# Patient Record
Sex: MALE | Race: WHITE | Employment: OTHER | URBAN - METROPOLITAN AREA
[De-identification: names, ages, dates, MRNs, and addresses within clinical notes are randomized per-mention and may not be internally consistent; named-entity substitution may affect disease eponyms.]

---

## 2017-02-01 ENCOUNTER — HOSPITAL ENCOUNTER (OUTPATIENT)
Dept: CT IMAGING | Age: 67
Discharge: HOME | End: 2017-02-01
Attending: SURGERY
Payer: MEDICARE

## 2017-02-01 DIAGNOSIS — I71.02 AORTIC DISSECTION, ABDOMINAL (HCC): ICD-10-CM

## 2017-02-01 LAB — CREAT UR-MCNC: 1.3 MG/DL (ref 0.6–1.3)

## 2017-02-01 PROCEDURE — 74174 CTA ABD&PLVS W/CONTRAST: CPT

## 2017-02-01 PROCEDURE — 82565 ASSAY OF CREATININE: CPT

## 2017-02-01 PROCEDURE — 74011636320 HC RX REV CODE- 636/320: Performed by: SURGERY

## 2017-02-01 RX ADMIN — IOPAMIDOL 100 ML: 755 INJECTION, SOLUTION INTRAVENOUS at 08:50

## 2017-02-07 ENCOUNTER — OFFICE VISIT (OUTPATIENT)
Dept: VASCULAR SURGERY | Age: 67
End: 2017-02-07

## 2017-02-07 VITALS
BODY MASS INDEX: 34.46 KG/M2 | WEIGHT: 260 LBS | RESPIRATION RATE: 20 BRPM | HEIGHT: 73 IN | HEART RATE: 88 BPM | DIASTOLIC BLOOD PRESSURE: 84 MMHG | SYSTOLIC BLOOD PRESSURE: 138 MMHG

## 2017-02-07 DIAGNOSIS — Z72.0 TOBACCO ABUSE: ICD-10-CM

## 2017-02-07 DIAGNOSIS — I71.40 ABDOMINAL AORTIC ANEURYSM (AAA) WITHOUT RUPTURE: Primary | ICD-10-CM

## 2017-02-07 NOTE — MR AVS SNAPSHOT
Visit Information Date & Time Provider Department Dept. Phone Encounter #  
 2/7/2017  9:15 AM Amanda Rothman, [address] [facility] [phone] [id number] Upcoming Health Maintenance Date Due DTaP/Tdap/Td series (1 - Tdap) 3/5/1972 FOBT Q 1 YEAR AGE 50-75 3/5/2001 ZOSTER VACCINE AGE 60> 3/5/2011 GLAUCOMA SCREENING Q2Y 3/5/2016 Pneumococcal 65+ Low/Medium Risk (1 of 2 - PCV13) 3/5/2016 MEDICARE YEARLY EXAM 3/5/2016 INFLUENZA AGE 9 TO ADULT 8/1/2016 Allergies as of 2/7/2017  Review Complete On: 2/7/2017 By: Risa Lobato LPN No Known Allergies Current Immunizations  Reviewed on 11/9/2015 Name Date Influenza Vaccine (Quad) PF 11/9/2015 Not reviewed this visit Vitals BP Pulse Resp Height(growth percentile) Weight(growth percentile) BMI  
 138/84 (BP 1 Location: Left arm, BP Patient Position: Sitting) 88 20 6' 1\" (1.854 m) 260 lb (117.9 kg) 34.3 kg/m2 Smoking Status Current Every Day Smoker Vitals History BMI and BSA Data Body Mass Index Body Surface Area  
 34.3 kg/m 2 2.46 m 2 Preferred Pharmacy Pharmacy Name Phone [facility] [address] [place], [address] Your Updated Medication List  
  
   
This list is accurate as of: 2/7/17  9:47 AM.  Always use your most recent med list.  
  
  
  
  
 albuterol 90 mcg/actuation inhaler Commonly known as:  PROVENTIL HFA, VENTOLIN HFA, PROAIR HFA Take 2 Puffs by inhalation every six (6) hours as needed for Wheezing. aspirin 81 mg chewable tablet Take 1 Tab by mouth daily. ATENOLOL PO Take 1 Tab by mouth. atorvastatin 40 mg tablet Commonly known as:  LIPITOR Take 40 mg by mouth two (2) times a day. carvedilol 6.25 mg tablet Commonly known as:  Ezequiel Liter Take  by mouth two (2) times daily (with meals). cyanocobalamin 1,000 mcg tablet Take 1,000 mcg by mouth daily. dexlansoprazole 30 mg capsule Commonly known as:  DEXILANT Take 1 Cap by mouth daily. ergocalciferol 50,000 unit capsule Commonly known as:  ERGOCALCIFEROL Take 1 capsule PO weekly, will be followed by 2000 units PO daily when complete  
  
 fluticasone-salmeterol 100-50 mcg/dose diskus inhaler Commonly known as:  ADVAIR Take 1 Puff by inhalation every twelve (12) hours. Indications: BRONCHOSPASM PREVENTION WITH COPD  
  
 furosemide 40 mg tablet Commonly known as:  LASIX Take  by mouth every seven (7) days. gabapentin 300 mg capsule Commonly known as:  NEURONTIN Take 2 by mouth every pm  
  
 * levothyroxine 137 mcg tablet Commonly known as:  SYNTHROID Take 137 mcg by mouth Daily (before breakfast). * levothyroxine 125 mcg tablet Commonly known as:  SYNTHROID  
  
 lisinopril 10 mg tablet Commonly known as:  Nonah Leidy Take 1 Tab by mouth daily. metoprolol tartrate 25 mg tablet Commonly known as:  LOPRESSOR Take 1 Tab by mouth every twelve (12) hours. nitroglycerin 0.4 mg SL tablet Commonly known as:  NITROSTAT  
1 Tab by SubLINGual route every five (5) minutes as needed for Chest Pain (if chest pain not resolved after taking 3 call 911 ). omega-3 acid ethyl esters 1 gram capsule Commonly known as:  Anthonette Rolling Take 2 Caps by mouth daily (with breakfast). rosuvastatin 20 mg tablet Commonly known as:  CRESTOR Take 1 Tab by mouth nightly. traMADol 50 mg tablet Commonly known as:  ULTRAM  
Take 1 Tab by mouth nightly as needed for Pain. Max Daily Amount: 50 mg. Indications: PAIN  
  
 * Notice: This list has 2 medication(s) that are the same as other medications prescribed for you. Read the directions carefully, and ask your doctor or other care provider to review them with you. Please provide this summary of care documentation to your next provider. Your primary care clinician is listed as Demetria Stroud. If you have any questions after today's visit, please call [phone].

## 2017-02-22 DIAGNOSIS — I71.02 AORTIC DISSECTION, ABDOMINAL (HCC): Primary | ICD-10-CM

## 2017-02-22 NOTE — PROGRESS NOTES
Type of procedure: CTA of abd/pelvis      Weight in appropriate:yes    Name of pre scriber:Dr. Eda Shaver    Date and time order was received: 02/22/17/1006

## 2017-02-23 ENCOUNTER — DOCUMENTATION ONLY (OUTPATIENT)
Dept: VASCULAR SURGERY | Age: 67
End: 2017-02-23

## 2017-02-23 NOTE — PROGRESS NOTES
Per Laureano Hodge at [facility] pt's aneurysm has not met criteria for the surgery per Dr Marcellus Diego. Surgery canceled because [facility] would not give authorization for the surgery.

## 2017-03-01 ENCOUNTER — TELEPHONE (OUTPATIENT)
Dept: VASCULAR SURGERY | Age: 67
End: 2017-03-01

## 2017-03-01 NOTE — TELEPHONE ENCOUNTER
Pt called and wanted to speak with Dr. Bharathi Farrell. He was suppose to have surgery on 02/23/17 but was cancelled by his cardiologist. He has been trying to get in touch with Dr. Tyler Kaur and see what is going on and could not get in touch with him. He knows that Dr. Tyler Kaur and Dr. Bharathi Farrell talked and he would like to know what is going on. Can you please rely this message. Pt will wait for a return call.

## 2017-03-06 ENCOUNTER — TELEPHONE (OUTPATIENT)
Dept: VASCULAR SURGERY | Age: 67
End: 2017-03-06

## 2017-03-06 NOTE — TELEPHONE ENCOUNTER
Discussed with provider , patient needs to get new CTA in  3 mo from the date of the last CTA. Called the patient and let him know this and he is agreeable to this plan. I advised would have someone call him with all his dates for scan and follow up appt.

## 2017-03-06 NOTE — TELEPHONE ENCOUNTER
Pt called, he states that has not heard anything and would like some answers. He said Dr Ron Garcia and Dr Lele Evans where suppose to discuss with Jourdan why he wants to do surgery. His insurance refussed the referral.  I did explain to him that Joe Guo is out on vacation this week.

## 2017-03-15 ENCOUNTER — TELEPHONE (OUTPATIENT)
Dept: VASCULAR SURGERY | Age: 67
End: 2017-03-15

## 2017-04-18 ENCOUNTER — TELEPHONE (OUTPATIENT)
Dept: VASCULAR SURGERY | Age: 67
End: 2017-04-18

## 2017-04-18 NOTE — TELEPHONE ENCOUNTER
Called PT and advised that notes were faxed to Dr. Darinel Mooney and Dr. Sunil Haque and he is aware of it.

## 2017-05-09 RX ORDER — ACETAMINOPHEN AND CODEINE PHOSPHATE 300; 30 MG/1; MG/1
1 TABLET ORAL
COMMUNITY
End: 2017-05-09

## 2017-05-11 NOTE — H&P
History and Physical   55-year-old male following up regarding surveillance and care for aortic aneurysm. He has this history of a thoracic dissection repaired at [facility] with a endovascular device. He has had no new abdominal or back pain he does have chronic back pain for which he had his last spinal injection in October 2016. He is treated for COPD with multiple inhalers, chronic back pain, degenerative aorta with aneurysm. He does continue to smoke. He has no new chest pain chronic shortness of breath and his ambulation is limited he walks with a cane.           Past Medical History   Diagnosis Date    CAD (coronary artery disease), native coronary artery         ALIDA X 2 to OM1    Carotid duplex 08/13/2013       Mild <50% bilateral ICA plaquing.  Complete heart block [facility]) June 2013       Medtronic dual chamber pacemaker    COPD (chronic obstructive pulmonary disease) (HCC)      Gastritis      GERD (gastroesophageal reflux disease)      High cholesterol      History of echocardiogram 06/04/2013       EF 60-65%. No WMA. Gr 1 DDfx. No significant valvular heart disease.  History of myocardial perfusion scan 04/18/2011       No ischemia or prior infarction. No WMA. EF 52%. Neg EKG on pharm stress test.    Hypertension      Hypothyroidism      Lower extremity venous duplex 01/06/2015       No DVT. Superficial insufficiency of right GSV. Deep venous reflux in right CFV & fem vein. Deep venous reflux in left CFV.  Renal duplex 12/24/2014       Mild < 60% RRA stenosis. Low parenchymal & LRA flow. Renal asymmetry. Intrinsic/med disease in right kidney. AAA (3.8 x 4.0 cm) infrarenal.    Right groin hernia         not resolved    S/P cardiac cath 06/02/2013       Diffuse atherosclerosis throughout. pRCA 50%. mLM 30%. mLAD 40%. oD2 (sm) 100%. pLCX 30%. p/mOM1 95/80% (o/lapping 2.25 x 23-mm & 2.25 x 12-mm Xience stents, resid 0%).             Patient Active Problem List   Diagnosis Code    HTN (hypertension) I10    Dyslipidemia E78.5    Aortic dissection, abdominal (HCC) I71.02    Coronary atherosclerosis of native coronary artery I25.10    Complete heart block (HCC) I44.2    Dyspnea R06.00    Lightheadedness R42    Vasovagal response R55    Tobacco abuse Z72.0    AAA (abdominal aortic aneurysm) (HCC) I71.4    Renal artery atherosclerosis, unilateral ([facility]) I70.1    Varicose vein of leg I83.90    Thyroid activity decreased E03.9    Right groin hernia K40.90    CAD (coronary artery disease), native coronary artery I25.10    Gastroesophageal reflux disease without esophagitis K21.9    Hypertriglyceridemia E78.1    Vitamin D deficiency E55.9    Elevated serum creatinine R79.89    HNP (herniated nucleus pulposus), lumbar M51.26    Facet arthritis of lumbar region M46.96    Chronic pain G89.29    Muscle spasm of back M62.830    Tobacco dependency F17.200    Spondylosis of lumbar region without myelopathy or radiculopathy M47.816    Lumbar facet arthropathy (HCC) M12.88    Arthritis of lumbar spine M47.9             Past Surgical History   Procedure Laterality Date    Hx pacemaker   6/2013       Medtronic Pacemaker     Hx coronary stent placement   6/2013    Hx cholecystectomy   2008    Hx hernia repair   1172       Umbilical             Current Outpatient Prescriptions   Medication Sig Dispense Refill    ATENOLOL PO Take 1 Tab by mouth.        levothyroxine (SYNTHROID) 125 mcg tablet          cyanocobalamin 1,000 mcg tablet Take 1,000 mcg by mouth daily.        atorvastatin (LIPITOR) 40 mg tablet Take 40 mg by mouth two (2) times a day.        carvedilol (COREG) 6.25 mg tablet Take by mouth two (2) times daily (with meals).        furosemide (LASIX) 40 mg tablet Take by mouth every seven (7) days.        lisinopril (PRINIVIL, ZESTRIL) 10 mg tablet Take 1 Tab by mouth daily. 30 Tab 3    metoprolol (LOPRESSOR) 25 mg tablet Take 1 Tab by mouth every twelve (12) hours.  60 Tab 3  traMADol (ULTRAM) 50 mg tablet Take 1 Tab by mouth nightly as needed for Pain. Max Daily Amount: 50 mg. Indications: PAIN 30 Tab 0    gabapentin (NEURONTIN) 300 mg capsule Take 2 by mouth every pm 60 Cap 5    fluticasone-salmeterol (ADVAIR) 100-50 mcg/dose diskus inhaler Take 1 Puff by inhalation every twelve (12) hours. Indications: BRONCHOSPASM PREVENTION WITH COPD 1 Inhaler 0    ergocalciferol (ERGOCALCIFEROL) 50,000 unit capsule Take 1 capsule PO weekly, will be followed by 2000 units PO daily when complete 4 Cap 2    omega-3 acid ethyl esters (LOVAZA) 1 gram capsule Take 2 Caps by mouth daily (with breakfast). 180 Cap 3    rosuvastatin (CRESTOR) 20 mg tablet Take 1 Tab by mouth nightly. 90 Tab 0    aspirin 81 mg chewable tablet Take 1 Tab by mouth daily. 30 Tab 11    levothyroxine (SYNTHROID) 137 mcg tablet Take 137 mcg by mouth Daily (before breakfast). 30 Tab 3    albuterol (PROVENTIL HFA, VENTOLIN HFA, PROAIR HFA) 90 mcg/actuation inhaler Take 2 Puffs by inhalation every six (6) hours as needed for Wheezing. 1 Inhaler 0    dexlansoprazole (DEXILANT) 30 mg capsule Take 1 Cap by mouth daily. 90 Cap 3    nitroglycerin (NITROSTAT) 0.4 mg SL tablet 1 Tab by SubLINGual route every five (5) minutes as needed for Chest Pain (if chest pain not resolved after taking 3 call 911 ).  30 Tab 3      No Known Allergies     Review of Systems   A full review of systems was completed times ten organ systems and was deemed negative unless otherwise mentioned in the HPI.     Physical        Visit Vitals    /84 (BP 1 Location: Left arm, BP Patient Position: Sitting)    Pulse 88    Resp 20    Ht 6' 1\" (1.854 m)    Wt 260 lb (117.9 kg)    BMI 34.3 kg/m2         In no acute distress today strong smell of tobacco  No facial symmetry pupils are equal  Neck no JVD I do not hear a carotid bruit  Chest coarse breath sounds throughout limited inspiration  Cardiac regular  Abdomen obese soft nontender unable to palpate any aneurysm  Extremities without edema strength and range of motion seems symmetrical  No signs of acute arterial insufficiency  No skin ulcerations notable  CAT scan shows stable infrarenal aneurysm read a report as 4.1 cm but I measured it to be 4.6 which is similar in size from the last CT  High-resolution images give a good look to the renals and the neck of the aneurysm and can resubmit for possibility of infrarenal stent graft     Impression/Plan:       ICD-10-CM ICD-9-CM     1. Abdominal aortic aneurysm (AAA) without rupture ([address] I71.4 441. 4     2. Tobacco abuse Z72.0 305. 1        EVAR

## 2017-05-17 ENCOUNTER — ANESTHESIA EVENT (OUTPATIENT)
Dept: CARDIOTHORACIC SURGERY | Age: 67
DRG: 269 | End: 2017-05-17
Payer: MEDICARE

## 2017-05-18 ENCOUNTER — HOSPITAL ENCOUNTER (INPATIENT)
Age: 67
LOS: 1 days | Discharge: HOME | DRG: 269 | End: 2017-05-19
Attending: SURGERY | Admitting: SURGERY
Payer: MEDICARE

## 2017-05-18 ENCOUNTER — ANESTHESIA (OUTPATIENT)
Dept: CARDIOTHORACIC SURGERY | Age: 67
DRG: 269 | End: 2017-05-18
Payer: MEDICARE

## 2017-05-18 ENCOUNTER — APPOINTMENT (OUTPATIENT)
Dept: GENERAL RADIOLOGY | Age: 67
DRG: 269 | End: 2017-05-18
Attending: SURGERY
Payer: MEDICARE

## 2017-05-18 PROBLEM — I71.40 AAA (ABDOMINAL AORTIC ANEURYSM) WITHOUT RUPTURE: Status: ACTIVE | Noted: 2017-05-18

## 2017-05-18 LAB
ABO + RH BLD: NORMAL
ACT BLD: 131 SECS (ref 79–138)
ACT BLD: 214 SECS (ref 79–138)
BLOOD GROUP ANTIBODIES SERPL: NORMAL
SPECIMEN EXP DATE BLD: NORMAL

## 2017-05-18 PROCEDURE — 77030019896 HC KT ARTERIAL LN TELE -B: Performed by: SURGERY

## 2017-05-18 PROCEDURE — 74011000258 HC RX REV CODE- 258

## 2017-05-18 PROCEDURE — 77030020061 HC IV BLD WRMR ADMIN SET 3M -B: Performed by: ANESTHESIOLOGY

## 2017-05-18 PROCEDURE — 77030027138 HC INCENT SPIROMETER -A

## 2017-05-18 PROCEDURE — C1768 GRAFT, VASCULAR: HCPCS | Performed by: SURGERY

## 2017-05-18 PROCEDURE — 74011250637 HC RX REV CODE- 250/637: Performed by: SURGERY

## 2017-05-18 PROCEDURE — 77030013058 HC DEV INFL ANGIO BSC -B: Performed by: SURGERY

## 2017-05-18 PROCEDURE — 74011250636 HC RX REV CODE- 250/636: Performed by: SURGERY

## 2017-05-18 PROCEDURE — C1751 CATH, INF, PER/CENT/MIDLINE: HCPCS | Performed by: SURGERY

## 2017-05-18 PROCEDURE — 74011250636 HC RX REV CODE- 250/636

## 2017-05-18 PROCEDURE — 77030010514 HC APPL CLP LIG COVD -B: Performed by: SURGERY

## 2017-05-18 PROCEDURE — 77030011640 HC PAD GRND REM COVD -A: Performed by: SURGERY

## 2017-05-18 PROCEDURE — 77030013079 HC BLNKT BAIR HGGR 3M -A: Performed by: ANESTHESIOLOGY

## 2017-05-18 PROCEDURE — 77030005401 HC CATH RAD ARRO -A: Performed by: ANESTHESIOLOGY

## 2017-05-18 PROCEDURE — 74011250636 HC RX REV CODE- 250/636: Performed by: NURSE ANESTHETIST, CERTIFIED REGISTERED

## 2017-05-18 PROCEDURE — 77030013797 HC KT TRNSDUC PRSSR EDWD -A: Performed by: ANESTHESIOLOGY

## 2017-05-18 PROCEDURE — 77030013797 HC KT TRNSDUC PRSSR EDWD -A: Performed by: SURGERY

## 2017-05-18 PROCEDURE — 74011000250 HC RX REV CODE- 250: Performed by: SURGERY

## 2017-05-18 PROCEDURE — 74011250637 HC RX REV CODE- 250/637: Performed by: NURSE ANESTHETIST, CERTIFIED REGISTERED

## 2017-05-18 PROCEDURE — C1769 GUIDE WIRE: HCPCS | Performed by: SURGERY

## 2017-05-18 PROCEDURE — 77030004530 HC CATH ANGI DX IMGR BSC -A: Performed by: SURGERY

## 2017-05-18 PROCEDURE — B31P1ZZ FLUOROSCOPY OF THORACO-ABDOMINAL AORTA USING LOW OSMOLAR CONTRAST: ICD-10-PCS | Performed by: SURGERY

## 2017-05-18 PROCEDURE — 75953 XR ENDOVASCULAR PLACMNT AAA: CPT

## 2017-05-18 PROCEDURE — 74011250636 HC RX REV CODE- 250/636: Performed by: ANESTHESIOLOGY

## 2017-05-18 PROCEDURE — 77030018673: Performed by: SURGERY

## 2017-05-18 PROCEDURE — C1725 CATH, TRANSLUMIN NON-LASER: HCPCS | Performed by: SURGERY

## 2017-05-18 PROCEDURE — C1894 INTRO/SHEATH, NON-LASER: HCPCS | Performed by: SURGERY

## 2017-05-18 PROCEDURE — 77030004552: Performed by: SURGERY

## 2017-05-18 PROCEDURE — 77030019908 HC STETH ESOPH SIMS -A: Performed by: ANESTHESIOLOGY

## 2017-05-18 PROCEDURE — 65620000000 HC RM CCU GENERAL

## 2017-05-18 PROCEDURE — 77030028837 HC SYR ANGI PWR INJ COEU -A: Performed by: SURGERY

## 2017-05-18 PROCEDURE — 76060000037 HC ANESTHESIA 3 TO 3.5 HR: Performed by: SURGERY

## 2017-05-18 PROCEDURE — 77030020782 HC GWN BAIR PAWS FLX 3M -B: Performed by: SURGERY

## 2017-05-18 PROCEDURE — 85347 COAGULATION TIME ACTIVATED: CPT

## 2017-05-18 PROCEDURE — 77030002986 HC SUT PROL J&J -A: Performed by: SURGERY

## 2017-05-18 PROCEDURE — 74011000250 HC RX REV CODE- 250

## 2017-05-18 PROCEDURE — 77030026438 HC STYL ET INTUB CARD -A: Performed by: ANESTHESIOLOGY

## 2017-05-18 PROCEDURE — 77030018719 HC DRSG PTCH ANTIMIC J&J -A: Performed by: ANESTHESIOLOGY

## 2017-05-18 PROCEDURE — 04V03E6 RESTRICT ABD AORTA, BIFURC, W FENESTR DEV 1 OR 2, PERC: ICD-10-PCS | Performed by: SURGERY

## 2017-05-18 PROCEDURE — 74011636320 HC RX REV CODE- 636/320

## 2017-05-18 PROCEDURE — 77030002933 HC SUT MCRYL J&J -A: Performed by: SURGERY

## 2017-05-18 PROCEDURE — 77030034850: Performed by: SURGERY

## 2017-05-18 PROCEDURE — 77030003390 HC NDL ANGI MRTM -A: Performed by: SURGERY

## 2017-05-18 PROCEDURE — 77030004922 HC CATH DX HI COOK -B: Performed by: SURGERY

## 2017-05-18 PROCEDURE — 74011250636 HC RX REV CODE- 250/636: Performed by: PHYSICIAN ASSISTANT

## 2017-05-18 PROCEDURE — 86900 BLOOD TYPING SEROLOGIC ABO: CPT | Performed by: SURGERY

## 2017-05-18 PROCEDURE — 77010033678 HC OXYGEN DAILY

## 2017-05-18 PROCEDURE — 74011636320 HC RX REV CODE- 636/320: Performed by: SURGERY

## 2017-05-18 PROCEDURE — 77030002996 HC SUT SLK J&J -A: Performed by: SURGERY

## 2017-05-18 PROCEDURE — 76010000202 HC CV SURG 3 TO 3.5 HR INTENSV-TIER 1: Performed by: SURGERY

## 2017-05-18 PROCEDURE — 77030008683 HC TU ET CUF COVD -A: Performed by: ANESTHESIOLOGY

## 2017-05-18 PROCEDURE — C1753 CATH, INTRAVAS ULTRASOUND: HCPCS | Performed by: SURGERY

## 2017-05-18 DEVICE — STENT GRAFT ETLW1613C124E ENDUR II LIMB
Type: IMPLANTABLE DEVICE | Site: AORTA | Status: FUNCTIONAL
Brand: ENDURANT® II

## 2017-05-18 DEVICE — STENT GRAFT ESBF3614C103E ENDUR IIS BIF
Type: IMPLANTABLE DEVICE | Site: AORTA | Status: FUNCTIONAL
Brand: ENDURANT® IIS

## 2017-05-18 RX ORDER — NITROGLYCERIN 0.4 MG/1
0.4 TABLET SUBLINGUAL
Status: DISCONTINUED | OUTPATIENT
Start: 2017-05-18 | End: 2017-05-19 | Stop reason: HOSPADM

## 2017-05-18 RX ORDER — OMEGA-3-ACID ETHYL ESTERS 1 G/1
2 CAPSULE, LIQUID FILLED ORAL
Status: DISCONTINUED | OUTPATIENT
Start: 2017-05-19 | End: 2017-05-19 | Stop reason: HOSPADM

## 2017-05-18 RX ORDER — HYDROMORPHONE HYDROCHLORIDE 1 MG/ML
.5-1 INJECTION, SOLUTION INTRAMUSCULAR; INTRAVENOUS; SUBCUTANEOUS
Status: DISCONTINUED | OUTPATIENT
Start: 2017-05-18 | End: 2017-05-19 | Stop reason: HOSPADM

## 2017-05-18 RX ORDER — SUCCINYLCHOLINE CHLORIDE 20 MG/ML
INJECTION INTRAMUSCULAR; INTRAVENOUS AS NEEDED
Status: DISCONTINUED | OUTPATIENT
Start: 2017-05-18 | End: 2017-05-18 | Stop reason: HOSPADM

## 2017-05-18 RX ORDER — SODIUM CHLORIDE 0.9 % (FLUSH) 0.9 %
5-10 SYRINGE (ML) INJECTION AS NEEDED
Status: DISCONTINUED | OUTPATIENT
Start: 2017-05-18 | End: 2017-05-18 | Stop reason: HOSPADM

## 2017-05-18 RX ORDER — CEFAZOLIN SODIUM 2 G/50ML
2 SOLUTION INTRAVENOUS EVERY 8 HOURS
Status: DISCONTINUED | OUTPATIENT
Start: 2017-05-18 | End: 2017-05-18 | Stop reason: HOSPADM

## 2017-05-18 RX ORDER — OXYCODONE AND ACETAMINOPHEN 5; 325 MG/1; MG/1
1 TABLET ORAL
Status: DISCONTINUED | OUTPATIENT
Start: 2017-05-18 | End: 2017-05-19 | Stop reason: HOSPADM

## 2017-05-18 RX ORDER — GUAIFENESIN 100 MG/5ML
81 LIQUID (ML) ORAL DAILY
Status: DISCONTINUED | OUTPATIENT
Start: 2017-05-19 | End: 2017-05-19 | Stop reason: HOSPADM

## 2017-05-18 RX ORDER — ATORVASTATIN CALCIUM 40 MG/1
40 TABLET, FILM COATED ORAL
Status: DISCONTINUED | OUTPATIENT
Start: 2017-05-18 | End: 2017-05-19 | Stop reason: HOSPADM

## 2017-05-18 RX ORDER — GABAPENTIN 300 MG/1
300 CAPSULE ORAL 3 TIMES DAILY
Status: DISCONTINUED | OUTPATIENT
Start: 2017-05-18 | End: 2017-05-19 | Stop reason: HOSPADM

## 2017-05-18 RX ORDER — LIDOCAINE HYDROCHLORIDE 10 MG/ML
INJECTION, SOLUTION EPIDURAL; INFILTRATION; INTRACAUDAL; PERINEURAL AS NEEDED
Status: DISCONTINUED | OUTPATIENT
Start: 2017-05-18 | End: 2017-05-18 | Stop reason: HOSPADM

## 2017-05-18 RX ORDER — ONDANSETRON 2 MG/ML
INJECTION INTRAMUSCULAR; INTRAVENOUS AS NEEDED
Status: DISCONTINUED | OUTPATIENT
Start: 2017-05-18 | End: 2017-05-18 | Stop reason: HOSPADM

## 2017-05-18 RX ORDER — SODIUM CHLORIDE 9 MG/ML
INJECTION, SOLUTION INTRAVENOUS
Status: DISCONTINUED | OUTPATIENT
Start: 2017-05-18 | End: 2017-05-18 | Stop reason: HOSPADM

## 2017-05-18 RX ORDER — FENTANYL CITRATE 50 UG/ML
50 INJECTION, SOLUTION INTRAMUSCULAR; INTRAVENOUS AS NEEDED
Status: DISCONTINUED | OUTPATIENT
Start: 2017-05-18 | End: 2017-05-19 | Stop reason: HOSPADM

## 2017-05-18 RX ORDER — SODIUM CHLORIDE 0.9 % (FLUSH) 0.9 %
5-10 SYRINGE (ML) INJECTION EVERY 8 HOURS
Status: DISCONTINUED | OUTPATIENT
Start: 2017-05-18 | End: 2017-05-18 | Stop reason: HOSPADM

## 2017-05-18 RX ORDER — PROPOFOL 10 MG/ML
INJECTION, EMULSION INTRAVENOUS AS NEEDED
Status: DISCONTINUED | OUTPATIENT
Start: 2017-05-18 | End: 2017-05-18 | Stop reason: HOSPADM

## 2017-05-18 RX ORDER — BUDESONIDE AND FORMOTEROL FUMARATE DIHYDRATE 80; 4.5 UG/1; UG/1
2 AEROSOL RESPIRATORY (INHALATION)
Status: DISCONTINUED | OUTPATIENT
Start: 2017-05-18 | End: 2017-05-19 | Stop reason: HOSPADM

## 2017-05-18 RX ORDER — SODIUM CHLORIDE 0.9 % (FLUSH) 0.9 %
5-10 SYRINGE (ML) INJECTION AS NEEDED
Status: DISCONTINUED | OUTPATIENT
Start: 2017-05-18 | End: 2017-05-19 | Stop reason: HOSPADM

## 2017-05-18 RX ORDER — ATENOLOL 50 MG/1
25 TABLET ORAL DAILY
Status: DISCONTINUED | OUTPATIENT
Start: 2017-05-19 | End: 2017-05-19 | Stop reason: HOSPADM

## 2017-05-18 RX ORDER — PANTOPRAZOLE SODIUM 40 MG/1
40 TABLET, DELAYED RELEASE ORAL
Status: DISCONTINUED | OUTPATIENT
Start: 2017-05-19 | End: 2017-05-19 | Stop reason: HOSPADM

## 2017-05-18 RX ORDER — HEPARIN SODIUM 200 [USP'U]/100ML
INJECTION, SOLUTION INTRAVENOUS
Status: DISCONTINUED
Start: 2017-05-18 | End: 2017-05-18

## 2017-05-18 RX ORDER — SODIUM CHLORIDE, SODIUM LACTATE, POTASSIUM CHLORIDE, CALCIUM CHLORIDE 600; 310; 30; 20 MG/100ML; MG/100ML; MG/100ML; MG/100ML
75 INJECTION, SOLUTION INTRAVENOUS CONTINUOUS
Status: DISCONTINUED | OUTPATIENT
Start: 2017-05-18 | End: 2017-05-19 | Stop reason: HOSPADM

## 2017-05-18 RX ORDER — ONDANSETRON 2 MG/ML
4 INJECTION INTRAMUSCULAR; INTRAVENOUS
Status: DISCONTINUED | OUTPATIENT
Start: 2017-05-18 | End: 2017-05-19 | Stop reason: HOSPADM

## 2017-05-18 RX ORDER — FAMOTIDINE 20 MG/1
20 TABLET, FILM COATED ORAL ONCE
Status: COMPLETED | OUTPATIENT
Start: 2017-05-18 | End: 2017-05-18

## 2017-05-18 RX ORDER — MAGNESIUM SULFATE 100 %
4 CRYSTALS MISCELLANEOUS AS NEEDED
Status: DISCONTINUED | OUTPATIENT
Start: 2017-05-18 | End: 2017-05-19 | Stop reason: HOSPADM

## 2017-05-18 RX ORDER — IODIXANOL 320 MG/ML
INJECTION, SOLUTION INTRAVASCULAR
Status: DISCONTINUED
Start: 2017-05-18 | End: 2017-05-18

## 2017-05-18 RX ORDER — NALOXONE HYDROCHLORIDE 0.4 MG/ML
0.4 INJECTION, SOLUTION INTRAMUSCULAR; INTRAVENOUS; SUBCUTANEOUS AS NEEDED
Status: DISCONTINUED | OUTPATIENT
Start: 2017-05-18 | End: 2017-05-19 | Stop reason: HOSPADM

## 2017-05-18 RX ORDER — ALBUTEROL SULFATE 90 UG/1
2 AEROSOL, METERED RESPIRATORY (INHALATION)
Status: DISCONTINUED | OUTPATIENT
Start: 2017-05-18 | End: 2017-05-19 | Stop reason: HOSPADM

## 2017-05-18 RX ORDER — SODIUM CHLORIDE 0.9 % (FLUSH) 0.9 %
5-10 SYRINGE (ML) INJECTION EVERY 8 HOURS
Status: DISCONTINUED | OUTPATIENT
Start: 2017-05-18 | End: 2017-05-19 | Stop reason: HOSPADM

## 2017-05-18 RX ORDER — MIDAZOLAM HYDROCHLORIDE 1 MG/ML
INJECTION, SOLUTION INTRAMUSCULAR; INTRAVENOUS AS NEEDED
Status: DISCONTINUED | OUTPATIENT
Start: 2017-05-18 | End: 2017-05-18 | Stop reason: HOSPADM

## 2017-05-18 RX ORDER — HYDROMORPHONE HYDROCHLORIDE 2 MG/ML
0.5 INJECTION, SOLUTION INTRAMUSCULAR; INTRAVENOUS; SUBCUTANEOUS AS NEEDED
Status: DISCONTINUED | OUTPATIENT
Start: 2017-05-18 | End: 2017-05-19 | Stop reason: SDUPTHER

## 2017-05-18 RX ORDER — LIDOCAINE HYDROCHLORIDE 10 MG/ML
INJECTION, SOLUTION EPIDURAL; INFILTRATION; INTRACAUDAL; PERINEURAL
Status: DISCONTINUED
Start: 2017-05-18 | End: 2017-05-18

## 2017-05-18 RX ORDER — IODIXANOL 320 MG/ML
INJECTION, SOLUTION INTRAVASCULAR AS NEEDED
Status: DISCONTINUED | OUTPATIENT
Start: 2017-05-18 | End: 2017-05-18 | Stop reason: HOSPADM

## 2017-05-18 RX ORDER — HEPARIN SODIUM 1000 [USP'U]/ML
INJECTION, SOLUTION INTRAVENOUS; SUBCUTANEOUS AS NEEDED
Status: DISCONTINUED | OUTPATIENT
Start: 2017-05-18 | End: 2017-05-18 | Stop reason: HOSPADM

## 2017-05-18 RX ORDER — CEFAZOLIN SODIUM 2 G/50ML
2 SOLUTION INTRAVENOUS EVERY 8 HOURS
Status: COMPLETED | OUTPATIENT
Start: 2017-05-18 | End: 2017-05-19

## 2017-05-18 RX ORDER — DEXTROSE 50 % IN WATER (D50W) INTRAVENOUS SYRINGE
25-50 AS NEEDED
Status: DISCONTINUED | OUTPATIENT
Start: 2017-05-18 | End: 2017-05-19 | Stop reason: HOSPADM

## 2017-05-18 RX ORDER — FENTANYL CITRATE 50 UG/ML
INJECTION, SOLUTION INTRAMUSCULAR; INTRAVENOUS AS NEEDED
Status: DISCONTINUED | OUTPATIENT
Start: 2017-05-18 | End: 2017-05-18 | Stop reason: HOSPADM

## 2017-05-18 RX ORDER — SODIUM CHLORIDE, SODIUM LACTATE, POTASSIUM CHLORIDE, CALCIUM CHLORIDE 600; 310; 30; 20 MG/100ML; MG/100ML; MG/100ML; MG/100ML
75 INJECTION, SOLUTION INTRAVENOUS CONTINUOUS
Status: DISCONTINUED | OUTPATIENT
Start: 2017-05-18 | End: 2017-05-18 | Stop reason: HOSPADM

## 2017-05-18 RX ORDER — LEVOTHYROXINE SODIUM 125 UG/1
125 TABLET ORAL
Status: DISCONTINUED | OUTPATIENT
Start: 2017-05-19 | End: 2017-05-19 | Stop reason: HOSPADM

## 2017-05-18 RX ORDER — FUROSEMIDE 40 MG/1
40 TABLET ORAL
Status: DISCONTINUED | OUTPATIENT
Start: 2017-05-18 | End: 2017-05-19 | Stop reason: HOSPADM

## 2017-05-18 RX ADMIN — FAMOTIDINE 20 MG: 20 TABLET ORAL at 07:44

## 2017-05-18 RX ADMIN — SODIUM CHLORIDE, SODIUM LACTATE, POTASSIUM CHLORIDE, AND CALCIUM CHLORIDE 75 ML/HR: 600; 310; 30; 20 INJECTION, SOLUTION INTRAVENOUS at 08:05

## 2017-05-18 RX ADMIN — FENTANYL CITRATE 100 MCG: 50 INJECTION, SOLUTION INTRAMUSCULAR; INTRAVENOUS at 08:58

## 2017-05-18 RX ADMIN — ATORVASTATIN CALCIUM 40 MG: 40 TABLET, FILM COATED ORAL at 21:37

## 2017-05-18 RX ADMIN — ONDANSETRON 4 MG: 2 INJECTION INTRAMUSCULAR; INTRAVENOUS at 11:06

## 2017-05-18 RX ADMIN — Medication 10 ML: at 21:36

## 2017-05-18 RX ADMIN — SUCCINYLCHOLINE CHLORIDE 160 MG: 20 INJECTION INTRAMUSCULAR; INTRAVENOUS at 08:58

## 2017-05-18 RX ADMIN — FENTANYL CITRATE 50 MCG: 50 INJECTION, SOLUTION INTRAMUSCULAR; INTRAVENOUS at 11:57

## 2017-05-18 RX ADMIN — Medication 10 ML: at 17:47

## 2017-05-18 RX ADMIN — SODIUM CHLORIDE, SODIUM LACTATE, POTASSIUM CHLORIDE, AND CALCIUM CHLORIDE 75 ML/HR: 600; 310; 30; 20 INJECTION, SOLUTION INTRAVENOUS at 13:52

## 2017-05-18 RX ADMIN — SODIUM CHLORIDE, SODIUM LACTATE, POTASSIUM CHLORIDE, AND CALCIUM CHLORIDE: 600; 310; 30; 20 INJECTION, SOLUTION INTRAVENOUS at 08:47

## 2017-05-18 RX ADMIN — HYDROMORPHONE HYDROCHLORIDE 1 MG: 1 INJECTION, SOLUTION INTRAMUSCULAR; INTRAVENOUS; SUBCUTANEOUS at 16:45

## 2017-05-18 RX ADMIN — HEPARIN SODIUM 2000 UNITS: 1000 INJECTION, SOLUTION INTRAVENOUS; SUBCUTANEOUS at 10:39

## 2017-05-18 RX ADMIN — OXYCODONE HYDROCHLORIDE AND ACETAMINOPHEN 1 TABLET: 5; 325 TABLET ORAL at 20:08

## 2017-05-18 RX ADMIN — HYDROMORPHONE HYDROCHLORIDE 0.5 MG: 2 INJECTION, SOLUTION INTRAMUSCULAR; INTRAVENOUS; SUBCUTANEOUS at 13:25

## 2017-05-18 RX ADMIN — CEFAZOLIN SODIUM 2 G: 2 SOLUTION INTRAVENOUS at 21:37

## 2017-05-18 RX ADMIN — Medication 10 ML: at 08:05

## 2017-05-18 RX ADMIN — ONDANSETRON 4 MG: 2 INJECTION INTRAMUSCULAR; INTRAVENOUS at 17:52

## 2017-05-18 RX ADMIN — GABAPENTIN 300 MG: 300 CAPSULE ORAL at 21:37

## 2017-05-18 RX ADMIN — SODIUM CHLORIDE: 9 INJECTION, SOLUTION INTRAVENOUS at 09:10

## 2017-05-18 RX ADMIN — CEFAZOLIN SODIUM 2 G: 2 SOLUTION INTRAVENOUS at 08:47

## 2017-05-18 RX ADMIN — HEPARIN SODIUM 8000 UNITS: 1000 INJECTION, SOLUTION INTRAVENOUS; SUBCUTANEOUS at 10:06

## 2017-05-18 RX ADMIN — GABAPENTIN 300 MG: 300 CAPSULE ORAL at 17:45

## 2017-05-18 RX ADMIN — IODIXANOL: 320 INJECTION, SOLUTION INTRAVASCULAR at 11:00

## 2017-05-18 RX ADMIN — PROPOFOL 150 MG: 10 INJECTION, EMULSION INTRAVENOUS at 08:58

## 2017-05-18 RX ADMIN — Medication: at 10:00

## 2017-05-18 RX ADMIN — MIDAZOLAM HYDROCHLORIDE 2 MG: 1 INJECTION, SOLUTION INTRAMUSCULAR; INTRAVENOUS at 08:47

## 2017-05-18 RX ADMIN — FUROSEMIDE 40 MG: 40 TABLET ORAL at 17:45

## 2017-05-18 RX ADMIN — CEFAZOLIN SODIUM 2 G: 2 SOLUTION INTRAVENOUS at 13:51

## 2017-05-18 NOTE — ANESTHESIA PROCEDURE NOTES
Arterial Line Placement    Start time: 5/18/2017 9:07 AM  End time: 5/18/2017 9:10 AM  Performed by: Aide Yanez  Authorized by: Aide Yanez     Pre-Procedure  Indications:  Arterial pressure monitoring and blood sampling  Timeout Time: 09:06        Procedure:   Prep:  Chlorhexidine  Orientation:  Left  Location:  Radial artery  Catheter size:  20 G  Number of attempts:  2  Cont Cardiac Output Sensor: No      Assessment:   Post-procedure:  Line secured and sterile dressing applied  Patient Tolerance:  Patient tolerated the procedure well with no immediate complications

## 2017-05-18 NOTE — PROGRESS NOTES
Bedside and Verbal shift change report given to Herrera Sherwood (oncoming nurse) by Silvana Loyd (offgoing nurse). Report included the following information SBAR, Kardex, MAR and Recent Results.        Silvana Loyd

## 2017-05-18 NOTE — IP AVS SNAPSHOT
Current Discharge Medication List  
  
START taking these medications Dose & Instructions Dispensing Information Comments Morning Noon Evening Bedtime  
 oxyCODONE-acetaminophen 5-325 mg per tablet Commonly known as:  PERCOCET Your last dose was: Your next dose is:    
   
   
 Dose:  1-2 Tab Take 1-2 Tabs by mouth every four (4) hours as needed for Pain. Max Daily Amount: 12 Tabs. Quantity:  60 Tab Refills:  0 CONTINUE these medications which have CHANGED Dose & Instructions Dispensing Information Comments Morning Noon Evening Bedtime  
 gabapentin 300 mg capsule Commonly known as:  NEURONTIN What changed:   
- how much to take - when to take this 
- additional instructions Your last dose was: Your next dose is: Take 2 by mouth every pm  
 Quantity:  60 Cap Refills:  5 CONTINUE these medications which have NOT CHANGED Dose & Instructions Dispensing Information Comments Morning Noon Evening Bedtime  
 acetaminophen 500 mg tablet Commonly known as:  TYLENOL Your last dose was: Your next dose is:    
   
   
 Dose:  500 mg Take 500 mg by mouth every six (6) hours as needed for Pain. Refills:  0  
     
   
   
   
  
 albuterol 90 mcg/actuation inhaler Commonly known as:  PROVENTIL HFA, VENTOLIN HFA, PROAIR HFA Your last dose was: Your next dose is:    
   
   
 Dose:  2 Puff Take 2 Puffs by inhalation every six (6) hours as needed for Wheezing. Quantity:  1 Inhaler Refills:  0  
     
   
   
   
  
 aspirin 81 mg chewable tablet Your last dose was: Your next dose is:    
   
   
 Dose:  81 mg Take 1 Tab by mouth daily. Quantity:  30 Tab Refills:  11  
     
   
   
   
  
 atenolol 25 mg tablet Commonly known as:  TENORMIN Your last dose was:     
   
Your next dose is:    
   
   
 Dose:  25 mg  
 Take 25 mg by mouth daily. Refills:  0  
     
   
   
   
  
 atorvastatin 40 mg tablet Commonly known as:  LIPITOR Your last dose was: Your next dose is:    
   
   
 Dose:  40 mg Take 40 mg by mouth nightly. Refills:  0  
     
   
   
   
  
 dexlansoprazole 30 mg capsule Commonly known as:  DEXILANT Your last dose was: Your next dose is:    
   
   
 Dose:  30 mg Take 1 Cap by mouth daily. Quantity:  90 Cap Refills:  3  
     
   
   
   
  
 fluticasone-salmeterol 100-50 mcg/dose diskus inhaler Commonly known as:  ADVAIR Your last dose was: Your next dose is:    
   
   
 Dose:  1 Puff Take 1 Puff by inhalation every twelve (12) hours. Indications: BRONCHOSPASM PREVENTION WITH COPD Quantity:  1 Inhaler Refills:  0  
     
   
   
   
  
 furosemide 40 mg tablet Commonly known as:  LASIX Your last dose was: Your next dose is: Take  by mouth every seven (7) days. Refills:  0  
     
   
   
   
  
 levothyroxine 125 mcg tablet Commonly known as:  SYNTHROID Your last dose was: Your next dose is:    
   
   
  Refills:  0  
     
   
   
   
  
 nitroglycerin 0.4 mg SL tablet Commonly known as:  NITROSTAT Your last dose was: Your next dose is:    
   
   
 Dose:  0.4 mg  
1 Tab by SubLINGual route every five (5) minutes as needed for Chest Pain (if chest pain not resolved after taking 3 call 911 ). Quantity:  30 Tab Refills:  3  
     
   
   
   
  
 omega-3 acid ethyl esters 1 gram capsule Commonly known as:  Sunshine Catena Your last dose was: Your next dose is:    
   
   
 Dose:  2 g Take 2 Caps by mouth daily (with breakfast). Quantity:  180 Cap Refills:  3 Where to Get Your Medications Information on where to get these meds will be given to you by the nurse or doctor. ! Ask your nurse or doctor about these medications oxyCODONE-acetaminophen 5-325 mg per tablet

## 2017-05-18 NOTE — ROUTINE PROCESS
Bedside, Verbal and Written shift change report given to WILIAM Alcala RN (oncoming nurse) by Jhon Strong RN (offgoing nurse). Report included the following information SBAR, Kardex, Intake/Output and Recent Results. Assumed care of pt laying in bed, aox4, following commands, states pain is 6/10, remains on telemetry, room air, louis, bilateral groin incisions, clean dry intact, right groin has small hematoma noted. 2200  Scheduled med's given, pt tolerated well. Pt resting, watching TV.  0000  Reassessment, pt c/o back pain, request pain med's. Repositioned. Swelling right groin unchanged. 0600  Repositioned, weighed pt, scheduled med's given. Pt appears asymptomatic.  0710  Bedside, Verbal and Written shift change report given to Janene Amaya RN (oncoming nurse) by Pj Sanabria. Rita Alcala RN (offgoing nurse). Report included the following information SBAR, Kardex, Intake/Output and Recent Results.

## 2017-05-18 NOTE — IP AVS SNAPSHOT
[address] [address] 
[phone] Patient: Bo Johnston MRN: [id number] [id number] You are allergic to the following No active allergies Recent Documentation Height Weight BMI Smoking Status 1.854 m 127 kg 36.94 kg/m2 Current Every Day Smoker Emergency Contacts Name Discharge Info Relation Home Work Mobile Katelynn Villa DISCHARGE CAREGIVER [3] Sister [23] [phone] About your hospitalization You were admitted on:  May 18, 2017 You last received care in the:  [facility] CV INTNS[facility] CARE You were discharged on:  May 19, 2017 Unit phone number:  [phone] Why you were hospitalized Your primary diagnosis was:  Not on File Your diagnoses also included:  Aaa (Abdominal Aortic Aneurysm) Without Rupture (Hcc) Providers Seen During Your Hospitalizations Provider Role Specialty Primary office phone Randell Sosa MD Attending Provider Vascular Surgery [phone] Your Primary Care Physician (PCP) Primary Care Physician Office Phone Office Fax [address], [address] [facility] [phone] Follow-up Information Follow up With Details Comments Contact Info Abby Mccarty MD On 5/24/2017 appointment time @10:10am [address] [facility] [id number] [phone] Randell Sosa MD On 6/2/2017 Appointment Time @9:00 [address] [facility] [address] [facility] 
[phone] Your Appointments Friday June 02, 2017  9:00 AM EDT HOSPITAL DISCHARGE with Randell Sosa MD  
[facility] [address] [place] [address]) 2300 [facility] 020 706 [facility]  
[phone] Current Discharge Medication List  
  
START taking these medications Dose & Instructions Dispensing Information Comments Morning Noon Evening Bedtime  
 oxyCODONE-acetaminophen 5-325 mg per tablet Commonly known as:  PERCOCET Your last dose was: Your next dose is:    
   
   
 Dose:  1-2 Tab Take 1-2 Tabs by mouth every four (4) hours as needed for Pain. Max Daily Amount: 12 Tabs. Quantity:  60 Tab Refills:  0 CONTINUE these medications which have CHANGED Dose & Instructions Dispensing Information Comments Morning Noon Evening Bedtime  
 gabapentin 300 mg capsule Commonly known as:  NEURONTIN What changed:   
- how much to take - when to take this 
- additional instructions Your last dose was: Your next dose is: Take 2 by mouth every pm  
 Quantity:  60 Cap Refills:  5 CONTINUE these medications which have NOT CHANGED Dose & Instructions Dispensing Information Comments Morning Noon Evening Bedtime  
 acetaminophen 500 mg tablet Commonly known as:  TYLENOL Your last dose was: Your next dose is:    
   
   
 Dose:  500 mg Take 500 mg by mouth every six (6) hours as needed for Pain. Refills:  0  
     
   
   
   
  
 albuterol 90 mcg/actuation inhaler Commonly known as:  PROVENTIL HFA, VENTOLIN HFA, PROAIR HFA Your last dose was: Your next dose is:    
   
   
 Dose:  2 Puff Take 2 Puffs by inhalation every six (6) hours as needed for Wheezing. Quantity:  1 Inhaler Refills:  0  
     
   
   
   
  
 aspirin 81 mg chewable tablet Your last dose was: Your next dose is:    
   
   
 Dose:  81 mg Take 1 Tab by mouth daily. Quantity:  30 Tab Refills:  11  
     
   
   
   
  
 atenolol 25 mg tablet Commonly known as:  TENORMIN Your last dose was: Your next dose is:    
   
   
 Dose:  25 mg Take 25 mg by mouth daily. Refills:  0  
     
   
   
   
  
 atorvastatin 40 mg tablet Commonly known as:  LIPITOR Your last dose was: Your next dose is:    
   
   
 Dose:  40 mg Take 40 mg by mouth nightly. Refills:  0  
     
   
   
   
  
 dexlansoprazole 30 mg capsule Commonly known as:  DEXILANT Your last dose was: Your next dose is:    
   
   
 Dose:  30 mg Take 1 Cap by mouth daily. Quantity:  90 Cap Refills:  3  
     
   
   
   
  
 fluticasone-salmeterol 100-50 mcg/dose diskus inhaler Commonly known as:  ADVAIR Your last dose was: Your next dose is:    
   
   
 Dose:  1 Puff Take 1 Puff by inhalation every twelve (12) hours. Indications: BRONCHOSPASM PREVENTION WITH COPD Quantity:  1 Inhaler Refills:  0  
     
   
   
   
  
 furosemide 40 mg tablet Commonly known as:  LASIX Your last dose was: Your next dose is: Take  by mouth every seven (7) days. Refills:  0  
     
   
   
   
  
 levothyroxine 125 mcg tablet Commonly known as:  SYNTHROID Your last dose was: Your next dose is:    
   
   
  Refills:  0  
     
   
   
   
  
 nitroglycerin 0.4 mg SL tablet Commonly known as:  NITROSTAT Your last dose was: Your next dose is:    
   
   
 Dose:  0.4 mg  
1 Tab by SubLINGual route every five (5) minutes as needed for Chest Pain (if chest pain not resolved after taking 3 call 911 ). Quantity:  30 Tab Refills:  3  
     
   
   
   
  
 omega-3 acid ethyl esters 1 gram capsule Commonly known as:  Kailey Omar Your last dose was: Your next dose is:    
   
   
 Dose:  2 g Take 2 Caps by mouth daily (with breakfast). Quantity:  180 Cap Refills:  3 Where to Get Your Medications Information on where to get these meds will be given to you by the nurse or doctor. ! Ask your nurse or doctor about these medications  
  oxyCODONE-acetaminophen 5-325 mg per tablet Discharge Instructions Call if fever>101.4F, red/pus at incisions, pain> meds. DISCHARGE SUMMARY from Nurse The following personal items are in your possession at time of discharge: 
 
Dental Appliances: None Visual Aid: None Home Medications: None Jewelry: Suzy Cabezascher Clothing: Shirt, Socks, Footwear, Undergarments, Pants Other Valuables: Eyeglasses, [employer], Wynonia Latty PATIENT INSTRUCTIONS: 
 
 
F-face looks uneven A-arms unable to move or move unevenly S-speech slurred or non-existent T-time-call 911 as soon as signs and symptoms begin-DO NOT go Back to bed or wait to see if you get better-TIME IS BRAIN. Warning Signs of HEART ATTACK Call 911 if you have these symptoms: 
? Chest discomfort. Most heart attacks involve discomfort in the center of the chest that lasts more than a few minutes, or that goes away and comes back. It can feel like uncomfortable pressure, squeezing, fullness, or pain. ? Discomfort in other areas of the upper body. Symptoms can include pain or discomfort in one or both arms, the back, neck, jaw, or stomach. ? Shortness of breath with or without chest discomfort. ? Other signs may include breaking out in a cold sweat, nausea, or lightheadedness. Don't wait more than five minutes to call [address]! Fast action can save your life. Calling 911 is almost always the fastest way to get lifesaving treatment. Emergency Medical Services staff can begin treatment when they arrive  up to an hour sooner than if someone gets to the hospital by car. The discharge information has been reviewed with the patient. The patient verbalized understanding.  
 
Discharge medications reviewed with the patient and appropriate educational materials and side effects teaching were provided. Discharge Instructions Attachments/References AORTIC ANEURYSM ABDOMINAL OR THORACIC: ENDOVASCULAR REPAIR: POST-OP (ENGLISH) Discharge Orders None [facility] Announcement We are excited to announce that we are making your provider's discharge notes available to you in "[employer]t. You will see these notes when they are completed and signed by the physician that discharged you from your recent hospital stay. If you have any questions or concerns about any information you see in "[employer], please call the Health Information Department where you were seen or reach out to your Primary Care Provider for more information about your plan of care. General Information Please provide this summary of care documentation to your next provider. Patient Signature:  ____________________________________________________________ Date:  ____________________________________________________________  
  
Rosana Cabrera Provider Signature:  ____________________________________________________________ Date:  ____________________________________________________________ More Information Endovascular Aortic Aneurysm Repair: What to Expect at Home Your Recovery Endovascular aortic aneurysm repair is a procedure to fix a weak and bulging section of the aorta. The aorta is the large blood vessel (artery) that carries blood from the heart through the belly to the rest of the body. The doctor put a man-made tube called a graft inside the aneurysm. Blood will pass through the graft in the aorta without pushing on the aneurysm. You can expect the cuts (incisions) in your groin to be sore for 1 to 2 weeks. If you have stitches or staples in your incisions, the doctor may need to take them out. You may feel more tired than usual for 1 to 2 weeks after surgery.  You may be able to do many of your usual activities after 1 to 2 weeks. But you will probably need up to 4 weeks to fully recover. Strenuous activities will not hurt the graft in your aorta, but they may cause problems with the incisions in your groin. You can be more active when your groin is no longer sore. This care sheet gives you a general idea about how long it will take for you to recover. But each person recovers at a different pace. Follow the steps below to get better as quickly as possible. How can you care for yourself at home? Activity · Rest when you feel tired. Getting enough sleep will help you recover. · Try to walk each day. Start by walking a little more than you did the day before. Bit by bit, increase the amount you walk. Walking boosts blood flow and helps prevent pneumonia and constipation. · Avoid strenuous activities, such as bicycle riding, jogging, weight lifting, or aerobic exercise. Your doctor will tell you when it's okay to do strenuous activity. · Ask your doctor when you can drive again. · You will probably need to take at least 1 to 2 weeks off from work. It depends on the type of work you do and how you feel. · You may shower as usual. Pat the incisions dry. Do not take a bath for the first 2 weeks, or until your doctor tells you it is okay. Diet · You can eat your normal diet. If your stomach is upset, try bland, low-fat foods like plain rice, broiled chicken, toast, and yogurt. · Drink plenty of fluids (unless your doctor tells you not to). · You may notice that your bowel movements are not regular right after your surgery. This is common. Try to avoid constipation and straining with bowel movements. You may want to take a fiber supplement every day. If you have not had a bowel movement after a couple of days, ask your doctor about taking a mild laxative. Medicines · Your doctor will tell you if and when you can restart your medicines.  He or she will also give you instructions about taking any new medicines. · If you take blood thinners, such as warfarin (Coumadin), clopidogrel (Plavix), or aspirin, be sure to talk to your doctor. He or she will tell you if and when to start taking those medicines again. Make sure that you understand exactly what your doctor wants you to do. · Be safe with medicines. Take pain medicines exactly as directed. ¨ If the doctor gave you a prescription medicine for pain, take it as prescribed. ¨ If you are not taking a prescription pain medicine, ask your doctor if you can take an over-the-counter medicine. ¨ Do not take two or more pain medicines at the same time unless the doctor told you to. Many pain medicines have acetaminophen, which is Tylenol. Too much acetaminophen (Tylenol) can be harmful. · If you think your pain medicine is making you sick to your stomach: 
¨ Take your medicine after meals (unless your doctor has told you not to). ¨ Ask your doctor for a different pain medicine. · If your doctor prescribed antibiotics, take them as directed. Do not stop taking them just because you feel better. You need to take the full course of antibiotics. Incision care · If you have strips of tape on the incisions, leave the tape on for a week or until it falls off. · Wash the area daily with water and pat it dry. Other cleaning products, such as hydrogen peroxide, can make the wounds heal more slowly. You may cover the area with a gauze bandage if it weeps or rubs against clothing. Change the bandage every day. · Keep the area clean and dry. Follow-up care is a key part of your treatment and safety. Be sure to make and go to all appointments, and call your doctor if you are having problems. It's also a good idea to know your test results and keep a list of the medicines you take. When should you call for help? Call 911 anytime you think you may need emergency care. For example, call if: · You passed out (lost consciousness). · You have severe trouble breathing. · You have sudden chest pain and shortness of breath, or you cough up blood or foamy, pink mucus. · You have a lump that is getting bigger under your skin where the incisions were made in your groin. · You have severe pain in your belly. · You have chest pain or pressure. This may occur with: ¨ Sweating. ¨ Shortness of breath. ¨ Nausea or vomiting. ¨ Pain that spreads from the chest to the neck, jaw, or one or both shoulders or arms. ¨ Dizziness or lightheadedness. ¨ A fast or uneven pulse. After calling 911, chew 1 adult-strength or 2 to 4 low-dose aspirin. Wait for an ambulance. Do not try to drive yourself. Call your doctor now or seek immediate medical care if: 
· You have new or increased shortness of breath. · You are dizzy or lightheaded, or you feel like you may faint. · You are sick to your stomach or cannot keep fluids down. · You have pain that does not get better after you take pain medicine. · You have a fever over 100°F. 
· You have loose stitches, or one of your incisions comes open. · Bright red blood has soaked through the bandage over your incisions. · You have signs of infection, such as: 
¨ Increased pain, swelling, warmth, or redness. ¨ Red streaks leading from the incisions. ¨ Pus draining from the incisions. ¨ Swollen lymph nodes in your neck, armpits, or groin. ¨ A fever. · You have signs of a blood clot, such as: 
¨ Pain in your calf, back of the knee, thigh, or groin. ¨ Redness and swelling in your leg or groin. Watch closely for any changes in your health, and be sure to contact your doctor if: 
· You have sudden weight gain, such as 3 pounds or more in 2 to 3 days. · You have increased swelling in your legs, ankles, or feet. Where can you learn more? Go to http://seema.info/.  
Enter L455 in the search box to learn more about \"Endovascular Aortic Aneurysm Repair: What to Expect at Home. \" Current as of: July 21, 2016 Content Version: 11.2 © 5547-0648 [employer]. Care instructions adapted under license by [employer] (which disclaims liability or warranty for this information). If you have questions about a medical condition or this instruction, always ask your healthcare professional. [address] [place] [address] any warranty or liability for your use of this information.

## 2017-05-18 NOTE — ANESTHESIA PREPROCEDURE EVALUATION
Anesthetic History   No history of anesthetic complications            Review of Systems / Medical History  Patient summary reviewed and pertinent labs reviewed    Pulmonary    COPD      Undiagnosed apnea and smoker         Neuro/Psych   Within defined limits           Cardiovascular    Hypertension        Dysrhythmias   Pacemaker, past MI, CAD, PAD and hyperlipidemia      Comments: AAA   GI/Hepatic/Renal     GERD           Endo/Other      Hypothyroidism  Morbid obesity and arthritis     Other Findings   Comments:   Risk Factors for Postoperative nausea/vomiting:       History of postoperative nausea/vomiting? NO       Female? NO       Motion sickness? NO       Intended opioid administration for postoperative analgesia? YES      Smoking Abstinence  Current Smoker? YES  Elective Surgery? YES  Seen preoperatively by anesthesiologist or proxy prior to day of surgery? YES  Pt abstained from smoking 24 hours prior to anesthesia?  NO           Physical Exam    Airway  Mallampati: III  TM Distance: 4 - 6 cm  Neck ROM: normal range of motion        Cardiovascular    Rhythm: regular  Rate: normal         Dental    Dentition: Poor dentition and Loose teeth     Pulmonary      Decreased breath sounds: bilateral           Abdominal  GI exam deferred       Other Findings            Anesthetic Plan    ASA: 4  Anesthesia type: general    Monitoring Plan: Arterial line      Induction: Intravenous  Anesthetic plan and risks discussed with: Patient

## 2017-05-18 NOTE — ANESTHESIA POSTPROCEDURE EVALUATION
Post-Anesthesia Evaluation and Assessment    Patient: Adrian Lee MRN: [id number]  SSN: [id number]    YOB: 1950  Age: 78 y.o. Sex: male     VS from flow sheet    Cardiovascular Function/Vital Signs  Visit Vitals    /69    Pulse 66    Temp 36.6 °C (97.8 °F)    Resp 18    Ht 6' 1\" (1.854 m)    Wt 126.1 kg (278 lb)    SpO2 100%    BMI 36.68 kg/m2       Patient is status post general anesthesia for Procedure(s):  AORTIC ABDOMINAL ANEURYSM REPAIR ENDOVASCULAR (EVAR). Nausea/Vomiting: None    Postoperative hydration reviewed and adequate. Pain:  Pain Scale 1: Numeric (0 - 10) (05/18/17 0721)  Pain Intensity 1: 5 (05/18/17 0721)   Managed    Neurological Status:   Neuro (WDL): Within Defined Limits (05/18/17 0727)   At baseline    Mental Status and Level of Consciousness: Arousable    Pulmonary Status:   O2 Device: Oxygen mask (05/18/17 1154)   Adequate oxygenation and airway patent    Complications related to anesthesia: None    Post-anesthesia assessment completed.  No concerns    Signed By: Camila Mart MD     May 18, 2017

## 2017-05-19 VITALS
HEIGHT: 73 IN | DIASTOLIC BLOOD PRESSURE: 52 MMHG | BODY MASS INDEX: 37.11 KG/M2 | RESPIRATION RATE: 10 BRPM | HEART RATE: 60 BPM | TEMPERATURE: 98 F | WEIGHT: 280 LBS | SYSTOLIC BLOOD PRESSURE: 96 MMHG | OXYGEN SATURATION: 93 %

## 2017-05-19 LAB
ANION GAP BLD CALC-SCNC: 6 MMOL/L (ref 3–18)
BUN SERPL-MCNC: 12 MG/DL (ref 7–18)
BUN/CREAT SERPL: 10 (ref 12–20)
CALCIUM SERPL-MCNC: 8.2 MG/DL (ref 8.5–10.1)
CHLORIDE SERPL-SCNC: 100 MMOL/L (ref 100–108)
CO2 SERPL-SCNC: 29 MMOL/L (ref 21–32)
CREAT SERPL-MCNC: 1.16 MG/DL (ref 0.6–1.3)
ERYTHROCYTE [DISTWIDTH] IN BLOOD BY AUTOMATED COUNT: 14.9 % (ref 11.6–14.5)
GLUCOSE SERPL-MCNC: 99 MG/DL (ref 74–99)
HCT VFR BLD AUTO: 36.1 % (ref 36–48)
HGB BLD-MCNC: 11.7 G/DL (ref 13–16)
MCH RBC QN AUTO: 28.5 PG (ref 24–34)
MCHC RBC AUTO-ENTMCNC: 32.4 G/DL (ref 31–37)
MCV RBC AUTO: 87.8 FL (ref 74–97)
PLATELET # BLD AUTO: 149 K/UL (ref 135–420)
PMV BLD AUTO: 10.5 FL (ref 9.2–11.8)
POTASSIUM SERPL-SCNC: 4.2 MMOL/L (ref 3.5–5.5)
RBC # BLD AUTO: 4.11 M/UL (ref 4.7–5.5)
SODIUM SERPL-SCNC: 135 MMOL/L (ref 136–145)
WBC # BLD AUTO: 9.8 K/UL (ref 4.6–13.2)

## 2017-05-19 PROCEDURE — 74011250636 HC RX REV CODE- 250/636: Performed by: ANESTHESIOLOGY

## 2017-05-19 PROCEDURE — 74011250636 HC RX REV CODE- 250/636: Performed by: SURGERY

## 2017-05-19 PROCEDURE — 85027 COMPLETE CBC AUTOMATED: CPT | Performed by: SURGERY

## 2017-05-19 PROCEDURE — 74011250637 HC RX REV CODE- 250/637: Performed by: SURGERY

## 2017-05-19 PROCEDURE — 80048 BASIC METABOLIC PNL TOTAL CA: CPT | Performed by: SURGERY

## 2017-05-19 RX ORDER — OXYCODONE AND ACETAMINOPHEN 5; 325 MG/1; MG/1
1-2 TABLET ORAL
Qty: 60 TAB | Refills: 0 | Status: SHIPPED | OUTPATIENT
Start: 2017-05-19 | End: 2017-05-30

## 2017-05-19 RX ADMIN — Medication 10 ML: at 05:47

## 2017-05-19 RX ADMIN — ASPIRIN 81 MG 81 MG: 81 TABLET ORAL at 08:16

## 2017-05-19 RX ADMIN — ATENOLOL 25 MG: 50 TABLET ORAL at 08:16

## 2017-05-19 RX ADMIN — CEFAZOLIN SODIUM 2 G: 2 SOLUTION INTRAVENOUS at 05:46

## 2017-05-19 RX ADMIN — BUDESONIDE AND FORMOTEROL FUMARATE DIHYDRATE 2 PUFF: 80; 4.5 AEROSOL RESPIRATORY (INHALATION) at 08:17

## 2017-05-19 RX ADMIN — OXYCODONE HYDROCHLORIDE AND ACETAMINOPHEN 1 TABLET: 5; 325 TABLET ORAL at 00:12

## 2017-05-19 RX ADMIN — HYDROMORPHONE HYDROCHLORIDE 1 MG: 1 INJECTION, SOLUTION INTRAMUSCULAR; INTRAVENOUS; SUBCUTANEOUS at 10:20

## 2017-05-19 RX ADMIN — LEVOTHYROXINE SODIUM 125 MCG: 125 TABLET ORAL at 08:17

## 2017-05-19 RX ADMIN — OMEGA-3-ACID ETHYL ESTERS 2000 MG: 1 CAPSULE, LIQUID FILLED ORAL at 08:17

## 2017-05-19 RX ADMIN — PANTOPRAZOLE SODIUM 40 MG: 40 TABLET, DELAYED RELEASE ORAL at 08:16

## 2017-05-19 RX ADMIN — SODIUM CHLORIDE, SODIUM LACTATE, POTASSIUM CHLORIDE, AND CALCIUM CHLORIDE 75 ML/HR: 600; 310; 30; 20 INJECTION, SOLUTION INTRAVENOUS at 00:25

## 2017-05-19 RX ADMIN — GABAPENTIN 300 MG: 300 CAPSULE ORAL at 08:16

## 2017-05-19 RX ADMIN — OXYCODONE HYDROCHLORIDE AND ACETAMINOPHEN 1 TABLET: 5; 325 TABLET ORAL at 08:17

## 2017-05-19 RX ADMIN — ONDANSETRON 4 MG: 2 INJECTION INTRAMUSCULAR; INTRAVENOUS at 08:16

## 2017-05-19 NOTE — DISCHARGE INSTRUCTIONS
Call if fever>101.4F, red/pus at incisions, pain> meds. DISCHARGE SUMMARY from Nurse    The following personal items are in your possession at time of discharge:    Dental Appliances: None  Visual Aid: None     Home Medications: None  Jewelry: Earrings, Necklace  Clothing: Shirt, Socks, Footwear, Undergarments, Pants  Other Valuables: Eyeglasses, Cell Phone, Wallet, Cane             PATIENT INSTRUCTIONS:    After general anesthesia or intravenous sedation, for 24 hours or while taking prescription Narcotics:  · Limit your activities  · Do not drive and operate hazardous machinery  · Do not make important personal or business decisions  · Do  not drink alcoholic beverages  · If you have not urinated within 8 hours after discharge, please contact your surgeon on call. Report the following to your surgeon:  · Excessive pain, swelling, redness or odor of or around the surgical area  · Temperature over 100.5  · Nausea and vomiting lasting longer than 4 hours or if unable to take medications  · Any signs of decreased circulation or nerve impairment to extremity: change in color, persistent  numbness, tingling, coldness or increase pain  · Any questions        What to do at Home:  Recommended activity: Activity as tolerated    If you experience any of the following symptoms Call if fever>101.4F, red/pus at incisions, pain> meds. *  Please give a list of your current medications to your Primary Care Provider. *  Please update this list whenever your medications are discontinued, doses are      changed, or new medications (including over-the-counter products) are added. *  Please carry medication information at all times in case of emergency situations. These are general instructions for a healthy lifestyle:    No smoking/ No tobacco products/ Avoid exposure to second hand smoke    Surgeon General's Warning:  Quitting smoking now greatly reduces serious risk to your health.     Obesity, smoking, and sedentary lifestyle greatly increases your risk for illness    A healthy diet, regular physical exercise & weight monitoring are important for maintaining a healthy lifestyle    You may be retaining fluid if you have a history of heart failure or if you experience any of the following symptoms:  Weight gain of 3 pounds or more overnight or 5 pounds in a week, increased swelling in our hands or feet or shortness of breath while lying flat in bed. Please call your doctor as soon as you notice any of these symptoms; do not wait until your next office visit. Recognize signs and symptoms of STROKE:    F-face looks uneven    A-arms unable to move or move unevenly    S-speech slurred or non-existent    T-time-call 911 as soon as signs and symptoms begin-DO NOT go       Back to bed or wait to see if you get better-TIME IS BRAIN. Warning Signs of HEART ATTACK     Call 911 if you have these symptoms:   Chest discomfort. Most heart attacks involve discomfort in the center of the chest that lasts more than a few minutes, or that goes away and comes back. It can feel like uncomfortable pressure, squeezing, fullness, or pain.  Discomfort in other areas of the upper body. Symptoms can include pain or discomfort in one or both arms, the back, neck, jaw, or stomach.  Shortness of breath with or without chest discomfort.  Other signs may include breaking out in a cold sweat, nausea, or lightheadedness. Don't wait more than five minutes to call 911 - MINUTES MATTER! Fast action can save your life. Calling 911 is almost always the fastest way to get lifesaving treatment. Emergency Medical Services staff can begin treatment when they arrive -- up to an hour sooner than if someone gets to the hospital by car. The discharge information has been reviewed with the patient. The patient verbalized understanding.     Discharge medications reviewed with the patient and appropriate educational materials and side effects teaching were provided.

## 2017-05-19 NOTE — PROGRESS NOTES
met with patient while in Cardiac ICU, completed the initial Spiritual Assessment of the patient, and offered Pastoral Care, see flow sheets for interventions. Patient was standing up in his room and said he is being discharged today. He is [place]. Pastoral support provided. Patient does not have any Evangelical/cultural needs that will affect patients preferences in health care. Chart reviewed. Chaplains will continue to follow and will provide pastoral care on an as needed/as requested basis. Camacho Nj MDiv.   Board Certified [employer] [phone]

## 2017-05-19 NOTE — PROGRESS NOTES
SW contacted regarding this patient by RN. Per RN patient expressed he cannot afford his discharge pain medication. SW informed her the patient is insured and does not qualify for indigent meds. Additionally, policy explained that indigent meds will not provide narcotic pain medications. Per Saul Strange, without going through insurance, prescription will cost $17.  SW suggested provider recommend another, more affordable, alternative.

## 2017-05-19 NOTE — OP NOTES
[address]    Name:  Cassie Randall  MR#:  [id number]  :  1950  Account #:  [de-identified]  Date of Adm:  2017  Date of Surgery:  2017      SURGEON: Kathy Soliman DO    PREOPERATIVE DIAGNOSIS: Complex aortic aneurysm with  expansion. POSTOPERATIVE DIAGNOSIS: Complex aortic aneurysm with  expansion. PROCEDURES PERFORMED: Bilateral open femoral exposure,  [id number]; placement of catheter in aorta,  05633; repair of aortic  aneurysm with a modular bifurcated device, [id number]; radiology s&I  endovascular aortic repair, [id number]; IVUS noncoronary primary  vessel with interpretation, 78585, [id number]. COMPLICATIONS: 0.    ESTIMATED BLOOD LOSS: 150 mL. CONDITION OF THE PATIENT: Stable. ANESTHESIA: General.    SPECIMENS REMOVED: None. DESCRIPTION OF PROCEDURE: The patient is a 81-year-old with an  overall degenerative aorta. He has had an emergency thoracic  procedure for which he has a stenting of his arch and ascending aorta. Remainder of his aorta has a small saccular aneurysm of the posterior  and visceral area that has been stable and then an enlarging infrarenal  portion that is interesting. The infrarenal portion is tight at the  bifurcation causing pressurization of the sac, likely causing enlarging and  also losing landing zones for endovascular repair. He is at really just  past the minimal criteria to still remain safe for this. I had a lengthy  discussion with him and his team and I feel that if he is going to be a  endovasc, should be down now before he loses his opportunity to be an  endovascular. Certainly open he would be fraught with complications  no doubt. We agreed upon this plan. He was brought to the OR, he had a preoperative antibiotic. He was  transferred to the room where he had general anesthesia, an A-line  was placed. He had 2 peripheral IVs, 18 and 20-gauge. Romero catheter  was placed.  He had a shave, prep and draped from the chest to the  knees following [facility] compliant guidelines for aseptic technique. I did  bilateral open femoral exposures. He had prior femoral artery access,  so there was some scar tissue noted, but safe exposure of the bilateral  common femorals done, double vessel was placed on each. First, I  punctured the right common femoral, placed a wire in the aorta,  followed this wire with a directional catheter all the way up into the  thoracic aorta, placed it within the stent of the repaired thoracic  portion. I then heparinized him with 8000 of heparin and placed an 11-  [place] dilator and sheath in exchange for a stiff wire. Advanced the  IVUS catheter over this wire and performed a loop from the visceral to  the external iliac. Of note, we noted the renal arteries and the diameter  measurements here appeared to be a 15 mm zone of neck for healthy  proximal seal and it was sized appropriate for a 36 graft. The  aneurysm was degenerative, lobular as see on CT. The common iliac  was diffusely 13 and smaller, the external was patent and position of  the hypogastric was noted. I accessed the left femoral, placed a wire  up to the same level on the chest, placed an 11-[place] long sheath  into the aorta. With this, I brought the endograft, a 36 x 14 x  103, chose a system to have a short body, so that the 13 limbs will be  at the bifurcation. I brought this on the right side  over the stiff wire,  the left side being exchanged for a flush catheter with marker bands,  brought up into position at the area of the renals. Under magnification  performed an aortogram. The aorta is patent. The aneurysm was noted  and the renal arteries are noted. I did markings and deployed the  proximal cuff landing with the covered portion just below the renals,  really at the ureteral stents, but not covering them. To hold this position  I deployed the top crown and then opened the gate deploying in a  cross limb today.  I cannulated the gate with a Glidewire, placed   catheter, twirled and pulled down in to the endograft to prove that it  was intraluminal. I was satisfied with this, I placed a stiff wire, pulled  back the marker catheter and did a left pelvic arteriogram. The  hypogastric distance to the bifurcation was measured and a 16 x 13 x  124 modular extension was deployed with 4 cm of overlap and landing  just above the hypogastric artery. The remainder of the endograft was  deployed, captured the crown under direct visualization and removed  the delivery mechanism. I placed a sheath and the marker catheter  and did a right pelvic arteriogram. The hypogastric was patent notably,  external and common were notable. This measured for appropriate   as well as a 16 x 13 x 124 modular piece was deployed with  appropriate overlap. I used a Reliant balloon to balloon the entire  endoprosthesis at the bifurcation. I opened 2 Reliant balloons at the  same time, they opened nicely, there was no flow impingement. Exchanged the wire for a catheter, I did an aortogram. The proximal  aorta was patent. The bilateral renal artery stents and renal arteries  are patent, they are not covered by fabric. The stent is patent, there is  no leak. There is good limb separation and the runoff is into the  external and internal iliac arteries without any difficulty. I was pleased  with this. I removed the wires and sheaths, repaired the femorals with  interrupted Prolene sutures, 5-0 and 6-0. Handheld Doppler confirms  flow through the vessels. There was no bleeding. I irrigated and closed  with interrupted 2-0 Monocryl for the fascia, 3-0 Monocryl  subcutaneous, and 4-0 Monocryl and Dermabond glue for the skin. He  was extubated and transferred to recovery in stable condition.         DO TJ Squires MP  D:  05/18/2017   15:50  T:  05/19/2017   00:24  Job #:  [id number]

## 2017-05-19 NOTE — DISCHARGE SUMMARY
Physician Discharge Summary     Patient ID:  Bette Santos  [id number]  86 y.o.  1950    Admit date: 5/18/2017    Discharge date: 5/19/2017      Admitting Physician: Stella Joseph MD     Discharge Physician: Stella Joseph MD     Admission Diagnoses: AAA (abdominal aortic aneurysm) without rupture ([facility] [address] [F31. *    Discharge Diagnoses: There are no discharge diagnoses documented for the most recent discharge. Procedures for this admission: Procedure(s):  AORTIC ABDOMINAL ANEURYSM REPAIR ENDOVASCULAR ([facility])    Discharged Condition: stable    Hospital Course: AAA     Bette Santos  went to the operating room and underwent endovascular repair of his enlarging abdominal aortic aneurysm. He has done well, and his recovery in the cardiovascular intensive care unit has been uneventful. On the morning after surgery, the patient is awake, alert, and oriented. Hemodynamically stable. Lower extremities show no evidence of ischemia. The  abdomen is soft and non tender. Laboratory studies are unremarkable. After ambulating and taking breakfast this morning, he should be ready for hospital discharge. Consults: None    Significant Diagnostic Studies: none    Treatments: IV hydration and analgesia: Morphine    Discharge Exam: GEN: alert, cooperative, no distress, appears stated age  THROAT & NECK: normal and no erythema or exudates noted. LUNG: clear to auscultation bilaterally  HEART: regular rate and rhythm, S1, S2 normal, no murmur, click, rub or gallop  ABDOMEN:  no change  EXTREMITIES:   right lower extremity  extremities normal, atraumatic, no cyanosis or edema and left lower extremity  extremities normal, atraumatic, no cyanosis or edema    Disposition: home    Patient Instructions:   Current Discharge Medication List      START taking these medications    Details   oxyCODONE-acetaminophen (PERCOCET) 5-325 mg per tablet Take 1-2 Tabs by mouth every four (4) hours as needed for Pain.  Max Daily Amount: 12 Tabs. Qty: 60 Tab, Refills: 0         CONTINUE these medications which have NOT CHANGED    Details   atenolol (TENORMIN) 25 mg tablet Take 25 mg by mouth daily. acetaminophen (TYLENOL) 500 mg tablet Take 500 mg by mouth every six (6) hours as needed for Pain.      levothyroxine (SYNTHROID) 125 mcg tablet       atorvastatin (LIPITOR) 40 mg tablet Take 40 mg by mouth nightly.      gabapentin (NEURONTIN) 300 mg capsule Take 2 by mouth every pm  Qty: 60 Cap, Refills: 5    Associated Diagnoses: HNP (herniated nucleus pulposus), lumbar      fluticasone-salmeterol (ADVAIR) 100-50 mcg/dose diskus inhaler Take 1 Puff by inhalation every twelve (12) hours. Indications: BRONCHOSPASM PREVENTION WITH COPD  Qty: 1 Inhaler, Refills: 0    Associated Diagnoses: COPD (chronic obstructive pulmonary disease) ([facility])      omega-3 acid ethyl esters (LOVAZA) 1 gram capsule Take 2 Caps by mouth daily (with breakfast). Qty: 180 Cap, Refills: 3    Associated Diagnoses: Hypertriglyceridemia      aspirin 81 mg chewable tablet Take 1 Tab by mouth daily. Qty: 30 Tab, Refills: 11    Associated Diagnoses: Essential hypertension      albuterol (PROVENTIL HFA, VENTOLIN HFA, PROAIR HFA) 90 mcg/actuation inhaler Take 2 Puffs by inhalation every six (6) hours as needed for Wheezing. Qty: 1 Inhaler, Refills: 0    Associated Diagnoses: Current smoker; Wheezing; Shortness of breath      dexlansoprazole (DEXILANT) 30 mg capsule Take 1 Cap by mouth daily. Qty: 90 Cap, Refills: 3    Associated Diagnoses: Gastroesophageal reflux disease without esophagitis      furosemide (LASIX) 40 mg tablet Take  by mouth every seven (7) days. nitroglycerin (NITROSTAT) 0.4 mg SL tablet 1 Tab by SubLINGual route every five (5) minutes as needed for Chest Pain (if chest pain not resolved after taking 3 call 911 ).   Qty: 30 Tab, Refills: 3             Reference discharge instructions as provided by nursing for diet, labs, medications, activity, wound care and any outpatient referrals. Follow-up with Dr. Kenroy Jacob 2 weeks. Signed:   Cleo Jacob MD  5/19/2017  9:32 AM

## 2017-05-19 NOTE — PROGRESS NOTES
Up in chair, sore at groins, tolerated breakfast  Incisions clean dry and intact  Labs stable, VSS  No SOB, no CP  abd soft  Legs stable and warm    Plan for dc today

## 2017-05-20 NOTE — DISCHARGE SUMMARY
[address] [place] SUMMARY    Name:  Melisa Lynn  MR#:  [id number]  :  1950  Account #:  [de-identified]  Date of Adm:  2017  Date of Discharge:  2017      REASON FOR ADMISSION: Treatment of abdominal aortic aneurysm. HOSPITAL COURSE: The patient was admitted to the hospital,  had standard preoperative antibiotic. General anesthesia. He had an  endovascular repair of an infrarenal aortic aneurysm without any  difficulty. His postop course was without complications. Romero catheter  was removed. His A line was removed. He sat up in a chair and ate  breakfast, was able to walk. Had a lengthy discussion with him, he said  he felt like he wanted to go home. Discharge him today in a stable  condition with no change in his medications and did have a  prescription for Percocet for him. DISPOSITION FOR DISCHARGE: Stable. FOLLOWUP: With Dr. Corie Smith in 1 week.         Shelli Younger DO CM / FLORENTIN  D:  2017   14:51  T:  2017   21:01  Job #:  [id number]

## 2017-05-23 ENCOUNTER — HOSPITAL ENCOUNTER (EMERGENCY)
Age: 67
Discharge: HOME | End: 2017-05-24
Attending: EMERGENCY MEDICINE | Admitting: EMERGENCY MEDICINE
Payer: MEDICARE

## 2017-05-23 ENCOUNTER — APPOINTMENT (OUTPATIENT)
Dept: CT IMAGING | Age: 67
End: 2017-05-23
Attending: EMERGENCY MEDICINE
Payer: MEDICARE

## 2017-05-23 DIAGNOSIS — R10.84 ABDOMINAL PAIN, GENERALIZED: Primary | ICD-10-CM

## 2017-05-23 LAB
ANION GAP BLD CALC-SCNC: 7 MMOL/L (ref 3–18)
BASOPHILS # BLD AUTO: 0 K/UL (ref 0–0.1)
BASOPHILS # BLD: 0 % (ref 0–2)
BUN SERPL-MCNC: 15 MG/DL (ref 7–18)
BUN/CREAT SERPL: 12 (ref 12–20)
CALCIUM SERPL-MCNC: 8.9 MG/DL (ref 8.5–10.1)
CHLORIDE SERPL-SCNC: 100 MMOL/L (ref 100–108)
CK MB CFR SERPL CALC: 1.7 % (ref 0–4)
CK MB SERPL-MCNC: 1.5 NG/ML (ref 5–25)
CK SERPL-CCNC: 89 U/L (ref 39–308)
CO2 SERPL-SCNC: 30 MMOL/L (ref 21–32)
CREAT SERPL-MCNC: 1.23 MG/DL (ref 0.6–1.3)
DIFFERENTIAL METHOD BLD: ABNORMAL
EOSINOPHIL # BLD: 0.2 K/UL (ref 0–0.4)
EOSINOPHIL NFR BLD: 3 % (ref 0–5)
ERYTHROCYTE [DISTWIDTH] IN BLOOD BY AUTOMATED COUNT: 14.7 % (ref 11.6–14.5)
GLUCOSE SERPL-MCNC: 92 MG/DL (ref 74–99)
HCT VFR BLD AUTO: 38.3 % (ref 36–48)
HGB BLD-MCNC: 12.9 G/DL (ref 13–16)
LYMPHOCYTES # BLD AUTO: 18 % (ref 21–52)
LYMPHOCYTES # BLD: 1.5 K/UL (ref 0.9–3.6)
MCH RBC QN AUTO: 29.1 PG (ref 24–34)
MCHC RBC AUTO-ENTMCNC: 33.7 G/DL (ref 31–37)
MCV RBC AUTO: 86.3 FL (ref 74–97)
MONOCYTES # BLD: 0.8 K/UL (ref 0.05–1.2)
MONOCYTES NFR BLD AUTO: 9 % (ref 3–10)
NEUTS SEG # BLD: 5.8 K/UL (ref 1.8–8)
NEUTS SEG NFR BLD AUTO: 70 % (ref 40–73)
PLATELET # BLD AUTO: 195 K/UL (ref 135–420)
PMV BLD AUTO: 11.1 FL (ref 9.2–11.8)
POTASSIUM SERPL-SCNC: 3.6 MMOL/L (ref 3.5–5.5)
RBC # BLD AUTO: 4.44 M/UL (ref 4.7–5.5)
SODIUM SERPL-SCNC: 137 MMOL/L (ref 136–145)
TROPONIN I SERPL-MCNC: <0.02 NG/ML (ref 0–0.04)
WBC # BLD AUTO: 8.4 K/UL (ref 4.6–13.2)

## 2017-05-23 PROCEDURE — 96374 THER/PROPH/DIAG INJ IV PUSH: CPT

## 2017-05-23 PROCEDURE — 74011250636 HC RX REV CODE- 250/636: Performed by: EMERGENCY MEDICINE

## 2017-05-23 PROCEDURE — 82550 ASSAY OF CK (CPK): CPT | Performed by: EMERGENCY MEDICINE

## 2017-05-23 PROCEDURE — 99285 EMERGENCY DEPT VISIT HI MDM: CPT

## 2017-05-23 PROCEDURE — 74011636320 HC RX REV CODE- 636/320: Performed by: EMERGENCY MEDICINE

## 2017-05-23 PROCEDURE — 80048 BASIC METABOLIC PNL TOTAL CA: CPT | Performed by: EMERGENCY MEDICINE

## 2017-05-23 PROCEDURE — 85025 COMPLETE CBC W/AUTO DIFF WBC: CPT | Performed by: EMERGENCY MEDICINE

## 2017-05-23 PROCEDURE — 96375 TX/PRO/DX INJ NEW DRUG ADDON: CPT

## 2017-05-23 RX ORDER — ONDANSETRON 2 MG/ML
4 INJECTION INTRAMUSCULAR; INTRAVENOUS
Status: COMPLETED | OUTPATIENT
Start: 2017-05-23 | End: 2017-05-23

## 2017-05-23 RX ORDER — HYDROMORPHONE HYDROCHLORIDE 1 MG/ML
1 INJECTION, SOLUTION INTRAMUSCULAR; INTRAVENOUS; SUBCUTANEOUS ONCE
Status: COMPLETED | OUTPATIENT
Start: 2017-05-23 | End: 2017-05-23

## 2017-05-23 RX ADMIN — IOPAMIDOL 80 ML: 612 INJECTION, SOLUTION INTRAVENOUS at 23:46

## 2017-05-23 RX ADMIN — HYDROMORPHONE HYDROCHLORIDE 1 MG: 1 INJECTION, SOLUTION INTRAMUSCULAR; INTRAVENOUS; SUBCUTANEOUS at 23:51

## 2017-05-23 RX ADMIN — ONDANSETRON 4 MG: 2 INJECTION INTRAMUSCULAR; INTRAVENOUS at 23:50

## 2017-05-24 ENCOUNTER — APPOINTMENT (OUTPATIENT)
Dept: CT IMAGING | Age: 67
End: 2017-05-24
Attending: EMERGENCY MEDICINE
Payer: MEDICARE

## 2017-05-24 VITALS
HEART RATE: 63 BPM | SYSTOLIC BLOOD PRESSURE: 115 MMHG | BODY MASS INDEX: 37.24 KG/M2 | DIASTOLIC BLOOD PRESSURE: 65 MMHG | OXYGEN SATURATION: 97 % | WEIGHT: 281 LBS | HEIGHT: 73 IN | TEMPERATURE: 98.3 F | RESPIRATION RATE: 14 BRPM

## 2017-05-24 PROCEDURE — 74174 CTA ABD&PLVS W/CONTRAST: CPT

## 2017-05-24 NOTE — ED PROVIDER NOTES
Catina Lizeth  [address] [facility] EMERGENCY DEPT      78 y.o. male with noted past medical history of HTN, Cardiac Cath, GERD, CAD, COPD, and Smoking 1PPD who presents to the emergency department via EMS with c/o lower abd pain onset 6 days ago s/p having Aortic Abd Aneurysm repair 6 days ago. Pt reports constipation, and back pain. Pt denied n/v/d, CP, SOB, or cough. Pt noted to be on Percocet that he is not out of. All other sx denied. No other complaints at this time. No other complaints. Current Facility-Administered Medications   Medication Dose Route Frequency    iopamidol (ISOVUE 300) 61 % contrast injection  mL   mL IntraVENous RAD ONCE     Current Outpatient Prescriptions   Medication Sig    oxyCODONE-acetaminophen (PERCOCET) 5-325 mg per tablet Take 1-2 Tabs by mouth every four (4) hours as needed for Pain. Max Daily Amount: 12 Tabs.  atenolol (TENORMIN) 25 mg tablet Take 25 mg by mouth daily.  acetaminophen (TYLENOL) 500 mg tablet Take 500 mg by mouth every six (6) hours as needed for Pain.  levothyroxine (SYNTHROID) 125 mcg tablet     atorvastatin (LIPITOR) 40 mg tablet Take 40 mg by mouth nightly.  furosemide (LASIX) 40 mg tablet Take  by mouth every seven (7) days.  gabapentin (NEURONTIN) 300 mg capsule Take 2 by mouth every pm (Patient taking differently: 300 mg three (3) times daily. Take 2 by mouth every pm)    fluticasone-salmeterol (ADVAIR) 100-50 mcg/dose diskus inhaler Take 1 Puff by inhalation every twelve (12) hours. Indications: BRONCHOSPASM PREVENTION WITH COPD    omega-3 acid ethyl esters (LOVAZA) 1 gram capsule Take 2 Caps by mouth daily (with breakfast).  aspirin 81 mg chewable tablet Take 1 Tab by mouth daily.  albuterol (PROVENTIL HFA, VENTOLIN HFA, PROAIR HFA) 90 mcg/actuation inhaler Take 2 Puffs by inhalation every six (6) hours as needed for Wheezing.  dexlansoprazole (DEXILANT) 30 mg capsule Take 1 Cap by mouth daily.     nitroglycerin (NITROSTAT) 0.4 mg SL tablet 1 Tab by SubLINGual route every five (5) minutes as needed for Chest Pain (if chest pain not resolved after taking 3 call 911 ). Past Medical History:   Diagnosis Date    Arthritis     CAD (coronary artery disease), native coronary artery     ALIDA X 2 to OM1    Carotid duplex 08/13/2013    Mild <50% bilateral ICA plaquing.  Complete heart block [facility]) June 2013    Medtronic dual chamber pacemaker    COPD (chronic obstructive pulmonary disease) (HCC)     Gastritis     GERD (gastroesophageal reflux disease)     High cholesterol     History of echocardiogram 06/04/2013    EF 60-65%. No WMA. Gr 1 DDfx. No significant valvular heart disease.  History of myocardial perfusion scan 04/18/2011    No ischemia or prior infarction. No WMA. EF 52%. Neg EKG on pharm stress test.    Hypertension     Hypothyroidism     Lower extremity venous duplex 01/06/2015    No DVT. Superficial insufficiency of right GSV. Deep venous reflux in right CFV & fem vein. Deep venous reflux in left CFV.  Renal duplex 12/24/2014    Mild < 60% RRA stenosis. Low parenchymal & LRA flow. Renal asymmetry. Intrinsic/med disease in right kidney. AAA (3.8 x 4.0 cm) infrarenal.    Right groin hernia     not resolved    S/P cardiac cath 06/02/2013    Diffuse atherosclerosis throughout. pRCA 50%. mLM 30%. mLAD 40%. oD2 (sm) 100%. pLCX 30%. p/mOM1 95/80% (o/lapping 2.25 x 23-mm & 2.25 x 12-mm Xience stents, resid 0%).                    Past Surgical History:   Procedure Laterality Date    HX CHOLECYSTECTOMY  2008    HX CORONARY STENT PLACEMENT  6/2013    HX HERNIA REPAIR  9309    Umbilical    HX PACEMAKER  6/2013    Medtronic Pacemaker        Family History   Problem Relation Age of Onset    Cancer Mother      pancreatic    Heart Attack Father     Heart Disease Father     Diabetes Sister     Heart Disease Sister     Diabetes Brother     Stroke Brother        Social History Social History    Marital status:      Spouse name: N/A    Number of children: N/A    Years of education: N/A     Occupational History    Not on file. Social History Main Topics    Smoking status: Current Every Day Smoker     Packs/day: 1.00     Types: Cigarettes    Smokeless tobacco: Never Used    Alcohol use No    Drug use: No    Sexual activity: Not on file     Other Topics Concern     Service No    Blood Transfusions No    Caffeine Concern Yes    Occupational Exposure No    Hobby Hazards No    Sleep Concern No    Stress Concern No    Weight Concern No    Special Diet No    Back Care Yes    Exercise No    Bike Helmet No    Seat Belt Yes     Social History Narrative       No Known Allergies    Patient's primary care provider (as noted in EPIC):  Omar Harper MD    REVIEW OF SYSTEMS:    Constitutional:  Negative for diaphoresis. HENT:  Negative for congestion. Respiratory:  Negative for cough and shortness of breath. Cardiovascular:  Negative for chest pain and palpitations. Gastrointestinal:  Negative for diarrhea. Positive for abd pain, and constipation. Genitourinary:  Negative for flank pain. Musculoskeletal:  Negative for back pain. Skin:  Negative for pallor. Neurological:  Negative for weakness. Visit Vitals    BP (!) 135/110 (BP 1 Location: Right arm, BP Patient Position: At rest)    Pulse 74    Temp 98.1 °F (36.7 °C)    Resp 15    Ht 6' 1\" (1.854 m)    Wt 127.5 kg (281 lb)    SpO2 99%    BMI 37.07 kg/m2       Patient Vitals for the past 12 hrs:   Temp Pulse Resp BP SpO2   05/23/17 2227 98.1 °F (36.7 °C) 74 15 (!) 135/110 99 %       PHYSICAL EXAM:    CONSTITUTIONAL:  Alert, in no apparent distress;  well developed;  well nourished. HEAD:  Normocephalic, atraumatic. EYES:  EOMI. Non-icteric sclera. Normal conjunctiva. ENTM:  Nose:  no rhinorrhea. Throat:  no erythema or exudate, mucous membranes moist.  NECK:  No JVD. Supple  RESPIRATORY:  Chest clear, equal breath sounds, good air movement. CARDIOVASCULAR:  Regular rate and rhythm. No murmurs, rubs, or gallops. GI:  Normal bowel sounds, abdomen soft with bilateral lower abdominal TTP near surgical incision sites. No cellulitis. Wounds are closed. No discharge. No foul odor. No rebound or guarding. Purple-green abdominal wall SQ bruising noted. BACK:  Non-tender. UPPER EXT:  Normal inspection. LOWER EXT:  No edema, no calf tenderness. Distal pulses intact. NEURO:  Moves all four extremities, and grossly normal motor exam.  SKIN:  No rashes;  Normal for age. PSYCH:  Alert and normal affect. DIFFERENTIAL DIAGNOSES/ MEDICAL DECISION MAKING:  Appendicitis, diverticulitis, constipation related pain, obstruction, perforation, abscess, pyelonephritis, urinary tract infection, abdominal wall pain, referred upper abdominal pain, versus combination of the above and/or numerous other processes/ etiologies. Higher on ddx in this patient is post-surgical pain.        Abnormal lab results from this emergency department encounter:  Labs Reviewed   CBC WITH AUTOMATED DIFF - Abnormal; Notable for the following:        Result Value    RBC 4.44 (*)     HGB 12.9 (*)     RDW 14.7 (*)     LYMPHOCYTES 18 (*)     All other components within normal limits   METABOLIC PANEL, BASIC - Abnormal; Notable for the following:     GFR est non-AA 59 (*)     All other components within normal limits   CARDIAC PANEL,(CK, CKMB & TROPONIN)       Lab values for this patient within approximately the last 12 hours:  Recent Results (from the past 12 hour(s))   CBC WITH AUTOMATED DIFF    Collection Time: 05/23/17 10:29 PM   Result Value Ref Range    WBC 8.4 4.6 - 13.2 K/uL    RBC 4.44 (L) 4.70 - 5.50 M/uL    HGB 12.9 (L) 13.0 - 16.0 g/dL    HCT 38.3 36.0 - 48.0 %    MCV 86.3 74.0 - 97.0 FL    MCH 29.1 24.0 - 34.0 PG    MCHC 33.7 31.0 - 37.0 g/dL    RDW 14.7 (H) 11.6 - 14.5 %    PLATELET 511 878 - 200 K/uL    MPV 11.1 9.2 - 11.8 FL    NEUTROPHILS 70 40 - 73 %    LYMPHOCYTES 18 (L) 21 - 52 %    MONOCYTES 9 3 - 10 %    EOSINOPHILS 3 0 - 5 %    BASOPHILS 0 0 - 2 %    ABS. NEUTROPHILS 5.8 1.8 - 8.0 K/UL    ABS. LYMPHOCYTES 1.5 0.9 - 3.6 K/UL    ABS. MONOCYTES 0.8 0.05 - 1.2 K/UL    ABS. EOSINOPHILS 0.2 0.0 - 0.4 K/UL    ABS. BASOPHILS 0.0 0.0 - 0.1 K/UL    DF AUTOMATED     METABOLIC PANEL, BASIC    Collection Time: 05/23/17 10:29 PM   Result Value Ref Range    Sodium 137 136 - 145 mmol/L    Potassium 3.6 3.5 - 5.5 mmol/L    Chloride 100 100 - 108 mmol/L    CO2 30 21 - 32 mmol/L    Anion gap 7 3.0 - 18 mmol/L    Glucose 92 74 - 99 mg/dL    BUN 15 7.0 - 18 MG/DL    Creatinine 1.23 0.6 - 1.3 MG/DL    BUN/Creatinine ratio 12 12 - 20      GFR est AA >60 >60 ml/min/1.73m2    GFR est non-AA 59 (L) >60 ml/min/1.73m2    Calcium 8.9 8.5 - 10.1 MG/DL   CARDIAC PANEL,(CK, CKMB & TROPONIN)    Collection Time: 05/23/17 10:29 PM   Result Value Ref Range    CK 89 39 - 308 U/L    CK - MB 1.5 <3.6 ng/ml    CK-MB Index 1.7 0.0 - 4.0 %    Troponin-I, Qt. <0.02 0.0 - 0.045 NG/ML       Radiologist and cardiologist interpretations if available at time of this note:  No results found. Medication(s) ordered for patient during this emergency visit encounter:  Medications   iopamidol (ISOVUE 300) 61 % contrast injection  mL (not administered)   HYDROmorphone (PF) (DILAUDID) injection 1 mg (1 mg IntraVENous Given 5/23/17 2351)   ondansetron (ZOFRAN) injection 4 mg (4 mg IntraVENous Given 5/23/17 7330)       IMPRESSION AND MEDICAL DECISION MAKING:  Based upon the patient's presentation with noted HPI and PE, along with the work   up done in the emergency department, I believe that the patient is having abdominal pain of uncertain etiology. However, I do believe that the patient is stable and can be discharged home with further outpatient evaluation of the abdominal pain by her primary doctor. DIAGNOSIS:  1.  Abdominal pain.  2. S/p AAA repair within the last week. 3. Constipation, most likely secondary to narcotic use     SPECIFIC PATIENT INSTRUCTIONS FROM THE PHYSICIAN WHO TREATED YOU IN THE ER TODAY:  1. Return if any concerns or worsening of condition(s)  2. Percocet as previously prescribed for pain not controlled with over the counter ibuprofen. 3. Follow up with your primary doctor in the next 2-4 days for reevaluation. 4. Use over the counter fiber additives, such as metamucil, daily as directed on the container to minimize constipation. 5. Dulcolax as prescribed for constipation. Onesimo Laura M.D. Provider Attestation:  If a scribe was utilized in generation of this patient record, I personally performed the services described in the documentation, reviewed the documentation, as recorded by the scribe in my presence, and it accurately records the patient's history of presenting illness, review of systems, patient physical examination, and procedures performed by me as the attending physician. Onesimo Laura M.D. [facility] Board Certified Emergency Physician  5/23/2017.  11:13 PM    Scribe Attestation  Liam Bertrand scribing for and in the presence of Steele Siemens, MD 11:16 PM, 05/24/17. Physician Attestation  I personally performed the services described in the documentation, reviewed the documentation, as recorded by the scribe in my presence, and it accurately and completely records my words and actions.     Steele Siemens, MD 11:16 PM 05/24/17        Signed by : Nikki Kyle, 05/24/17 at 11:16 PM

## 2017-05-24 NOTE — ED NOTES
Discharge instructions reviewed with patient on downtime form. All forms will be scanned into chart. Pt left facility stable and ambulatory. Patient armband removed and given to patient to take home. Patient was informed of the privacy risks if armband lost or stolen.

## 2017-05-24 NOTE — ED TRIAGE NOTES
Pt arrives to ED via EMS. Per EMS, pt is c/o abdominal pain and also bleeding from an incision from a procedure done at this facility last Thursday. Pt is alert and oriented. PT is also c/o constipation x7 days.

## 2017-05-27 ENCOUNTER — HOSPITAL ENCOUNTER (EMERGENCY)
Age: 67
Discharge: HOME | End: 2017-05-27
Attending: EMERGENCY MEDICINE | Admitting: EMERGENCY MEDICINE
Payer: MEDICARE

## 2017-05-27 ENCOUNTER — APPOINTMENT (OUTPATIENT)
Dept: GENERAL RADIOLOGY | Age: 67
End: 2017-05-27
Attending: PHYSICIAN ASSISTANT
Payer: MEDICARE

## 2017-05-27 VITALS
OXYGEN SATURATION: 100 % | HEART RATE: 72 BPM | SYSTOLIC BLOOD PRESSURE: 124 MMHG | RESPIRATION RATE: 16 BRPM | TEMPERATURE: 98.9 F | DIASTOLIC BLOOD PRESSURE: 68 MMHG

## 2017-05-27 DIAGNOSIS — E86.0 DEHYDRATION: ICD-10-CM

## 2017-05-27 DIAGNOSIS — N30.00 ACUTE CYSTITIS WITHOUT HEMATURIA: ICD-10-CM

## 2017-05-27 DIAGNOSIS — K59.03 DRUG-INDUCED CONSTIPATION: Primary | ICD-10-CM

## 2017-05-27 LAB
ALBUMIN SERPL BCP-MCNC: 3 G/DL (ref 3.4–5)
ALBUMIN/GLOB SERPL: 0.6 {RATIO} (ref 0.8–1.7)
ALP SERPL-CCNC: 93 U/L (ref 45–117)
ALT SERPL-CCNC: 30 U/L (ref 16–61)
ANION GAP BLD CALC-SCNC: 10 MMOL/L (ref 3–18)
APPEARANCE UR: CLEAR
APTT PPP: 23 SEC (ref 23–36.4)
AST SERPL W P-5'-P-CCNC: 26 U/L (ref 15–37)
BACTERIA URNS QL MICRO: ABNORMAL /HPF
BASOPHILS # BLD AUTO: 0 K/UL (ref 0–0.1)
BASOPHILS # BLD: 0 % (ref 0–2)
BILIRUB SERPL-MCNC: 0.9 MG/DL (ref 0.2–1)
BILIRUB UR QL: ABNORMAL
BUN SERPL-MCNC: 18 MG/DL (ref 7–18)
BUN/CREAT SERPL: 12 (ref 12–20)
CALCIUM SERPL-MCNC: 8.8 MG/DL (ref 8.5–10.1)
CHLORIDE SERPL-SCNC: 100 MMOL/L (ref 100–108)
CK MB CFR SERPL CALC: 1 % (ref 0–4)
CK MB SERPL-MCNC: 1 NG/ML (ref 5–25)
CK SERPL-CCNC: 104 U/L (ref 39–308)
CO2 SERPL-SCNC: 28 MMOL/L (ref 21–32)
COLOR UR: ABNORMAL
CREAT SERPL-MCNC: 1.52 MG/DL (ref 0.6–1.3)
DIFFERENTIAL METHOD BLD: ABNORMAL
EOSINOPHIL # BLD: 0.1 K/UL (ref 0–0.4)
EOSINOPHIL NFR BLD: 0 % (ref 0–5)
EPITH CASTS URNS QL MICRO: ABNORMAL /LPF (ref 0–5)
ERYTHROCYTE [DISTWIDTH] IN BLOOD BY AUTOMATED COUNT: 14.9 % (ref 11.6–14.5)
GLOBULIN SER CALC-MCNC: 5.1 G/DL (ref 2–4)
GLUCOSE SERPL-MCNC: 120 MG/DL (ref 74–99)
GLUCOSE UR STRIP.AUTO-MCNC: NEGATIVE MG/DL
HCT VFR BLD AUTO: 41 % (ref 36–48)
HGB BLD-MCNC: 13.7 G/DL (ref 13–16)
HGB UR QL STRIP: NEGATIVE
HYALINE CASTS URNS QL MICRO: ABNORMAL /LPF (ref 0–2)
INR PPP: 1.1 (ref 0.8–1.2)
KETONES UR QL STRIP.AUTO: NEGATIVE MG/DL
LEUKOCYTE ESTERASE UR QL STRIP.AUTO: ABNORMAL
LYMPHOCYTES # BLD AUTO: 5 % (ref 21–52)
LYMPHOCYTES # BLD: 0.7 K/UL (ref 0.9–3.6)
MCH RBC QN AUTO: 29.1 PG (ref 24–34)
MCHC RBC AUTO-ENTMCNC: 33.4 G/DL (ref 31–37)
MCV RBC AUTO: 87.2 FL (ref 74–97)
MONOCYTES # BLD: 1.3 K/UL (ref 0.05–1.2)
MONOCYTES NFR BLD AUTO: 8 % (ref 3–10)
MUCOUS THREADS URNS QL MICRO: ABNORMAL /LPF
NEUTS SEG # BLD: 13 K/UL (ref 1.8–8)
NEUTS SEG NFR BLD AUTO: 87 % (ref 40–73)
NITRITE UR QL STRIP.AUTO: POSITIVE
PH UR STRIP: 5.5 [PH] (ref 5–8)
PLATELET # BLD AUTO: 247 K/UL (ref 135–420)
PMV BLD AUTO: 10.4 FL (ref 9.2–11.8)
POTASSIUM SERPL-SCNC: 4.2 MMOL/L (ref 3.5–5.5)
PROT SERPL-MCNC: 8.1 G/DL (ref 6.4–8.2)
PROT UR STRIP-MCNC: ABNORMAL MG/DL
PROTHROMBIN TIME: 13.4 SEC (ref 11.5–15.2)
RBC # BLD AUTO: 4.7 M/UL (ref 4.7–5.5)
RBC #/AREA URNS HPF: ABNORMAL /HPF (ref 0–5)
SODIUM SERPL-SCNC: 138 MMOL/L (ref 136–145)
SP GR UR REFRACTOMETRY: >1.03 (ref 1–1.03)
TROPONIN I SERPL-MCNC: <0.02 NG/ML (ref 0–0.04)
UROBILINOGEN UR QL STRIP.AUTO: 1 EU/DL (ref 0.2–1)
WBC # BLD AUTO: 15 K/UL (ref 4.6–13.2)
WBC URNS QL MICRO: ABNORMAL /HPF (ref 0–4)

## 2017-05-27 PROCEDURE — 74011250636 HC RX REV CODE- 250/636: Performed by: PHYSICIAN ASSISTANT

## 2017-05-27 PROCEDURE — 85025 COMPLETE CBC W/AUTO DIFF WBC: CPT | Performed by: PHYSICIAN ASSISTANT

## 2017-05-27 PROCEDURE — 99285 EMERGENCY DEPT VISIT HI MDM: CPT

## 2017-05-27 PROCEDURE — 96360 HYDRATION IV INFUSION INIT: CPT

## 2017-05-27 PROCEDURE — P9612 CATHETERIZE FOR URINE SPEC: HCPCS

## 2017-05-27 PROCEDURE — 74011000272 HC RX REV CODE- 272: Performed by: PHYSICIAN ASSISTANT

## 2017-05-27 PROCEDURE — 93005 ELECTROCARDIOGRAM TRACING: CPT

## 2017-05-27 PROCEDURE — 82550 ASSAY OF CK (CPK): CPT | Performed by: PHYSICIAN ASSISTANT

## 2017-05-27 PROCEDURE — 85730 THROMBOPLASTIN TIME PARTIAL: CPT | Performed by: PHYSICIAN ASSISTANT

## 2017-05-27 PROCEDURE — 81001 URINALYSIS AUTO W/SCOPE: CPT | Performed by: PHYSICIAN ASSISTANT

## 2017-05-27 PROCEDURE — 80053 COMPREHEN METABOLIC PANEL: CPT | Performed by: PHYSICIAN ASSISTANT

## 2017-05-27 PROCEDURE — 74011250637 HC RX REV CODE- 250/637: Performed by: PHYSICIAN ASSISTANT

## 2017-05-27 PROCEDURE — 85610 PROTHROMBIN TIME: CPT | Performed by: PHYSICIAN ASSISTANT

## 2017-05-27 RX ORDER — POLYETHYLENE GLYCOL 3350 17 G/17G
17 POWDER, FOR SOLUTION ORAL DAILY
Qty: 119 G | Refills: 0 | Status: SHIPPED | OUTPATIENT
Start: 2017-05-27 | End: 2017-09-10 | Stop reason: ALTCHOICE

## 2017-05-27 RX ORDER — BISACODYL 5 MG
10 TABLET, DELAYED RELEASE (ENTERIC COATED) ORAL
Status: COMPLETED | OUTPATIENT
Start: 2017-05-27 | End: 2017-05-27

## 2017-05-27 RX ORDER — BISACODYL 5 MG
5 TABLET, DELAYED RELEASE (ENTERIC COATED) ORAL 2 TIMES DAILY
Qty: 20 TAB | Refills: 0 | Status: SHIPPED | OUTPATIENT
Start: 2017-05-27 | End: 2017-09-10 | Stop reason: ALTCHOICE

## 2017-05-27 RX ORDER — CEPHALEXIN 500 MG/1
500 CAPSULE ORAL 2 TIMES DAILY
Qty: 14 CAP | Refills: 0 | Status: SHIPPED | OUTPATIENT
Start: 2017-05-27 | End: 2017-06-02 | Stop reason: CLARIF

## 2017-05-27 RX ADMIN — SODIUM CHLORIDE 500 ML: 900 INJECTION, SOLUTION INTRAVENOUS at 13:00

## 2017-05-27 RX ADMIN — BISACODYL 10 MG: 5 TABLET, COATED ORAL at 06:37

## 2017-05-27 RX ADMIN — Medication 500 ML: at 10:17

## 2017-05-27 NOTE — ED PROVIDER NOTES
HPI Comments: Bola Khalil is a 78 y.o. male with a pertinent history of recent endovascular AAA repair on 5/18/17 by Dr. Nadine Rdz, CAD s/p ALIDA x 2, HTN, complete heart block now with PPM, HLD, hypothyroidism, GERD, COPD who presents via ambulance to the emergency department for evaluation of lower abdominal pain and constipation x 2-3 days. He states he has not really had a normal BM since being seen for same complaint on 5/23/17. He states he is taking some form of store bran stool softener, but is not sure which. He was treated with enema when he was here before, but states it was unsuccessful. He relates liquid stool leaking out of his anus with frequent rectal spasms. He reports a very brief episode of lower chest pain/upper abdominal pain last night, but none currently. After an episode of straining to have a BM, which he states he knows he is not supposed to be doing, he noticed some lightheadedness and had to sit down. He reports compliance with all other medications. (+) chills. He also reports his urine has been dark since the procedure. No difficulty urinating. He has been on percocet for pain control after the procedure. Pt denies any fevers, headache, ENT issues, SOB, cough, n/v/d, back pain, diaphoresis, melena/hematochezia, dysuria, frequency, focal weakness/numbness/tingling, or rash. Patient has no other complaints at this time. PCP:  Maia Rodriguez MD        Patient is a 78 y.o. male presenting with abdominal pain and constipation. Abdominal Pain    Associated symptoms include constipation and chest pain. Pertinent negatives include no fever, no diarrhea, no nausea, no vomiting, no dysuria, no frequency, no headaches and no back pain. Constipation    Associated symptoms include abdominal pain, chills and constipation. Pertinent negatives include no dysuria, no fever, no nausea, no back pain, no vomiting and no diarrhea.         Past Medical History:   Diagnosis Date    Arthritis     CAD (coronary artery disease), native coronary artery     ALIDA X 2 to OM1    Carotid duplex 08/13/2013    Mild <50% bilateral ICA plaquing.  Complete heart block [facility]) June 2013    Medtronic dual chamber pacemaker    COPD (chronic obstructive pulmonary disease) (HCC)     Gastritis     GERD (gastroesophageal reflux disease)     High cholesterol     History of echocardiogram 06/04/2013    EF 60-65%. No WMA. Gr 1 DDfx. No significant valvular heart disease.  History of myocardial perfusion scan 04/18/2011    No ischemia or prior infarction. No WMA. EF 52%. Neg EKG on pharm stress test.    Hypertension     Hypothyroidism     Lower extremity venous duplex 01/06/2015    No DVT. Superficial insufficiency of right GSV. Deep venous reflux in right CFV & fem vein. Deep venous reflux in left CFV.  Renal duplex 12/24/2014    Mild < 60% RRA stenosis. Low parenchymal & LRA flow. Renal asymmetry. Intrinsic/med disease in right kidney. AAA (3.8 x 4.0 cm) infrarenal.    Right groin hernia     not resolved    S/P cardiac cath 06/02/2013    Diffuse atherosclerosis throughout. pRCA 50%. mLM 30%. mLAD 40%. oD2 (sm) 100%. pLCX 30%. p/mOM1 95/80% (o/lapping 2.25 x 23-mm & 2.25 x 12-mm Xience stents, resid 0%). Past Surgical History:   Procedure Laterality Date    HX CHOLECYSTECTOMY  2008    HX CORONARY STENT PLACEMENT  6/2013    HX HERNIA REPAIR  5793    Umbilical    HX PACEMAKER  6/2013    Medtronic Pacemaker          Family History:   Problem Relation Age of Onset    Cancer Mother      pancreatic    Heart Attack Father     Heart Disease Father     Diabetes Sister     Heart Disease Sister     Diabetes Brother     Stroke Brother        Social History     Social History    Marital status:      Spouse name: N/A    Number of children: N/A    Years of education: N/A     Occupational History    Not on file.      Social History Main Topics    Smoking status: Current Every Day Smoker     Packs/day: 1.00     Types: Cigarettes    Smokeless tobacco: Never Used    Alcohol use No    Drug use: No    Sexual activity: Not on file     Other Topics Concern     Service No    Blood Transfusions No    Caffeine Concern Yes    Occupational Exposure No    Hobby Hazards No    Sleep Concern No    Stress Concern No    Weight Concern No    Special Diet No    Back Care Yes    Exercise No    Bike Helmet No    Seat Belt Yes     Social History Narrative         ALLERGIES: Review of patient's allergies indicates no known allergies. Review of Systems   Constitutional: Positive for chills. Negative for fever. HENT: Negative for congestion, rhinorrhea and sore throat. Respiratory: Negative for cough and shortness of breath. Cardiovascular: Positive for chest pain. Negative for leg swelling. Gastrointestinal: Positive for abdominal pain and constipation. Negative for anal bleeding, blood in stool, diarrhea, nausea and vomiting. Genitourinary: Negative for difficulty urinating, dysuria and frequency. Musculoskeletal: Negative for back pain. Skin: Negative for rash and wound. Neurological: Positive for light-headedness. Negative for dizziness, syncope, weakness and headaches. Vitals:    05/27/17 1230 05/27/17 1245 05/27/17 1300 05/27/17 1315   BP: 105/63 98/54 106/57 94/58   Pulse: 66 67 68 60   Resp:       Temp:       SpO2:                Physical Exam   Constitutional: He is oriented to person, place, and time. He appears well-developed and well-nourished. Appears uncomfortable   HENT:   Head: Normocephalic and atraumatic. Mouth/Throat: Oropharynx is clear and moist.   Eyes: Conjunctivae are normal. Right eye exhibits no discharge. Left eye exhibits no discharge. Neck: Normal range of motion. Neck supple. No thyromegaly present. Cardiovascular: Normal rate, regular rhythm and normal heart sounds.     Pulmonary/Chest: Effort normal and breath sounds normal. No respiratory distress. He exhibits no tenderness. Abdominal: Soft. Bowel sounds are normal. He exhibits no distension and no mass. There is tenderness. There is no rebound and no guarding. Tenderness to deep palpation noted across lower abdominal regions. No rebound, rigidity, guarding, distention, or mass noted. (-) Raman's sign. (-) Rovsing's sign. Normoactive BS all four quadrants. Genitourinary:   Genitourinary Comments: Anorectal exam performed at bedside. Multiple external hemorrhoids. No fissures or other abnormalities noted on external exam of anus or perianal area. Normal anal sphincter tone. Stool bolus noted high in rectal vault. Unable to manually disimpact. No internal hemorrhoids noted. Musculoskeletal: Normal range of motion. He exhibits no edema or deformity. Lymphadenopathy:     He has no cervical adenopathy. Neurological: He is alert and oriented to person, place, and time. Skin: Skin is warm and dry. No rash noted. He is not diaphoretic. Psychiatric: He has a normal mood and affect. Nursing note and vitals reviewed. MDM  Number of Diagnoses or Management Options  Acute cystitis without hematuria: new and requires workup  Dehydration: new and requires workup  Drug-induced constipation: new and requires workup  Diagnosis management comments: Differential Diagnosis: endoleak, rupture of repair, fecal impaction, constipation, mesenteric ischemia, SBO, LBO      Plan:  Pt presents via EMS, well-hydrated, non-toxic in appearance, with normal vitals. Benign exam of abdomen with TTP noted to lower abdomen and no peritoneal signs. Constipation likely from opioid use. Multiple BMs here with dulcolax and Padmini enema. Dark urine, but CK wnl. Gently hydrated with 500cc NS bolus. Troponin wnl. EKG shows ventricular paced rhythm. Leukocytosis noted - pt afebrile. Possibly stress response. UA shows infection - will treat with keflex. Mildly worsened renal function - history of rising and falling GFR. Hydration should help. At this time, patient is stable and appropriate for discharge home. Patient demonstrates understanding of current diagnoses and is in agreement with the treatment plan. They are advised that while the likelihood of serious underlying condition is low at this point given the evaluation performed today, we cannot fully rule it out. They are advised to immediately return with any new symptoms or worsening of current condition. All questions have been answered. Patient is given educational material regarding their diagnoses, including danger symptoms and when to return to the ED.            Amount and/or Complexity of Data Reviewed  Clinical lab tests: ordered and reviewed  Tests in the radiology section of CPT®: ordered and reviewed  Tests in the medicine section of CPT®: ordered and reviewed  Decide to obtain previous medical records or to obtain history from someone other than the patient: yes  Review and summarize past medical records: yes    Risk of Complications, Morbidity, and/or Mortality  Presenting problems: moderate  Diagnostic procedures: moderate  Management options: moderate    Patient Progress  Patient progress: improved    ED Course       Procedures    -------------------------------------------------------------------------------------------------------------------  Orders:  Orders Placed This Encounter    XR ABD ACUTE W 1 V CHEST     Standing Status:   Standing     Number of Occurrences:   1     Order Specific Question:   Transport     Answer:   Wheelchair [7]     Order Specific Question:   Reason for Exam     Answer:   abdominal pain, chest pain, constipation    URINALYSIS W/ RFLX MICROSCOPIC     Standing Status:   Standing     Number of Occurrences:   1    CBC WITH AUTOMATED DIFF     Standing Status:   Standing     Number of Occurrences:   1    PROTHROMBIN TIME + INR     Standing Status:   Standing Number of Occurrences:   1    PTT     Standing Status:   Standing     Number of Occurrences:   1    METABOLIC PANEL, COMPREHENSIVE     Standing Status:   Standing     Number of Occurrences:   1    CARDIAC PANEL,(CK, CKMB & TROPONIN)     Standing Status:   Standing     Number of Occurrences:   1    URINE MICROSCOPIC ONLY     Standing Status:   Standing     Number of Occurrences:   1    CARDIAC MONITORING     Standing Status:   Standing     Number of Occurrences:   1     Order Specific Question:   Type: Answer:   Bedside    STRAIGHT CATHETER (NURSING) ONE TIME STAT     Standing Status:   Standing     Number of Occurrences:   1    EKG, 12 LEAD, INITIAL     Standing Status:   Standing     Number of Occurrences:   1     Order Specific Question:   Reason for Exam:     Answer:   chest pain    SALINE LOCK IV ONE TIME STAT     Standing Status:   Standing     Number of Occurrences:   1    bisacodyl (DULCOLAX) tablet 10 mg    rush enema    sodium chloride 0.9 % bolus infusion 500 mL    cephALEXin (KEFLEX) 500 mg capsule     Sig: Take 1 Cap by mouth two (2) times a day for 7 days. Dispense:  14 Cap     Refill:  0    bisacodyl (DULCOLAX, BISACODYL,) 5 mg EC tablet     Sig: Take 1 Tab by mouth two (2) times a day. Take 1 tablet by mouth twice a day as needed for constipation. Dispense:  20 Tab     Refill:  0    polyethylene glycol (MIRALAX) 17 gram/dose powder     Sig: Take 17 g by mouth daily.  1 capful with 8 oz of water daily     Dispense:  119 g     Refill:  0        Lab Results:   Recent Results (from the past 12 hour(s))   CBC WITH AUTOMATED DIFF    Collection Time: 05/27/17  7:00 AM   Result Value Ref Range    WBC 15.0 (H) 4.6 - 13.2 K/uL    RBC 4.70 4.70 - 5.50 M/uL    HGB 13.7 13.0 - 16.0 g/dL    HCT 41.0 36.0 - 48.0 %    MCV 87.2 74.0 - 97.0 FL    MCH 29.1 24.0 - 34.0 PG    MCHC 33.4 31.0 - 37.0 g/dL    RDW 14.9 (H) 11.6 - 14.5 %    PLATELET 402 015 - 696 K/uL    MPV 10.4 9.2 - 11.8 FL NEUTROPHILS 87 (H) 40 - 73 %    LYMPHOCYTES 5 (L) 21 - 52 %    MONOCYTES 8 3 - 10 %    EOSINOPHILS 0 0 - 5 %    BASOPHILS 0 0 - 2 %    ABS. NEUTROPHILS 13.0 (H) 1.8 - 8.0 K/UL    ABS. LYMPHOCYTES 0.7 (L) 0.9 - 3.6 K/UL    ABS. MONOCYTES 1.3 (H) 0.05 - 1.2 K/UL    ABS. EOSINOPHILS 0.1 0.0 - 0.4 K/UL    ABS. BASOPHILS 0.0 0.0 - 0.1 K/UL    DF AUTOMATED     PROTHROMBIN TIME + INR    Collection Time: 05/27/17  7:00 AM   Result Value Ref Range    Prothrombin time 13.4 11.5 - 15.2 sec    INR 1.1 0.8 - 1.2     PTT    Collection Time: 05/27/17  7:00 AM   Result Value Ref Range    aPTT 23.0 23.0 - 73.4 SEC   METABOLIC PANEL, COMPREHENSIVE    Collection Time: 05/27/17  7:00 AM   Result Value Ref Range    Sodium 138 136 - 145 mmol/L    Potassium 4.2 3.5 - 5.5 mmol/L    Chloride 100 100 - 108 mmol/L    CO2 28 21 - 32 mmol/L    Anion gap 10 3.0 - 18 mmol/L    Glucose 120 (H) 74 - 99 mg/dL    BUN 18 7.0 - 18 MG/DL    Creatinine 1.52 (H) 0.6 - 1.3 MG/DL    BUN/Creatinine ratio 12 12 - 20      GFR est AA 56 (L) >60 ml/min/1.73m2    GFR est non-AA 46 (L) >60 ml/min/1.73m2    Calcium 8.8 8.5 - 10.1 MG/DL    Bilirubin, total 0.9 0.2 - 1.0 MG/DL    ALT (SGPT) 30 16 - 61 U/L    AST (SGOT) 26 15 - 37 U/L    Alk.  phosphatase 93 45 - 117 U/L    Protein, total 8.1 6.4 - 8.2 g/dL    Albumin 3.0 (L) 3.4 - 5.0 g/dL    Globulin 5.1 (H) 2.0 - 4.0 g/dL    A-G Ratio 0.6 (L) 0.8 - 1.7     CARDIAC PANEL,(CK, CKMB & TROPONIN)    Collection Time: 05/27/17  7:00 AM   Result Value Ref Range     39 - 308 U/L    CK - MB 1.0 <3.6 ng/ml    CK-MB Index 1.0 0.0 - 4.0 %    Troponin-I, Qt. <0.02 0.0 - 0.045 NG/ML   EKG, 12 LEAD, INITIAL    Collection Time: 05/27/17  7:23 AM   Result Value Ref Range    Ventricular Rate 66 BPM    Atrial Rate 66 BPM    P-R Interval 332 ms    QRS Duration 192 ms    Q-T Interval 490 ms    QTC Calculation (Bezet) 513 ms    Calculated P Axis 34 degrees    Calculated R Axis -81 degrees    Calculated T Axis 97 degrees    Diagnosis Electronic ventricular pacemaker  When compared with ECG of 06-OCT-2015 22:58,  Vent. rate has decreased BY  21 BPM     URINALYSIS W/ RFLX MICROSCOPIC    Collection Time: 05/27/17 10:14 AM   Result Value Ref Range    Color ORANGE      Appearance CLEAR      Specific gravity >1.030 (H) 1.005 - 1.030    pH (UA) 5.5 5.0 - 8.0      Protein TRACE (A) NEG mg/dL    Glucose NEGATIVE  NEG mg/dL    Ketone NEGATIVE  NEG mg/dL    Bilirubin MODERATE (A) NEG      Blood NEGATIVE  NEG      Urobilinogen 1.0 0.2 - 1.0 EU/dL    Nitrites POSITIVE (A) NEG      Leukocyte Esterase MODERATE (A) NEG     URINE MICROSCOPIC ONLY    Collection Time: 05/27/17 10:14 AM   Result Value Ref Range    WBC 1 to 4 0 - 4 /hpf    RBC 0 to 3 0 - 5 /hpf    Epithelial cells FEW 0 - 5 /lpf    Bacteria FEW (A) NEG /hpf    Mucus 1+ (A) NEG /lpf    Hyaline cast 0 to 3 0 - 2 /lpf       Radiology Results:  XR ABD ACUTE W 1 V CHEST    (Results Pending)       Progress Notes:  6:38 AM:  Kinsey Oglesby PA-C was at the pt's bedside, assessed the pt and answered the pt's questions regarding treatment.    -------------------------------------------------------------------------------------------------------------------    Disposition:  Diagnosis:   1. Drug-induced constipation    2. Acute cystitis without hematuria    3. Dehydration        Disposition: [place] Home    Follow-up Information     Follow up With Details Comments Contact Info    Donovan Arrieta MD Call in 2 days For follow-up [address]      [facility] EMERGENCY DEPT Go to As needed, If symptoms worsen [address]  [phone]          Patient's Medications   Start Taking    BISACODYL (DULCOLAX, BISACODYL,) 5 MG EC TABLET    Take 1 Tab by mouth two (2) times a day. Take 1 tablet by mouth twice a day as needed for constipation. CEPHALEXIN (KEFLEX) 500 MG CAPSULE    Take 1 Cap by mouth two (2) times a day for 7 days. POLYETHYLENE GLYCOL (MIRALAX) 17 GRAM/DOSE POWDER    Take 17 g by mouth daily. 1 capful with 8 oz of water daily   Continue Taking    ACETAMINOPHEN (TYLENOL) 500 MG TABLET    Take 500 mg by mouth every six (6) hours as needed for Pain. ALBUTEROL (PROVENTIL HFA, VENTOLIN HFA, PROAIR HFA) 90 MCG/ACTUATION INHALER    Take 2 Puffs by inhalation every six (6) hours as needed for Wheezing. ASPIRIN 81 MG CHEWABLE TABLET    Take 1 Tab by mouth daily. ATENOLOL (TENORMIN) 25 MG TABLET    Take 25 mg by mouth daily. ATORVASTATIN (LIPITOR) 40 MG TABLET    Take 40 mg by mouth nightly. DEXLANSOPRAZOLE (DEXILANT) 30 MG CAPSULE    Take 1 Cap by mouth daily. FLUTICASONE-SALMETEROL (ADVAIR) 100-50 MCG/DOSE DISKUS INHALER    Take 1 Puff by inhalation every twelve (12) hours. Indications: BRONCHOSPASM PREVENTION WITH COPD    FUROSEMIDE (LASIX) 40 MG TABLET    Take  by mouth every seven (7) days. GABAPENTIN (NEURONTIN) 300 MG CAPSULE    Take 2 by mouth every pm    LEVOTHYROXINE (SYNTHROID) 125 MCG TABLET        NITROGLYCERIN (NITROSTAT) 0.4 MG SL TABLET    1 Tab by SubLINGual route every five (5) minutes as needed for Chest Pain (if chest pain not resolved after taking 3 call 911 ). OMEGA-3 ACID ETHYL ESTERS (LOVAZA) 1 GRAM CAPSULE    Take 2 Caps by mouth daily (with breakfast). OXYCODONE-ACETAMINOPHEN (PERCOCET) 5-325 MG PER TABLET    Take 1-2 Tabs by mouth every four (4) hours as needed for Pain. Max Daily Amount: 12 Tabs.    These Medications have changed    No medications on file   Stop Taking    No medications on file

## 2017-05-27 NOTE — DISCHARGE INSTRUCTIONS
Urinary Tract Infections in Men: Care Instructions  Your Care Instructions    A urinary tract infection, or UTI, is a general term for an infection anywhere between the kidneys and the tip of the penis. UTIs can also be a result of a prostate problem. Most cause pain or burning when you urinate. Most UTIs are caused by bacteria and can be cured with antibiotics. It is important to complete your treatment so that the infection does not get worse. Follow-up care is a key part of your treatment and safety. Be sure to make and go to all appointments, and call your doctor if you are having problems. It's also a good idea to know your test results and keep a list of the medicines you take. How can you care for yourself at home? · Take your antibiotics as prescribed. Do not stop taking them just because you feel better. You need to take the full course of antibiotics. · Take your medicines exactly as prescribed. Your doctor may have prescribed a medicine, such as phenazopyridine (Pyridium), to help relieve pain when you urinate. This turns your urine orange. You may stop taking it when your symptoms get better. But be sure to take all of your antibiotics, which treat the infection. · Drink extra water for the next day or two. This will help make the urine less concentrated and help wash out the bacteria causing the infection. (If you have kidney, heart, or liver disease and have to limit your fluids, talk with your doctor before you increase your fluid intake.)  · Avoid drinks that are carbonated or have caffeine. They can irritate the bladder. · Urinate often. Try to empty your bladder each time. · To relieve pain, take a hot bath or lay a heating pad (set on low) over your lower belly or genital area. Never go to sleep with a heating pad in place. To help prevent UTIs  · Drink plenty of fluids, enough so that your urine is light yellow or clear like water.  If you have kidney, heart, or liver disease and have to limit fluids, talk with your doctor before you increase the amount of fluids you drink. · Urinate when you have the urge. Do not hold your urine for a long time. Urinate before you go to sleep. · Keep your penis clean. Catheter care  If you have a drainage tube (catheter) in place, the following steps will help you care for it. · Always wash your hands before and after touching your catheter. · Check the area around the urethra for inflammation or signs of infection. Signs of infection include irritated, swollen, red, or tender skin, or pus around the catheter. · Clean the area around the catheter with soap and water two times a day. Dry with a clean towel afterward. · Do not apply powder or lotion to the skin around the catheter. To empty the urine collection bag  · Wash your hands with soap and water. · Without touching the drain spout, remove the spout from its sleeve at the bottom of the collection bag. Open the valve on the spout. · Let the urine flow out of the bag and into the toilet or a container. Do not let the tubing or drain spout touch anything. · After you empty the bag, clean the end of the drain spout with tissue and water. Close the valve and put the drain spout back into its sleeve at the bottom of the collection bag. · Wash your hands with soap and water. When should you call for help? Call your doctor now or seek immediate medical care if:  · Symptoms such as a fever, chills, nausea, or vomiting get worse or happen for the first time. · You have new pain in your back just below your rib cage. This is called flank pain. · There is new blood or pus in your urine. · You are not able to take or keep down your antibiotics. Watch closely for changes in your health, and be sure to contact your doctor if:  · You are not getting better after taking an antibiotic for 2 days. · Your symptoms go away but then come back. Where can you learn more?   Go to http://daily-lance.info/. Enter X273 in the search box to learn more about \"Urinary Tract Infections in Men: Care Instructions. \"  Current as of: November 28, 2016  Content Version: 11.2  © 6743-9618 [employer]. Care instructions adapted under license by [employer] (which disclaims liability or warranty for this information). If you have questions about a medical condition or this instruction, always ask your healthcare professional. [address] any warranty or liability for your use of this information. Oral Rehydration: Care Instructions  Your Care Instructions  Dehydration occurs when your body loses too much water. This can happen if you do not drink enough fluids or lose a lot of fluid due to diarrhea, vomiting, or sweating. Being dehydrated can cause health problems and can even be life-threatening. To replace lost fluids, you need to drink liquid that contains special chemicals called electrolytes. Electrolytes keep your body working well. Plain water does not have electrolytes. You also need to rest to prevent more fluid loss. Replacing water and electrolytes (oral rehydration) completely takes about 36 hours. But you should feel better within a few hours. Follow-up care is a key part of your treatment and safety. Be sure to make and go to all appointments, and call your doctor if you are having problems. It's also a good idea to know your test results and keep a list of the medicines you take. How can you care for yourself at home? · Take frequent sips of a drink such as Gatorade, Powerade, or other rehydration drinks that your doctor suggests. These replace both fluid and important chemicals (electrolytes) you need for balance in your blood. · Drink 2 quarts of cool liquid over 2 to 4 hours. You should have at least 10 glasses of liquid a day to replace lost fluid.  If you have kidney, heart, or liver disease and have to limit fluids, talk with your doctor before you increase the amount of fluids you drink. · Make your own drink. Measure everything carefully. The drink may not work well or may even be harmful if the amounts are off. ¨ 1 quart water  ¨ ½ teaspoon salt  ¨ 6 teaspoons sugar  · Do not drink liquid with caffeine, such as coffee and mike. · Do not drink any alcohol. It can make you dehydrated. · Drink plenty of fluids, enough so that your urine is light yellow or clear like water. If you have kidney, heart, or liver disease and have to limit fluids, talk with your doctor before you increase the amount of fluids you drink. When should you call for help? Call 911 anytime you think you may need emergency care. For example, call if:  · You have signs of severe dehydration, such as:  ¨ You are confused or unable to stay awake. ¨ You passed out (lost consciousness). Call your doctor now or seek immediate medical care if:  · You still have signs of dehydration. You have sunken eyes and a dry mouth, and you pass only a little dark urine. · You are dizzy or lightheaded, or you feel like you may faint. · You are not able to keep down fluids. Watch closely for changes in your health, and be sure to contact your doctor if:  · You do not get better as expected. Where can you learn more? Go to http://daily-lance.info/. Enter I040 in the search box to learn more about \"Oral Rehydration: Care Instructions. \"  Current as of: May 27, 2016  Content Version: 11.2  © 5865-3017 [employer]. Care instructions adapted under license by [employer] (which disclaims liability or warranty for this information). If you have questions about a medical condition or this instruction, always ask your healthcare professional. [address] any warranty or liability for your use of this information.

## 2017-05-27 NOTE — ED NOTES
Pt arrives from home by medic with c/o abdominal pain and constipation. Pt had AAA repair on 5/18/17 and reports constipation since then. Was seen here on 5/23/17 and was tx with an enema.

## 2017-05-27 NOTE — ED NOTES
Patient had large bowel movement in pants. Patient was assisted to bedside commode. Patient states he is unable to clean himself. Assisted patient in cleaning himself.

## 2017-05-27 NOTE — ED NOTES
Pt alert and oriented c/o abdominal discomfort. Reports he was seen here 3 days ago for same, was given an enema and reports he felt relief after. Pt reports that he has had few small bowel movements since. Reports abd discomfort and constipation. Pt states he takes percocet as he had a stent placed for AAA last week. No other complaints.

## 2017-05-27 NOTE — ED NOTES
Report received from Yadi Shaffer. Patient resting comfortably asleepon stretcher at this time. Observed for rise and fall of chest. Call light within reach.

## 2017-05-29 ENCOUNTER — APPOINTMENT (OUTPATIENT)
Dept: GENERAL RADIOLOGY | Age: 67
End: 2017-05-29
Attending: EMERGENCY MEDICINE
Payer: MEDICARE

## 2017-05-29 ENCOUNTER — APPOINTMENT (OUTPATIENT)
Dept: CT IMAGING | Age: 67
End: 2017-05-29
Attending: EMERGENCY MEDICINE
Payer: MEDICARE

## 2017-05-29 ENCOUNTER — HOSPITAL ENCOUNTER (EMERGENCY)
Age: 67
Discharge: HOME | End: 2017-05-30
Attending: EMERGENCY MEDICINE | Admitting: EMERGENCY MEDICINE
Payer: MEDICARE

## 2017-05-29 DIAGNOSIS — M54.9 ACUTE MIDLINE BACK PAIN, UNSPECIFIED BACK LOCATION: Primary | ICD-10-CM

## 2017-05-29 DIAGNOSIS — R19.7 DIARRHEA, UNSPECIFIED TYPE: ICD-10-CM

## 2017-05-29 LAB
ABO + RH BLD: NORMAL
ALBUMIN SERPL BCP-MCNC: 2.9 G/DL (ref 3.4–5)
ALBUMIN/GLOB SERPL: 0.5 {RATIO} (ref 0.8–1.7)
ALP SERPL-CCNC: 88 U/L (ref 45–117)
ALT SERPL-CCNC: 30 U/L (ref 16–61)
ANION GAP BLD CALC-SCNC: 9 MMOL/L (ref 3–18)
AST SERPL W P-5'-P-CCNC: 41 U/L (ref 15–37)
ATRIAL RATE: 66 BPM
BASOPHILS # BLD AUTO: 0 K/UL (ref 0–0.1)
BASOPHILS # BLD: 0 % (ref 0–2)
BILIRUB DIRECT SERPL-MCNC: 0.3 MG/DL (ref 0–0.2)
BILIRUB SERPL-MCNC: 0.7 MG/DL (ref 0.2–1)
BLOOD GROUP ANTIBODIES SERPL: NORMAL
BUN SERPL-MCNC: 16 MG/DL (ref 7–18)
BUN/CREAT SERPL: 12 (ref 12–20)
CALCIUM SERPL-MCNC: 8.8 MG/DL (ref 8.5–10.1)
CALCULATED P AXIS, ECG09: 34 DEGREES
CALCULATED R AXIS, ECG10: -81 DEGREES
CALCULATED T AXIS, ECG11: 97 DEGREES
CHLORIDE SERPL-SCNC: 103 MMOL/L (ref 100–108)
CK MB CFR SERPL CALC: 0.8 % (ref 0–4)
CK MB SERPL-MCNC: 1.2 NG/ML (ref 5–25)
CK SERPL-CCNC: 148 U/L (ref 39–308)
CO2 SERPL-SCNC: 25 MMOL/L (ref 21–32)
CREAT SERPL-MCNC: 1.34 MG/DL (ref 0.6–1.3)
DIAGNOSIS, 93000: NORMAL
DIFFERENTIAL METHOD BLD: ABNORMAL
EOSINOPHIL # BLD: 0.2 K/UL (ref 0–0.4)
EOSINOPHIL NFR BLD: 2 % (ref 0–5)
ERYTHROCYTE [DISTWIDTH] IN BLOOD BY AUTOMATED COUNT: 14.9 % (ref 11.6–14.5)
GLOBULIN SER CALC-MCNC: 5.3 G/DL (ref 2–4)
GLUCOSE SERPL-MCNC: 97 MG/DL (ref 74–99)
HCT VFR BLD AUTO: 38.6 % (ref 36–48)
HGB BLD-MCNC: 13 G/DL (ref 13–16)
INR PPP: 1 (ref 0.8–1.2)
LACTATE BLD-SCNC: 2.4 MMOL/L (ref 0.4–2)
LIPASE SERPL-CCNC: 194 U/L (ref 73–393)
LYMPHOCYTES # BLD AUTO: 12 % (ref 21–52)
LYMPHOCYTES # BLD: 1.5 K/UL (ref 0.9–3.6)
MCH RBC QN AUTO: 28.8 PG (ref 24–34)
MCHC RBC AUTO-ENTMCNC: 33.7 G/DL (ref 31–37)
MCV RBC AUTO: 85.6 FL (ref 74–97)
MONOCYTES # BLD: 0.5 K/UL (ref 0.05–1.2)
MONOCYTES NFR BLD AUTO: 4 % (ref 3–10)
NEUTS SEG # BLD: 9.8 K/UL (ref 1.8–8)
NEUTS SEG NFR BLD AUTO: 82 % (ref 40–73)
P-R INTERVAL, ECG05: 332 MS
PLATELET # BLD AUTO: 276 K/UL (ref 135–420)
PMV BLD AUTO: 10.7 FL (ref 9.2–11.8)
POTASSIUM SERPL-SCNC: 3.7 MMOL/L (ref 3.5–5.5)
PROT SERPL-MCNC: 8.2 G/DL (ref 6.4–8.2)
PROTHROMBIN TIME: 12.4 SEC (ref 11.5–15.2)
Q-T INTERVAL, ECG07: 490 MS
QRS DURATION, ECG06: 192 MS
QTC CALCULATION (BEZET), ECG08: 513 MS
RBC # BLD AUTO: 4.51 M/UL (ref 4.7–5.5)
SODIUM SERPL-SCNC: 137 MMOL/L (ref 136–145)
SPECIMEN EXP DATE BLD: NORMAL
TROPONIN I SERPL-MCNC: <0.02 NG/ML (ref 0–0.04)
VENTRICULAR RATE, ECG03: 66 BPM
WBC # BLD AUTO: 12 K/UL (ref 4.6–13.2)

## 2017-05-29 PROCEDURE — 86900 BLOOD TYPING SEROLOGIC ABO: CPT | Performed by: EMERGENCY MEDICINE

## 2017-05-29 PROCEDURE — 80076 HEPATIC FUNCTION PANEL: CPT | Performed by: EMERGENCY MEDICINE

## 2017-05-29 PROCEDURE — 80048 BASIC METABOLIC PNL TOTAL CA: CPT | Performed by: EMERGENCY MEDICINE

## 2017-05-29 PROCEDURE — 85610 PROTHROMBIN TIME: CPT | Performed by: EMERGENCY MEDICINE

## 2017-05-29 PROCEDURE — 83605 ASSAY OF LACTIC ACID: CPT

## 2017-05-29 PROCEDURE — 99285 EMERGENCY DEPT VISIT HI MDM: CPT

## 2017-05-29 PROCEDURE — 93005 ELECTROCARDIOGRAM TRACING: CPT

## 2017-05-29 PROCEDURE — 74011636320 HC RX REV CODE- 636/320: Performed by: EMERGENCY MEDICINE

## 2017-05-29 PROCEDURE — 96375 TX/PRO/DX INJ NEW DRUG ADDON: CPT

## 2017-05-29 PROCEDURE — 96374 THER/PROPH/DIAG INJ IV PUSH: CPT

## 2017-05-29 PROCEDURE — 83690 ASSAY OF LIPASE: CPT | Performed by: EMERGENCY MEDICINE

## 2017-05-29 PROCEDURE — 82550 ASSAY OF CK (CPK): CPT | Performed by: EMERGENCY MEDICINE

## 2017-05-29 PROCEDURE — 85025 COMPLETE CBC W/AUTO DIFF WBC: CPT | Performed by: EMERGENCY MEDICINE

## 2017-05-29 PROCEDURE — 71010 XR CHEST PORT: CPT

## 2017-05-29 PROCEDURE — 74011250636 HC RX REV CODE- 250/636: Performed by: EMERGENCY MEDICINE

## 2017-05-29 PROCEDURE — 96361 HYDRATE IV INFUSION ADD-ON: CPT

## 2017-05-29 PROCEDURE — 74174 CTA ABD&PLVS W/CONTRAST: CPT

## 2017-05-29 RX ORDER — HYDROMORPHONE HYDROCHLORIDE 1 MG/ML
1 INJECTION, SOLUTION INTRAMUSCULAR; INTRAVENOUS; SUBCUTANEOUS
Status: COMPLETED | OUTPATIENT
Start: 2017-05-29 | End: 2017-05-29

## 2017-05-29 RX ORDER — SODIUM CHLORIDE 9 MG/ML
150 INJECTION, SOLUTION INTRAVENOUS CONTINUOUS
Status: DISCONTINUED | OUTPATIENT
Start: 2017-05-29 | End: 2017-05-30 | Stop reason: HOSPADM

## 2017-05-29 RX ORDER — ONDANSETRON 2 MG/ML
4 INJECTION INTRAMUSCULAR; INTRAVENOUS ONCE
Status: COMPLETED | OUTPATIENT
Start: 2017-05-29 | End: 2017-05-29

## 2017-05-29 RX ADMIN — HYDROMORPHONE HYDROCHLORIDE 1 MG: 1 INJECTION, SOLUTION INTRAMUSCULAR; INTRAVENOUS; SUBCUTANEOUS at 21:32

## 2017-05-29 RX ADMIN — SODIUM CHLORIDE 150 ML/HR: 900 INJECTION, SOLUTION INTRAVENOUS at 22:26

## 2017-05-29 RX ADMIN — IOPAMIDOL 95 ML: 755 INJECTION, SOLUTION INTRAVENOUS at 22:54

## 2017-05-29 RX ADMIN — ONDANSETRON 4 MG: 2 INJECTION INTRAMUSCULAR; INTRAVENOUS at 21:31

## 2017-05-29 RX ADMIN — SODIUM CHLORIDE 3744 ML: 900 INJECTION, SOLUTION INTRAVENOUS at 23:08

## 2017-05-30 VITALS
WEIGHT: 275.19 LBS | BODY MASS INDEX: 36.31 KG/M2 | HEART RATE: 72 BPM | TEMPERATURE: 98.3 F | OXYGEN SATURATION: 99 % | DIASTOLIC BLOOD PRESSURE: 61 MMHG | RESPIRATION RATE: 16 BRPM | SYSTOLIC BLOOD PRESSURE: 118 MMHG

## 2017-05-30 DIAGNOSIS — I71.40 AAA (ABDOMINAL AORTIC ANEURYSM) WITHOUT RUPTURE: Primary | ICD-10-CM

## 2017-05-30 LAB
APPEARANCE UR: CLEAR
ATRIAL RATE: 71 BPM
BILIRUB UR QL: NEGATIVE
CALCULATED P AXIS, ECG09: 49 DEGREES
CALCULATED R AXIS, ECG10: -81 DEGREES
CALCULATED T AXIS, ECG11: 92 DEGREES
COLOR UR: ABNORMAL
DIAGNOSIS, 93000: NORMAL
EPITH CASTS URNS QL MICRO: NORMAL /LPF (ref 0–5)
GLUCOSE UR STRIP.AUTO-MCNC: NEGATIVE MG/DL
HGB UR QL STRIP: NEGATIVE
HYALINE CASTS URNS QL MICRO: NORMAL /LPF (ref 0–2)
KETONES UR QL STRIP.AUTO: ABNORMAL MG/DL
LACTATE BLD-SCNC: 0.7 MMOL/L (ref 0.4–2)
LEUKOCYTE ESTERASE UR QL STRIP.AUTO: NEGATIVE
NITRITE UR QL STRIP.AUTO: NEGATIVE
P-R INTERVAL, ECG05: 330 MS
PH UR STRIP: 6 [PH] (ref 5–8)
PROT UR STRIP-MCNC: ABNORMAL MG/DL
Q-T INTERVAL, ECG07: 492 MS
QRS DURATION, ECG06: 198 MS
QTC CALCULATION (BEZET), ECG08: 534 MS
RBC #/AREA URNS HPF: NORMAL /HPF (ref 0–5)
SP GR UR REFRACTOMETRY: >1.03 (ref 1–1.03)
UROBILINOGEN UR QL STRIP.AUTO: 2 EU/DL (ref 0.2–1)
VENTRICULAR RATE, ECG03: 71 BPM
WBC URNS QL MICRO: NORMAL /HPF (ref 0–4)

## 2017-05-30 PROCEDURE — 81001 URINALYSIS AUTO W/SCOPE: CPT | Performed by: EMERGENCY MEDICINE

## 2017-05-30 PROCEDURE — 83605 ASSAY OF LACTIC ACID: CPT

## 2017-05-30 PROCEDURE — 74011250637 HC RX REV CODE- 250/637: Performed by: EMERGENCY MEDICINE

## 2017-05-30 RX ORDER — LIDOCAINE 50 MG/G
1 PATCH TOPICAL
Status: DISCONTINUED | OUTPATIENT
Start: 2017-05-30 | End: 2017-05-30 | Stop reason: HOSPADM

## 2017-05-30 RX ORDER — METRONIDAZOLE 500 MG/1
500 TABLET ORAL 2 TIMES DAILY
Qty: 14 TAB | Refills: 0 | Status: SHIPPED | OUTPATIENT
Start: 2017-05-30 | End: 2017-06-09

## 2017-05-30 RX ORDER — LIDOCAINE 50 MG/G
PATCH TOPICAL
Qty: 1 EACH | Refills: 0 | Status: SHIPPED | OUTPATIENT
Start: 2017-05-30 | End: 2017-09-10 | Stop reason: ALTCHOICE

## 2017-05-30 RX ORDER — OXYCODONE AND ACETAMINOPHEN 5; 325 MG/1; MG/1
1 TABLET ORAL
Qty: 8 TAB | Refills: 0 | Status: SHIPPED | OUTPATIENT
Start: 2017-05-30 | End: 2017-06-09

## 2017-05-30 NOTE — DISCHARGE INSTRUCTIONS
Back Pain: Care Instructions  Your Care Instructions    Back pain has many possible causes. It is often related to problems with muscles and ligaments of the back. It may also be related to problems with the nerves, discs, or bones of the back. Moving, lifting, standing, sitting, or sleeping in an awkward way can strain the back. Sometimes you don't notice the injury until later. Arthritis is another common cause of back pain. Although it may hurt a lot, back pain usually improves on its own within several weeks. Most people recover in 12 weeks or less. Using good home treatment and being careful not to stress your back can help you feel better sooner. Follow-up care is a key part of your treatment and safety. Be sure to make and go to all appointments, and call your doctor if you are having problems. Its also a good idea to know your test results and keep a list of the medicines you take. How can you care for yourself at home? · Sit or lie in positions that are most comfortable and reduce your pain. Try one of these positions when you lie down:  ¨ Lie on your back with your knees bent and supported by large pillows. ¨ Lie on the floor with your legs on the seat of a sofa or chair. [place] on your side with your knees and hips bent and a pillow between your legs. ¨ Lie on your stomach if it does not make pain worse. · Do not sit up in bed, and avoid soft couches and twisted positions. Bed rest can help relieve pain at first, but it delays healing. Avoid bed rest after the first day of back pain. · Change positions every 30 minutes. If you must sit for long periods of time, take breaks from sitting. Get up and walk around, or lie in a comfortable position. · Try using a heating pad on a low or medium setting for 15 to 20 minutes every 2 or 3 hours. Try a warm shower in place of one session with the heating pad. · You can also try an ice pack for 10 to 15 minutes every 2 to 3 hours.  Put a thin cloth between the ice pack and your skin. · Take pain medicines exactly as directed. ¨ If the doctor gave you a prescription medicine for pain, take it as prescribed. ¨ If you are not taking a prescription pain medicine, ask your doctor if you can take an over-the-counter medicine. · Take short walks several times a day. You can start with 5 to 10 minutes, 3 or 4 times a day, and work up to longer walks. Walk on level surfaces and avoid hills and stairs until your back is better. · Return to work and other activities as soon as you can. Continued rest without activity is usually not good for your back. · To prevent future back pain, do exercises to stretch and strengthen your back and stomach. Learn how to use good posture, safe lifting techniques, and proper body mechanics. When should you call for help? Call your doctor now or seek immediate medical care if:  · You have new or worsening numbness in your legs. · You have new or worsening weakness in your legs. (This could make it hard to stand up.)  · You lose control of your bladder or bowels. Watch closely for changes in your health, and be sure to contact your doctor if:  · Your pain gets worse. · You are not getting better after 2 weeks. Where can you learn more? Go to http://daily-lance.info/. Enter W500 in the search box to learn more about \"Back Pain: Care Instructions. \"  Current as of: May 23, 2016  Content Version: 11.2  © 1189-3522 [employer]. Care instructions adapted under license by [employer] (which disclaims liability or warranty for this information). If you have questions about a medical condition or this instruction, always ask your healthcare professional. [address] any warranty or liability for your use of this information. Diarrhea: Care Instructions  Your Care Instructions    Diarrhea is loose, watery stools (bowel movements).  The exact cause is often hard to find. Sometimes diarrhea is your body's way of getting rid of what caused an upset stomach. Viruses, food poisoning, and many medicines can cause diarrhea. Some people get diarrhea in response to emotional stress, anxiety, or certain foods. Almost everyone has diarrhea now and then. It usually isn't serious, and your stools will return to normal soon. The important thing to do is replace the fluids you have lost, so you can prevent dehydration. The doctor has checked you carefully, but problems can develop later. If you notice any problems or new symptoms, get medical treatment right away. Follow-up care is a key part of your treatment and safety. Be sure to make and go to all appointments, and call your doctor if you are having problems. It's also a good idea to know your test results and keep a list of the medicines you take. How can you care for yourself at home? · Watch for signs of dehydration, which means your body has lost too much water. Dehydration is a serious condition and should be treated right away. Signs of dehydration are:  ¨ Increasing thirst and dry eyes and mouth. ¨ Feeling faint or lightheaded. ¨ Darker urine, and a smaller amount of urine than normal.  · To prevent dehydration, drink plenty of fluids, enough so that your urine is light yellow or clear like water. Choose water and other caffeine-free clear liquids until you feel better. If you have kidney, heart, or liver disease and have to limit fluids, talk with your doctor before you increase the amount of fluids you drink. · Begin eating small amounts of mild foods the next day, if you feel like it. ¨ Try yogurt that has live cultures of Lactobacillus. (Check the label.)  ¨ Avoid spicy foods, fruits, alcohol, and caffeine until 48 hours after all symptoms are gone. ¨ Avoid chewing gum that contains sorbitol.   ¨ Avoid dairy products (except for yogurt with Lactobacillus) while you have diarrhea and for 3 days after symptoms are gone.  · The doctor may recommend that you take over-the-counter medicine, such as loperamide (Imodium), if you still have diarrhea after 6 hours. Read and follow all instructions on the label. Do not use this medicine if you have bloody diarrhea, a high fever, or other signs of serious illness. Call your doctor if you think you are having a problem with your medicine. When should you call for help? Call 911 anytime you think you may need emergency care. For example, call if:  · You passed out (lost consciousness). · Your stools are maroon or very bloody. Call your doctor now or seek immediate medical care if:  · You are dizzy or lightheaded, or you feel like you may faint. · Your stools are black and look like tar, or they have streaks of blood. · You have new or worse belly pain. · You have symptoms of dehydration, such as:  ¨ Dry eyes and a dry mouth. ¨ Passing only a little dark urine. ¨ Feeling thirstier than usual.  · You have a new or higher fever. Watch closely for changes in your health, and be sure to contact your doctor if:  · Your diarrhea is getting worse. · You see pus in the diarrhea. · You are not getting better after 2 days (48 hours). Where can you learn more? Go to http://daily-lance.info/. Enter X684 in the search box to learn more about \"Diarrhea: Care Instructions. \"  Current as of: May 27, 2016  Content Version: 11.2  © 0288-4570 [employer]. Care instructions adapted under license by [employer] (which disclaims liability or warranty for this information). If you have questions about a medical condition or this instruction, always ask your healthcare professional. [place] [address] any warranty or liability for your use of this information.

## 2017-05-30 NOTE — ED TRIAGE NOTES
Patient arrived to ED by EMS with complaints of severe abdominal pain, diarrhea, and mid back pain.  Patient also c/o of sob and chest pain, denies any n/v.

## 2017-05-30 NOTE — PROGRESS NOTES
Type of procedure: duplex of lower leg arterial / looking for pseudoaneurysm      Weight in appropriate: yes    Name of pre scriber: Dr. Bonner [facility]    Date and time order was received:   05/30/17

## 2017-05-30 NOTE — ED PROVIDER NOTES
HPI Comments: 8:57 PM Rissa Raygoza is a 78 y.o. male with a h/o HTN, Gastritis, CAD, COPD, AAA, and Smoking 1PPPD  presents to the ED via EMS with c/o lower abd pain onset 10 days ago s/p having an AAA surgery. Per EMS reported pt was rx abx and has not gotten them filled because the pt claimed to not have any transportation. Pt stated that he came to the ED 1 week ago when he received a CT, and Dilaudid, and that he came to the ED 4 days ago when he received 2 Dulcolax pills. Pt reported lower back pain, SOB, and diarrhea. Pt denied n/v/, CP, or cough. Pt noted that he sees Dr. Amanda Mae, and Dr Sabine Baldwin (nephrology). All other sx denied. No other complaints at this time. Rhiannon Comer MD      The history is provided by the patient. Past Medical History:   Diagnosis Date    Arthritis     CAD (coronary artery disease), native coronary artery     ALIDA X 2 to OM1    Carotid duplex 08/13/2013    Mild <50% bilateral ICA plaquing.  Complete heart block [facility]) June 2013    Medtronic dual chamber pacemaker    COPD (chronic obstructive pulmonary disease) (HCC)     Gastritis     GERD (gastroesophageal reflux disease)     High cholesterol     History of echocardiogram 06/04/2013    EF 60-65%. No WMA. Gr 1 DDfx. No significant valvular heart disease.  History of myocardial perfusion scan 04/18/2011    No ischemia or prior infarction. No WMA. EF 52%. Neg EKG on pharm stress test.    Hypertension     Hypothyroidism     Lower extremity venous duplex 01/06/2015    No DVT. Superficial insufficiency of right GSV. Deep venous reflux in right CFV & fem vein. Deep venous reflux in left CFV.  Renal duplex 12/24/2014    Mild < 60% RRA stenosis. Low parenchymal & LRA flow. Renal asymmetry. Intrinsic/med disease in right kidney. AAA (3.8 x 4.0 cm) infrarenal.    Right groin hernia     not resolved    S/P cardiac cath 06/02/2013    Diffuse atherosclerosis throughout. pRCA 50%.   mLM 30%.  mLAD 40%. oD2 (sm) 100%. pLCX 30%. p/mOM1 95/80% (o/lapping 2.25 x 23-mm & 2.25 x 12-mm Xience stents, resid 0%). Past Surgical History:   Procedure Laterality Date    HX CHOLECYSTECTOMY  2008    HX CORONARY STENT PLACEMENT  6/2013    HX HERNIA REPAIR  0186    Umbilical    HX PACEMAKER  6/2013    Medtronic Pacemaker          Family History:   Problem Relation Age of Onset    Cancer Mother      pancreatic    Heart Attack Father     Heart Disease Father     Diabetes Sister     Heart Disease Sister     Diabetes Brother     Stroke Brother        Social History     Social History    Marital status:      Spouse name: N/A    Number of children: N/A    Years of education: N/A     Occupational History    Not on file. Social History Main Topics    Smoking status: Current Every Day Smoker     Packs/day: 1.00     Types: Cigarettes    Smokeless tobacco: Never Used    Alcohol use No    Drug use: No    Sexual activity: Not on file     Other Topics Concern     Service No    Blood Transfusions No    Caffeine Concern Yes    Occupational Exposure No    Hobby Hazards No    Sleep Concern No    Stress Concern No    Weight Concern No    Special Diet No    Back Care Yes    Exercise No    Bike Helmet No    Seat Belt Yes     Social History Narrative         ALLERGIES: Review of patient's allergies indicates no known allergies. Review of Systems   Constitutional: Negative for activity change, appetite change, diaphoresis and fever. HENT: Negative for congestion, dental problem, ear pain, hearing loss, nosebleeds, postnasal drip, sinus pressure, sneezing and tinnitus. Eyes: Negative for photophobia, discharge, redness and visual disturbance. Respiratory: Positive for shortness of breath. Negative for cough, choking, wheezing and stridor. Cardiovascular: Negative for chest pain, palpitations and leg swelling.    Gastrointestinal: Positive for abdominal pain (lower) and diarrhea. Negative for abdominal distention, anal bleeding, blood in stool, nausea and vomiting. Genitourinary: Negative for decreased urine volume, difficulty urinating, discharge, dysuria, frequency, hematuria, penile swelling, scrotal swelling, testicular pain and urgency. Musculoskeletal: Positive for back pain. Negative for arthralgias, gait problem, joint swelling, myalgias and neck pain. Skin: Negative for color change and pallor. Neurological: Negative for dizziness, tremors, seizures, syncope and headaches. Hematological: Negative for adenopathy. Does not bruise/bleed easily. Psychiatric/Behavioral: Negative for agitation, behavioral problems, confusion and hallucinations. The patient is not nervous/anxious. Vitals:    05/29/17 2101 05/29/17 2114 05/29/17 2200 05/29/17 2224   BP: 106/69  134/66    Pulse: 72  69    Resp: 14  13    Temp: 97.6 °F (36.4 °C)      SpO2: 98% 98% 97%    Weight:    124.8 kg (275 lb 3 oz)            Physical Exam   Constitutional: He is oriented to person, place, and time. He appears well-developed and well-nourished. HENT:   Head: Normocephalic and atraumatic. Right Ear: External ear normal.   Left Ear: External ear normal.   Nose: Nose normal.   Mouth/Throat: Oropharynx is clear and moist.   Eyes: Conjunctivae and EOM are normal. Pupils are equal, round, and reactive to light. Right eye exhibits no discharge. No scleral icterus. Neck: Normal range of motion. Neck supple. No JVD present. No thyromegaly present. Cardiovascular: Normal rate, regular rhythm and intact distal pulses. Exam reveals no gallop and no friction rub. No murmur heard. Pulmonary/Chest: No respiratory distress. He has no wheezes. He has no rales. He exhibits no tenderness. Abdominal: Soft. He exhibits no distension and no mass. There is no tenderness. There is no rebound and no guarding. No bowel sounds. Musculoskeletal: Normal range of motion.  He exhibits no edema. Lymphadenopathy:     He has no cervical adenopathy. Neurological: He is alert and oriented to person, place, and time. No cranial nerve deficit. Coordination normal.   Skin: Skin is warm and dry. No rash noted. No erythema. Psychiatric: He has a normal mood and affect. His behavior is normal. Judgment normal.   Nursing note and vitals reviewed. MDM  Number of Diagnoses or Management Options  Diagnosis management comments: 78year old male presents with abdominal pain radiating to the back and diarrhea       Amount and/or Complexity of Data Reviewed  Clinical lab tests: ordered  Tests in the radiology section of CPT®: ordered    Risk of Complications, Morbidity, and/or Mortality  Presenting problems: high      ED Course       Procedures                       Vitals:  Patient Vitals for the past 12 hrs:   Temp Pulse Resp BP SpO2   05/29/17 2200 - 69 13 134/66 97 %   05/29/17 2114 - - - - 98 %   05/29/17 2101 97.6 °F (36.4 °C) 72 14 106/69 98 %         Medications ordered:   Medications   0.9% sodium chloride infusion (150 mL/hr IntraVENous New Bag 5/29/17 2226)   HYDROmorphone (DILAUDID) syringe 1 mg (1 mg IntraVENous Given 5/29/17 2132)   ondansetron (ZOFRAN) injection 4 mg (4 mg IntraVENous Given 5/29/17 2131)   sodium chloride 0.9 % bolus infusion 3,744 mL (3,744 mL IntraVENous New Bag 5/29/17 2308)   iopamidol (ISOVUE-370) 76 % injection  mL (95 mL IntraVENous Given 5/29/17 2254)         Lab findings:  Recent Results (from the past 12 hour(s))   EKG, 12 LEAD, INITIAL    Collection Time: 05/29/17  9:10 PM   Result Value Ref Range    Ventricular Rate 71 BPM    Atrial Rate 71 BPM    P-R Interval 330 ms    QRS Duration 198 ms    Q-T Interval 492 ms    QTC Calculation (Bezet) 534 ms    Calculated P Axis 49 degrees    Calculated R Axis -81 degrees    Calculated T Axis 92 degrees    Diagnosis       Electronic ventricular pacemaker  When compared with ECG of 27-MAY-2017 07:23,  Vent.  rate has increased BY   5 BPM     CBC WITH AUTOMATED DIFF    Collection Time: 05/29/17  9:31 PM   Result Value Ref Range    WBC 12.0 4.6 - 13.2 K/uL    RBC 4.51 (L) 4.70 - 5.50 M/uL    HGB 13.0 13.0 - 16.0 g/dL    HCT 38.6 36.0 - 48.0 %    MCV 85.6 74.0 - 97.0 FL    MCH 28.8 24.0 - 34.0 PG    MCHC 33.7 31.0 - 37.0 g/dL    RDW 14.9 (H) 11.6 - 14.5 %    PLATELET 073 985 - 689 K/uL    MPV 10.7 9.2 - 11.8 FL    NEUTROPHILS 82 (H) 40 - 73 %    LYMPHOCYTES 12 (L) 21 - 52 %    MONOCYTES 4 3 - 10 %    EOSINOPHILS 2 0 - 5 %    BASOPHILS 0 0 - 2 %    ABS. NEUTROPHILS 9.8 (H) 1.8 - 8.0 K/UL    ABS. LYMPHOCYTES 1.5 0.9 - 3.6 K/UL    ABS. MONOCYTES 0.5 0.05 - 1.2 K/UL    ABS. EOSINOPHILS 0.2 0.0 - 0.4 K/UL    ABS. BASOPHILS 0.0 0.0 - 0.1 K/UL    DF AUTOMATED     METABOLIC PANEL, BASIC    Collection Time: 05/29/17  9:31 PM   Result Value Ref Range    Sodium 137 136 - 145 mmol/L    Potassium 3.7 3.5 - 5.5 mmol/L    Chloride 103 100 - 108 mmol/L    CO2 25 21 - 32 mmol/L    Anion gap 9 3.0 - 18 mmol/L    Glucose 97 74 - 99 mg/dL    BUN 16 7.0 - 18 MG/DL    Creatinine 1.34 (H) 0.6 - 1.3 MG/DL    BUN/Creatinine ratio 12 12 - 20      GFR est AA >60 >60 ml/min/1.73m2    GFR est non-AA 53 (L) >60 ml/min/1.73m2    Calcium 8.8 8.5 - 10.1 MG/DL   CARDIAC PANEL,(CK, CKMB & TROPONIN)    Collection Time: 05/29/17  9:31 PM   Result Value Ref Range     39 - 308 U/L    CK - MB 1.2 <3.6 ng/ml    CK-MB Index 0.8 0.0 - 4.0 %    Troponin-I, Qt. <0.02 0.0 - 0.045 NG/ML   LIPASE    Collection Time: 05/29/17  9:31 PM   Result Value Ref Range    Lipase 194 73 - 393 U/L   HEPATIC FUNCTION PANEL    Collection Time: 05/29/17  9:31 PM   Result Value Ref Range    Protein, total 8.2 6.4 - 8.2 g/dL    Albumin 2.9 (L) 3.4 - 5.0 g/dL    Globulin 5.3 (H) 2.0 - 4.0 g/dL    A-G Ratio 0.5 (L) 0.8 - 1.7      Bilirubin, total 0.7 0.2 - 1.0 MG/DL    Bilirubin, direct 0.3 (H) 0.0 - 0.2 MG/DL    Alk.  phosphatase 88 45 - 117 U/L    AST (SGOT) 41 (H) 15 - 37 U/L    ALT (SGPT) 30 16 - 61 U/L   PROTHROMBIN TIME + INR    Collection Time: 05/29/17  9:31 PM   Result Value Ref Range    Prothrombin time 12.4 11.5 - 15.2 sec    INR 1.0 0.8 - 1.2     TYPE & SCREEN    Collection Time: 05/29/17  9:31 PM   Result Value Ref Range    Crossmatch Expiration 06/01/2017     ABO/Rh(D) Aileen Palm NEGATIVE     Antibody screen NEG    POC LACTIC ACID    Collection Time: 05/29/17  9:40 PM   Result Value Ref Range    Lactic Acid (POC) 2.4 (HH) 0.4 - 2.0 mmol/L   POC LACTIC ACID    Collection Time: 05/30/17 12:55 AM   Result Value Ref Range    Lactic Acid (POC) 0.7 0.4 - 2.0 mmol/L   URINALYSIS W/ RFLX MICROSCOPIC    Collection Time: 05/30/17  1:15 AM   Result Value Ref Range    Color DARK YELLOW      Appearance CLEAR      Specific gravity >1.030 (H) 1.005 - 1.030    pH (UA) 6.0 5.0 - 8.0      Protein TRACE (A) NEG mg/dL    Glucose NEGATIVE  NEG mg/dL    Ketone TRACE (A) NEG mg/dL    Bilirubin NEGATIVE  NEG      Blood NEGATIVE  NEG      Urobilinogen 2.0 (H) 0.2 - 1.0 EU/dL    Nitrites NEGATIVE  NEG      Leukocyte Esterase NEGATIVE  NEG     URINE MICROSCOPIC ONLY    Collection Time: 05/30/17  1:15 AM   Result Value Ref Range    WBC 0 to 5 0 - 4 /hpf    RBC 0 to 3 0 - 5 /hpf    Epithelial cells FEW 0 - 5 /lpf    Hyaline cast 0 to 2 0 - 2 /lpf       EKG interpretation by ED Physician:      X-Ray, CT or other radiology findings or impressions:  XR CHEST PORT   Final Result   IMPRESSION:     Stable intra-arterial aortic arch and descending thoracic aortic graft. Stable  ectasia of the aorta   CTA ABD PELV W WO CONT    (Results Pending)     2:12 AM  CT showed evidence of inflammation distal colon secondary to colitis or enema. Will start pt on Flagyl and ask to f/u with [facility] PCP. Pt is unable to give stool sample at this time. I suspect pain is from lumbar disc disease. Will place on Lidoderm patch and have pt f/u with PCP. Progress notes, Consult notes or additional Procedure notes:   Chart reviewed;    He had standard preoperative antibiotic. endovascular repair of an infrarenal aortic aneurysm was perfoerformed without any  difficulty. His postop course was without complications. Reevaluation of patient:       Disposition:  Diagnosis:   1. Acute midline back pain, unspecified back location    2. Diarrhea, unspecified type        Disposition: discharge    Follow-up Information     Follow up With Details Comments Contact Info    Anastacio Pratt MD Go in 2 days For follow up [address] [facility]  [phone]              Patient's Medications   Start Taking    No medications on file   Continue Taking    ACETAMINOPHEN (TYLENOL) 500 MG TABLET    Take 500 mg by mouth every six (6) hours as needed for Pain. ALBUTEROL (PROVENTIL HFA, VENTOLIN HFA, PROAIR HFA) 90 MCG/ACTUATION INHALER    Take 2 Puffs by inhalation every six (6) hours as needed for Wheezing. ASPIRIN 81 MG CHEWABLE TABLET    Take 1 Tab by mouth daily. ATENOLOL (TENORMIN) 25 MG TABLET    Take 25 mg by mouth daily. ATORVASTATIN (LIPITOR) 40 MG TABLET    Take 40 mg by mouth nightly. BISACODYL (DULCOLAX, BISACODYL,) 5 MG EC TABLET    Take 1 Tab by mouth two (2) times a day. Take 1 tablet by mouth twice a day as needed for constipation. CEPHALEXIN (KEFLEX) 500 MG CAPSULE    Take 1 Cap by mouth two (2) times a day for 7 days. DEXLANSOPRAZOLE (DEXILANT) 30 MG CAPSULE    Take 1 Cap by mouth daily. FLUTICASONE-SALMETEROL (ADVAIR) 100-50 MCG/DOSE DISKUS INHALER    Take 1 Puff by inhalation every twelve (12) hours. Indications: BRONCHOSPASM PREVENTION WITH COPD    FUROSEMIDE (LASIX) 40 MG TABLET    Take  by mouth every seven (7) days. GABAPENTIN (NEURONTIN) 300 MG CAPSULE    Take 2 by mouth every pm    LEVOTHYROXINE (SYNTHROID) 125 MCG TABLET        NITROGLYCERIN (NITROSTAT) 0.4 MG SL TABLET    1 Tab by SubLINGual route every five (5) minutes as needed for Chest Pain (if chest pain not resolved after taking 3 call 911 ). OMEGA-3 ACID ETHYL ESTERS (LOVAZA) 1 GRAM CAPSULE    Take 2 Caps by mouth daily (with breakfast). OXYCODONE-ACETAMINOPHEN (PERCOCET) 5-325 MG PER TABLET    Take 1-2 Tabs by mouth every four (4) hours as needed for Pain. Max Daily Amount: 12 Tabs. POLYETHYLENE GLYCOL (MIRALAX) 17 GRAM/DOSE POWDER    Take 17 g by mouth daily. 1 capful with 8 oz of water daily   These Medications have changed    No medications on file   Stop Taking    No medications on file     Nikki [address] for and in the presence of Nils Pagan MD 9:04 PM, 05/30/17. Physician Attestation  I personally performed the services described in the documentation, reviewed the documentation, as recorded by the scribe in my presence, and it accurately and completely records my words and actions.     Nils Pagan MD 9:04 PM 05/30/17        Signed by : Nikki Aleman, 05/30/17 at 9:04 PM

## 2017-06-02 ENCOUNTER — APPOINTMENT (OUTPATIENT)
Dept: GENERAL RADIOLOGY | Age: 67
DRG: 392 | End: 2017-06-02
Attending: EMERGENCY MEDICINE
Payer: MEDICARE

## 2017-06-02 ENCOUNTER — OFFICE VISIT (OUTPATIENT)
Dept: VASCULAR SURGERY | Age: 67
End: 2017-06-02

## 2017-06-02 ENCOUNTER — HOSPITAL ENCOUNTER (INPATIENT)
Age: 67
LOS: 6 days | Discharge: HOME HEALTH | DRG: 392 | End: 2017-06-09
Attending: EMERGENCY MEDICINE | Admitting: HOSPITALIST
Payer: MEDICARE

## 2017-06-02 ENCOUNTER — APPOINTMENT (OUTPATIENT)
Dept: CT IMAGING | Age: 67
DRG: 392 | End: 2017-06-02
Attending: EMERGENCY MEDICINE
Payer: MEDICARE

## 2017-06-02 VITALS
HEIGHT: 73 IN | SYSTOLIC BLOOD PRESSURE: 94 MMHG | DIASTOLIC BLOOD PRESSURE: 56 MMHG | WEIGHT: 275 LBS | RESPIRATION RATE: 13 BRPM | HEART RATE: 79 BPM | BODY MASS INDEX: 36.45 KG/M2

## 2017-06-02 DIAGNOSIS — I71.40 AAA (ABDOMINAL AORTIC ANEURYSM) WITHOUT RUPTURE: Primary | ICD-10-CM

## 2017-06-02 DIAGNOSIS — R79.89 ELEVATED D-DIMER: ICD-10-CM

## 2017-06-02 DIAGNOSIS — K62.5 RECTAL BLEEDING: ICD-10-CM

## 2017-06-02 DIAGNOSIS — R06.02 SHORTNESS OF BREATH: ICD-10-CM

## 2017-06-02 DIAGNOSIS — E86.0 DEHYDRATION: ICD-10-CM

## 2017-06-02 DIAGNOSIS — K52.9 COLITIS: Primary | ICD-10-CM

## 2017-06-02 DIAGNOSIS — J44.9 COPD (CHRONIC OBSTRUCTIVE PULMONARY DISEASE) (HCC): ICD-10-CM

## 2017-06-02 DIAGNOSIS — R06.02 SOB (SHORTNESS OF BREATH): ICD-10-CM

## 2017-06-02 DIAGNOSIS — F17.200 TOBACCO DEPENDENCY: ICD-10-CM

## 2017-06-02 LAB
ANION GAP BLD CALC-SCNC: 11 MMOL/L (ref 3–18)
ANION GAP BLD CALC-SCNC: 7 MMOL/L (ref 3–18)
ATRIAL RATE: 63 BPM
BASOPHILS # BLD AUTO: 0.1 K/UL (ref 0–0.06)
BASOPHILS # BLD: 1 % (ref 0–2)
BUN SERPL-MCNC: 10 MG/DL (ref 7–18)
BUN SERPL-MCNC: 9 MG/DL (ref 7–18)
BUN/CREAT SERPL: 7 (ref 12–20)
BUN/CREAT SERPL: 8 (ref 12–20)
CALCIUM SERPL-MCNC: 8.2 MG/DL (ref 8.5–10.1)
CALCIUM SERPL-MCNC: 8.9 MG/DL (ref 8.5–10.1)
CALCULATED P AXIS, ECG09: 29 DEGREES
CALCULATED R AXIS, ECG10: -88 DEGREES
CALCULATED T AXIS, ECG11: 55 DEGREES
CHLORIDE SERPL-SCNC: 100 MMOL/L (ref 100–108)
CHLORIDE SERPL-SCNC: 104 MMOL/L (ref 100–108)
CO2 SERPL-SCNC: 26 MMOL/L (ref 21–32)
CO2 SERPL-SCNC: 26 MMOL/L (ref 21–32)
CREAT SERPL-MCNC: 1.18 MG/DL (ref 0.6–1.3)
CREAT SERPL-MCNC: 1.45 MG/DL (ref 0.6–1.3)
D DIMER PPP FEU-MCNC: 8.72 UG/ML(FEU)
DIAGNOSIS, 93000: NORMAL
DIFFERENTIAL METHOD BLD: ABNORMAL
EOSINOPHIL # BLD: 0.2 K/UL (ref 0–0.4)
EOSINOPHIL NFR BLD: 2 % (ref 0–5)
ERYTHROCYTE [DISTWIDTH] IN BLOOD BY AUTOMATED COUNT: 15 % (ref 11.6–14.5)
ERYTHROCYTE [DISTWIDTH] IN BLOOD BY AUTOMATED COUNT: 15 % (ref 11.6–14.5)
GLUCOSE SERPL-MCNC: 110 MG/DL (ref 74–99)
GLUCOSE SERPL-MCNC: 135 MG/DL (ref 74–99)
HCT VFR BLD AUTO: 36.1 % (ref 36–48)
HCT VFR BLD AUTO: 40.5 % (ref 36–48)
HGB BLD-MCNC: 11.8 G/DL (ref 13–16)
HGB BLD-MCNC: 13.3 G/DL (ref 13–16)
LACTATE SERPL-SCNC: 1.1 MMOL/L (ref 0.4–2)
LACTATE SERPL-SCNC: 3.2 MMOL/L (ref 0.4–2)
LYMPHOCYTES # BLD AUTO: 16 % (ref 21–52)
LYMPHOCYTES # BLD: 2 K/UL (ref 0.9–3.6)
MCH RBC QN AUTO: 28.1 PG (ref 24–34)
MCH RBC QN AUTO: 28.5 PG (ref 24–34)
MCHC RBC AUTO-ENTMCNC: 32.7 G/DL (ref 31–37)
MCHC RBC AUTO-ENTMCNC: 32.8 G/DL (ref 31–37)
MCV RBC AUTO: 86 FL (ref 74–97)
MCV RBC AUTO: 86.9 FL (ref 74–97)
MONOCYTES # BLD: 1.1 K/UL (ref 0.05–1.2)
MONOCYTES NFR BLD AUTO: 9 % (ref 3–10)
NEUTS SEG # BLD: 8.7 K/UL (ref 1.8–8)
NEUTS SEG NFR BLD AUTO: 72 % (ref 40–73)
P-R INTERVAL, ECG05: 316 MS
PLATELET # BLD AUTO: 268 K/UL (ref 135–420)
PLATELET # BLD AUTO: 353 K/UL (ref 135–420)
PMV BLD AUTO: 10.5 FL (ref 9.2–11.8)
PMV BLD AUTO: 10.7 FL (ref 9.2–11.8)
POTASSIUM SERPL-SCNC: 3.3 MMOL/L (ref 3.5–5.5)
POTASSIUM SERPL-SCNC: 4.5 MMOL/L (ref 3.5–5.5)
Q-T INTERVAL, ECG07: 558 MS
QRS DURATION, ECG06: 184 MS
QTC CALCULATION (BEZET), ECG08: 571 MS
RBC # BLD AUTO: 4.2 M/UL (ref 4.7–5.5)
RBC # BLD AUTO: 4.66 M/UL (ref 4.7–5.5)
SODIUM SERPL-SCNC: 137 MMOL/L (ref 136–145)
SODIUM SERPL-SCNC: 137 MMOL/L (ref 136–145)
TROPONIN I SERPL-MCNC: <0.02 NG/ML (ref 0–0.06)
VENTRICULAR RATE, ECG03: 63 BPM
WBC # BLD AUTO: 12.1 K/UL (ref 4.6–13.2)
WBC # BLD AUTO: 8.8 K/UL (ref 4.6–13.2)

## 2017-06-02 PROCEDURE — 74011250636 HC RX REV CODE- 250/636: Performed by: EMERGENCY MEDICINE

## 2017-06-02 PROCEDURE — 80048 BASIC METABOLIC PNL TOTAL CA: CPT | Performed by: FAMILY MEDICINE

## 2017-06-02 PROCEDURE — 96374 THER/PROPH/DIAG INJ IV PUSH: CPT

## 2017-06-02 PROCEDURE — 94640 AIRWAY INHALATION TREATMENT: CPT

## 2017-06-02 PROCEDURE — 65660000000 HC RM CCU STEPDOWN

## 2017-06-02 PROCEDURE — 85027 COMPLETE CBC AUTOMATED: CPT | Performed by: FAMILY MEDICINE

## 2017-06-02 PROCEDURE — 96361 HYDRATE IV INFUSION ADD-ON: CPT

## 2017-06-02 PROCEDURE — 74011250637 HC RX REV CODE- 250/637: Performed by: FAMILY MEDICINE

## 2017-06-02 PROCEDURE — 99285 EMERGENCY DEPT VISIT HI MDM: CPT

## 2017-06-02 PROCEDURE — 84484 ASSAY OF TROPONIN QUANT: CPT | Performed by: EMERGENCY MEDICINE

## 2017-06-02 PROCEDURE — 96372 THER/PROPH/DIAG INJ SC/IM: CPT

## 2017-06-02 PROCEDURE — 71020 XR CHEST PA LAT: CPT

## 2017-06-02 PROCEDURE — 85025 COMPLETE CBC W/AUTO DIFF WBC: CPT | Performed by: EMERGENCY MEDICINE

## 2017-06-02 PROCEDURE — 80048 BASIC METABOLIC PNL TOTAL CA: CPT | Performed by: EMERGENCY MEDICINE

## 2017-06-02 PROCEDURE — 83605 ASSAY OF LACTIC ACID: CPT | Performed by: EMERGENCY MEDICINE

## 2017-06-02 PROCEDURE — 74011000250 HC RX REV CODE- 250: Performed by: EMERGENCY MEDICINE

## 2017-06-02 PROCEDURE — 85379 FIBRIN DEGRADATION QUANT: CPT | Performed by: EMERGENCY MEDICINE

## 2017-06-02 PROCEDURE — 36415 COLL VENOUS BLD VENIPUNCTURE: CPT | Performed by: FAMILY MEDICINE

## 2017-06-02 PROCEDURE — 93005 ELECTROCARDIOGRAM TRACING: CPT

## 2017-06-02 PROCEDURE — 71275 CT ANGIOGRAPHY CHEST: CPT

## 2017-06-02 RX ORDER — CIPROFLOXACIN 2 MG/ML
400 INJECTION, SOLUTION INTRAVENOUS
Status: COMPLETED | OUTPATIENT
Start: 2017-06-02 | End: 2017-06-02

## 2017-06-02 RX ORDER — CIPROFLOXACIN 500 MG/1
TABLET ORAL 2 TIMES DAILY
COMMUNITY
End: 2017-06-09

## 2017-06-02 RX ORDER — ACETAMINOPHEN 325 MG/1
650 TABLET ORAL
Status: DISCONTINUED | OUTPATIENT
Start: 2017-06-02 | End: 2017-06-10 | Stop reason: HOSPADM

## 2017-06-02 RX ORDER — METRONIDAZOLE 500 MG/100ML
500 INJECTION, SOLUTION INTRAVENOUS
Status: COMPLETED | OUTPATIENT
Start: 2017-06-02 | End: 2017-06-02

## 2017-06-02 RX ORDER — ENOXAPARIN SODIUM 150 MG/ML
120 INJECTION SUBCUTANEOUS EVERY 12 HOURS
Status: DISCONTINUED | OUTPATIENT
Start: 2017-06-03 | End: 2017-06-03

## 2017-06-02 RX ORDER — BISACODYL 5 MG
5 TABLET, DELAYED RELEASE (ENTERIC COATED) ORAL 2 TIMES DAILY
Status: DISCONTINUED | OUTPATIENT
Start: 2017-06-02 | End: 2017-06-03

## 2017-06-02 RX ORDER — DIPHENHYDRAMINE HCL 25 MG
25 CAPSULE ORAL
Status: DISCONTINUED | OUTPATIENT
Start: 2017-06-02 | End: 2017-06-10 | Stop reason: HOSPADM

## 2017-06-02 RX ORDER — ENOXAPARIN SODIUM 100 MG/ML
100 INJECTION SUBCUTANEOUS
Status: COMPLETED | OUTPATIENT
Start: 2017-06-02 | End: 2017-06-02

## 2017-06-02 RX ORDER — OMEGA-3-ACID ETHYL ESTERS 1 G/1
2 CAPSULE, LIQUID FILLED ORAL
Status: DISCONTINUED | OUTPATIENT
Start: 2017-06-03 | End: 2017-06-10 | Stop reason: HOSPADM

## 2017-06-02 RX ORDER — BUDESONIDE AND FORMOTEROL FUMARATE DIHYDRATE 160; 4.5 UG/1; UG/1
2 AEROSOL RESPIRATORY (INHALATION)
Status: DISCONTINUED | OUTPATIENT
Start: 2017-06-02 | End: 2017-06-10 | Stop reason: HOSPADM

## 2017-06-02 RX ORDER — ATENOLOL 25 MG/1
25 TABLET ORAL DAILY
Status: DISCONTINUED | OUTPATIENT
Start: 2017-06-03 | End: 2017-06-04

## 2017-06-02 RX ORDER — SODIUM CHLORIDE 9 MG/ML
1000 INJECTION, SOLUTION INTRAVENOUS ONCE
Status: COMPLETED | OUTPATIENT
Start: 2017-06-02 | End: 2017-06-02

## 2017-06-02 RX ORDER — OXYCODONE AND ACETAMINOPHEN 5; 325 MG/1; MG/1
1 TABLET ORAL
Status: DISCONTINUED | OUTPATIENT
Start: 2017-06-02 | End: 2017-06-10 | Stop reason: HOSPADM

## 2017-06-02 RX ORDER — ATORVASTATIN CALCIUM 40 MG/1
40 TABLET, FILM COATED ORAL
Status: DISCONTINUED | OUTPATIENT
Start: 2017-06-02 | End: 2017-06-10 | Stop reason: HOSPADM

## 2017-06-02 RX ORDER — GABAPENTIN 300 MG/1
300 CAPSULE ORAL 3 TIMES DAILY
Status: DISCONTINUED | OUTPATIENT
Start: 2017-06-02 | End: 2017-06-10 | Stop reason: HOSPADM

## 2017-06-02 RX ORDER — LEVOTHYROXINE SODIUM 125 UG/1
125 TABLET ORAL
Status: DISCONTINUED | OUTPATIENT
Start: 2017-06-03 | End: 2017-06-10 | Stop reason: HOSPADM

## 2017-06-02 RX ADMIN — CIPROFLOXACIN 400 MG: 2 INJECTION, SOLUTION INTRAVENOUS at 15:21

## 2017-06-02 RX ADMIN — ENOXAPARIN SODIUM 100 MG: 100 INJECTION SUBCUTANEOUS at 15:21

## 2017-06-02 RX ADMIN — GABAPENTIN 300 MG: 300 CAPSULE ORAL at 23:30

## 2017-06-02 RX ADMIN — BUDESONIDE AND FORMOTEROL FUMARATE DIHYDRATE 2 PUFF: 160; 4.5 AEROSOL RESPIRATORY (INHALATION) at 21:04

## 2017-06-02 RX ADMIN — ATORVASTATIN CALCIUM 40 MG: 40 TABLET, FILM COATED ORAL at 23:30

## 2017-06-02 RX ADMIN — METRONIDAZOLE 500 MG: 500 INJECTION, SOLUTION INTRAVENOUS at 15:21

## 2017-06-02 RX ADMIN — GABAPENTIN 300 MG: 300 CAPSULE ORAL at 18:27

## 2017-06-02 RX ADMIN — SODIUM CHLORIDE 1000 ML: 900 INJECTION, SOLUTION INTRAVENOUS at 11:04

## 2017-06-02 RX ADMIN — OXYCODONE HYDROCHLORIDE AND ACETAMINOPHEN 1 TABLET: 5; 325 TABLET ORAL at 18:48

## 2017-06-02 RX ADMIN — OXYCODONE HYDROCHLORIDE AND ACETAMINOPHEN 1 TABLET: 5; 325 TABLET ORAL at 23:37

## 2017-06-02 NOTE — ED PROVIDER NOTES
HPI Comments: 10:01 AM Joni Benites is a 78 y.o. male with a history of hypertension, hypercholesterolemia, hypothyroidism, gastritis, COPD, GERD, CAD, and arthritis who presents to ED to be evaluated for SOB onset this morning. Pt was being seen at [facility] office who sent him to the ED for SOB. Pt also c/o ongoing abd pain, rectal bleeding, blood in stool, diarrhea, fatigue, and intermittent lightheatedness. Pt explains he had a S/P AAA repair May 18, 2017. Reports following the procedure he has been to the emergency department 3 times and his PCP once. Admits to smoking cigarettes daily. No other concerns at this time. The history is provided by the patient. Past Medical History:   Diagnosis Date    Arthritis     CAD (coronary artery disease), native coronary artery     ALIDA X 2 to OM1    Carotid duplex 08/13/2013    Mild <50% bilateral ICA plaquing.  Complete heart block [facility]) June 2013    Medtronic dual chamber pacemaker    COPD (chronic obstructive pulmonary disease) (HCC)     Gastritis     GERD (gastroesophageal reflux disease)     High cholesterol     History of echocardiogram 06/04/2013    EF 60-65%. No WMA. Gr 1 DDfx. No significant valvular heart disease.  History of myocardial perfusion scan 04/18/2011    No ischemia or prior infarction. No WMA. EF 52%. Neg EKG on pharm stress test.    Hypertension     Hypothyroidism     Lower extremity venous duplex 01/06/2015    No DVT. Superficial insufficiency of right GSV. Deep venous reflux in right CFV & fem vein. Deep venous reflux in left CFV.  Renal duplex 12/24/2014    Mild < 60% RRA stenosis. Low parenchymal & LRA flow. Renal asymmetry. Intrinsic/med disease in right kidney. AAA (3.8 x 4.0 cm) infrarenal.    Right groin hernia     not resolved    S/P AAA repair 05/18/2017    S/P cardiac cath 06/02/2013    Diffuse atherosclerosis throughout. pRCA 50%. mLM 30%. mLAD 40%. oD2 (sm) 100%.   Batool Aguirre 30%.  p/mOM1 95/80% (o/lapping 2.25 x 23-mm & 2.25 x 12-mm Xience stents, resid 0%). Past Surgical History:   Procedure Laterality Date    HX CHOLECYSTECTOMY  2008    HX CORONARY STENT PLACEMENT  6/2013    HX HERNIA REPAIR  2073    Umbilical    HX PACEMAKER  6/2013    Medtronic Pacemaker          Family History:   Problem Relation Age of Onset    Cancer Mother      pancreatic    Heart Attack Father     Heart Disease Father     Diabetes Sister     Heart Disease Sister     Diabetes Brother     Stroke Brother        Social History     Social History    Marital status:      Spouse name: N/A    Number of children: N/A    Years of education: N/A     Occupational History    Not on file. Social History Main Topics    Smoking status: Current Every Day Smoker     Packs/day: 1.00     Types: Cigarettes    Smokeless tobacco: Never Used    Alcohol use No    Drug use: No    Sexual activity: Not on file     Other Topics Concern     Service No    Blood Transfusions No    Caffeine Concern Yes    Occupational Exposure No    Hobby Hazards No    Sleep Concern No    Stress Concern No    Weight Concern No    Special Diet No    Back Care Yes    Exercise No    Bike Helmet No    Seat Belt Yes     Social History Narrative         ALLERGIES: Review of patient's allergies indicates no known allergies. Review of Systems   Constitutional: Positive for fatigue. Respiratory: Positive for shortness of breath. Gastrointestinal: Positive for anal bleeding, blood in stool and diarrhea. Neurological: Positive for light-headedness. Vitals:    06/02/17 1130 06/02/17 1337 06/02/17 1338 06/02/17 1400   BP: 112/59 126/62  126/65   Pulse: 64  68 67   Resp: 10  14 9   Temp:       SpO2: 100%  100% 100%   Weight:       Height:                Physical Exam   Constitutional: He is oriented to person, place, and time. He appears well-developed and well-nourished. No distress. HENT:   Head: Normocephalic and atraumatic. Nose: Nose normal.   Eyes: Right eye exhibits no discharge. Left eye exhibits no discharge. No scleral icterus. Neck: Neck supple. No JVD present. Cardiovascular: Normal rate, regular rhythm and normal heart sounds. Exam reveals no friction rub. No murmur heard. Pulmonary/Chest: Effort normal and breath sounds normal. No respiratory distress. He has no wheezes. He has no rales. Abdominal: Soft. There is no tenderness. There is no rebound and no guarding. Genitourinary:   Genitourinary Comments: External hemorrhoids w/o active bleeding. Stool brown, guaiac +. Musculoskeletal: He exhibits no edema or tenderness. Neurological: He is alert and oriented to person, place, and time. He exhibits normal muscle tone. Coordination normal.   Skin: Skin is warm and dry. No rash noted. He is not diaphoretic. Psychiatric: He has a normal mood and affect. Nursing note and vitals reviewed. MDM  Number of Diagnoses or Management Options  Colitis:   Dehydration:   Elevated d-dimer:   Rectal bleeding:   Shortness of breath:   Diagnosis management comments: Imp: Weakness, dehydration, shortness of breath, diarrhea w/ heme + stool. Mutliple trips to ER and MD. Multiple complaints and co-morbidities. Markedly elevated D-dimer. Sent for CTA but IV infiltrated x 2 and additional attempts halted. Case d/w Dr Gonzales Land to clear for Lovenox administration pending VQ scan. Case d/w Dr German Gordon at [address] [place] [address] to admit. Flagyl and Cipro administered based on ongoing GI bleeding combined w/ prior CT noting area of focal colitis and elevated lactate today. Concern for worsening bleeding w/ Lovenox but risk of untreated PE of greater immediate concern.      Critical Care  Total time providing critical care: 30-74 minutes    ED Course       Procedures    Vitals:  Patient Vitals for the past 12 hrs:   Temp Pulse Resp BP SpO2   06/02/17 1400 - 67 9 126/65 100 %   06/02/17 1338 - 68 14 - 100 %   06/02/17 1337 - - - 126/62 -   06/02/17 1130 - 64 10 112/59 100 %   06/02/17 1105 - 62 16 - 100 %   06/02/17 1104 - - - 110/72 -   06/02/17 1000 - 60 16 104/62 100 %   06/02/17 0947 97.6 °F (36.4 °C) 64 20 96/51 100 %   06/02/17 0945 - 62 17 98/56 99 %   06/02/17 0937 - - - 95/56 100 %   06/02/17 0935 - - - 105/54 100 %         Medications ordered:   Medications   metroNIDAZOLE (FLAGYL) IVPB premix 500 mg (500 mg IntraVENous New Bag 6/2/17 1521)   ciprofloxacin (CIPRO) 400 mg IVPB (premix) (400 mg IntraVENous New Bag 6/2/17 1521)   0.9% sodium chloride infusion 1,000 mL (0 mL IntraVENous IV Completed 6/2/17 1300)   enoxaparin (LOVENOX) injection 100 mg (100 mg SubCUTAneous Given 6/2/17 1521)         Lab findings:  Recent Results (from the past 12 hour(s))   EKG, 12 LEAD, INITIAL    Collection Time: 06/02/17  9:46 AM   Result Value Ref Range    Ventricular Rate 63 BPM    Atrial Rate 63 BPM    P-R Interval 316 ms    QRS Duration 184 ms    Q-T Interval 558 ms    QTC Calculation (Bezet) 571 ms    Calculated P Axis 29 degrees    Calculated R Axis -88 degrees    Calculated T Axis 55 degrees    Diagnosis       AV dual-paced rhythm  Left axis deviation  Abnormal ECG  When compared with ECG of 29-MAY-2017 21:10,  No significant change was found    Confirmed by Tracee Schmidt MD ([id number]) on 6/2/2017 11:31:06 AM     CBC WITH AUTOMATED DIFF    Collection Time: 06/02/17  9:50 AM   Result Value Ref Range    WBC 12.1 4.6 - 13.2 K/uL    RBC 4.66 (L) 4.70 - 5.50 M/uL    HGB 13.3 13.0 - 16.0 g/dL    HCT 40.5 36.0 - 48.0 %    MCV 86.9 74.0 - 97.0 FL    MCH 28.5 24.0 - 34.0 PG    MCHC 32.8 31.0 - 37.0 g/dL    RDW 15.0 (H) 11.6 - 14.5 %    PLATELET 715 411 - 109 K/uL    MPV 10.7 9.2 - 11.8 FL    NEUTROPHILS 72 40 - 73 %    LYMPHOCYTES 16 (L) 21 - 52 %    MONOCYTES 9 3 - 10 %    EOSINOPHILS 2 0 - 5 %    BASOPHILS 1 0 - 2 %    ABS. NEUTROPHILS 8.7 (H) 1.8 - 8.0 K/UL    ABS. LYMPHOCYTES 2.0 0.9 - 3.6 K/UL    ABS.  MONOCYTES 1.1 0.05 - 1.2 K/UL    ABS. EOSINOPHILS 0.2 0.0 - 0.4 K/UL    ABS. BASOPHILS 0.1 (H) 0.0 - 0.06 K/UL    DF AUTOMATED     METABOLIC PANEL, BASIC    Collection Time: 06/02/17  9:50 AM   Result Value Ref Range    Sodium 137 136 - 145 mmol/L    Potassium 4.5 3.5 - 5.5 mmol/L    Chloride 100 100 - 108 mmol/L    CO2 26 21 - 32 mmol/L    Anion gap 11 3.0 - 18 mmol/L    Glucose 135 (H) 74 - 99 mg/dL    BUN 10 7.0 - 18 MG/DL    Creatinine 1.45 (H) 0.6 - 1.3 MG/DL    BUN/Creatinine ratio 7 (L) 12 - 20      GFR est AA 59 (L) >60 ml/min/1.73m2    GFR est non-AA 49 (L) >60 ml/min/1.73m2    Calcium 8.9 8.5 - 10.1 MG/DL   LACTIC ACID, PLASMA    Collection Time: 06/02/17  9:50 AM   Result Value Ref Range    Lactic acid 3.2 (HH) 0.4 - 2.0 MMOL/L   TROPONIN I    Collection Time: 06/02/17  9:50 AM   Result Value Ref Range    Troponin-I, Qt. <0.02 0.00 - 0.06 NG/ML   D DIMER    Collection Time: 06/02/17  9:50 AM   Result Value Ref Range    D DIMER 8.72 (H) <0.46 ug/ml(FEU)   LACTIC ACID, PLASMA    Collection Time: 06/02/17  2:32 PM   Result Value Ref Range    Lactic acid 1.1 0.4 - 2.0 MMOL/L       EKG interpretation by ED Physician:  paced rhythm w/ capture, rate 63    X-Ray, CT or other radiology findings or impressions:  XR CHEST PA LAT   Final Result      CTA CHEST W OR W WO CONT    (Results Pending)         Reevaluation of patient:   Better after IVF. Results discussed and need for admission reviewed    Disposition:  Diagnosis:   1. Colitis    2. Elevated d-dimer    3. Shortness of breath    4. Dehydration    5. Rectal bleeding        Disposition: admit    Follow-up Information     None           Patient's Medications   Start Taking    No medications on file   Continue Taking    ACETAMINOPHEN (TYLENOL) 500 MG TABLET    Take 500 mg by mouth every six (6) hours as needed for Pain. ALBUTEROL (PROVENTIL HFA, VENTOLIN HFA, PROAIR HFA) 90 MCG/ACTUATION INHALER    Take 2 Puffs by inhalation every six (6) hours as needed for Wheezing.     ASPIRIN 81 MG CHEWABLE TABLET    Take 1 Tab by mouth daily. ATENOLOL (TENORMIN) 25 MG TABLET    Take 25 mg by mouth daily. ATORVASTATIN (LIPITOR) 40 MG TABLET    Take 40 mg by mouth nightly. BISACODYL (DULCOLAX, BISACODYL,) 5 MG EC TABLET    Take 1 Tab by mouth two (2) times a day. Take 1 tablet by mouth twice a day as needed for constipation. CIPROFLOXACIN HCL (CIPRO) 500 MG TABLET    Take  by mouth two (2) times a day. FLUTICASONE-SALMETEROL (ADVAIR) 100-50 MCG/DOSE DISKUS INHALER    Take 1 Puff by inhalation every twelve (12) hours. Indications: BRONCHOSPASM PREVENTION WITH COPD    GABAPENTIN (NEURONTIN) 300 MG CAPSULE    Take 2 by mouth every pm    LEVOTHYROXINE (SYNTHROID) 125 MCG TABLET        LIDOCAINE (LIDODERM) 5 %    Apply patch to the affected area for 12 hours a day and remove for 12 hours a day. METRONIDAZOLE (FLAGYL) 500 MG TABLET    Take 1 Tab by mouth two (2) times a day for 7 days. NITROGLYCERIN (NITROSTAT) 0.4 MG SL TABLET    1 Tab by SubLINGual route every five (5) minutes as needed for Chest Pain (if chest pain not resolved after taking 3 call 911 ). OMEGA-3 ACID ETHYL ESTERS (LOVAZA) 1 GRAM CAPSULE    Take 2 Caps by mouth daily (with breakfast). OXYCODONE-ACETAMINOPHEN (PERCOCET) 5-325 MG PER TABLET    Take 1 Tab by mouth every four (4) hours as needed for Pain. Max Daily Amount: 6 Tabs. POLYETHYLENE GLYCOL (MIRALAX) 17 GRAM/DOSE POWDER    Take 17 g by mouth daily. 1 capful with 8 oz of water daily   These Medications have changed    No medications on file   Stop Taking    No medications on file     Scribe Attestation:   I, Kay Schulte, scribing for and in the presence of Colton De Souza MD June 02, 2017 at 10:10 AM     Physician Attestation:   I personally performed the services described in this documentation, reviewed and edited the documentation which was dictated to the scribe in my presence, and it accurately records my words and actions.  Colton De Souza MD  June 02, 2017 at 10:10 AM    Signed by: Nikki Temple, June 02, 2017 at 10:10 AM

## 2017-06-02 NOTE — ROUTINE PROCESS
TRANSFER - OUT REPORT:    Verbal report given to [employer] (name) on Bette Santos  being transferred to  (unit) for routine progression of care       Report consisted of patients Situation, Background, Assessment and   Recommendations(SBAR). Information from the following report(s) SBAR, Kardex and ED Summary was reviewed with the receiving nurse. Lines:   Peripheral IV 06/02/17 Right Wrist (Active)   Dressing Status Clean, dry, & intact 6/2/2017  9:58 AM   Dressing Type Transparent 6/2/2017  9:58 AM   Hub Color/Line Status Flushed 6/2/2017  9:58 AM        Opportunity for questions and clarification was provided.       Patient transported with:   Monitor  O2 @ 2 liters

## 2017-06-02 NOTE — IP AVS SNAPSHOT
Current Discharge Medication List  
  
START taking these medications Dose & Instructions Dispensing Information Comments Morning Noon Evening Bedtime LORazepam 1 mg tablet Commonly known as:  ATIVAN Your last dose was: Your next dose is:    
   
   
 Dose:  1 mg Take 1 Tab by mouth every six (6) hours as needed. Max Daily Amount: 4 mg. Indications: anxiety Quantity:  15 Tab Refills:  0  
     
   
   
   
  
 nicotine 14 mg/24 hr patch Commonly known as:  Bhumi Harris Your last dose was: Your next dose is:    
   
   
 Dose:  1 Patch 1 Patch by TransDERmal route daily for 30 days. Quantity:  30 Patch Refills:  0  
     
   
   
   
  
 nystatin topical cream  
Commonly known as:  MYCOSTATIN Your last dose was: Your next dose is:    
   
   
 Dose:  1 Units Apply 1 g to affected area two (2) times daily as needed for Skin Irritation. Apply to athlete's feet BID Quantity:  15 g Refills:  0 phenyleph-shark pacheco oil-mo-pet rectal ointment Commonly known as:  PREPARATION H Your last dose was: Your next dose is:    
   
   
 by PeriANAL route four (4) times daily as needed for Pain. Quantity:  30 g Refills:  0  
     
   
   
   
  
 senna 8.6 mg tablet Commonly known as:  Robert Gore Your last dose was: Your next dose is:    
   
   
 Dose:  2 Tab Take 2 Tabs by mouth two (2) times a day. Indications: Constipation Quantity:  100 Tab Refills:  0 CONTINUE these medications which have CHANGED Dose & Instructions Dispensing Information Comments Morning Noon Evening Bedtime  
 gabapentin 300 mg capsule Commonly known as:  NEURONTIN What changed:   
- how much to take - when to take this 
- additional instructions Your last dose was: Your next dose is: Take 2 by mouth every pm  
 Quantity:  60 Cap Refills:  5  
     
   
   
   
  
 oxyCODONE-acetaminophen 7.5-325 mg per tablet Commonly known as:  PERCOCET 7.5 What changed:   
- when to take this - Another medication with the same name was removed. Continue taking this medication, and follow the directions you see here. Your last dose was: Your next dose is:    
   
   
 Dose:  1 Tab Take 1 Tab by mouth every four (4) hours as needed. Max Daily Amount: 6 Tabs. Indications: Pain Quantity:  30 Tab Refills:  0 CONTINUE these medications which have NOT CHANGED Dose & Instructions Dispensing Information Comments Morning Noon Evening Bedtime  
 albuterol 90 mcg/actuation inhaler Commonly known as:  PROVENTIL HFA, VENTOLIN HFA, PROAIR HFA Your last dose was: Your next dose is:    
   
   
 Dose:  2 Puff Take 2 Puffs by inhalation every six (6) hours as needed for Wheezing. Quantity:  1 Inhaler Refills:  0  
     
   
   
   
  
 aspirin 81 mg chewable tablet Your last dose was: Your next dose is:    
   
   
 Dose:  81 mg Take 1 Tab by mouth daily. Quantity:  30 Tab Refills:  11  
     
   
   
   
  
 atenolol 25 mg tablet Commonly known as:  TENORMIN Your last dose was: Your next dose is:    
   
   
 Dose:  25 mg Take 25 mg by mouth daily. Refills:  0  
     
   
   
   
  
 atorvastatin 40 mg tablet Commonly known as:  LIPITOR Your last dose was: Your next dose is:    
   
   
 Dose:  40 mg Take 40 mg by mouth nightly. Refills:  0  
     
   
   
   
  
 bisacodyl 5 mg EC tablet Commonly known as:  DULCOLAX (BISACODYL) Your last dose was: Your next dose is:    
   
   
 Dose:  5 mg Take 1 Tab by mouth two (2) times a day. Take 1 tablet by mouth twice a day as needed for constipation. Quantity:  20 Tab Refills:  0  
     
   
   
   
  
 fluticasone-salmeterol 100-50 mcg/dose diskus inhaler Commonly known as:  ADVAIR Your last dose was: Your next dose is:    
   
   
 Dose:  1 Puff Take 1 Puff by inhalation every twelve (12) hours. Indications: BRONCHOSPASM PREVENTION WITH COPD Quantity:  1 Inhaler Refills:  0  
     
   
   
   
  
 levothyroxine 125 mcg tablet Commonly known as:  SYNTHROID Your last dose was: Your next dose is:    
   
   
  Refills:  0  
     
   
   
   
  
 lidocaine 5 % Commonly known as:  Jim Hitch Your last dose was: Your next dose is:    
   
   
 Apply patch to the affected area for 12 hours a day and remove for 12 hours a day. Quantity:  1 Each Refills:  0  
     
   
   
   
  
 nitroglycerin 0.4 mg SL tablet Commonly known as:  NITROSTAT Your last dose was: Your next dose is:    
   
   
 Dose:  0.4 mg  
1 Tab by SubLINGual route every five (5) minutes as needed for Chest Pain (if chest pain not resolved after taking 3 call 911 ). Quantity:  30 Tab Refills:  3  
     
   
   
   
  
 omega-3 acid ethyl esters 1 gram capsule Commonly known as:  Isa Shahid Your last dose was: Your next dose is:    
   
   
 Dose:  2 g Take 2 Caps by mouth daily (with breakfast). Quantity:  180 Cap Refills:  3  
     
   
   
   
  
 polyethylene glycol 17 gram/dose powder Commonly known as:  Lashonda Hampton Your last dose was: Your next dose is:    
   
   
 Dose:  17 g Take 17 g by mouth daily. 1 capful with 8 oz of water daily Quantity:  119 g Refills:  0 PROTONIX 40 mg tablet Generic drug:  pantoprazole Your last dose was: Your next dose is:    
   
   
 Dose:  40 mg Take 40 mg by mouth daily. Refills:  0 STOP taking these medications   
 acetaminophen 500 mg tablet Commonly known as:  TYLENOL  
   
  
 CIPRO 500 mg tablet Generic drug:  ciprofloxacin HCl  
   
  
 metroNIDAZOLE 500 mg tablet Commonly known as:  FLAGYL Where to Get Your Medications Information on where to get these meds will be given to you by the nurse or doctor. ! Ask your nurse or doctor about these medications  
  fluticasone-salmeterol 100-50 mcg/dose diskus inhaler LORazepam 1 mg tablet  
 nicotine 14 mg/24 hr patch  
 nystatin topical cream  
 oxyCODONE-acetaminophen 7.5-325 mg per tablet  
 phenyleph-shark pacheco oil-mo-pet rectal ointment  
 senna 8.6 mg tablet

## 2017-06-02 NOTE — H&P
History & Physical    Patient: Shalini Levine MRN: [id number]  CSN: [id number]    YOB: 1950  Age: 78 y.o. Sex: male      DOA: 6/2/2017    Chief Complaint:   Chief Complaint   Patient presents with    Shortness of Breath          HPI:     Shalini Levine is a 78 y.o.  male who presented to the ED earlier today with complaints of general malaise, SOB, dizziness/lightheadedness. Reports having had AAA repair approximately 2 weeks ago and has been feeling poorly since. Has had several episodes of abdominal pain and loose stools that have been intermixed with blood. He has been seen in the ED on at least 2 occasions over the course of the past several days but states \"they just sent me home. \"  Was seen by his vascular surgeon this AM and was sent to ED from his office. H/H on arrival to ED today was in normal range and there was hemorrhoid noted on exam.  D dimer was elevated to 8-range, however, and CTA was subsequently attempted to evaluate for possible PE. Study was unsuccessful, however, due to inability to obtain adequate IV access. Case was discussed with vascular surgery and recommendation was made to go ahead and anticoagulate patient. He has now been referred for hospital admission for further evaluation/treatment. Past Medical History:   Diagnosis Date    Arthritis     CAD (coronary artery disease), native coronary artery     ALIDA X 2 to OM1    Carotid duplex 08/13/2013    Mild <50% bilateral ICA plaquing.  Complete heart block [facility]) June 2013    Medtronic dual chamber pacemaker    COPD (chronic obstructive pulmonary disease) (HCC)     Gastritis     GERD (gastroesophageal reflux disease)     High cholesterol     History of echocardiogram 06/04/2013    EF 60-65%. No WMA. Gr 1 DDfx. No significant valvular heart disease.  History of myocardial perfusion scan 04/18/2011    No ischemia or prior infarction. No WMA. EF 52%.   Neg EKG on pharm stress test.    Hypertension     Hypothyroidism     Lower extremity venous duplex 01/06/2015    No DVT. Superficial insufficiency of right GSV. Deep venous reflux in right CFV & fem vein. Deep venous reflux in left CFV.  Renal duplex 12/24/2014    Mild < 60% RRA stenosis. Low parenchymal & LRA flow. Renal asymmetry. Intrinsic/med disease in right kidney. AAA (3.8 x 4.0 cm) infrarenal.    Right groin hernia     not resolved    S/P AAA repair 05/18/2017    S/P cardiac cath 06/02/2013    Diffuse atherosclerosis throughout. pRCA 50%. mLM 30%. mLAD 40%. oD2 (sm) 100%. pLCX 30%. p/mOM1 95/80% (o/lapping 2.25 x 23-mm & 2.25 x 12-mm Xience stents, resid 0%). Past Surgical History:   Procedure Laterality Date    HX CHOLECYSTECTOMY  2008    HX CORONARY STENT PLACEMENT  6/2013    HX HERNIA REPAIR  8495    Umbilical    HX PACEMAKER  6/2013    Medtronic Pacemaker        Family History   Problem Relation Age of Onset    Cancer Mother      pancreatic    Heart Attack Father     Heart Disease Father     Diabetes Sister     Heart Disease Sister     Diabetes Brother     Stroke Brother        Social History     Social History    Marital status:      Spouse name: N/A    Number of children: N/A    Years of education: N/A     Social History Main Topics    Smoking status: Current Every Day Smoker     Packs/day: 1.00     Types: Cigarettes    Smokeless tobacco: Never Used    Alcohol use No    Drug use: No    Sexual activity: Not Asked     Other Topics Concern     Service No    Blood Transfusions No    Caffeine Concern Yes    Occupational Exposure No    Hobby Hazards No    Sleep Concern No    Stress Concern No    Weight Concern No    Special Diet No    Back Care Yes    Exercise No    Bike Helmet No    Seat Belt Yes     Social History Narrative       Prior to Admission medications    Medication Sig Start Date End Date Taking?  Authorizing Provider   ciprofloxacin HCl (CIPRO) 500 mg tablet Take  by mouth two (2) times a day. Yes Carly López MD   metroNIDAZOLE (FLAGYL) 500 mg tablet Take 1 Tab by mouth two (2) times a day for 7 days. 5/30/17 6/6/17 Yes James Rogers MD   atenolol (TENORMIN) 25 mg tablet Take 25 mg by mouth daily. Yes Historical Provider   acetaminophen (TYLENOL) 500 mg tablet Take 500 mg by mouth every six (6) hours as needed for Pain. Yes Historical Provider   levothyroxine (SYNTHROID) 125 mcg tablet  6/28/16  Yes Historical Provider   atorvastatin (LIPITOR) 40 mg tablet Take 40 mg by mouth nightly. Yes Historical Provider   gabapentin (NEURONTIN) 300 mg capsule Take 2 by mouth every pm  Patient taking differently: 300 mg three (3) times daily. Take 2 by mouth every pm 12/10/15  Yes Yadi Hood MD   aspirin 81 mg chewable tablet Take 1 Tab by mouth daily. 11/9/15  Yes Radha Ceron NP   albuterol (PROVENTIL HFA, VENTOLIN HFA, PROAIR HFA) 90 mcg/actuation inhaler Take 2 Puffs by inhalation every six (6) hours as needed for Wheezing. 11/9/15  Yes Radha Ceron NP   nitroglycerin (NITROSTAT) 0.4 mg SL tablet 1 Tab by SubLINGual route every five (5) minutes as needed for Chest Pain (if chest pain not resolved after taking 3 call 911 ). 6/7/13  Yes Shilo Christian MD   oxyCODONE-acetaminophen (PERCOCET) 5-325 mg per tablet Take 1 Tab by mouth every four (4) hours as needed for Pain. Max Daily Amount: 6 Tabs. 5/30/17   James Rogers MD   lidocaine (LIDODERM) 5 % Apply patch to the affected area for 12 hours a day and remove for 12 hours a day. 5/30/17   James Rogers MD   bisacodyl (DULCOLAX, BISACODYL,) 5 mg EC tablet Take 1 Tab by mouth two (2) times a day. Take 1 tablet by mouth twice a day as needed for constipation. 5/27/17   Merary Ash PA-C   polyethylene glycol [facility]) 17 gram/dose powder Take 17 g by mouth daily.  1 capful with 8 oz of water daily 5/27/17   Merary Ash PA-C   fluticasone-salmeterol (ADVAIR) 100-50 mcg/dose diskus inhaler Take 1 Puff by inhalation every twelve (12) hours. Indications: BRONCHOSPASM PREVENTION WITH COPD 12/1/15   Devyn Barnes NP   omega-3 acid ethyl esters (LOVAZA) 1 gram capsule Take 2 Caps by mouth daily (with breakfast). 11/16/15   Devyn Barnes NP       No Known Allergies      Review of Systems  GENERAL: no F/C, + malaise/fatigue. HEENT: No change in vision, no earache, tinnitus, sore throat or sinus congestion. NECK: No pain or stiffness. PULMONARY: + shortness of breath, no cough or wheeze. Cardiovascular: no pnd or orthopnea, no CP  GASTROINTESTINAL:  No nausea, vomiting.  + abdominal pain and iarrhea, with occasional blood. GENITOURINARY: No urinary frequency, urgency, hesitancy or dysuria. MUSCULOSKELETAL: No joint or muscle pain, no back pain, no recent trauma. DERMATOLOGIC: No rash, no itching, no lesions. ENDOCRINE: No polyuria, polydipsia, no heat or cold intolerance. No recent change in weight. HEMATOLOGICAL: No easy bruising or bleeding. NEUROLOGIC: No headache, seizures, numbness, tingling or focal weakness. Physical Exam:     Physical Exam:  Visit Vitals    /75 (BP 1 Location: Right arm, BP Patient Position: At rest)    Pulse 60    Temp 97.8 °F (36.6 °C)    Resp 18    Ht 6' 1\" (1.854 m)    Wt 124.1 kg (273 lb 8 oz)    SpO2 99%    BMI 36.08 kg/m2    O2 Flow Rate (L/min): 2 l/min O2 Device: Nasal cannula    Temp (24hrs), Av.9 °F (36.6 °C), Min:97.6 °F (36.4 °C), Max:98.2 °F (36.8 °C)         07 -  1900  In: 720 [P.O.:720]  Out: -     General:  In NAD. Head: NCAT. Rachelle Snooks Eyes:  Conjunctivae/corneas clear, sclerae anicteric. Nose: Nares normal, no drainage. Throat: OP clear. Neck: Supple, symmetrical, trachea midline. Back:   Deferred. Lungs:   Clear, no wheezes. Effort nonlabored. Chest wall:  No tenderness or deformity. Heart:  RRR. Rachelle Snooks Abdomen: Soft, NTTP. Rachelle Snooks Extremities: No edema. Pulses: Warm, no ischemia. Skin:  No rashes or lesions   Neurologic: Awake and alert, motor grossly nonfocal.       Labs Reviewed:  BMP:   Lab Results   Component Value Date/Time     06/02/2017 09:50 AM    K 4.5 06/02/2017 09:50 AM     06/02/2017 09:50 AM    CO2 26 06/02/2017 09:50 AM    AGAP 11 06/02/2017 09:50 AM     (H) 06/02/2017 09:50 AM    BUN 10 06/02/2017 09:50 AM    CREA 1.45 (H) 06/02/2017 09:50 AM    GFRAA 59 (L) 06/02/2017 09:50 AM    GFRNA 49 (L) 06/02/2017 09:50 AM          CBC WITH AUTOMATED DIFF    Collection Time: 06/02/17  9:50 AM   Result Value Ref Range    WBC 12.1 4.6 - 13.2 K/uL    RBC 4.66 (L) 4.70 - 5.50 M/uL    HGB 13.3 13.0 - 16.0 g/dL    HCT 40.5 36.0 - 48.0 %    MCV 86.9 74.0 - 97.0 FL    MCH 28.5 24.0 - 34.0 PG    MCHC 32.8 31.0 - 37.0 g/dL    RDW 15.0 (H) 11.6 - 14.5 %    PLATELET 167 267 - 472 K/uL    MPV 10.7 9.2 - 11.8 FL    NEUTROPHILS 72 40 - 73 %    LYMPHOCYTES 16 (L) 21 - 52 %    MONOCYTES 9 3 - 10 %    EOSINOPHILS 2 0 - 5 %    BASOPHILS 1 0 - 2 %    ABS. NEUTROPHILS 8.7 (H) 1.8 - 8.0 K/UL    ABS. LYMPHOCYTES 2.0 0.9 - 3.6 K/UL    ABS. MONOCYTES 1.1 0.05 - 1.2 K/UL    ABS. EOSINOPHILS 0.2 0.0 - 0.4 K/UL    ABS. BASOPHILS 0.1 (H) 0.0 - 0.06 K/UL    DF AUTOMATED           Assessment:   Suspected colitis - infectious, ischemic? Elevated d dimer, concern for DVT/PE  PAD s/p recent AAA repair  Hypothyroidism   Hyponatremia, mild  CKD 2-3  Ongoing tobacco abuse    Plan:  Admit to telemetry bed. H/H and platelets normal - monitor. Will continue with Lovenox for now. Mesenteric and venous PVL studies ordered. Cipro and flagyl to continue. Monitor lytes/renal indices. F/U labs in AM.  Resume home meds as prescribed. Needs to stop smoking. DVT/GI Prophylaxis: Lovenox   Discussed with patient family, questions answered.       Anamaria Sorto MD  6/2/2017 7:23 PM

## 2017-06-02 NOTE — IP AVS SNAPSHOT
[address] [place] Patient: Jose Alfredo Dominguez MRN: [id number] YNA:[birth year] You are allergic to the following No active allergies Recent Documentation Height Weight BMI Smoking Status 1.854 m 120.4 kg 35.02 kg/m2 Current Every Day Smoker Emergency Contacts Name Discharge Info Relation Home Work Mobile Katelynn Villa DISCHARGE CAREGIVER [3] Sister [23] [phone] Katelynn Villa  Other Relative [6] [phone] About your hospitalization You were admitted on:  June 2, 2017 You last received care in the:  [address] [place] [address] You were discharged on:  June 9, 2017 Why you were hospitalized Your primary diagnosis was: Aaa (Abdominal Aortic Aneurysm) (Hcc) Your diagnoses also included:  Colitis, Cad (Coronary Artery Disease), Native Coronary Artery, Constipation Due To Pain Medication Therapy, Orthostatic Hypotension, Chronic Pain Providers Seen During Your Hospitalizations Provider Role Specialty Primary office phone Ella Stubbs MD Attending Provider Emergency Medicine [phone] Yoel David MD Attending Provider [facility] [phone] Lottie Bowles DO Attending Provider Internal Medicine [phone] Your Primary Care Physician (PCP) Primary Care Physician Office Phone Office Fax [address], [address] [facility] [address] [phone] Follow-up Information Follow up With Details Comments Contact Info Nicola Thomson MD   [address] [facility] [address] 
[phone] Nicola Thomson MD On 6/12/2017 0920 AM [facility] [address] 
[phone] Your Appointments Friday June 16, 2017  9:45 AM EDT Follow Up with [facility] ([address] [facility]) [address] [place] [address] [phone] [facility]  
[phone] Current Discharge Medication List  
  
START taking these medications Dose & Instructions Dispensing Information Comments Morning Noon Evening Bedtime LORazepam 1 mg tablet Commonly known as:  ATIVAN Your last dose was: Your next dose is:    
   
   
 Dose:  1 mg Take 1 Tab by mouth every six (6) hours as needed. Max Daily Amount: 4 mg. Indications: anxiety Quantity:  15 Tab Refills:  0  
     
   
   
   
  
 nicotine 14 mg/24 hr patch Commonly known as:  Yolie Mage Your last dose was: Your next dose is:    
   
   
 Dose:  1 Patch 1 Patch by TransDERmal route daily for 30 days. Quantity:  30 Patch Refills:  0  
     
   
   
   
  
 nystatin topical cream  
Commonly known as:  MYCOSTATIN Your last dose was: Your next dose is:    
   
   
 Dose:  1 Units Apply 1 g to affected area two (2) times daily as needed for Skin Irritation. Apply to athlete's feet BID Quantity:  15 g Refills:  0 phenyleph-shark pacheco oil-mo-pet rectal ointment Commonly known as:  PREPARATION H Your last dose was: Your next dose is:    
   
   
 by PeriANAL route four (4) times daily as needed for Pain. Quantity:  30 g Refills:  0  
     
   
   
   
  
 senna 8.6 mg tablet Commonly known as:  Robert Gore Your last dose was: Your next dose is:    
   
   
 Dose:  2 Tab Take 2 Tabs by mouth two (2) times a day. Indications: Constipation Quantity:  100 Tab Refills:  0 CONTINUE these medications which have CHANGED Dose & Instructions Dispensing Information Comments Morning Noon Evening Bedtime  
 gabapentin 300 mg capsule Commonly known as:  NEURONTIN What changed:   
- how much to take - when to take this 
- additional instructions Your last dose was: Your next dose is: Take 2 by mouth every pm  
 Quantity:  60 Cap Refills:  5  
     
   
   
   
  
 oxyCODONE-acetaminophen 7.5-325 mg per tablet Commonly known as:  PERCOCET 7.5 What changed:   
- when to take this - Another medication with the same name was removed. Continue taking this medication, and follow the directions you see here. Your last dose was: Your next dose is:    
   
   
 Dose:  1 Tab Take 1 Tab by mouth every four (4) hours as needed. Max Daily Amount: 6 Tabs. Indications: Pain Quantity:  30 Tab Refills:  0 CONTINUE these medications which have NOT CHANGED Dose & Instructions Dispensing Information Comments Morning Noon Evening Bedtime  
 albuterol 90 mcg/actuation inhaler Commonly known as:  PROVENTIL HFA, VENTOLIN HFA, PROAIR HFA Your last dose was: Your next dose is:    
   
   
 Dose:  2 Puff Take 2 Puffs by inhalation every six (6) hours as needed for Wheezing. Quantity:  1 Inhaler Refills:  0  
     
   
   
   
  
 aspirin 81 mg chewable tablet Your last dose was: Your next dose is:    
   
   
 Dose:  81 mg Take 1 Tab by mouth daily. Quantity:  30 Tab Refills:  11  
     
   
   
   
  
 atenolol 25 mg tablet Commonly known as:  TENORMIN Your last dose was: Your next dose is:    
   
   
 Dose:  25 mg Take 25 mg by mouth daily. Refills:  0  
     
   
   
   
  
 atorvastatin 40 mg tablet Commonly known as:  LIPITOR Your last dose was: Your next dose is:    
   
   
 Dose:  40 mg Take 40 mg by mouth nightly. Refills:  0  
     
   
   
   
  
 bisacodyl 5 mg EC tablet Commonly known as:  DULCOLAX (BISACODYL) Your last dose was: Your next dose is:    
   
   
 Dose:  5 mg Take 1 Tab by mouth two (2) times a day. Take 1 tablet by mouth twice a day as needed for constipation. Quantity:  20 Tab Refills:  0 fluticasone-salmeterol 100-50 mcg/dose diskus inhaler Commonly known as:  ADVAIR Your last dose was: Your next dose is:    
   
   
 Dose:  1 Puff Take 1 Puff by inhalation every twelve (12) hours. Indications: BRONCHOSPASM PREVENTION WITH COPD Quantity:  1 Inhaler Refills:  0  
     
   
   
   
  
 levothyroxine 125 mcg tablet Commonly known as:  SYNTHROID Your last dose was: Your next dose is:    
   
   
  Refills:  0  
     
   
   
   
  
 lidocaine 5 % Commonly known as:  Sindy Pillion Your last dose was: Your next dose is:    
   
   
 Apply patch to the affected area for 12 hours a day and remove for 12 hours a day. Quantity:  1 Each Refills:  0  
     
   
   
   
  
 nitroglycerin 0.4 mg SL tablet Commonly known as:  NITROSTAT Your last dose was: Your next dose is:    
   
   
 Dose:  0.4 mg  
1 Tab by SubLINGual route every five (5) minutes as needed for Chest Pain (if chest pain not resolved after taking 3 call 911 ). Quantity:  30 Tab Refills:  3  
     
   
   
   
  
 omega-3 acid ethyl esters 1 gram capsule Commonly known as:  Mirna Mosquera Your last dose was: Your next dose is:    
   
   
 Dose:  2 g Take 2 Caps by mouth daily (with breakfast). Quantity:  180 Cap Refills:  3  
     
   
   
   
  
 polyethylene glycol 17 gram/dose powder Commonly known as:  Tomeka Lockhart Your last dose was: Your next dose is:    
   
   
 Dose:  17 g Take 17 g by mouth daily. 1 capful with 8 oz of water daily Quantity:  119 g Refills:  0 PROTONIX 40 mg tablet Generic drug:  pantoprazole Your last dose was: Your next dose is:    
   
   
 Dose:  40 mg Take 40 mg by mouth daily. Refills:  0 STOP taking these medications   
 acetaminophen 500 mg tablet Commonly known as:  TYLENOL  
   
  
 CIPRO 500 mg tablet Generic drug:  ciprofloxacin HCl  
   
  
 metroNIDAZOLE 500 mg tablet Commonly known as:  FLAGYL Where to Get Your Medications Information on where to get these meds will be given to you by the nurse or doctor. ! Ask your nurse or doctor about these medications  
  fluticasone-salmeterol 100-50 mcg/dose diskus inhaler LORazepam 1 mg tablet  
 nicotine 14 mg/24 hr patch  
 nystatin topical cream  
 oxyCODONE-acetaminophen 7.5-325 mg per tablet  
 phenyleph-shark pacheco oil-mo-pet rectal ointment  
 senna 8.6 mg tablet Discharge Instructions Constipation: Care Instructions Your Care Instructions Constipation means that you have a hard time passing stools (bowel movements). People pass stools from 3 times a day to once every 3 days. What is normal for you may be different. Constipation may occur with pain in the rectum and cramping. The pain may get worse when you try to pass stools. Sometimes there are small amounts of bright red blood on toilet paper or the surface of stools. This is because of enlarged veins near the rectum (hemorrhoids). A few changes in your diet and lifestyle may help you avoid ongoing constipation. Your doctor may also prescribe medicine to help loosen your stool. Some medicines can cause constipation. These include pain medicines and antidepressants. Tell your doctor about all the medicines you take. Your doctor may want to make a medicine change to ease your symptoms. Follow-up care is a key part of your treatment and safety. Be sure to make and go to all appointments, and call your doctor if you are having problems. It's also a good idea to know your test results and keep a list of the medicines you take. How can you care for yourself at home? · Drink plenty of fluids, enough so that your urine is light yellow or clear like water.  If you have kidney, heart, or liver disease and have to limit fluids, talk with your doctor before you increase the amount of fluids you drink. · Include high-fiber foods in your diet each day. These include fruits, vegetables, beans, and whole grains. · Get at least 30 minutes of exercise on most days of the week. Walking is a good choice. You also may want to do other activities, such as running, swimming, cycling, or playing tennis or team sports. · Take a fiber supplement, such as Citrucel or Metamucil, every day. Read and follow all instructions on the label. · Schedule time each day for a bowel movement. A daily routine may help. Take your time having your bowel movement. · Support your feet with a small step stool when you sit on the toilet. This helps flex your hips and places your pelvis in a squatting position. · Your doctor may recommend an over-the-counter laxative to relieve your constipation. Examples are Milk of Magnesia and MiraLax. Read and follow all instructions on the label. Do not use laxatives on a long-term basis. When should you call for help? Call your doctor now or seek immediate medical care if: 
· You have new or worse belly pain. · You have new or worse nausea or vomiting. · You have blood in your stools. Watch closely for changes in your health, and be sure to contact your doctor if: 
· Your constipation is getting worse. · You do not get better as expected. Where can you learn more? Go to http://daily-lance.info/. Enter 21 [phone] in the search box to learn more about \"Constipation: Care Instructions. \" Current as of: May 27, 2016 Content Version: 11.2 © 5800-6777 [employer]. Care instructions adapted under license by [employer] (which disclaims liability or warranty for this information).  If you have questions about a medical condition or this instruction, always ask your healthcare professional. [address] any warranty or liability for your use of this information. Chronic Pain: Care Instructions Your Care Instructions Chronic pain is pain that lasts a long time (months or even years) and may or may not have a clear cause. It is different from acute pain, which usually does have a clear causelike an injury or illnessand gets better over time. Chronic pain: 
· Lasts over time but may vary from day to day. · Does not go away despite efforts to end it. · May disrupt your sleep and lead to fatigue. · May cause depression or anxiety. · May make your muscles tense, causing more pain. · Can disrupt your work, hobbies, home life, and relationships with friends and family. Chronic pain is a very real condition. It is not just in your head. Treatment can help and usually includes several methods used together, such as medicines, physical therapy, exercise, and other treatments. Learning how to relax and changing negative thought patterns can also help you cope. Chronic pain is complex. Taking an active role in your treatment will help you better manage your pain. Tell your doctor if you have trouble dealing with your pain. You may have to try several things before you find what works best for you. Follow-up care is a key part of your treatment and safety. Be sure to make and go to all appointments, and call your doctor if you are having problems. Its also a good idea to know your test results and keep a list of the medicines you take. How can you care for yourself at home? · Pace yourself. Break up large jobs into smaller tasks. Save harder tasks for days when you have less pain, or go back and forth between hard tasks and easier ones. Take rest breaks. · Relax, and reduce stress. Relaxation techniques such as deep breathing or meditation can help. · Keep moving. Gentle, daily exercise can help reduce pain over the long run. Try low- or no-impact exercises such as walking, swimming, and stationary biking. Do stretches to stay flexible. · Try heat, cold packs, and massage. · Get enough sleep. Chronic pain can make you tired and drain your energy. Talk with your doctor if you have trouble sleeping because of pain. · Think positive. Your thoughts can affect your pain level. Do things that you enjoy to distract yourself when you have pain instead of focusing on the pain. See a movie, read a book, listen to music, or spend time with a friend. · If you think you are depressed, talk to your doctor about treatment. · Keep a daily pain diary. Record how your moods, thoughts, sleep patterns, activities, and medicine affect your pain. You may find that your pain is worse during or after certain activities or when you are feeling a certain emotion. Having a record of your pain can help you and your doctor find the best ways to treat your pain. · Take pain medicines exactly as directed. ¨ If the doctor gave you a prescription medicine for pain, take it as prescribed. ¨ If you are not taking a prescription pain medicine, ask your doctor if you can take an over-the-counter medicine. Reducing constipation caused by pain medicine · Include fruits, vegetables, beans, and whole grains in your diet each day. These foods are high in fiber. · Drink plenty of fluids, enough so that your urine is light yellow or clear like water. If you have kidney, heart, or liver disease and have to limit fluids, talk with your doctor before you increase the amount of fluids you drink. · If your doctor recommends it, get more exercise. Walking is a good choice. Bit by bit, increase the amount you walk every day. Try for at least 30 minutes on most days of the week. · Schedule time each day for a bowel movement. A daily routine may help. Take your time and do not strain when having a bowel movement. When should you call for help? Call your doctor now or seek immediate medical care if: 
· Your pain gets worse or is out of control. · You feel down or blue, or you do not enjoy things like you once did. You may be depressed, which is common in people with chronic pain. Depression can be treated. · You have vomiting or cramps for more than 2 hours. Watch closely for changes in your health, and be sure to contact your doctor if: 
· You cannot sleep because of pain. · You are very worried or anxious about your pain. · You have trouble taking your pain medicine. · You have any concerns about your pain medicine. · You have trouble with bowel movements, such as: 
¨ No bowel movement in 3 days. ¨ Blood in the anal area, in your stool, or on the toilet paper. ¨ Diarrhea for more than 24 hours. Where can you learn more? Go to http://daily-lance.info/. Enter N004 in the search box to learn more about \"Chronic Pain: Care Instructions. \" Current as of: October 14, 2016 Content Version: 11.2 © 3323-3775 [employer]. Care instructions adapted under license by [employer] (which disclaims liability or warranty for this information). If you have questions about a medical condition or this instruction, always ask your healthcare professional. [place] [address] any warranty or liability for your use of this information. Discharge Orders Procedure Order Date Status Priority Quantity Spec Type Associated Dx CALL YOUR DOCTOR For: Temperature greater than 100.4., Persistant nausea and vomiting. 06/09/17 1703 Normal Routine 1 Questions: For:  Temperature greater than 100.4. For:  Persistant nausea and vomiting. ACTIVITY AFTER DISCHARGE Patient should: Resume activity as tolerated. 06/09/17 1703 Normal Routine 1 Schedule Instructions: With [employer], and as directed by PT and OT Questions: Patient should:  Resume activity as tolerated. DIET CARDIAC No options chosen 06/09/17 1703 Normal Routine 1 Questions: Additional options:  No options chosen CALL YOUR DOCTOR For: Persistant nausea and vomiting., Temperature greater than 100.4. 06/09/17 1711 Normal Routine 1 Questions: For:  Persistant nausea and vomiting. For:  Temperature greater than 100.4. ACTIVITY AFTER DISCHARGE Patient should: Resume activity as tolerated. 06/09/17 1711 Normal Routine 1 Schedule Instructions: With rolling walker, and as instructed by PT Questions: Patient should:  Resume activity as tolerated. COMMODE CHAIR 06/09/17 1711 Normal Routine 1 WALKER STANDARD 06/09/17 1711 Normal Routine 1 General Information Please provide this summary of care documentation to your next provider. Patient Signature:  ____________________________________________________________ Date:  ____________________________________________________________  
  
[facility] Provider Signature:  ____________________________________________________________ Date:  ____________________________________________________________

## 2017-06-02 NOTE — PROGRESS NOTES
61-year-old male here for his postop visit regarding complex aorta. He has had a degenerating distal aorta is now status post stent graft repair. He has had a thoracic dissection repaired emergently some time ago now he is a week postop on his infrarenal aneurysm repair. He has been to the emergency department with shortness of breath and some diarrhea. He tells me his diarrhea is better now it is not frequent. He does not have any abdominal pain. He tells me his shortness of breath has been to the point where he feels like he is not going to make it. He has not used any of his inhalers for some time now. He continues to smoke very heavily. On exam his abdomen is soft nontender no guarding. His groins incisions are intact there is no redness no pain at the site. I did review his CAT scan he had at the emergency department. His endograft is in good position there is no leak the aneurysm is treated. There is some seroma in the groins but no extravasation. This should be expected as he has had several groin access surgeries before and overall looks nice and pleased with the appearance. Regarding shortness of breath I discussed with the emergency room on her first floor nondistended and there for breathing treatment and evaluation. Hopefully can get his inhalers to stabilize his breathing also talked him about fluid hydration and bland diet until his full appetite returns. He does have some minor colitis on his last CT but is without any symptoms now no pain no further diarrhea expect this to resolve. His iliacs are patent as are his branch vessels by CTA.   We will see him back for postop care and also be seeing his primary care doctor at [facility] care on the fifth

## 2017-06-02 NOTE — IP AVS SNAPSHOT
[address] [address] [place] Patient: Stephanie Mcgee MRN: [id number] BTD:[id number]/6/[id number] You are allergic to the following No active allergies Recent Documentation Height Weight BMI Smoking Status 1.854 m 120.4 kg 35.02 kg/m2 Current Every Day Smoker Emergency Contacts Name Discharge Info Relation Home Work Mobile Katelynn Villa DISCHARGE CAREGIVER [3] Sister [23] [phone] Katelynn Villa  Other Relative [6] [phone] About your hospitalization You were admitted on:  June 2, 2017 You last received care in the:  [address] [place] [address] You were discharged on:  June 9, 2017 Unit phone number:  [phone] Why you were hospitalized Your primary diagnosis was: Aaa (Abdominal Aortic Aneurysm) (Hcc) Your diagnoses also included:  Colitis, Cad (Coronary Artery Disease), Native Coronary Artery, Constipation Due To Pain Medication Therapy, Orthostatic Hypotension, Chronic Pain Providers Seen During Your Hospitalizations Provider Role Specialty Primary office phone Millie Linares MD Attending Provider Emergency Medicine [phone] Leigh Kyle MD Attending Provider [facility] [phone] Lilli Dumont DO Attending Provider Internal Medicine [phone] Your Primary Care Physician (PCP) Primary Care Physician Office Phone Office Fax [address], [address] [phone] Follow-up Information Follow up With Details Comments Contact Info Grupo Riddle MD   [facility] [address] [phone] Grupo Riddle MD On 6/12/2017 0920 AM [facility] [address] 
[phone] Your Appointments Friday June 16, 2017  9:45 AM EDT Follow Up with VIJAY Galvan  
 [facility] ([facility] [address] [place] [address] Zhang Dalton [address] [place] [address]  
[phone] Current Discharge Medication List  
  
START taking these medications Dose & Instructions Dispensing Information Comments Morning Noon Evening Bedtime LORazepam 1 mg tablet Commonly known as:  ATIVAN Your last dose was: Your next dose is:    
   
   
 Dose:  1 mg Take 1 Tab by mouth every six (6) hours as needed. Max Daily Amount: 4 mg. Indications: anxiety Quantity:  15 Tab Refills:  0  
     
   
   
   
  
 nicotine 14 mg/24 hr patch Commonly known as:  Hali Gooden Your last dose was: Your next dose is:    
   
   
 Dose:  1 Patch 1 Patch by TransDERmal route daily for 30 days. Quantity:  30 Patch Refills:  0  
     
   
   
   
  
 nystatin topical cream  
Commonly known as:  MYCOSTATIN Your last dose was: Your next dose is:    
   
   
 Dose:  1 Units Apply 1 g to affected area two (2) times daily as needed for Skin Irritation. Apply to athlete's feet BID Quantity:  15 g Refills:  0 phenyleph-shark pacheco oil-mo-pet rectal ointment Commonly known as:  PREPARATION H Your last dose was: Your next dose is:    
   
   
 by PeriANAL route four (4) times daily as needed for Pain. Quantity:  30 g Refills:  0  
     
   
   
   
  
 senna 8.6 mg tablet Commonly known as:  Robert Gore Your last dose was: Your next dose is:    
   
   
 Dose:  2 Tab Take 2 Tabs by mouth two (2) times a day. Indications: Constipation Quantity:  100 Tab Refills:  0 CONTINUE these medications which have CHANGED Dose & Instructions Dispensing Information Comments Morning Noon Evening Bedtime  
 gabapentin 300 mg capsule Commonly known as:  NEURONTIN What changed:   
- how much to take - when to take this - additional instructions Your last dose was: Your next dose is: Take 2 by mouth every pm  
 Quantity:  60 Cap Refills:  5  
     
   
   
   
  
 oxyCODONE-acetaminophen 7.5-325 mg per tablet Commonly known as:  PERCOCET 7.5 What changed:   
- when to take this - Another medication with the same name was removed. Continue taking this medication, and follow the directions you see here. Your last dose was: Your next dose is:    
   
   
 Dose:  1 Tab Take 1 Tab by mouth every four (4) hours as needed. Max Daily Amount: 6 Tabs. Indications: Pain Quantity:  30 Tab Refills:  0 CONTINUE these medications which have NOT CHANGED Dose & Instructions Dispensing Information Comments Morning Noon Evening Bedtime  
 albuterol 90 mcg/actuation inhaler Commonly known as:  PROVENTIL HFA, VENTOLIN HFA, PROAIR HFA Your last dose was: Your next dose is:    
   
   
 Dose:  2 Puff Take 2 Puffs by inhalation every six (6) hours as needed for Wheezing. Quantity:  1 Inhaler Refills:  0  
     
   
   
   
  
 aspirin 81 mg chewable tablet Your last dose was: Your next dose is:    
   
   
 Dose:  81 mg Take 1 Tab by mouth daily. Quantity:  30 Tab Refills:  11  
     
   
   
   
  
 atenolol 25 mg tablet Commonly known as:  TENORMIN Your last dose was: Your next dose is:    
   
   
 Dose:  25 mg Take 25 mg by mouth daily. Refills:  0  
     
   
   
   
  
 atorvastatin 40 mg tablet Commonly known as:  LIPITOR Your last dose was: Your next dose is:    
   
   
 Dose:  40 mg Take 40 mg by mouth nightly. Refills:  0  
     
   
   
   
  
 bisacodyl 5 mg EC tablet Commonly known as:  DULCOLAX (BISACODYL) Your last dose was: Your next dose is:    
   
   
 Dose:  5 mg Take 1 Tab by mouth two (2) times a day.  Take 1 tablet by mouth twice a day as needed for constipation. Quantity:  20 Tab Refills:  0  
     
   
   
   
  
 fluticasone-salmeterol 100-50 mcg/dose diskus inhaler Commonly known as:  ADVAIR Your last dose was: Your next dose is:    
   
   
 Dose:  1 Puff Take 1 Puff by inhalation every twelve (12) hours. Indications: BRONCHOSPASM PREVENTION WITH COPD Quantity:  1 Inhaler Refills:  0  
     
   
   
   
  
 levothyroxine 125 mcg tablet Commonly known as:  SYNTHROID Your last dose was: Your next dose is:    
   
   
  Refills:  0  
     
   
   
   
  
 lidocaine 5 % Commonly known as:  Sindy Pillion Your last dose was: Your next dose is:    
   
   
 Apply patch to the affected area for 12 hours a day and remove for 12 hours a day. Quantity:  1 Each Refills:  0  
     
   
   
   
  
 nitroglycerin 0.4 mg SL tablet Commonly known as:  NITROSTAT Your last dose was: Your next dose is:    
   
   
 Dose:  0.4 mg  
1 Tab by SubLINGual route every five (5) minutes as needed for Chest Pain (if chest pain not resolved after taking 3 call 911 ). Quantity:  30 Tab Refills:  3  
     
   
   
   
  
 omega-3 acid ethyl esters 1 gram capsule Commonly known as:  Mirna Mosquera Your last dose was: Your next dose is:    
   
   
 Dose:  2 g Take 2 Caps by mouth daily (with breakfast). Quantity:  180 Cap Refills:  3  
     
   
   
   
  
 polyethylene glycol 17 gram/dose powder Commonly known as:  Tomeka Lockhart Your last dose was: Your next dose is:    
   
   
 Dose:  17 g Take 17 g by mouth daily. 1 capful with 8 oz of water daily Quantity:  119 g Refills:  0 PROTONIX 40 mg tablet Generic drug:  pantoprazole Your last dose was: Your next dose is:    
   
   
 Dose:  40 mg Take 40 mg by mouth daily. Refills:  0 STOP taking these medications acetaminophen 500 mg tablet Commonly known as:  TYLENOL  
   
  
 CIPRO 500 mg tablet Generic drug:  ciprofloxacin HCl  
   
  
 metroNIDAZOLE 500 mg tablet Commonly known as:  FLAGYL Where to Get Your Medications Information on where to get these meds will be given to you by the nurse or doctor. ! Ask your nurse or doctor about these medications  
  fluticasone-salmeterol 100-50 mcg/dose diskus inhaler LORazepam 1 mg tablet  
 nicotine 14 mg/24 hr patch  
 nystatin topical cream  
 oxyCODONE-acetaminophen 7.5-325 mg per tablet  
 phenyleph-shark pacheco oil-mo-pet rectal ointment  
 senna 8.6 mg tablet Discharge Instructions Constipation: Care Instructions Your Care Instructions Constipation means that you have a hard time passing stools (bowel movements). People pass stools from 3 times a day to once every 3 days. What is normal for you may be different. Constipation may occur with pain in the rectum and cramping. The pain may get worse when you try to pass stools. Sometimes there are small amounts of bright red blood on toilet paper or the surface of stools. This is because of enlarged veins near the rectum (hemorrhoids). A few changes in your diet and lifestyle may help you avoid ongoing constipation. Your doctor may also prescribe medicine to help loosen your stool. Some medicines can cause constipation. These include pain medicines and antidepressants. Tell your doctor about all the medicines you take. Your doctor may want to make a medicine change to ease your symptoms. Follow-up care is a key part of your treatment and safety. Be sure to make and go to all appointments, and call your doctor if you are having problems. It's also a good idea to know your test results and keep a list of the medicines you take. How can you care for yourself at home?  
· Drink plenty of fluids, enough so that your urine is light yellow or clear like water. If you have kidney, heart, or liver disease and have to limit fluids, talk with your doctor before you increase the amount of fluids you drink. · Include high-fiber foods in your diet each day. These include fruits, vegetables, beans, and whole grains. · Get at least 30 minutes of exercise on most days of the week. Walking is a good choice. You also may want to do other activities, such as running, swimming, cycling, or playing tennis or team sports. · Take a fiber supplement, such as Citrucel or Metamucil, every day. Read and follow all instructions on the label. · Schedule time each day for a bowel movement. A daily routine may help. Take your time having your bowel movement. · Support your feet with a small step stool when you sit on the toilet. This helps flex your hips and places your pelvis in a squatting position. · Your doctor may recommend an over-the-counter laxative to relieve your constipation. Examples are Milk of Magnesia and MiraLax. Read and follow all instructions on the label. Do not use laxatives on a long-term basis. When should you call for help? Call your doctor now or seek immediate medical care if: 
· You have new or worse belly pain. · You have new or worse nausea or vomiting. · You have blood in your stools. Watch closely for changes in your health, and be sure to contact your doctor if: 
· Your constipation is getting worse. · You do not get better as expected. Where can you learn more? Go to http://daily-lance.info/. Enter 21 [phone] in the search box to learn more about \"Constipation: Care Instructions. \" Current as of: May 27, 2016 Content Version: 11.2 © 3407-2505 [employer]. Care instructions adapted under license by [employer] (which disclaims liability or warranty for this information).  If you have questions about a medical condition or this instruction, always ask your healthcare professional. [employer] disclaims any warranty or liability for your use of this information. Chronic Pain: Care Instructions Your Care Instructions Chronic pain is pain that lasts a long time (months or even years) and may or may not have a clear cause. It is different from acute pain, which usually does have a clear causelike an injury or illnessand gets better over time. Chronic pain: 
· Lasts over time but may vary from day to day. · Does not go away despite efforts to end it. · May disrupt your sleep and lead to fatigue. · May cause depression or anxiety. · May make your muscles tense, causing more pain. · Can disrupt your work, hobbies, home life, and relationships with friends and family. Chronic pain is a very real condition. It is not just in your head. Treatment can help and usually includes several methods used together, such as medicines, physical therapy, exercise, and other treatments. Learning how to relax and changing negative thought patterns can also help you cope. Chronic pain is complex. Taking an active role in your treatment will help you better manage your pain. Tell your doctor if you have trouble dealing with your pain. You may have to try several things before you find what works best for you. Follow-up care is a key part of your treatment and safety. Be sure to make and go to all appointments, and call your doctor if you are having problems. Its also a good idea to know your test results and keep a list of the medicines you take. How can you care for yourself at home? · Pace yourself. Break up large jobs into smaller tasks. Save harder tasks for days when you have less pain, or go back and forth between hard tasks and easier ones. Take rest breaks. · Relax, and reduce stress. Relaxation techniques such as deep breathing or meditation can help. · Keep moving.  Gentle, daily exercise can help reduce pain over the long run. Try low- or no-impact exercises such as walking, swimming, and stationary biking. Do stretches to stay flexible. · Try heat, cold packs, and massage. · Get enough sleep. Chronic pain can make you tired and drain your energy. Talk with your doctor if you have trouble sleeping because of pain. · Think positive. Your thoughts can affect your pain level. Do things that you enjoy to distract yourself when you have pain instead of focusing on the pain. See a movie, read a book, listen to music, or spend time with a friend. · If you think you are depressed, talk to your doctor about treatment. · Keep a daily pain diary. Record how your moods, thoughts, sleep patterns, activities, and medicine affect your pain. You may find that your pain is worse during or after certain activities or when you are feeling a certain emotion. Having a record of your pain can help you and your doctor find the best ways to treat your pain. · Take pain medicines exactly as directed. ¨ If the doctor gave you a prescription medicine for pain, take it as prescribed. ¨ If you are not taking a prescription pain medicine, ask your doctor if you can take an over-the-counter medicine. Reducing constipation caused by pain medicine · Include fruits, vegetables, beans, and whole grains in your diet each day. These foods are high in fiber. · Drink plenty of fluids, enough so that your urine is light yellow or clear like water. If you have kidney, heart, or liver disease and have to limit fluids, talk with your doctor before you increase the amount of fluids you drink. · If your doctor recommends it, get more exercise. Walking is a good choice. Bit by bit, increase the amount you walk every day. Try for at least 30 minutes on most days of the week. · Schedule time each day for a bowel movement. A daily routine may help. Take your time and do not strain when having a bowel movement. When should you call for help? Call your doctor now or seek immediate medical care if: 
· Your pain gets worse or is out of control. · You feel down or blue, or you do not enjoy things like you once did. You may be depressed, which is common in people with chronic pain. Depression can be treated. · You have vomiting or cramps for more than 2 hours. Watch closely for changes in your health, and be sure to contact your doctor if: 
· You cannot sleep because of pain. · You are very worried or anxious about your pain. · You have trouble taking your pain medicine. · You have any concerns about your pain medicine. · You have trouble with bowel movements, such as: 
¨ No bowel movement in 3 days. ¨ Blood in the anal area, in your stool, or on the toilet paper. ¨ Diarrhea for more than 24 hours. Where can you learn more? Go to http://dailyProcuricslance.info/. Enter N004 in the search box to learn more about \"Chronic Pain: Care Instructions. \" Current as of: October 14, 2016 Content Version: 11.2 © 4962-5448 "[employer]. Care instructions adapted under license by [employer] (which disclaims liability or warranty for this information). If you have questions about a medical condition or this instruction, always ask your healthcare professional. [address] [address] any warranty or liability for your use of this information. Discharge Orders Procedure Order Date Status Priority Quantity Spec Type Associated Dx CALL YOUR DOCTOR For: Temperature greater than 100.4., Persistant nausea and vomiting. 06/09/17 1703 Normal Routine 1 Questions: For:  Temperature greater than 100.4. For:  Persistant nausea and vomiting. ACTIVITY AFTER DISCHARGE Patient should: Resume activity as tolerated. 06/09/17 1703 Normal Routine 1 Schedule Instructions: With [employer], and as directed by PT and OT Questions: Patient should:  Resume activity as tolerated. DIET CARDIAC No options chosen 06/09/17 1703 Normal Routine 1 Questions: Additional options:  No options chosen CALL YOUR DOCTOR For: Persistant nausea and vomiting., Temperature greater than 100.4. 06/09/17 1711 Normal Routine 1 Questions: For:  Persistant nausea and vomiting. For:  Temperature greater than 100.4. ACTIVITY AFTER DISCHARGE Patient should: Resume activity as tolerated. 06/09/17 1711 Normal Routine 1 Schedule Instructions: With rolling walker, and as instructed by PT Questions: Patient should:  Resume activity as tolerated. COMMODE CHAIR 06/09/17 1711 Normal Routine 1 WALKER STANDARD 06/09/17 1711 Normal Routine 1 [employer] Announcement We are excited to announce that we are making your provider's discharge notes available to you in Swoop. You will see these notes when they are completed and signed by the physician that discharged you from your recent hospital stay. If you have any questions or concerns about any information you see in disco volantet, please call the Health Information Department where you were seen or reach out to your Primary Care Provider for more information about your plan of care. General Information Please provide this summary of care documentation to your next provider. Patient Signature:  ____________________________________________________________ Date:  ____________________________________________________________  
  
Tena Calvin Provider Signature:  ____________________________________________________________ Date:  ____________________________________________________________

## 2017-06-02 NOTE — ED TRIAGE NOTES
Pt from  Dr Luisa Marc office with co sob  And fatigue also co rectal bleeding for several weeks states has seen PMD and is being treated  But bleeding continues

## 2017-06-02 NOTE — ROUTINE PROCESS
1800 Pt arrived unit via stretcher from [facility] ED in no acute distress. Alert and oriented x 4. Oriented to call bell and surrounding. Bed locked and in low position. Instruct to call for assistance. End of shift bedside report given to Eugene Weeks RN. Report included the following information. SBAR, MAR, KARDEX AND RECEN RESULTS.

## 2017-06-03 ENCOUNTER — APPOINTMENT (OUTPATIENT)
Dept: CT IMAGING | Age: 67
DRG: 392 | End: 2017-06-03
Attending: HOSPITALIST
Payer: MEDICARE

## 2017-06-03 LAB
ANION GAP BLD CALC-SCNC: 3 MMOL/L (ref 3–18)
APPEARANCE UR: CLEAR
APTT PPP: 39.2 SEC (ref 23–36.4)
APTT PPP: 44.4 SEC (ref 23–36.4)
BASOPHILS # BLD AUTO: 0.1 K/UL (ref 0–0.06)
BASOPHILS # BLD AUTO: 0.1 K/UL (ref 0–0.1)
BASOPHILS # BLD: 1 % (ref 0–2)
BASOPHILS # BLD: 1 % (ref 0–2)
BILIRUB UR QL: NEGATIVE
BUN SERPL-MCNC: 10 MG/DL (ref 7–18)
BUN/CREAT SERPL: 8 (ref 12–20)
CALCIUM SERPL-MCNC: 8.2 MG/DL (ref 8.5–10.1)
CHLORIDE SERPL-SCNC: 102 MMOL/L (ref 100–108)
CK MB CFR SERPL CALC: 1.2 % (ref 0–4)
CK MB SERPL-MCNC: 1.1 NG/ML (ref 5–25)
CK SERPL-CCNC: 92 U/L (ref 39–308)
CO2 SERPL-SCNC: 32 MMOL/L (ref 21–32)
COLOR UR: YELLOW
CREAT SERPL-MCNC: 1.31 MG/DL (ref 0.6–1.3)
DIFFERENTIAL METHOD BLD: ABNORMAL
DIFFERENTIAL METHOD BLD: ABNORMAL
EOSINOPHIL # BLD: 0.3 K/UL (ref 0–0.4)
EOSINOPHIL # BLD: 0.4 K/UL (ref 0–0.4)
EOSINOPHIL NFR BLD: 4 % (ref 0–5)
EOSINOPHIL NFR BLD: 5 % (ref 0–5)
ERYTHROCYTE [DISTWIDTH] IN BLOOD BY AUTOMATED COUNT: 15 % (ref 11.6–14.5)
ERYTHROCYTE [DISTWIDTH] IN BLOOD BY AUTOMATED COUNT: 15.1 % (ref 11.6–14.5)
GLUCOSE SERPL-MCNC: 113 MG/DL (ref 74–99)
GLUCOSE UR STRIP.AUTO-MCNC: NEGATIVE MG/DL
HCT VFR BLD AUTO: 34.6 % (ref 36–48)
HCT VFR BLD AUTO: 35.7 % (ref 36–48)
HGB BLD-MCNC: 11.3 G/DL (ref 13–16)
HGB BLD-MCNC: 11.4 G/DL (ref 13–16)
HGB UR QL STRIP: NEGATIVE
INR PPP: 1.1 (ref 0.8–1.2)
KETONES UR QL STRIP.AUTO: NEGATIVE MG/DL
LEUKOCYTE ESTERASE UR QL STRIP.AUTO: NEGATIVE
LYMPHOCYTES # BLD AUTO: 22 % (ref 21–52)
LYMPHOCYTES # BLD AUTO: 25 % (ref 21–52)
LYMPHOCYTES # BLD: 1.6 K/UL (ref 0.9–3.6)
LYMPHOCYTES # BLD: 1.9 K/UL (ref 0.9–3.6)
MAGNESIUM SERPL-MCNC: 2.3 MG/DL (ref 1.6–2.6)
MCH RBC QN AUTO: 27.9 PG (ref 24–34)
MCH RBC QN AUTO: 28.3 PG (ref 24–34)
MCHC RBC AUTO-ENTMCNC: 31.9 G/DL (ref 31–37)
MCHC RBC AUTO-ENTMCNC: 32.7 G/DL (ref 31–37)
MCV RBC AUTO: 86.5 FL (ref 74–97)
MCV RBC AUTO: 87.5 FL (ref 74–97)
MONOCYTES # BLD: 0.6 K/UL (ref 0.05–1.2)
MONOCYTES # BLD: 0.8 K/UL (ref 0.05–1.2)
MONOCYTES NFR BLD AUTO: 10 % (ref 3–10)
MONOCYTES NFR BLD AUTO: 8 % (ref 3–10)
NEUTS SEG # BLD: 4.8 K/UL (ref 1.8–8)
NEUTS SEG # BLD: 4.8 K/UL (ref 1.8–8)
NEUTS SEG NFR BLD AUTO: 62 % (ref 40–73)
NEUTS SEG NFR BLD AUTO: 62 % (ref 40–73)
NITRITE UR QL STRIP.AUTO: NEGATIVE
PH UR STRIP: 6 [PH] (ref 5–8)
PHOSPHATE SERPL-MCNC: 2.8 MG/DL (ref 2.5–4.9)
PLATELET # BLD AUTO: 200 K/UL (ref 135–420)
PLATELET # BLD AUTO: 239 K/UL (ref 135–420)
PMV BLD AUTO: 10 FL (ref 9.2–11.8)
PMV BLD AUTO: 10.4 FL (ref 9.2–11.8)
POTASSIUM SERPL-SCNC: 4.4 MMOL/L (ref 3.5–5.5)
PROT UR STRIP-MCNC: NEGATIVE MG/DL
PROTHROMBIN TIME: 14 SEC (ref 11.5–15.2)
RBC # BLD AUTO: 4 M/UL (ref 4.7–5.5)
RBC # BLD AUTO: 4.08 M/UL (ref 4.7–5.5)
SODIUM SERPL-SCNC: 137 MMOL/L (ref 136–145)
SP GR UR REFRACTOMETRY: >1.03 (ref 1–1.03)
TROPONIN I SERPL-MCNC: <0.02 NG/ML (ref 0–0.04)
UROBILINOGEN UR QL STRIP.AUTO: 0.2 EU/DL (ref 0.2–1)
WBC # BLD AUTO: 7.6 K/UL (ref 4.6–13.2)
WBC # BLD AUTO: 7.6 K/UL (ref 4.6–13.2)

## 2017-06-03 PROCEDURE — 85610 PROTHROMBIN TIME: CPT | Performed by: HOSPITALIST

## 2017-06-03 PROCEDURE — 82550 ASSAY OF CK (CPK): CPT | Performed by: HOSPITALIST

## 2017-06-03 PROCEDURE — 65660000000 HC RM CCU STEPDOWN

## 2017-06-03 PROCEDURE — 74011250637 HC RX REV CODE- 250/637: Performed by: FAMILY MEDICINE

## 2017-06-03 PROCEDURE — 85025 COMPLETE CBC W/AUTO DIFF WBC: CPT | Performed by: HOSPITALIST

## 2017-06-03 PROCEDURE — 74011250637 HC RX REV CODE- 250/637: Performed by: HOSPITALIST

## 2017-06-03 PROCEDURE — 80048 BASIC METABOLIC PNL TOTAL CA: CPT | Performed by: HOSPITALIST

## 2017-06-03 PROCEDURE — 94640 AIRWAY INHALATION TREATMENT: CPT

## 2017-06-03 PROCEDURE — 85730 THROMBOPLASTIN TIME PARTIAL: CPT | Performed by: HOSPITALIST

## 2017-06-03 PROCEDURE — 74011636320 HC RX REV CODE- 636/320: Performed by: HOSPITALIST

## 2017-06-03 PROCEDURE — 74011000250 HC RX REV CODE- 250: Performed by: HOSPITALIST

## 2017-06-03 PROCEDURE — 93970 EXTREMITY STUDY: CPT

## 2017-06-03 PROCEDURE — 71275 CT ANGIOGRAPHY CHEST: CPT

## 2017-06-03 PROCEDURE — 74011250636 HC RX REV CODE- 250/636: Performed by: HOSPITALIST

## 2017-06-03 PROCEDURE — 84100 ASSAY OF PHOSPHORUS: CPT | Performed by: HOSPITALIST

## 2017-06-03 PROCEDURE — 83735 ASSAY OF MAGNESIUM: CPT | Performed by: HOSPITALIST

## 2017-06-03 PROCEDURE — 81003 URINALYSIS AUTO W/O SCOPE: CPT | Performed by: HOSPITALIST

## 2017-06-03 PROCEDURE — 36415 COLL VENOUS BLD VENIPUNCTURE: CPT | Performed by: HOSPITALIST

## 2017-06-03 PROCEDURE — 74011250636 HC RX REV CODE- 250/636: Performed by: FAMILY MEDICINE

## 2017-06-03 PROCEDURE — 93005 ELECTROCARDIOGRAM TRACING: CPT

## 2017-06-03 RX ORDER — HEPARIN SODIUM 10000 [USP'U]/100ML
18-36 INJECTION, SOLUTION INTRAVENOUS
Status: DISCONTINUED | OUTPATIENT
Start: 2017-06-03 | End: 2017-06-04

## 2017-06-03 RX ORDER — LORAZEPAM 1 MG/1
1 TABLET ORAL
Status: DISCONTINUED | OUTPATIENT
Start: 2017-06-03 | End: 2017-06-10 | Stop reason: HOSPADM

## 2017-06-03 RX ORDER — ALBUTEROL SULFATE 0.83 MG/ML
2.5 SOLUTION RESPIRATORY (INHALATION)
Status: DISCONTINUED | OUTPATIENT
Start: 2017-06-03 | End: 2017-06-03

## 2017-06-03 RX ORDER — PANTOPRAZOLE SODIUM 40 MG/1
40 TABLET, DELAYED RELEASE ORAL DAILY
COMMUNITY

## 2017-06-03 RX ORDER — METRONIDAZOLE 500 MG/1
500 TABLET ORAL 3 TIMES DAILY
Status: DISCONTINUED | OUTPATIENT
Start: 2017-06-03 | End: 2017-06-07

## 2017-06-03 RX ORDER — SODIUM CHLORIDE 9 MG/ML
75 INJECTION, SOLUTION INTRAVENOUS CONTINUOUS
Status: DISPENSED | OUTPATIENT
Start: 2017-06-03 | End: 2017-06-04

## 2017-06-03 RX ORDER — FAMOTIDINE 20 MG/1
20 TABLET, FILM COATED ORAL 2 TIMES DAILY
Status: DISCONTINUED | OUTPATIENT
Start: 2017-06-03 | End: 2017-06-03

## 2017-06-03 RX ORDER — NYSTATIN 100000 U/G
CREAM TOPICAL 2 TIMES DAILY
Status: DISCONTINUED | OUTPATIENT
Start: 2017-06-03 | End: 2017-06-10 | Stop reason: HOSPADM

## 2017-06-03 RX ORDER — POLYETHYLENE GLYCOL 3350 17 G/17G
17 POWDER, FOR SOLUTION ORAL DAILY
Status: DISCONTINUED | OUTPATIENT
Start: 2017-06-04 | End: 2017-06-10 | Stop reason: HOSPADM

## 2017-06-03 RX ORDER — ALBUTEROL SULFATE 0.83 MG/ML
2.5 SOLUTION RESPIRATORY (INHALATION)
Status: DISCONTINUED | OUTPATIENT
Start: 2017-06-03 | End: 2017-06-04

## 2017-06-03 RX ORDER — PANTOPRAZOLE SODIUM 40 MG/1
40 TABLET, DELAYED RELEASE ORAL
Status: DISCONTINUED | OUTPATIENT
Start: 2017-06-03 | End: 2017-06-10 | Stop reason: HOSPADM

## 2017-06-03 RX ORDER — CIPROFLOXACIN 500 MG/1
500 TABLET ORAL EVERY 12 HOURS
Status: DISCONTINUED | OUTPATIENT
Start: 2017-06-03 | End: 2017-06-07

## 2017-06-03 RX ADMIN — METRONIDAZOLE 500 MG: 500 TABLET ORAL at 21:13

## 2017-06-03 RX ADMIN — OMEGA-3-ACID ETHYL ESTERS 2000 MG: 1 CAPSULE, LIQUID FILLED ORAL at 09:21

## 2017-06-03 RX ADMIN — NYSTATIN: 100000 CREAM TOPICAL at 17:06

## 2017-06-03 RX ADMIN — ENOXAPARIN SODIUM 120 MG: 120 INJECTION SUBCUTANEOUS at 13:03

## 2017-06-03 RX ADMIN — ATORVASTATIN CALCIUM 40 MG: 40 TABLET, FILM COATED ORAL at 21:13

## 2017-06-03 RX ADMIN — OXYCODONE HYDROCHLORIDE AND ACETAMINOPHEN 1 TABLET: 5; 325 TABLET ORAL at 16:55

## 2017-06-03 RX ADMIN — LEVOTHYROXINE SODIUM 125 MCG: 125 TABLET ORAL at 09:20

## 2017-06-03 RX ADMIN — OXYCODONE HYDROCHLORIDE AND ACETAMINOPHEN 1 TABLET: 5; 325 TABLET ORAL at 21:13

## 2017-06-03 RX ADMIN — OXYCODONE HYDROCHLORIDE AND ACETAMINOPHEN 1 TABLET: 5; 325 TABLET ORAL at 09:19

## 2017-06-03 RX ADMIN — BUDESONIDE AND FORMOTEROL FUMARATE DIHYDRATE 2 PUFF: 160; 4.5 AEROSOL RESPIRATORY (INHALATION) at 19:56

## 2017-06-03 RX ADMIN — IOPAMIDOL 90 ML: 755 INJECTION, SOLUTION INTRAVENOUS at 17:25

## 2017-06-03 RX ADMIN — GABAPENTIN 300 MG: 300 CAPSULE ORAL at 09:21

## 2017-06-03 RX ADMIN — GABAPENTIN 300 MG: 300 CAPSULE ORAL at 16:54

## 2017-06-03 RX ADMIN — CIPROFLOXACIN HYDROCHLORIDE 500 MG: 500 TABLET, FILM COATED ORAL at 21:13

## 2017-06-03 RX ADMIN — ENOXAPARIN SODIUM 120 MG: 120 INJECTION SUBCUTANEOUS at 03:19

## 2017-06-03 RX ADMIN — GABAPENTIN 300 MG: 300 CAPSULE ORAL at 21:13

## 2017-06-03 RX ADMIN — SODIUM CHLORIDE 75 ML/HR: 900 INJECTION, SOLUTION INTRAVENOUS at 17:06

## 2017-06-03 RX ADMIN — OXYCODONE HYDROCHLORIDE AND ACETAMINOPHEN 1 TABLET: 5; 325 TABLET ORAL at 03:18

## 2017-06-03 RX ADMIN — PANTOPRAZOLE SODIUM 40 MG: 40 TABLET, DELAYED RELEASE ORAL at 13:06

## 2017-06-03 RX ADMIN — ATENOLOL 25 MG: 25 TABLET ORAL at 09:21

## 2017-06-03 RX ADMIN — METRONIDAZOLE 500 MG: 500 TABLET ORAL at 16:55

## 2017-06-03 RX ADMIN — ALBUTEROL SULFATE 2.5 MG: 2.5 SOLUTION RESPIRATORY (INHALATION) at 20:01

## 2017-06-03 RX ADMIN — BISACODYL 5 MG: 5 TABLET, COATED ORAL at 09:21

## 2017-06-03 RX ADMIN — OXYCODONE HYDROCHLORIDE AND ACETAMINOPHEN 1 TABLET: 5; 325 TABLET ORAL at 13:06

## 2017-06-03 NOTE — PROGRESS NOTES
met with Parvez Mercedes, who is a 78 y.o.,male to assist in the completion of a [address]. Patients Primary Language is: [place]. The reason the Patient was admitted to is:   Patient Active Problem List    Diagnosis Date Noted    SOB (shortness of breath) 06/02/2017    Colitis 06/02/2017    AAA (abdominal aortic aneurysm) without rupture ([address] 05/18/2017    Spondylosis of lumbar region without myelopathy or radiculopathy 08/11/2016    Lumbar facet arthropathy ([address] 08/11/2016    Arthritis of lumbar spine 08/11/2016    Muscle spasm of back 07/19/2016    Tobacco dependency 07/19/2016    HNP (herniated nucleus pulposus), lumbar 12/10/2015    Facet arthritis of lumbar region ([address] 12/10/2015    Chronic pain 12/10/2015    Hypertriglyceridemia 11/16/2015    Vitamin D deficiency 11/16/2015    Elevated serum creatinine 11/16/2015    Thyroid activity decreased 11/09/2015    Gastroesophageal reflux disease without esophagitis 11/09/2015    Right groin hernia     CAD (coronary artery disease), native coronary artery     Renal artery atherosclerosis, unilateral ([address] 12/23/2014    Varicose vein of leg 12/23/2014    AAA (abdominal aortic aneurysm) ([address] 12/05/2014    Tobacco abuse 08/01/2014    Dyspnea 04/10/2014    Lightheadedness 04/10/2014    Vasovagal response 04/10/2014    Coronary atherosclerosis of native coronary artery 08/23/2013    Complete heart block ([address] 08/23/2013    HTN (hypertension) 03/30/2013    Dyslipidemia 03/30/2013    Aortic dissection, abdominal ([address] 03/30/2013        The  provided the following Interventions:  Chart reviewed. Initiated a relationship of care and support.    Explored mental status and with the patient's Nurse Yana Tang) who confirmed that the Patient was not on any medications that would effect the patients ability to, nor would the patient's mental status effect the ability to execute an Advance Medical Directive. Questioned patient as to date, location, and name. Provided education on the [address]. Offered prayer and assurance of continued prayers on patients behalf. Placed a copy of the executed [address] in the patients paper chart and returned the original to the patient. The following outcomes were achieved:  Patient was able to state date, location, and name. Patient executed a [address], completing Section I:    Appointment and Rico [employer] of My Agent  Patient completed Section II: My Health Care Instruction  Patient completed Section III: Anatomical Gifts. Patient expressed gratitude for pastoral care visit. Assessment:  There are no further spiritual or Alevism issues which require Spiritual Care Services interventions at this time. Plan:  Chaplains will continue to follow and will provide pastoral care on an as needed/requested basis.  recommends bedside caregivers page  on duty if patient shows signs of acute spiritual or emotional distress.     greyson Crump

## 2017-06-03 NOTE — ROUTINE PROCESS
1911 Received bedside report from Edwin Hidalgo RN, patient was resting in bed with visitor at bedside,  Bed in lowest position, oriented to call button. Gave report to Edwin Hidalgo. RN,using SBAR, MAR, and Kardex.

## 2017-06-03 NOTE — ROUTINE PROCESS
0750 Pt received after bedside shift report from Miguelina Torrez RN. Pt resting in bed with no distress noted. Bed locked and in low position. Call bell in reach.       End of shift bedside report given to [facility]. Report included the following information. SBAR, MAR, KARDEX AND RECENT RESULTS.

## 2017-06-03 NOTE — PROCEDURES
[facility]  *** [facility]AL [facility] ***    Name: Corbett Lennox  MRN: [id number]    Inpatient  : 05 Mar 1950  HIS Order #: [id number]  [id number] [facility] Visit #: [id number]  Date: 2017    TYPE OF TEST: Peripheral Venous Testing    REASON FOR TEST  LLE pain, markedly elevated d dimer in setting of recent surgery,  Chest pain, SOB    Right Leg:-  Deep venous thrombosis:           No  Superficial venous thrombosis:    No  Deep venous insufficiency:        Not examined  Superficial venous insufficiency: Not examined    Left Leg:-  Deep venous thrombosis:           No  Superficial venous thrombosis:    No  Deep venous insufficiency:        Not examined  Superficial venous insufficiency: Not examined      INTERPRETATION/FINDINGS  Duplex images were obtained using 2-D gray scale, color flow, and  spectral Doppler analysis. Right leg :  1. No evidence of deep venous thrombosis detected in the veins  visualized. 2. Deep vein(s) visualized include the common femoral, femoral,  popliteal, posterior tibial, and peroneal veins. 3. No evidence of superficial thrombosis detected. 4. Superficial vein(s) visualized include the great saphenous vein at  the sapheno-femoral junction. Left leg :  1. No evidence of deep venous thrombosis detected in the veins  visualized. 2. Deep vein(s) visualized include the common femoral, femoral,  popliteal, posterior tibial, and peroneal veins. 3. No evidence of superficial thrombosis detected. 4. Superficial vein(s) visualized include the great saphenous vein at  the sapheno-femoral junction. ADDITIONAL COMMENTS    I have personally reviewed the data relevant to the interpretation of  this  study. TECHNOLOGIST: Nate Britton RVT  Signed: 2017 06:08 PM    PHYSICIAN: Godfrey Massey MD  Signed: 2017 10:01 AM

## 2017-06-03 NOTE — PROGRESS NOTES
Hospitalist Progress Note    Patient: Mercy Osullivan Age: 78 y.o. : 1950 MR#: [id number] SSN: [id number]  Date/Time: 6/3/2017 11:27 AM    Subjective:      Intermittent chest pain sharp to anterior left chest, more with change in position  Laying in bed with O2 on 2 L nasal cannula    Review of Systems -   Negative except   Frequent diarrhea for the past 10 days  Improved since Tuesday when started on Cipro and Flagyl at PCP  Lightheaded and dizzy while at vascular evaluation yesterday with Dr. Romie Jerome,   and sent to [facility] ER- chest pain and found to be hypotensive  Transfered to [facility] for admission, CTA could not be completed due to IV access   Dr. Romie Jerome wanted full anticoagulation to cover PE, patient was initiated on Lovenox      Objective:   VS:   Visit Vitals    /75 (BP 1 Location: Right arm, BP Patient Position: At rest)    Pulse 61    Temp 97.8 °F (36.6 °C)    Resp 18    Ht 6' 1\" (1.854 m)    Wt 122.5 kg (270 lb)    SpO2 98%    BMI 35.62 kg/m2      Tmax/24hrs: Temp (24hrs), Av.5 °F (36.4 °C), Min:96 °F (35.6 °C), Max:98.2 °F (36.8 °C)     Intake/Output Summary (Last 24 hours) at 17 1127  Last data filed at 17 6949   Gross per 24 hour   Intake              830 ml   Output              300 ml   Net              530 ml       General: alert and oriented ×3  HEENT:speech clear and goal-directed, no focal deficits detected, pallor, conjunctiva clear,  No scleral icterus, poor dentition, no JVD  Cardiovascular:  Heart rate regular 60  Pulmonary:  Clear to bases, mild expiratory wheeze left posterior  GI:  Diffusely tender, doughy, bowel sounds hypoactive, no rebound         Extremities:  Left lower extremity with tenderness medial lateral calf, warmer than right lower extremity, positive pulses, no edema  Additional:    Current Facility-Administered Medications   Medication Dose Route Frequency    pantoprazole (PROTONIX) tablet 40 mg  40 mg Oral ACB    atenolol (TENORMIN) tablet 25 mg  25 mg Oral DAILY    atorvastatin (LIPITOR) tablet 40 mg  40 mg Oral QHS    bisacodyl (DULCOLAX) tablet 5 mg  5 mg Oral BID    budesonide-formoterol (SYMBICORT) 160-4.5 mcg/actuation HFA inhaler 2 Puff  2 Puff Inhalation BID RT    oxyCODONE-acetaminophen (PERCOCET) 5-325 mg per tablet 1 Tab  1 Tab Oral Q4H PRN    levothyroxine (SYNTHROID) tablet 125 mcg  125 mcg Oral ACB    gabapentin (NEURONTIN) capsule 300 mg  300 mg Oral TID    omega-3 acid ethyl esters (LOVAZA) capsule 2,000 mg  2 g Oral DAILY WITH BREAKFAST    diphenhydrAMINE (BENADRYL) capsule 25 mg  25 mg Oral Q4H PRN    acetaminophen (TYLENOL) tablet 650 mg  650 mg Oral Q4H PRN    enoxaparin (LOVENOX) injection 120 mg  120 mg SubCUTAneous Q12H     Labs:    Recent Results (from the past 24 hour(s))   LACTIC ACID, PLASMA    Collection Time: 06/02/17  2:32 PM   Result Value Ref Range    Lactic acid 1.1 0.4 - 2.0 MMOL/L   METABOLIC PANEL, BASIC    Collection Time: 06/02/17  7:55 PM   Result Value Ref Range    Sodium 137 136 - 145 mmol/L    Potassium 3.3 (L) 3.5 - 5.5 mmol/L    Chloride 104 100 - 108 mmol/L    CO2 26 21 - 32 mmol/L    Anion gap 7 3.0 - 18 mmol/L    Glucose 110 (H) 74 - 99 mg/dL    BUN 9 7.0 - 18 MG/DL    Creatinine 1.18 0.6 - 1.3 MG/DL    BUN/Creatinine ratio 8 (L) 12 - 20      GFR est AA >60 >60 ml/min/1.73m2    GFR est non-AA >60 >60 ml/min/1.73m2    Calcium 8.2 (L) 8.5 - 10.1 MG/DL   CBC W/O DIFF    Collection Time: 06/02/17  7:55 PM   Result Value Ref Range    WBC 8.8 4.6 - 13.2 K/uL    RBC 4.20 (L) 4.70 - 5.50 M/uL    HGB 11.8 (L) 13.0 - 16.0 g/dL    HCT 36.1 36.0 - 48.0 %    MCV 86.0 74.0 - 97.0 FL    MCH 28.1 24.0 - 34.0 PG    MCHC 32.7 31.0 - 37.0 g/dL    RDW 15.0 (H) 11.6 - 14.5 %    PLATELET 149 411 - 587 K/uL    MPV 10.5 9.2 - 11.8 FL       Imaging:  No results found.     Assessment/Plan:   Chest pain with shortness of breath  Patient with a history of CAD and a stent, high risk for ACS  Serial troponins and EKG's  Started on Lovenox, not ideal for this patient with a history of renal artery stenosis and CK D stage III  switched weight-based heparin, pending completion of the CTA chest and PVLs of the lower extremity     2 weeks postop AAA  Evaluated by Dr. Peter Gaming was reviewed  Stable postop  Wound care to both groins every shift      Diarrhea  Improved after Flagyl and Cipro started by PCP 5/30/2017  Continue Cipro and Flagyl  Monitor I and O  Stools for OB and still    GERD  Continue PPI    Personal history of cardiac pacemaker  AV pacing appropriately, stable      Additional Notes:      Full Code  Patient spoke with ,   appointed sister medical decision maker,   Full Code, unless hopeless    Disposition:   Home    Case discussed with:  [x]Patient  []Family  [x]Nursing  []Case Management  DVT Prophylaxis:  []Lovenox  []Hep SQ  []SCDs  []Coumadin   [x]On Heparin gtt    Signed By: Lyndon Escobar 51    Radha 3, 2017 11:27 AM

## 2017-06-04 LAB
ALBUMIN SERPL BCP-MCNC: 2.4 G/DL (ref 3.4–5)
ALBUMIN/GLOB SERPL: 0.6 {RATIO} (ref 0.8–1.7)
ALP SERPL-CCNC: 63 U/L (ref 45–117)
ALT SERPL-CCNC: 16 U/L (ref 16–61)
ANION GAP BLD CALC-SCNC: 6 MMOL/L (ref 3–18)
APTT PPP: 105.5 SEC (ref 23–36.4)
AST SERPL W P-5'-P-CCNC: 28 U/L (ref 15–37)
ATRIAL RATE: 61 BPM
ATRIAL RATE: 61 BPM
BASOPHILS # BLD AUTO: 0.1 K/UL (ref 0–0.1)
BASOPHILS # BLD: 1 % (ref 0–2)
BILIRUB SERPL-MCNC: 0.3 MG/DL (ref 0.2–1)
BUN SERPL-MCNC: 10 MG/DL (ref 7–18)
BUN/CREAT SERPL: 7 (ref 12–20)
CALCIUM SERPL-MCNC: 8 MG/DL (ref 8.5–10.1)
CALCULATED P AXIS, ECG09: -125 DEGREES
CALCULATED P AXIS, ECG09: -19 DEGREES
CALCULATED R AXIS, ECG10: -85 DEGREES
CALCULATED R AXIS, ECG10: -97 DEGREES
CALCULATED T AXIS, ECG11: 87 DEGREES
CALCULATED T AXIS, ECG11: 94 DEGREES
CHLORIDE SERPL-SCNC: 102 MMOL/L (ref 100–108)
CK MB CFR SERPL CALC: 1.9 % (ref 0–4)
CK MB SERPL-MCNC: 1.5 NG/ML (ref 5–25)
CK SERPL-CCNC: 79 U/L (ref 39–308)
CO2 SERPL-SCNC: 28 MMOL/L (ref 21–32)
CREAT SERPL-MCNC: 1.38 MG/DL (ref 0.6–1.3)
DIAGNOSIS, 93000: NORMAL
DIAGNOSIS, 93000: NORMAL
DIFFERENTIAL METHOD BLD: ABNORMAL
EOSINOPHIL # BLD: 0.4 K/UL (ref 0–0.4)
EOSINOPHIL NFR BLD: 5 % (ref 0–5)
ERYTHROCYTE [DISTWIDTH] IN BLOOD BY AUTOMATED COUNT: 15.4 % (ref 11.6–14.5)
GLOBULIN SER CALC-MCNC: 3.9 G/DL (ref 2–4)
GLUCOSE SERPL-MCNC: 110 MG/DL (ref 74–99)
HCT VFR BLD AUTO: 34.1 % (ref 36–48)
HGB BLD-MCNC: 10.9 G/DL (ref 13–16)
INR PPP: 1.1 (ref 0.8–1.2)
LYMPHOCYTES # BLD AUTO: 20 % (ref 21–52)
LYMPHOCYTES # BLD: 1.6 K/UL (ref 0.9–3.6)
MCH RBC QN AUTO: 28 PG (ref 24–34)
MCHC RBC AUTO-ENTMCNC: 32 G/DL (ref 31–37)
MCV RBC AUTO: 87.7 FL (ref 74–97)
MONOCYTES # BLD: 0.8 K/UL (ref 0.05–1.2)
MONOCYTES NFR BLD AUTO: 10 % (ref 3–10)
NEUTS SEG # BLD: 5.1 K/UL (ref 1.8–8)
NEUTS SEG NFR BLD AUTO: 64 % (ref 40–73)
P-R INTERVAL, ECG05: 282 MS
P-R INTERVAL, ECG05: 296 MS
PLATELET # BLD AUTO: 247 K/UL (ref 135–420)
PMV BLD AUTO: 10.8 FL (ref 9.2–11.8)
POTASSIUM SERPL-SCNC: 3.7 MMOL/L (ref 3.5–5.5)
PROT SERPL-MCNC: 6.3 G/DL (ref 6.4–8.2)
PROTHROMBIN TIME: 14.1 SEC (ref 11.5–15.2)
Q-T INTERVAL, ECG07: 520 MS
Q-T INTERVAL, ECG07: 526 MS
QRS DURATION, ECG06: 202 MS
QRS DURATION, ECG06: 210 MS
QTC CALCULATION (BEZET), ECG08: 523 MS
QTC CALCULATION (BEZET), ECG08: 529 MS
RBC # BLD AUTO: 3.89 M/UL (ref 4.7–5.5)
SODIUM SERPL-SCNC: 136 MMOL/L (ref 136–145)
TROPONIN I SERPL-MCNC: <0.02 NG/ML (ref 0–0.04)
VENTRICULAR RATE, ECG03: 61 BPM
VENTRICULAR RATE, ECG03: 61 BPM
WBC # BLD AUTO: 7.9 K/UL (ref 4.6–13.2)

## 2017-06-04 PROCEDURE — 82550 ASSAY OF CK (CPK): CPT | Performed by: HOSPITALIST

## 2017-06-04 PROCEDURE — 74011000250 HC RX REV CODE- 250: Performed by: HOSPITALIST

## 2017-06-04 PROCEDURE — 80053 COMPREHEN METABOLIC PANEL: CPT | Performed by: HOSPITALIST

## 2017-06-04 PROCEDURE — 94760 N-INVAS EAR/PLS OXIMETRY 1: CPT

## 2017-06-04 PROCEDURE — 74011250637 HC RX REV CODE- 250/637: Performed by: HOSPITALIST

## 2017-06-04 PROCEDURE — 93306 TTE W/DOPPLER COMPLETE: CPT

## 2017-06-04 PROCEDURE — 85610 PROTHROMBIN TIME: CPT | Performed by: HOSPITALIST

## 2017-06-04 PROCEDURE — 77010033678 HC OXYGEN DAILY

## 2017-06-04 PROCEDURE — 85730 THROMBOPLASTIN TIME PARTIAL: CPT | Performed by: HOSPITALIST

## 2017-06-04 PROCEDURE — 74011250636 HC RX REV CODE- 250/636: Performed by: HOSPITALIST

## 2017-06-04 PROCEDURE — 65660000000 HC RM CCU STEPDOWN

## 2017-06-04 PROCEDURE — 85025 COMPLETE CBC W/AUTO DIFF WBC: CPT | Performed by: HOSPITALIST

## 2017-06-04 PROCEDURE — 36415 COLL VENOUS BLD VENIPUNCTURE: CPT | Performed by: HOSPITALIST

## 2017-06-04 PROCEDURE — 74011250637 HC RX REV CODE- 250/637: Performed by: FAMILY MEDICINE

## 2017-06-04 PROCEDURE — 94640 AIRWAY INHALATION TREATMENT: CPT

## 2017-06-04 PROCEDURE — 93005 ELECTROCARDIOGRAM TRACING: CPT

## 2017-06-04 RX ORDER — ALBUTEROL SULFATE 0.83 MG/ML
2.5 SOLUTION RESPIRATORY (INHALATION)
Status: DISCONTINUED | OUTPATIENT
Start: 2017-06-04 | End: 2017-06-10 | Stop reason: HOSPADM

## 2017-06-04 RX ORDER — SODIUM CHLORIDE 9 MG/ML
100 INJECTION, SOLUTION INTRAVENOUS CONTINUOUS
Status: DISCONTINUED | OUTPATIENT
Start: 2017-06-04 | End: 2017-06-07

## 2017-06-04 RX ORDER — HEPARIN SODIUM 5000 [USP'U]/ML
5000 INJECTION, SOLUTION INTRAVENOUS; SUBCUTANEOUS EVERY 8 HOURS
Status: DISCONTINUED | OUTPATIENT
Start: 2017-06-04 | End: 2017-06-10 | Stop reason: HOSPADM

## 2017-06-04 RX ORDER — ATENOLOL 25 MG/1
25 TABLET ORAL DAILY
Status: DISCONTINUED | OUTPATIENT
Start: 2017-06-06 | End: 2017-06-05

## 2017-06-04 RX ORDER — IBUPROFEN 200 MG
1 TABLET ORAL DAILY
Status: DISCONTINUED | OUTPATIENT
Start: 2017-06-04 | End: 2017-06-10 | Stop reason: HOSPADM

## 2017-06-04 RX ADMIN — NYSTATIN: 100000 CREAM TOPICAL at 09:07

## 2017-06-04 RX ADMIN — LEVOTHYROXINE SODIUM 125 MCG: 125 TABLET ORAL at 09:05

## 2017-06-04 RX ADMIN — ATENOLOL 25 MG: 25 TABLET ORAL at 09:06

## 2017-06-04 RX ADMIN — SODIUM CHLORIDE 100 ML/HR: 900 INJECTION, SOLUTION INTRAVENOUS at 17:49

## 2017-06-04 RX ADMIN — ATORVASTATIN CALCIUM 40 MG: 40 TABLET, FILM COATED ORAL at 21:47

## 2017-06-04 RX ADMIN — OMEGA-3-ACID ETHYL ESTERS 2000 MG: 1 CAPSULE, LIQUID FILLED ORAL at 09:05

## 2017-06-04 RX ADMIN — CIPROFLOXACIN HYDROCHLORIDE 500 MG: 500 TABLET, FILM COATED ORAL at 09:06

## 2017-06-04 RX ADMIN — METRONIDAZOLE 500 MG: 500 TABLET ORAL at 09:05

## 2017-06-04 RX ADMIN — ALBUTEROL SULFATE 2.5 MG: 2.5 SOLUTION RESPIRATORY (INHALATION) at 09:17

## 2017-06-04 RX ADMIN — OXYCODONE HYDROCHLORIDE AND ACETAMINOPHEN 1 TABLET: 5; 325 TABLET ORAL at 17:11

## 2017-06-04 RX ADMIN — PANTOPRAZOLE SODIUM 40 MG: 40 TABLET, DELAYED RELEASE ORAL at 09:06

## 2017-06-04 RX ADMIN — CIPROFLOXACIN HYDROCHLORIDE 500 MG: 500 TABLET, FILM COATED ORAL at 21:47

## 2017-06-04 RX ADMIN — GABAPENTIN 300 MG: 300 CAPSULE ORAL at 17:11

## 2017-06-04 RX ADMIN — HEPARIN SODIUM 5000 UNITS: 5000 INJECTION, SOLUTION INTRAVENOUS; SUBCUTANEOUS at 12:56

## 2017-06-04 RX ADMIN — NYSTATIN: 100000 CREAM TOPICAL at 17:12

## 2017-06-04 RX ADMIN — METRONIDAZOLE 500 MG: 500 TABLET ORAL at 17:11

## 2017-06-04 RX ADMIN — POLYETHYLENE GLYCOL 3350 17 G: 17 POWDER, FOR SOLUTION ORAL at 09:11

## 2017-06-04 RX ADMIN — LACTULOSE 30 G: 20 SOLUTION ORAL at 21:47

## 2017-06-04 RX ADMIN — BUDESONIDE AND FORMOTEROL FUMARATE DIHYDRATE 2 PUFF: 160; 4.5 AEROSOL RESPIRATORY (INHALATION) at 09:17

## 2017-06-04 RX ADMIN — METRONIDAZOLE 500 MG: 500 TABLET ORAL at 21:47

## 2017-06-04 RX ADMIN — BUDESONIDE AND FORMOTEROL FUMARATE DIHYDRATE 2 PUFF: 160; 4.5 AEROSOL RESPIRATORY (INHALATION) at 20:24

## 2017-06-04 RX ADMIN — OXYCODONE HYDROCHLORIDE AND ACETAMINOPHEN 1 TABLET: 5; 325 TABLET ORAL at 01:18

## 2017-06-04 RX ADMIN — GABAPENTIN 300 MG: 300 CAPSULE ORAL at 09:05

## 2017-06-04 RX ADMIN — GABAPENTIN 300 MG: 300 CAPSULE ORAL at 21:47

## 2017-06-04 RX ADMIN — HEPARIN SODIUM 5000 UNITS: 5000 INJECTION, SOLUTION INTRAVENOUS; SUBCUTANEOUS at 21:47

## 2017-06-04 RX ADMIN — OXYCODONE HYDROCHLORIDE AND ACETAMINOPHEN 1 TABLET: 5; 325 TABLET ORAL at 10:29

## 2017-06-04 RX ADMIN — OXYCODONE HYDROCHLORIDE AND ACETAMINOPHEN 1 TABLET: 5; 325 TABLET ORAL at 06:17

## 2017-06-04 RX ADMIN — LACTULOSE 30 G: 20 SOLUTION ORAL at 17:13

## 2017-06-04 RX ADMIN — HEPARIN SODIUM AND DEXTROSE 18 UNITS/KG/HR: 10000; 5 INJECTION INTRAVENOUS at 00:50

## 2017-06-04 NOTE — PROGRESS NOTES
Spoke with [address] [place] [address] and patient will be made NPO after midnight for abdominal visceral arterial duplex exam in the a.m.

## 2017-06-04 NOTE — PROGRESS NOTES
ADULT PROTOCOL: JET AEROSOL ASSESSMENT    Patient  Latasha Melgar     78 y.o.   male     6/4/2017  12:40 PM    Breath Sounds Pre Procedure:  Breath Sounds Bilateral: Clear                                            Breath Sounds Post Procedure: Breath Sounds Bilateral: Clear                                               Breathing pattern: Pre procedure  Breathing Pattern: Regular          Post procedure  Breathing Pattern: Regular    Cough: Pre procedure  Cough: Non-productive               Post procedure Cough: Non-productive, Dry    Heart Rate: Pre procedure Pulse: 64           Post procedure Pulse: 65    Resp Rate: Pre procedure  Respirations: 16           Post procedure          Nebulizer Therapy: Current medications Aerosolized Medications: Symbicort       Problem List:   Patient Active Problem List   Diagnosis Code    HTN (hypertension) I10    Dyslipidemia E78.5    Aortic dissection, abdominal ([facility]) I71.02    Coronary atherosclerosis of native coronary artery I25.10    Complete heart block (HCC) I44.2    Dyspnea R06.00    Lightheadedness R42    Vasovagal response R55    Tobacco abuse Z72.0    AAA (abdominal aortic aneurysm) ([facility]) I71.4    Renal artery atherosclerosis, unilateral ([facility]) I70.1    Varicose vein of leg I83.90    Thyroid activity decreased E03.9    Right groin hernia K40.90    CAD (coronary artery disease), native coronary artery I25.10    Gastroesophageal reflux disease without esophagitis K21.9    Hypertriglyceridemia E78.1    Vitamin D deficiency E55.9    Elevated serum creatinine R79.89    HNP (herniated nucleus pulposus), lumbar M51.26    Facet arthritis of lumbar region (HCC) M46.96    Chronic pain G89.29    Muscle spasm of back M62.830    Tobacco dependency F17.200    Spondylosis of lumbar region without myelopathy or radiculopathy M47.816    Lumbar facet arthropathy (HCC) M12.88    Arthritis of lumbar spine M46.96    AAA (abdominal aortic aneurysm) without rupture ([facility]) I71.4    SOB (shortness of breath) R06.02    Colitis K52.9       Patient alert and cooperative to use MDI: Not applicable    Home Respiratory Therapy Regimen/Frequency:  YES  Medication Advair Daily  Device  Frequency     SEVERITY INDEX:    ITEM 0 1 2 3 4 Score   Respiratory Pattern and or Rate Regular  10-19 Regular  20-24   24-30    30-34 Severe SOB or   Greater than 35 0   Breath Sounds Clear Occasional Wheeze Mild Wheezing Moderate Wheezing  wheezing/Absent breath sounds 0   Shortness of Breath None Dyspnea on Exertion Dyspnea at Rest Moderate Shortness of Breath at Rest Severe Shortness of Breath - Limited Speech 0       Total Score:  0    * Scoring Guidelines  0-4 pts:  PRN-BID   5-7 pts:  BID, TID, QID  8-9 pts:  TID, QID, Q6  10-12 pts:  Q4-Q6  * - Guidelines used with clinical judgement. PRN Treatments can be ordered to supplement scheduled treatments. Regardless of score, frequency should not be less than normal home regimen.     Recommended Order/Frequency:  Continue Symbicort BID/ Change Duoneb to Q4 PRN  Comments:          Respiratory Therapist: Glo Bonilla

## 2017-06-04 NOTE — ROUTINE PROCESS
0750 Pt received after bedside shift report from [facility] Aníbal BORGES RN. Pt up in bed with no distress noted. Bed locked and in low position. Call bell in reach. Instruct pt to call for assistance.      End of shift bedside report given to Bruce Grover RN. Report included the following information. SBAR, MAR, KARDEX AND RECENT RESULTS.

## 2017-06-04 NOTE — PROGRESS NOTES
Pt seen and examined. Complaining of pain in left shoulder with movement. Also complaining of some groin pain and lower abdominal pain. He has not had a BM since 6/2 but states that it was somewhat bloody before. He no longer complains of shortness of breath. Visit Vitals    BP 99/61 (BP 1 Location: Right arm, BP Patient Position: At rest)    Pulse 78    Temp 97.9 °F (36.6 °C)    Resp 18    Ht 6' 1\" (1.854 m)    Wt 270 lb (122.5 kg)    SpO2 95%    BMI 35.62 kg/m2      NAD  RRR  CTAB  Obese, slight TTP LQ. No rebounding or guarding  B/L groin incisions c/d/i. There is dried blood and dermabond in groins but no drainage or dehiscence    A/P: 78 M s/p EVAR now with possible colitis and atypical chest pain  1) DVT study reviewed and negative. Pt did not complete CTA of the chest  2) Do not believe wound care to incisions is necessary. Wounds c/d/i. Can leave open to air and instructed patient to peel off dermabond when he showers  3) Colitis has improved as he has not had any GIB despite heparin  4) Will follow    Addendum: Called by Dr. Chelsie Dupree who notified me that CT was completed on the 3rd. I was unable to see these images previously but am able to see report. No evidence of PE by report, and no evidence of endoleak. Agree with prophylatic heparin. Believe his colitis is improving given current abdominal symptoms. Will follow.

## 2017-06-04 NOTE — PROGRESS NOTES
Hospitalist Progress Note    Patient: Arthur Agrawal Age: 78 y.o. : 1950 MR#: [id number] SSN: [id number]  Date/Time: 2017  3:14 PM    Subjective:     Continues with intermittent chest pain sharp to anterior left chest, more with change in position  Laying in bed with O2 on 2 L nasal cannula    Review of Systems -   Negative except chest pain and constipated ×2 days    Objective:   VS:   Visit Vitals    BP 99/61 (BP 1 Location: Right arm, BP Patient Position: At rest)    Pulse 78    Temp 97.9 °F (36.6 °C)    Resp 18    Ht 6' 1\" (1.854 m)    Wt 122.5 kg (270 lb)    SpO2 95%    BMI 35.62 kg/m2      Tmax/24hrs: Temp (24hrs), Av.1 °F (36.7 °C), Min:97.9 °F (36.6 °C), Max:98.4 °F (36.9 °C)       Intake/Output Summary (Last 24 hours) at 17 1514  Last data filed at 17 1259   Gross per 24 hour   Intake              200 ml   Output             1100 ml   Net             -900 ml       General: alert and oriented ×3  HEENT:speech clear and goal-directed, no focal deficits detected, pallor, conjunctiva clear,  No scleral icterus, poor dentition, no JVD  Cardiovascular:  Heart rate regular 70  Pulmonary:  Clear to bases  GI:  bowel sounds hypoactive, diffuse tenderness, no rebound         Extremities: positive pulses, no edema  Additional:    Current Facility-Administered Medications   Medication Dose Route Frequency    heparin (porcine) injection 5,000 Units  5,000 Units SubCUTAneous Q8H    albuterol (PROVENTIL VENTOLIN) nebulizer solution 2.5 mg  2.5 mg Nebulization Q4H PRN    nicotine (NICODERM CQ) 14 mg/24 hr patch 1 Patch  1 Patch TransDERmal DAILY    pantoprazole (PROTONIX) tablet 40 mg  40 mg Oral ACB    ciprofloxacin HCl (CIPRO) tablet 500 mg  500 mg Oral Q12H    metroNIDAZOLE (FLAGYL) tablet 500 mg  500 mg Oral TID    0.9% sodium chloride infusion  75 mL/hr IntraVENous CONTINUOUS    LORazepam (ATIVAN) tablet 1 mg  1 mg Oral Q6H PRN    nystatin (MYCOSTATIN) 100,000 unit/gram cream   Topical BID    polyethylene glycol (MIRALAX) packet 17 g  17 g Oral DAILY    atenolol (TENORMIN) tablet 25 mg  25 mg Oral DAILY    atorvastatin (LIPITOR) tablet 40 mg  40 mg Oral QHS    budesonide-formoterol (SYMBICORT) 160-4.5 mcg/actuation HFA inhaler 2 Puff  2 Puff Inhalation BID RT    oxyCODONE-acetaminophen (PERCOCET) 5-325 mg per tablet 1 Tab  1 Tab Oral Q4H PRN    levothyroxine (SYNTHROID) tablet 125 mcg  125 mcg Oral ACB    gabapentin (NEURONTIN) capsule 300 mg  300 mg Oral TID    omega-3 acid ethyl esters (LOVAZA) capsule 2,000 mg  2 g Oral DAILY WITH BREAKFAST    diphenhydrAMINE (BENADRYL) capsule 25 mg  25 mg Oral Q4H PRN    acetaminophen (TYLENOL) tablet 650 mg  650 mg Oral Q4H PRN     Labs:    Recent Results (from the past 24 hour(s))   CBC WITH AUTOMATED DIFF    Collection Time: 06/03/17  4:23 PM   Result Value Ref Range    WBC 7.6 4.6 - 13.2 K/uL    RBC 4.00 (L) 4.70 - 5.50 M/uL    HGB 11.3 (L) 13.0 - 16.0 g/dL    HCT 34.6 (L) 36.0 - 48.0 %    MCV 86.5 74.0 - 97.0 FL    MCH 28.3 24.0 - 34.0 PG    MCHC 32.7 31.0 - 37.0 g/dL    RDW 15.1 (H) 11.6 - 14.5 %    PLATELET 065 786 - 537 K/uL    MPV 10.4 9.2 - 11.8 FL    NEUTROPHILS 62 40 - 73 %    LYMPHOCYTES 25 21 - 52 %    MONOCYTES 8 3 - 10 %    EOSINOPHILS 4 0 - 5 %    BASOPHILS 1 0 - 2 %    ABS. NEUTROPHILS 4.8 1.8 - 8.0 K/UL    ABS. LYMPHOCYTES 1.9 0.9 - 3.6 K/UL    ABS. MONOCYTES 0.6 0.05 - 1.2 K/UL    ABS. EOSINOPHILS 0.3 0.0 - 0.4 K/UL    ABS.  BASOPHILS 0.1 0.0 - 0.1 K/UL    DF AUTOMATED     PTT    Collection Time: 06/03/17  4:23 PM   Result Value Ref Range    aPTT 44.4 (H) 23.0 - 36.4 SEC   URINALYSIS W/ RFLX MICROSCOPIC    Collection Time: 06/03/17  9:15 PM   Result Value Ref Range    Color YELLOW      Appearance CLEAR      Specific gravity >1.030 (H) 1.005 - 1.030    pH (UA) 6.0 5.0 - 8.0      Protein NEGATIVE  NEG mg/dL    Glucose NEGATIVE  NEG mg/dL    Ketone NEGATIVE  NEG mg/dL    Bilirubin NEGATIVE  NEG      Blood NEGATIVE NEG      Urobilinogen 0.2 0.2 - 1.0 EU/dL    Nitrites NEGATIVE  NEG      Leukocyte Esterase NEGATIVE  NEG     METABOLIC PANEL, COMPREHENSIVE    Collection Time: 06/04/17  3:03 AM   Result Value Ref Range    Sodium 136 136 - 145 mmol/L    Potassium 3.7 3.5 - 5.5 mmol/L    Chloride 102 100 - 108 mmol/L    CO2 28 21 - 32 mmol/L    Anion gap 6 3.0 - 18 mmol/L    Glucose 110 (H) 74 - 99 mg/dL    BUN 10 7.0 - 18 MG/DL    Creatinine 1.38 (H) 0.6 - 1.3 MG/DL    BUN/Creatinine ratio 7 (L) 12 - 20      GFR est AA >60 >60 ml/min/1.73m2    GFR est non-AA 52 (L) >60 ml/min/1.73m2    Calcium 8.0 (L) 8.5 - 10.1 MG/DL    Bilirubin, total 0.3 0.2 - 1.0 MG/DL    ALT (SGPT) 16 16 - 61 U/L    AST (SGOT) 28 15 - 37 U/L    Alk. phosphatase 63 45 - 117 U/L    Protein, total 6.3 (L) 6.4 - 8.2 g/dL    Albumin 2.4 (L) 3.4 - 5.0 g/dL    Globulin 3.9 2.0 - 4.0 g/dL    A-G Ratio 0.6 (L) 0.8 - 1.7     CBC WITH AUTOMATED DIFF    Collection Time: 06/04/17  3:03 AM   Result Value Ref Range    WBC 7.9 4.6 - 13.2 K/uL    RBC 3.89 (L) 4.70 - 5.50 M/uL    HGB 10.9 (L) 13.0 - 16.0 g/dL    HCT 34.1 (L) 36.0 - 48.0 %    MCV 87.7 74.0 - 97.0 FL    MCH 28.0 24.0 - 34.0 PG    MCHC 32.0 31.0 - 37.0 g/dL    RDW 15.4 (H) 11.6 - 14.5 %    PLATELET 961 884 - 892 K/uL    MPV 10.8 9.2 - 11.8 FL    NEUTROPHILS 64 40 - 73 %    LYMPHOCYTES 20 (L) 21 - 52 %    MONOCYTES 10 3 - 10 %    EOSINOPHILS 5 0 - 5 %    BASOPHILS 1 0 - 2 %    ABS. NEUTROPHILS 5.1 1.8 - 8.0 K/UL    ABS. LYMPHOCYTES 1.6 0.9 - 3.6 K/UL    ABS. MONOCYTES 0.8 0.05 - 1.2 K/UL    ABS. EOSINOPHILS 0.4 0.0 - 0.4 K/UL    ABS.  BASOPHILS 0.1 0.0 - 0.1 K/UL    DF AUTOMATED     CARDIAC PANEL,(CK, CKMB & TROPONIN)    Collection Time: 06/04/17  3:03 AM   Result Value Ref Range    CK 79 39 - 308 U/L    CK - MB 1.5 <3.6 ng/ml    CK-MB Index 1.9 0.0 - 4.0 %    Troponin-I, Qt. <0.02 0.0 - 0.045 NG/ML   PROTHROMBIN TIME + INR    Collection Time: 06/04/17  3:03 AM   Result Value Ref Range    Prothrombin time 14.1 11.5 - 15.2 sec    INR 1.1 0.8 - 1.2     PTT    Collection Time: 06/04/17  3:03 AM   Result Value Ref Range    aPTT 105.5 (H) 23.0 - 36.4 SEC   EKG, 12 LEAD, SUBSEQUENT    Collection Time: 06/04/17  7:07 AM   Result Value Ref Range    Ventricular Rate 61 BPM    Atrial Rate 61 BPM    P-R Interval 296 ms    QRS Duration 210 ms    Q-T Interval 520 ms    QTC Calculation (Bezet) 523 ms    Calculated P Axis -19 degrees    Calculated R Axis -85 degrees    Calculated T Axis 94 degrees    Diagnosis       Sinus rhythm with 1st degree AV block  Left axis deviation  Right bundle branch block  Possible Anterolateral infarct , age undetermined  Abnormal ECG  When compared with ECG of 03-JUN-2017 12:13,  Sinus rhythm has replaced Electronic atrial pacemaker  Right bundle branch block has replaced Nonspecific intraventricular block       Echocardiogram 6/3/2017  SUMMARY:  Left ventricle: Size was normal. Systolic function was normal. Ejection  fraction was estimated to be 55 %. There was moderate hypokinesis of the  apical septal wall(s). Wall thickness was mildly increased. Right ventricle: Systolic pressure was at the upper limits of normal.  Estimated peak pressure was 30 mmHg. Inferior vena cava, hepatic veins: Respirophasic changes were blunted  (less than 50% variation). Pericardium: A trivial pericardial effusion was identified along the right  ventricular free wall. The fluid exhibited a fibrinous appearance. There  was no evidence of hemodynamic compromise. COMPARISONS:  There has been no significant change in overall LV function, but no  mention of pacemaker of apical septal wall motion abnormality made on the  report of the study fo 04-Jun-2013. Comparison was made with the report of  the previous study of 04-Jun-2013. The actual study could not be found  within the system for direct comparison. INDICATIONS: Congestive heart failure.       Imaging:  Cta Chest W Or W Wo Cont    Result Date: 6/3/2017  CTA Chest Pulmonary CPT CODE: 98246 HISTORY: Chest pain, suspected pulmonary emboli. Patient complains of intermittent left chest pain COMPARISON: CTA chest 6/3/2013. TECHNIQUE: 2.5 millimeter contiguous axial images from the lung apices to the level of the adrenal glands following timed contrast bolus for maximum intravascular enhancement of the pulmonary arteries. Images reviewed in soft tissue and lung windows. Coronal and sagittal MIP reformations were performed for better evaluation of tortuous branching pulmonary vessels. FINDINGS: Diagnostic filling of the pulmonary arteries. No filling defects to suggest pulmonary embolus. Faint contrast opacification of the thoracic aorta due to pulmonary artery bolus timing. Aortic stent graft is in place. Similar appearance of the chronic mural plaque or chronic aortic thrombus outside the lumen of the stent graft. Grossly patent lumen of the aorta. There is a separate stent at the origin of the left subclavian artery that is grossly patent. As on previous exams the upper abdominal aorta is aneurysmal, small amount of mural thrombus. Heart size is normal. No pericardial effusion. Lungs are clear. No pulmonary nodule or consolidation. No bony destruction. No fractures. Imaging through the upper abdomen shows a few punctate calcifications in the spleen likely related to prior granulomatous infection. There is a small rounded density in the posterior right hepatic dome, similar to previous exams. Adrenal glands are not enlarged. Some prominence of the left upper pole collecting system, only partially imaged on this exam, also seen on the CTA from 3/29/2017 and 5/24/2017. IMPRESSION: 1. No evidence for pulmonary embolism. 2. Aortic stent graft. Similar appearance of residual mural thrombus or hematoma related to the previous dissection. Grossly patent thoracic aorta.  3. Findings of old granulomatous disease with punctate calcifications in the spleen, solitary calcification in the liver. 4. Partially imaged abdominal aortic aneurysm.       Assessment/Plan:   Chest pain with shortness of breath in a patient at high risk for ACS  Patient with a history of CAD and stent  Serial troponin flat  CTA chest and PVLs of the lower extremity with no thrombus: anticoagulation has been stopped  Prophylactic heparin  Moderate hypokinesis of the apical septal walls on echocardiogram,  Not mentioned in 2013, or office echo of 2016  Discussed patient with Dr. Jaren Umaña, cardiology, who will consult in a.m.    2 weeks postop AAA  Evaluated by Dr. Eugene Jasmine, CTA was reviewed  Stable postop    Diarrhea  Improved after Flagyl and Cipro started by PCP 5/30/2017  Continue Cipro and Flagyl  Monitor I and O  Stools for OB     GERD  Continue PPI    Personal history of cardiac pacemaker  AV pacing, stable      Additional Notes:      Full Code  Patient spoke with ,   appointed sister medical decision maker,   Full Code, unless hopeless    Disposition:   Home with home health    Case discussed with:  [x]Patient  []Family  [x]Nursing  []Case Management  X vascular  DVT Prophylaxis:  []Lovenox  []Hep SQ  []SCDs  []Coumadin   [x]On Heparin gtt    Signed By: Lyndon Lutz 51    June 4, 2017  3:14 PM

## 2017-06-04 NOTE — ROUTINE PROCESS
Bedside and Verbal shift change report received from [address] [place] [address]. Report included the following information SBAR, Kardex, MAR and Recent Results. Pt awake in bed, denies needs. Call light in reach. Will continue to monitor. Monique Cheung RN    [id number] and Verbal shift change report given to [address] [place] [address] (oncoming nurse) by   Asa Stanley RN (offgoing nurse). Report included the following information SBAR, Kardex, MAR and Recent Results. Patient resting in bed, denies needs. Call light within reach.

## 2017-06-05 PROCEDURE — 93975 VASCULAR STUDY: CPT

## 2017-06-05 PROCEDURE — 74011250637 HC RX REV CODE- 250/637: Performed by: FAMILY MEDICINE

## 2017-06-05 PROCEDURE — 74011250637 HC RX REV CODE- 250/637: Performed by: HOSPITALIST

## 2017-06-05 PROCEDURE — 74011250636 HC RX REV CODE- 250/636: Performed by: HOSPITALIST

## 2017-06-05 PROCEDURE — 94640 AIRWAY INHALATION TREATMENT: CPT

## 2017-06-05 PROCEDURE — 65660000000 HC RM CCU STEPDOWN

## 2017-06-05 RX ADMIN — CIPROFLOXACIN HYDROCHLORIDE 500 MG: 500 TABLET, FILM COATED ORAL at 10:41

## 2017-06-05 RX ADMIN — METRONIDAZOLE 500 MG: 500 TABLET ORAL at 15:19

## 2017-06-05 RX ADMIN — OXYCODONE HYDROCHLORIDE AND ACETAMINOPHEN 1 TABLET: 5; 325 TABLET ORAL at 15:19

## 2017-06-05 RX ADMIN — OXYCODONE HYDROCHLORIDE AND ACETAMINOPHEN 1 TABLET: 5; 325 TABLET ORAL at 20:55

## 2017-06-05 RX ADMIN — HEPARIN SODIUM 5000 UNITS: 5000 INJECTION, SOLUTION INTRAVENOUS; SUBCUTANEOUS at 20:35

## 2017-06-05 RX ADMIN — OXYCODONE HYDROCHLORIDE AND ACETAMINOPHEN 1 TABLET: 5; 325 TABLET ORAL at 10:37

## 2017-06-05 RX ADMIN — BUDESONIDE AND FORMOTEROL FUMARATE DIHYDRATE 2 PUFF: 160; 4.5 AEROSOL RESPIRATORY (INHALATION) at 19:34

## 2017-06-05 RX ADMIN — ATORVASTATIN CALCIUM 40 MG: 40 TABLET, FILM COATED ORAL at 21:00

## 2017-06-05 RX ADMIN — OMEGA-3-ACID ETHYL ESTERS 2000 MG: 1 CAPSULE, LIQUID FILLED ORAL at 10:41

## 2017-06-05 RX ADMIN — METRONIDAZOLE 500 MG: 500 TABLET ORAL at 21:00

## 2017-06-05 RX ADMIN — LEVOTHYROXINE SODIUM 125 MCG: 125 TABLET ORAL at 10:41

## 2017-06-05 RX ADMIN — HEPARIN SODIUM 5000 UNITS: 5000 INJECTION, SOLUTION INTRAVENOUS; SUBCUTANEOUS at 05:06

## 2017-06-05 RX ADMIN — SODIUM CHLORIDE 100 ML/HR: 900 INJECTION, SOLUTION INTRAVENOUS at 01:28

## 2017-06-05 RX ADMIN — GABAPENTIN 300 MG: 300 CAPSULE ORAL at 21:00

## 2017-06-05 RX ADMIN — BUDESONIDE AND FORMOTEROL FUMARATE DIHYDRATE 2 PUFF: 160; 4.5 AEROSOL RESPIRATORY (INHALATION) at 07:46

## 2017-06-05 RX ADMIN — METRONIDAZOLE 500 MG: 500 TABLET ORAL at 10:41

## 2017-06-05 RX ADMIN — POLYETHYLENE GLYCOL 3350 17 G: 17 POWDER, FOR SOLUTION ORAL at 10:42

## 2017-06-05 RX ADMIN — OXYCODONE HYDROCHLORIDE AND ACETAMINOPHEN 1 TABLET: 5; 325 TABLET ORAL at 05:18

## 2017-06-05 RX ADMIN — PANTOPRAZOLE SODIUM 40 MG: 40 TABLET, DELAYED RELEASE ORAL at 10:41

## 2017-06-05 RX ADMIN — HEPARIN SODIUM 5000 UNITS: 5000 INJECTION, SOLUTION INTRAVENOUS; SUBCUTANEOUS at 13:14

## 2017-06-05 RX ADMIN — NYSTATIN: 100000 CREAM TOPICAL at 20:40

## 2017-06-05 RX ADMIN — CIPROFLOXACIN HYDROCHLORIDE 500 MG: 500 TABLET, FILM COATED ORAL at 20:35

## 2017-06-05 RX ADMIN — GABAPENTIN 300 MG: 300 CAPSULE ORAL at 15:19

## 2017-06-05 RX ADMIN — GABAPENTIN 300 MG: 300 CAPSULE ORAL at 10:41

## 2017-06-05 NOTE — ROUTINE PROCESS
Report received from DAVID Rodríguez RN. Lying in bed awake. Denies pain or shortness of breath. IVF infusing. NPO for procedure. See assessment. Call bell in reach.

## 2017-06-05 NOTE — PROGRESS NOTES
Care Management Interventions  PCP Verified by CM: Yes Milagros Zheng)  Palliative Care Consult (Criteria: CHF and RRAT>21): No  Reason for No Palliative Care Consult: Other (see comment)  Mode of Transport at Discharge: Self (pt's sister will assist)  Transition of Care Consult (CM Consult): Discharge Planning  MyChart Signup: No  Discharge Durable Medical Equipment: No  Health Maintenance Reviewed: Yes  Physical Therapy Consult: Yes  Occupational Therapy Consult: Yes  Speech Therapy Consult: No  Current Support Network: Lives Alone  Confirm Follow Up Transport: Other (see comment) ([facility] bus)  Plan discussed with Pt/Family/Caregiver: Yes  Discharge Location  Discharge Placement: Home     Patient 78years old male admitted to [facility] with colitis. Saw the patient and his sister-Katelynn in room, patient AAO x 4, open to conversation. Patient shares, he lives alone, his sister lives 15 minutes away, she helps her brother when needed. Patient does not drive, [facility] assists with transportation. Patient has a cane at home, says, it was given to him by his friend, cane is old. Patient asks for additional equipment (RW). Patient plans to return home at dc, his sister will assist with transport at dc after she gets off of work (after 2 PM). Patient says, he tried to apply for disability/MDC but was denied about a year ago. He would like to try to reapply. Referral to TRA made, [place]. CM will continue to follow.

## 2017-06-05 NOTE — PROGRESS NOTES
Pt seen and examined. Left shoulder pain improved along with SOB. States breathing treatments helping. Continues to c/o some tenderness at his groin incisions and across lower abdomen. C/o constipation as well. Visit Vitals    /67 (BP 1 Location: Right arm, BP Patient Position: At rest)    Pulse 60    Temp 98.1 °F (36.7 °C)    Resp 16    Ht 6' 1\" (1.854 m)    Wt 270 lb 9.6 oz (122.7 kg)    SpO2 100%    BMI 35.7 kg/m2      NAD  No resp difficulty  Obese. approp tender at incision sites. No rebounding or guarding  B/L groin incisions c/d/i. Soft. No drainage or dehiscence    A/P: 78 M s/p EVAR now with possible colitis and atypical chest pain  1) Cardiology recs noted. No ischemic workup. 2) Reportedly had mesenteric arterial study done today- spoke with tech and reports was wnl which correlates with previous CT scans. 3) Imaging reviewed with Dr Lizeth Villarreal and shows \"endograft is in good position there is no leak the aneurysm is treated. There is some seroma in the groins but no extravasation. This should be expected as he has had several groin access surgeries before and overall looks nice and pleased with the appearance\". 4) Seems to be improving with supportive care.    5) Following     Jennifer Ayala  [phone]

## 2017-06-05 NOTE — ROUTINE PROCESS
Bedside shift change report given to [facility] (oncoming nurse) by John Schuster RN (offgoing nurse). Report given with SBAR, Kardex, Intake/Output, MAR and Recent Results.

## 2017-06-05 NOTE — CONSULTS
CARDIOLOGY CONSULT -    Date of  Admission: 6/2/2017  9:31 AM     Admission type:Emergency           Assessment:       Active Problems:    Colitis (6/2/2017)         Plan:     1. Orthostatic Hypotension:   - symptoms improved today after holding anti-hypertensive therapy and continuing IV fluids   - would continue IV hydration until patient's appetite improves (limited due to constipation and GI discomfort)   - mild activity encouraged at this time, such as sitting out of bed to chair   - would also consider PT/OT evaluation in this patient, who seems deconditioned with decreased leg strength   - would not resume anti-hypertensive therapy at this time given satisfactory control (can consider restarting once his appetite improves, which will influence his BP)      2. CAD/Angina:   - CCS Class 1   - prior stents   - atypical left sided discomfort, more consistent with muscle strain in left shoulder    - would not advise ischemic workup at this time given atypical symptoms   - no significant wall motion abnormalities appreciated on echo, and without significant arrhythmia appreciated on telemetry (only paced rhythm)   - continue medical therapy only at this time   - hold beta blocker/atenolol given well controlled BP      3. General:   - follow up with primary team for management of colitis   - consider PT/OT evaluation as patient symptoms of gait instability are improved compared to yesterday   - continue IV hydration until appetite improves   - no ischemic workup required at this time       Subjective:     Nacho Pena is a 78 y.o. male admitted for colitis. He underwent repair of abdominal aorta aneurysm in May 2017. He reports having weakness and diarrhea with decreased appetite for several days, with dizziness when walking. A cardiology consult was requested for evaluation of orthostatic hypotension and chest discomfort.   The patient's anti-hypertensive medication (atenolol) was stopped yesterday, and IV fluid hydration was continued. He is NPO today for an abdominal ultrasound. However, he reports that his symptoms of dizziness have improved compared to yesterday. He ambulated to the bathroom this morning feeling more steady on his feet, although not quite at his baseline. The patient now reports having constipation for approximately 2-3 days, rather than diarrhea. His appetite was poor for over one week, but he is now able to tolerate some food, eating most of his dinner last night, which is a significant improvement compared to just a few days ago. Regarding his chest pain, he reports having some discomfort in his left chest and shoulder which occurred when he was being moved several times between the stretcher and CT scanner bed. The pain was acutely worse when his shoulder was being pulled. He reports having improvement in the discomfort when resting on his right side, but it is tender when on his left side. He denies having any significant dyspnea, palpitations, or pedal edema.         Patient Active Problem List    Diagnosis Date Noted    SOB (shortness of breath) 06/02/2017    Colitis 06/02/2017    AAA (abdominal aortic aneurysm) without rupture ([address] 05/18/2017    Spondylosis of lumbar region without myelopathy or radiculopathy 08/11/2016    Lumbar facet arthropathy (HCC) 08/11/2016    Arthritis of lumbar spine 08/11/2016    Muscle spasm of back 07/19/2016    Tobacco dependency 07/19/2016    HNP (herniated nucleus pulposus), lumbar 12/10/2015    Facet arthritis of lumbar region ([address] 12/10/2015    Chronic pain 12/10/2015    Hypertriglyceridemia 11/16/2015    Vitamin D deficiency 11/16/2015    Elevated serum creatinine 11/16/2015    Thyroid activity decreased 11/09/2015    Gastroesophageal reflux disease without esophagitis 11/09/2015    Right groin hernia     CAD (coronary artery disease), native coronary artery     Renal artery atherosclerosis, unilateral ([address] 12/23/2014  Varicose vein of leg 12/23/2014    AAA (abdominal aortic aneurysm) ([facility] 12/05/2014    Tobacco abuse 08/01/2014    Dyspnea 04/10/2014    Lightheadedness 04/10/2014    Vasovagal response 04/10/2014    Coronary atherosclerosis of native coronary artery 08/23/2013    Complete heart block ([facility] 08/23/2013    HTN (hypertension) 03/30/2013    Dyslipidemia 03/30/2013    Aortic dissection, abdominal ([facility] 03/30/2013      Rocio Mei MD  Past Medical History:   Diagnosis Date    Arthritis     CAD (coronary artery disease), native coronary artery     ALIDA X 2 to OM1    Carotid duplex 08/13/2013    Mild <50% bilateral ICA plaquing.  Complete heart block [facility]) June 2013    Medtronic dual chamber pacemaker    COPD (chronic obstructive pulmonary disease) (HCC)     Gastritis     GERD (gastroesophageal reflux disease)     High cholesterol     History of echocardiogram 06/04/2013    EF 60-65%. No WMA. Gr 1 DDfx. No significant valvular heart disease.  History of myocardial perfusion scan 04/18/2011    No ischemia or prior infarction. No WMA. EF 52%. Neg EKG on pharm stress test.    Hypertension     Hypothyroidism     Lower extremity venous duplex 01/06/2015    No DVT. Superficial insufficiency of right GSV. Deep venous reflux in right CFV & fem vein. Deep venous reflux in left CFV.  Renal duplex 12/24/2014    Mild < 60% RRA stenosis. Low parenchymal & LRA flow. Renal asymmetry. Intrinsic/med disease in right kidney. AAA (3.8 x 4.0 cm) infrarenal.    Right groin hernia     not resolved    S/P AAA repair 05/18/2017    S/P cardiac cath 06/02/2013    Diffuse atherosclerosis throughout. pRCA 50%. mLM 30%. mLAD 40%. oD2 (sm) 100%. pLCX 30%. p/mOM1 95/80% (o/lapping 2.25 x 23-mm & 2.25 x 12-mm Xience stents, resid 0%).                   Social History     Social History    Marital status:      Spouse name: N/A    Number of children: N/A    Years of education: N/A Social History Main Topics    Smoking status: Current Every Day Smoker     Packs/day: 1.00     Types: Cigarettes    Smokeless tobacco: Never Used    Alcohol use No    Drug use: No    Sexual activity: Not Asked     Other Topics Concern     Service No    Blood Transfusions No    Caffeine Concern Yes    Occupational Exposure No    Hobby Hazards No    Sleep Concern No    Stress Concern No    Weight Concern No    Special Diet No    Back Care Yes    Exercise No    Bike Helmet No    Seat Belt Yes     Social History Narrative     No Known Allergies   Family History   Problem Relation Age of Onset    Cancer Mother      pancreatic    Heart Attack Father     Heart Disease Father     Diabetes Sister     Heart Disease Sister     Diabetes Brother     Stroke Brother       Current Facility-Administered Medications   Medication Dose Route Frequency    heparin (porcine) injection 5,000 Units  5,000 Units SubCUTAneous Q8H    albuterol (PROVENTIL VENTOLIN) nebulizer solution 2.5 mg  2.5 mg Nebulization Q4H PRN    nicotine (NICODERM CQ) 14 mg/24 hr patch 1 Patch  1 Patch TransDERmal DAILY    [START ON 6/6/2017] atenolol (TENORMIN) tablet 25 mg  25 mg Oral DAILY    0.9% sodium chloride infusion  100 mL/hr IntraVENous CONTINUOUS    pantoprazole (PROTONIX) tablet 40 mg  40 mg Oral ACB    ciprofloxacin HCl (CIPRO) tablet 500 mg  500 mg Oral Q12H    metroNIDAZOLE (FLAGYL) tablet 500 mg  500 mg Oral TID    LORazepam (ATIVAN) tablet 1 mg  1 mg Oral Q6H PRN    nystatin (MYCOSTATIN) 100,000 unit/gram cream   Topical BID    polyethylene glycol (MIRALAX) packet 17 g  17 g Oral DAILY    atorvastatin (LIPITOR) tablet 40 mg  40 mg Oral QHS    budesonide-formoterol (SYMBICORT) 160-4.5 mcg/actuation HFA inhaler 2 Puff  2 Puff Inhalation BID RT    oxyCODONE-acetaminophen (PERCOCET) 5-325 mg per tablet 1 Tab  1 Tab Oral Q4H PRN    levothyroxine (SYNTHROID) tablet 125 mcg  125 mcg Oral ACB    gabapentin (NEURONTIN) capsule 300 mg  300 mg Oral TID    omega-3 acid ethyl esters (LOVAZA) capsule 2,000 mg  2 g Oral DAILY WITH BREAKFAST    diphenhydrAMINE (BENADRYL) capsule 25 mg  25 mg Oral Q4H PRN    acetaminophen (TYLENOL) tablet 650 mg  650 mg Oral Q4H PRN         Review of Symptoms:  Constitutional: fatigue, dizziness  Eyes: negative  Respiratory: negative  Cardiovascular: left shoulder and chest pain   Gastrointestinal: constipation  Genitourinary: negative  Musculoskeletal: shoulder pain with movement  Neurological: negative     Subjective:        Physical:    Visit Vitals    /73 (BP 1 Location: Right arm, BP Patient Position: At rest)    Pulse 98    Temp 98.2 °F (36.8 °C)    Resp 18    Ht 6' 1\" (1.854 m)    Wt 122.7 kg (270 lb 9.6 oz)    SpO2 98%    BMI 35.7 kg/m2         Intake/Output Summary (Last 24 hours) at 06/05/17 0715  Last data filed at 06/05/17 0523   Gross per 24 hour   Intake          1101.25 ml   Output             1200 ml   Net           -98.75 ml        HEENT: no scleral icterus, EOMI  Vessels: JVP normal, carotid pulse normal,   Cardiac: S1S2, no S3 appreciated, RRR, no appreciable murmur   Lungs: good air entry b/l, clear to auscultation   Abdomen: Soft, +BS, no obvious hepatomegaly, mild tenderness to palpation  Extremities: trace edema, left shoulder pain with abduction and some tenderness to palpation  Neurologic: Grossly normal    Data Review:   Recent Labs      06/04/17   0303  06/03/17   1623  06/03/17   1404   WBC  7.9  7.6  7.6   HGB  10.9*  11.3*  11.4*   HCT  34.1*  34.6*  35.7*   PLT  247  239  200     Recent Labs      06/04/17   0303  06/03/17   1404  06/02/17   1955   NA  136  137  137   K  3.7  4.4  3.3*   CL  102  102  104   CO2  28  32  26   GLU  110*  113*  110*   BUN  10  10  9   CREA  1.38*  1.31*  1.18   CA  8.0*  8.2*  8.2*   MG   --   2.3   --    PHOS   --   2.8   --    ALB  2.4*   --    --    SGOT  28   --    --    ALT  16   --    --    INR  1.1  1.1   -- Recent Labs      06/04/17   0303  06/03/17   1404  06/02/17   0950   TROIQ  <0.02  <0.02  <0.02   CPK  79  92   --    CKMB  1.5  1.1   --        Lab Results   Component Value Date/Time    Cholesterol, total 111 02/01/2016 08:13 AM    HDL Cholesterol 37 02/01/2016 08:13 AM    LDL, calculated 34.4 02/01/2016 08:13 AM    VLDL, calculated 39.6 02/01/2016 08:13 AM    Triglyceride 198 02/01/2016 08:13 AM    CHOL/HDL Ratio 3.0 02/01/2016 08:13 AM       Telemetry: paced rhythm @ 60-65 bpm    ECG: AV Sequential pacemaker @ 61 bpm    Echocardiogram June 2017:   EF 55%, mild LVH, RVSP = 30mmHg, trivial pericardial effusion without hemodynamic compromise, normal LA size

## 2017-06-05 NOTE — ROUTINE PROCESS
Bedside and Verbal shift change report given to DAVID Edwards RN (oncoming nurse) by Jade Mckeon RN (offgoing nurse). Report included the following information SBAR, Kardex, Intake/Output, MAR and Recent Results.

## 2017-06-05 NOTE — PROGRESS NOTES
NUTRITION    Nutrition Consult: General Nutrition Management & Supplements     RECOMMENDATIONS / PLAN:     - Resume diet as medically appropriate. - Add supplements: Ensure Enlive TID.   - Continue RD inpatient monitoring and evaluation. NUTRITION INTERVENTIONS & DIAGNOSIS:     [x] Meals/Snacks: modified diet  [x] Medical food supplementation: initiate      Nutrition Diagnosis: Unintentional weight loss related to inadequate energy intake as evidence by 11 lb, 4% weight loss x month. ASSESSMENT:     Appetite improved. Agreeable to supplements. NPO for test today.      Average po intake adequate to meet patients estimated nutritional needs:   [] Yes     [x] No   [] Unable to determine at this time    Diet: DIET NPO      Food Allergies: NKFA  Current Appetite:   [] Good     [x] Fair     [] Poor     [] Other:  Appetite/meal intake prior to admission:   [] Good     [] Fair     [x] Poor x month    [] Other:  Feeding Limitations:  [] Swallowing difficulty    [] Chewing difficulty    [] Other:  Current Meal Intake: Patient Vitals for the past 100 hrs:   % Diet Eaten   06/05/17 1051 75 %   06/03/17 0939 90 %   06/02/17 1833 100 %     BM: 6/2    Skin Integrity: groin surgical incision   Edema: none   Pertinent Medications: Reviewed; miralax     Recent Labs      06/04/17   0303  06/03/17   1404  06/02/17   1955   NA  136  137  137   K  3.7  4.4  3.3*   CL  102  102  104   CO2  28  32  26   GLU  110*  113*  110*   BUN  10  10  9   CREA  1.38*  1.31*  1.18   CA  8.0*  8.2*  8.2*   MG   --   2.3   --    PHOS   --   2.8   --    ALB  2.4*   --    --    SGOT  28   --    --    ALT  16   --    --        Intake/Output Summary (Last 24 hours) at 06/05/17 1301  Last data filed at 06/05/17 1051   Gross per 24 hour   Intake          2676.25 ml   Output             1050 ml   Net          1626.25 ml       Anthropometrics:  Ht Readings from Last 1 Encounters:   06/02/17 6' 1\" (1.854 m)     Last 3 Recorded Weights in this Encounter 06/02/17 1754 06/03/17 0400 06/05/17 0709   Weight: 124.1 kg (273 lb 8 oz) 122.5 kg (270 lb) 122.7 kg (270 lb 9.6 oz)     Body mass index is 35.7 kg/(m^2). Obese, Class II     Weight History: patient reports weight loss over the past month from 281 lb down to 270 lb (11 lb, 4% weight loss x month)     Weight Metrics 6/5/2017 6/2/2017 6/2/2017 6/2/2017 5/29/2017 5/23/2017 5/19/2017   Weight 270 lb 9.6 oz - 275 lb - 275 lb 3 oz 281 lb 280 lb   BMI - 35.7 kg/m2 - 36.28 kg/m2 36.31 kg/m2 37.07 kg/m2 -        Admitting Diagnosis: Colitis  Colitis  Past Medical History:   Diagnosis Date    Arthritis     CAD (coronary artery disease), native coronary artery     ALIDA X 2 to OM1    Carotid duplex 08/13/2013    Mild <50% bilateral ICA plaquing.  Complete heart block [facility]) June 2013    Medtronic dual chamber pacemaker    COPD (chronic obstructive pulmonary disease) (HCC)     Gastritis     GERD (gastroesophageal reflux disease)     High cholesterol     History of echocardiogram 06/04/2013    EF 60-65%. No WMA. Gr 1 DDfx. No significant valvular heart disease.  History of myocardial perfusion scan 04/18/2011    No ischemia or prior infarction. No WMA. EF 52%. Neg EKG on pharm stress test.    Hypertension     Hypothyroidism     Lower extremity venous duplex 01/06/2015    No DVT. Superficial insufficiency of right GSV. Deep venous reflux in right CFV & fem vein. Deep venous reflux in left CFV.  Renal duplex 12/24/2014    Mild < 60% RRA stenosis. Low parenchymal & LRA flow. Renal asymmetry. Intrinsic/med disease in right kidney. AAA (3.8 x 4.0 cm) infrarenal.    Right groin hernia     not resolved    S/P AAA repair 05/18/2017    S/P cardiac cath 06/02/2013    Diffuse atherosclerosis throughout. pRCA 50%. mLM 30%. mLAD 40%. oD2 (sm) 100%. pLCX 30%. p/mOM1 95/80% (o/lapping 2.25 x 23-mm & 2.25 x 12-mm Xience stents, resid 0%).                    Education Needs:        [x] None identified  [] Identified - Not appropriate at this time  []  Identified and addressed - refer to education log  Learning Limitations:   [x] None identified  [] Identified    Cultural, Anglican & ethnic food preferences:  [x] None identified    [] Identified and addressed     ESTIMATED NUTRITION NEEDS:     Calories: 1741-7922 kcal (MSJx1.2-1.3) based on  [x] Actual  kg     [] IBW   Protein:  gm (0.8-1 gm/kg) based on  [x] Actual BW      [] IBW   Fluid: 1 mL/kcal     MONITORING & EVALUATION:     Nutrition Goal(s):   1. Po intake of meals will meet >75% of patient estimated nutritional needs within the next 7 days.   Outcome:  [] Met/Ongoing    []  Not Met    [x] New/Initial Goal     Monitoring:   [x] Diet tolerance   [x] Meal intake   [x] Supplement intake   [] GI symptoms/ability to tolerate po diet   [] Respiratory status   [] Plan of care      Previous Recommendations (for follow-up assessments only):     []   Implemented       []   Not Implemented (RD to address)     [] No Recommendation Made     Discharge Planning: cardiac diet   [x] Participated in care planning, discharge planning, & interdisciplinary rounds as appropriate      Franchesca Quinteros, [address] [place] [address]    Pager: [phone]

## 2017-06-05 NOTE — PROCEDURES
[facility]  *** AMENDED REPORT ***    Name: Mika Long  MRN: [id number]    Inpatient  : 05 Mar 1950  HIS Order #: [id number]  [address] [facility] Visit #: [id number]  Date: 2017    TYPE OF TEST: Visceral Arterial Duplex    REASON FOR TEST    Aortic PSV:  61.9 cm/s  Diameter AP: 3.7 cm   TV: 3.7 cm    Mesenteric:-                  Prox   Mid   Dist Ratio Stenosis          Aneurysm                  ----- ----- ----- ----- ----------------- ------------  SMA:            194.3 191.1 105.5  3.1 Normal  Celiac:         161.3               2.6 Normal  Hepatic:         89.2               1.4 Normal  Splenic:         96.2               1.6 Normal  CANDELARIO:                                    Not visualized  :    AMENDED INTERPRETATION/FINDINGS  Duplex images were obtained using 2-D gray scale, color flow, and  spectral Doppler analysis. MESENTERIC:  1. Patent superior mesenteric artery without evidence of stenosis. 2. Patent celiac, splenic, and hepatic arteries origins without  evidence of stenosis. 3. Patent superior mesenteric vein. 4. Patent abdominal aorta with evidence of a repaired abdominal aortic   aneurysm (endograft). The largest measurement is 3.7 cm by 4.4 cm  proximal to the endograft. 5. Unable to visualize the inferior mesenteric artery. AMENDED COMMENTS  Note: Abdominal aortic aneurysm noted, therefore the visceral/aortic  ratios may not accurately predict visceral artery stenosis. I have personally reviewed the data relevant to the interpretation of  this study.     TECHNOLOGIST: Courtney Ash RVT  Signed: 2017 04:05 PM    PHYSICIAN: Maya Chaney MD  Signed: 2017 09:16 AM

## 2017-06-06 ENCOUNTER — APPOINTMENT (OUTPATIENT)
Dept: GENERAL RADIOLOGY | Age: 67
DRG: 392 | End: 2017-06-06
Attending: HOSPITALIST
Payer: MEDICARE

## 2017-06-06 LAB
ANION GAP BLD CALC-SCNC: 6 MMOL/L (ref 3–18)
BASOPHILS # BLD AUTO: 0 K/UL (ref 0–0.1)
BASOPHILS # BLD: 0 % (ref 0–2)
BUN SERPL-MCNC: 5 MG/DL (ref 7–18)
BUN/CREAT SERPL: 4 (ref 12–20)
CALCIUM SERPL-MCNC: 8.3 MG/DL (ref 8.5–10.1)
CHLORIDE SERPL-SCNC: 103 MMOL/L (ref 100–108)
CO2 SERPL-SCNC: 30 MMOL/L (ref 21–32)
CREAT SERPL-MCNC: 1.15 MG/DL (ref 0.6–1.3)
DIFFERENTIAL METHOD BLD: ABNORMAL
EOSINOPHIL # BLD: 0.3 K/UL (ref 0–0.4)
EOSINOPHIL NFR BLD: 4 % (ref 0–5)
ERYTHROCYTE [DISTWIDTH] IN BLOOD BY AUTOMATED COUNT: 15.3 % (ref 11.6–14.5)
GLUCOSE SERPL-MCNC: 126 MG/DL (ref 74–99)
HCT VFR BLD AUTO: 34.8 % (ref 36–48)
HGB BLD-MCNC: 11.3 G/DL (ref 13–16)
LYMPHOCYTES # BLD AUTO: 18 % (ref 21–52)
LYMPHOCYTES # BLD: 1.2 K/UL (ref 0.9–3.6)
MCH RBC QN AUTO: 28.8 PG (ref 24–34)
MCHC RBC AUTO-ENTMCNC: 32.5 G/DL (ref 31–37)
MCV RBC AUTO: 88.8 FL (ref 74–97)
MONOCYTES # BLD: 0.4 K/UL (ref 0.05–1.2)
MONOCYTES NFR BLD AUTO: 7 % (ref 3–10)
NEUTS SEG # BLD: 4.7 K/UL (ref 1.8–8)
NEUTS SEG NFR BLD AUTO: 71 % (ref 40–73)
PLATELET # BLD AUTO: 215 K/UL (ref 135–420)
PMV BLD AUTO: 10.5 FL (ref 9.2–11.8)
POTASSIUM SERPL-SCNC: 4.8 MMOL/L (ref 3.5–5.5)
RBC # BLD AUTO: 3.92 M/UL (ref 4.7–5.5)
SODIUM SERPL-SCNC: 139 MMOL/L (ref 136–145)
WBC # BLD AUTO: 6.6 K/UL (ref 4.6–13.2)

## 2017-06-06 PROCEDURE — 74011250636 HC RX REV CODE- 250/636: Performed by: HOSPITALIST

## 2017-06-06 PROCEDURE — 97165 OT EVAL LOW COMPLEX 30 MIN: CPT

## 2017-06-06 PROCEDURE — 74011250637 HC RX REV CODE- 250/637: Performed by: FAMILY MEDICINE

## 2017-06-06 PROCEDURE — 74020 XR ABD (AP AND ERECT OR DECUB): CPT

## 2017-06-06 PROCEDURE — 65660000000 HC RM CCU STEPDOWN

## 2017-06-06 PROCEDURE — 97161 PT EVAL LOW COMPLEX 20 MIN: CPT

## 2017-06-06 PROCEDURE — 36415 COLL VENOUS BLD VENIPUNCTURE: CPT | Performed by: HOSPITALIST

## 2017-06-06 PROCEDURE — 74011250637 HC RX REV CODE- 250/637: Performed by: HOSPITALIST

## 2017-06-06 PROCEDURE — 80048 BASIC METABOLIC PNL TOTAL CA: CPT | Performed by: HOSPITALIST

## 2017-06-06 PROCEDURE — 85025 COMPLETE CBC W/AUTO DIFF WBC: CPT | Performed by: HOSPITALIST

## 2017-06-06 PROCEDURE — 77010033678 HC OXYGEN DAILY

## 2017-06-06 PROCEDURE — 94640 AIRWAY INHALATION TREATMENT: CPT

## 2017-06-06 RX ORDER — BISACODYL 5 MG
15 TABLET, DELAYED RELEASE (ENTERIC COATED) ORAL ONCE
Status: COMPLETED | OUTPATIENT
Start: 2017-06-06 | End: 2017-06-06

## 2017-06-06 RX ORDER — DEXTROSE, SODIUM CHLORIDE, AND POTASSIUM CHLORIDE 5; .45; .15 G/100ML; G/100ML; G/100ML
100 INJECTION INTRAVENOUS CONTINUOUS
Status: DISCONTINUED | OUTPATIENT
Start: 2017-06-06 | End: 2017-06-07

## 2017-06-06 RX ORDER — DEXTROMETHORPHAN POLISTIREX 30 MG/5 ML
SUSPENSION, EXTENDED RELEASE 12 HR ORAL
Status: COMPLETED | OUTPATIENT
Start: 2017-06-06 | End: 2017-06-06

## 2017-06-06 RX ORDER — MAGNESIUM CITRATE
296 SOLUTION, ORAL ORAL
Status: COMPLETED | OUTPATIENT
Start: 2017-06-06 | End: 2017-06-06

## 2017-06-06 RX ORDER — FACIAL-BODY WIPES
10 EACH TOPICAL ONCE
Status: COMPLETED | OUTPATIENT
Start: 2017-06-06 | End: 2017-06-06

## 2017-06-06 RX ADMIN — DEXTROSE MONOHYDRATE, SODIUM CHLORIDE, AND POTASSIUM CHLORIDE 100 ML/HR: 50; 4.5; 1.49 INJECTION, SOLUTION INTRAVENOUS at 10:42

## 2017-06-06 RX ADMIN — CIPROFLOXACIN HYDROCHLORIDE 500 MG: 500 TABLET, FILM COATED ORAL at 22:04

## 2017-06-06 RX ADMIN — LACTULOSE 30 G: 20 SOLUTION ORAL at 16:39

## 2017-06-06 RX ADMIN — LACTULOSE 30 G: 20 SOLUTION ORAL at 10:41

## 2017-06-06 RX ADMIN — BISACODYL 15 MG: 5 TABLET, COATED ORAL at 22:08

## 2017-06-06 RX ADMIN — NYSTATIN: 100000 CREAM TOPICAL at 09:51

## 2017-06-06 RX ADMIN — DEXTROSE MONOHYDRATE, SODIUM CHLORIDE, AND POTASSIUM CHLORIDE 100 ML/HR: 50; 4.5; 1.49 INJECTION, SOLUTION INTRAVENOUS at 22:03

## 2017-06-06 RX ADMIN — OXYCODONE HYDROCHLORIDE AND ACETAMINOPHEN 1 TABLET: 5; 325 TABLET ORAL at 09:43

## 2017-06-06 RX ADMIN — NYSTATIN: 100000 CREAM TOPICAL at 19:51

## 2017-06-06 RX ADMIN — POLYETHYLENE GLYCOL 3350 17 G: 17 POWDER, FOR SOLUTION ORAL at 09:42

## 2017-06-06 RX ADMIN — BUDESONIDE AND FORMOTEROL FUMARATE DIHYDRATE 2 PUFF: 160; 4.5 AEROSOL RESPIRATORY (INHALATION) at 07:57

## 2017-06-06 RX ADMIN — METRONIDAZOLE 500 MG: 500 TABLET ORAL at 09:43

## 2017-06-06 RX ADMIN — OXYCODONE HYDROCHLORIDE AND ACETAMINOPHEN 1 TABLET: 5; 325 TABLET ORAL at 13:35

## 2017-06-06 RX ADMIN — BISACODYL 10 MG: 10 SUPPOSITORY RECTAL at 22:08

## 2017-06-06 RX ADMIN — METRONIDAZOLE 500 MG: 500 TABLET ORAL at 16:40

## 2017-06-06 RX ADMIN — OXYCODONE HYDROCHLORIDE AND ACETAMINOPHEN 1 TABLET: 5; 325 TABLET ORAL at 02:54

## 2017-06-06 RX ADMIN — HEPARIN SODIUM 5000 UNITS: 5000 INJECTION, SOLUTION INTRAVENOUS; SUBCUTANEOUS at 05:24

## 2017-06-06 RX ADMIN — GABAPENTIN 300 MG: 300 CAPSULE ORAL at 22:04

## 2017-06-06 RX ADMIN — GABAPENTIN 300 MG: 300 CAPSULE ORAL at 09:43

## 2017-06-06 RX ADMIN — HEPARIN SODIUM 5000 UNITS: 5000 INJECTION, SOLUTION INTRAVENOUS; SUBCUTANEOUS at 13:31

## 2017-06-06 RX ADMIN — MINERAL OIL 133 ML: 100 ENEMA RECTAL at 09:50

## 2017-06-06 RX ADMIN — METRONIDAZOLE 500 MG: 500 TABLET ORAL at 22:04

## 2017-06-06 RX ADMIN — ATORVASTATIN CALCIUM 40 MG: 40 TABLET, FILM COATED ORAL at 22:04

## 2017-06-06 RX ADMIN — GABAPENTIN 300 MG: 300 CAPSULE ORAL at 16:40

## 2017-06-06 RX ADMIN — LEVOTHYROXINE SODIUM 125 MCG: 125 TABLET ORAL at 09:42

## 2017-06-06 RX ADMIN — MINERAL OIL 133 ML: 100 ENEMA RECTAL at 22:03

## 2017-06-06 RX ADMIN — CIPROFLOXACIN HYDROCHLORIDE 500 MG: 500 TABLET, FILM COATED ORAL at 09:43

## 2017-06-06 RX ADMIN — OXYCODONE HYDROCHLORIDE AND ACETAMINOPHEN 1 TABLET: 5; 325 TABLET ORAL at 23:57

## 2017-06-06 RX ADMIN — MAGNESIUM CITRATE 296 ML: 1.75 LIQUID ORAL at 22:02

## 2017-06-06 RX ADMIN — OMEGA-3-ACID ETHYL ESTERS 2000 MG: 1 CAPSULE, LIQUID FILLED ORAL at 09:43

## 2017-06-06 RX ADMIN — HEPARIN SODIUM 5000 UNITS: 5000 INJECTION, SOLUTION INTRAVENOUS; SUBCUTANEOUS at 19:48

## 2017-06-06 RX ADMIN — PANTOPRAZOLE SODIUM 40 MG: 40 TABLET, DELAYED RELEASE ORAL at 09:42

## 2017-06-06 RX ADMIN — OXYCODONE HYDROCHLORIDE AND ACETAMINOPHEN 1 TABLET: 5; 325 TABLET ORAL at 19:48

## 2017-06-06 RX ADMIN — BUDESONIDE AND FORMOTEROL FUMARATE DIHYDRATE 2 PUFF: 160; 4.5 AEROSOL RESPIRATORY (INHALATION) at 20:19

## 2017-06-06 NOTE — PROGRESS NOTES
Problem: Self Care Deficits Care Plan (Adult)  Goal: *Acute Goals and Plan of Care (Insert Text)  Occupational Therapy Goals  Initiated 6/6/2017 within 7 day(s). 1. Patient will perform lower body dressing with modified independence and no increased c/o pain  2. Patient will maneuver on and off BSC with modified independent and no increased pain  3. Patient will maneuver in bed in preparation for participation in his self care routine with modified independence and no c/o pain  OCCUPATIONAL THERAPY EVALUATION     Patient: Rissa Raygoza (89 y.o. male)  Date: 6/6/2017  Primary Diagnosis: Colitis  Colitis  Precautions: Fall      ASSESSMENT :  Based on the objective data described below, the patient presents with pain in left hip that limits his independence and his functional mobility. Patient will benefit from skilled intervention to address the above impairments.   Patients rehabilitation potential is considered to be Good  Factors which may influence rehabilitation potential include:   [ ]             None noted  [ ]             Mental ability/status  [X]             Medical condition  [ ]             Home/family situation and support systems  [ ]             Safety awareness  [ ]             Pain tolerance/management  [ ]             Other:        PLAN :  Recommendations and Planned Interventions:  [X]               Self Care Training                  [X]        Therapeutic Activities  [ ]               Functional Mobility Training    [ ]        Cognitive Retraining  [ ]               Therapeutic Exercises           [ ]        Endurance Activities  [ ]               Balance Training                   [ ]        Neuromuscular Re-Education  [ ]               Visual/Perceptual Training     [ ]   Home Safety Training  [X]               Patient Education                 [X]        Family Training/Education  [ ]               Other (comment):     Frequency/Duration: Patient will be followed by occupational therapy 1-2 times per day/4-7 days per week to address goals. Discharge Recommendations: Derik Hunter  Further Equipment Recommendations for Discharge: N/A       PATIENT COMPLEXITY      Eval Complexity: History: LOW Complexity : Brief history review ; Examination: LOW Complexity : 1-3 performance deficits relating to physical, cognitive , or psychosocial skils that result in activity limitations and / or participation restrictions ; Decision Making:LOW Complexity : No comorbidities that affect functional and no verbal or physical assistance needed to complete eval tasks  Assessment: LOW Complexity       G-CODES:      Self Care  Current  CK= 40-59%   Goal  CI= 1-19%. The severity rating is based on the Level of Assistance required for Functional Mobility and ADLs. SUBJECTIVE:   Patient stated My hip hurts and I am waiting to hear the results of my xray.  he is referring to his left hip      OBJECTIVE DATA SUMMARY:       Past Medical History:   Diagnosis Date    Arthritis      CAD (coronary artery disease), native coronary artery       ALIDA X 2 to OM1    Carotid duplex 08/13/2013     Mild <50% bilateral ICA plaquing.  Complete heart block [facility]) June 2013     Medtronic dual chamber pacemaker    COPD (chronic obstructive pulmonary disease) (HCC)      Gastritis      GERD (gastroesophageal reflux disease)      High cholesterol      History of echocardiogram 06/04/2013     EF 60-65%. No WMA. Gr 1 DDfx. No significant valvular heart disease.  History of myocardial perfusion scan 04/18/2011     No ischemia or prior infarction. No WMA. EF 52%. Neg EKG on pharm stress test.    Hypertension      Hypothyroidism      Lower extremity venous duplex 01/06/2015     No DVT. Superficial insufficiency of right GSV. Deep venous reflux in right CFV & fem vein. Deep venous reflux in left CFV.  Renal duplex 12/24/2014     Mild < 60% RRA stenosis. Low parenchymal & LRA flow.   Renal asymmetry. Intrinsic/med disease in right kidney. AAA (3.8 x 4.0 cm) infrarenal.    Right groin hernia       not resolved    S/P AAA repair 05/18/2017    S/P cardiac cath 06/02/2013     Diffuse atherosclerosis throughout. pRCA 50%. mLM 30%. mLAD 40%. oD2 (sm) 100%. pLCX 30%. p/mOM1 95/80% (o/lapping 2.25 x 23-mm & 2.25 x 12-mm Xience stents, resid 0%). Past Surgical History:   Procedure Laterality Date    HX CHOLECYSTECTOMY   2008    HX CORONARY STENT PLACEMENT   6/2013    HX HERNIA REPAIR   9598     Umbilical    HX PACEMAKER   6/2013     Medtronic Pacemaker      Prior Level of Function/Home Situation: he reports he was independent with his self care prior to this admission; he often didn't wear socks if it became too difficult  Home Situation  Home Environment: Apartment  # Steps to Enter: 0 (Elevator available)  One/Two Story Residence: One story  Living Alone: Yes  Support Systems: Family member(s)  Patient Expects to be Discharged to[de-identified] Apartment  Current DME Used/Available at Home: Cane, straight  [X]  Right hand dominant          [ ]  Left hand dominant  Cognitive/Behavioral Status:  Neurologic State: Alert  Orientation Level: Oriented X4  Skin: no skin integrity issues noted during OT evaluation  Edema: no extremity edema noted  Vision/Perceptual:      tracking is WFL     Coordination:  BUE's WFL  Balance: per PT as he is concerned about his hip pain  Sitting: Intact; With support (Uses bilat UE for support)  Standing: Impaired; With support  Standing - Static: Fair  Standing - Dynamic : Poor  Strength:  BUE's: 4/5  Tone & Sensation:  BUE's WFL   Range of Motion:  BUE's AROM is WFL  Functional Mobility and Transfers for ADLs:  Bed Mobility:  Supine to Sit: Modified independent; Additional time  Sit to Supine: Minimum assistance  Transfers:  Sit to Stand: Contact guard assistance   ADL Assessment:   self feeding: independent  Grooming: set up secondary to decreased standing  UB bathing/dressing: independent  LB bathing/dressing: mod assist (pain reaching to mid shin)  Pain:  0/10 pain prior to OT session  0/10 pain following OT session  He reports his pain meds were just given and he is anxious about moving and his hip pain returning  Activity Tolerance:   No SOB noted  Please refer to the flowsheet for vital signs taken during this treatment. After treatment:   [ ] Patient left in no apparent distress sitting up in chair  [X] Patient left in no apparent distress in bed  [ ] Call bell left within reach  [ ] Nursing notified  [ ] Caregiver present  [ ] Bed alarm activated      COMMUNICATION/EDUCATION:   [ ] Home safety education was provided and the patient/caregiver indicated understanding. [ ] Patient/family have participated as able in goal setting and plan of care. [X] Patient/family agree to work toward stated goals and plan of care. [ ] Patient understands intent and goals of therapy, but is neutral about his/her participation. [ ] Patient is unable to participate in goal setting and plan of care.      Thank you for this referral.  Deepika Dc, OTR/L  Time Calculation: 12 mins

## 2017-06-06 NOTE — PROGRESS NOTES
Pt seen and examined. Still c/o constipation. Had enema placed just recently. No BM just yet. Visit Vitals    /75 (BP 1 Location: Left arm, BP Patient Position: Sitting)    Pulse 62    Temp 97.6 °F (36.4 °C)    Resp 20    Ht 6' 1\" (1.854 m)    Wt 270 lb 6.4 oz (122.7 kg)    SpO2 100%    BMI 35.67 kg/m2      NAD  No resp difficulty  Obese. Soft, ND, approp tender at incision sites. B/L groin incisions c/d/i. Soft. No drainage or dehiscence    A/P: 78 M s/p EVAR now with possible colitis and atypical chest pain  1) Continue current care per primary team.   2) No acute vascular issues. 3) Please call with questions or concerns. Will continue to follow along peripherally at this point.      Jennifer Ayala  [phone]

## 2017-06-06 NOTE — PROGRESS NOTES
Hospitalist Progress Note    Patient: Arthur Ear Age: 78 y.o. : 1950 MR#: [id number] SSN: [id number]  Date/Time: 2017  8:54 PM    Subjective:     Laying in bed with O2 on 2 L nasal cannula    Review of Systems -   Negative except still constipated after lactulose x 2    Objective:   VS:   Visit Vitals    /70 (BP 1 Location: Right arm, BP Patient Position: At rest)    Pulse 64    Temp 97.8 °F (36.6 °C)    Resp 20    Ht 6' 1\" (1.854 m)    Wt 122.7 kg (270 lb 9.6 oz)    SpO2 100%    BMI 35.7 kg/m2      Tmax/24hrs: Temp (24hrs), Av.1 °F (36.7 °C), Min:97.8 °F (36.6 °C), Max:98.4 °F (36.9 °C)       Intake/Output Summary (Last 24 hours) at 17  Last data filed at 17 1827   Gross per 24 hour   Intake             2535 ml   Output             1250 ml   Net             1285 ml       General: alert and oriented ×3  HEENT:speech clear and goal-directed, no focal deficits detected, pallor,                 conjunctiva not injected,No scleral icterus, no JVD  Cardiovascular:  Heart rate regular 64  Pulmonary:  Clear to bases  GI:  bowel sounds +, diffuse tenderness, no rebound         Extremities: positive pulses, no edema  Additional:    Current Facility-Administered Medications   Medication Dose Route Frequency    heparin (porcine) injection 5,000 Units  5,000 Units SubCUTAneous Q8H    albuterol (PROVENTIL VENTOLIN) nebulizer solution 2.5 mg  2.5 mg Nebulization Q4H PRN    nicotine (NICODERM CQ) 14 mg/24 hr patch 1 Patch  1 Patch TransDERmal DAILY    pantoprazole (PROTONIX) tablet 40 mg  40 mg Oral ACB    ciprofloxacin HCl (CIPRO) tablet 500 mg  500 mg Oral Q12H    metroNIDAZOLE (FLAGYL) tablet 500 mg  500 mg Oral TID    LORazepam (ATIVAN) tablet 1 mg  1 mg Oral Q6H PRN    nystatin (MYCOSTATIN) 100,000 unit/gram cream   Topical BID    polyethylene glycol (MIRALAX) packet 17 g  17 g Oral DAILY    atorvastatin (LIPITOR) tablet 40 mg  40 mg Oral QHS    budesonide-formoterol (SYMBICORT) 160-4.5 mcg/actuation HFA inhaler 2 Puff  2 Puff Inhalation BID RT    oxyCODONE-acetaminophen (PERCOCET) 5-325 mg per tablet 1 Tab  1 Tab Oral Q4H PRN    levothyroxine (SYNTHROID) tablet 125 mcg  125 mcg Oral ACB    gabapentin (NEURONTIN) capsule 300 mg  300 mg Oral TID    omega-3 acid ethyl esters (LOVAZA) capsule 2,000 mg  2 g Oral DAILY WITH BREAKFAST    diphenhydrAMINE (BENADRYL) capsule 25 mg  25 mg Oral Q4H PRN    acetaminophen (TYLENOL) tablet 650 mg  650 mg Oral Q4H PRN         Assessment/Plan:   Chest pain with shortness of breath in a patient at high risk for ACS  Patient with a history of CAD and stent  Serial troponin flat  CTA chest and PVLs of the lower extremity with no thrombus: anticoagulation has been stopped  Prophylactic heparin  Dr. Amara Dc, consult appreciated, no indication for further testing    2 weeks postop AAA  Stable postop    Diarrhea- now constipated  Improved after Flagyl and Cipro started by PCP 5/30/2017  Continue Cipro and Flagyl  Monitor I and O  Stools for OB     Protein calorie malnutrition with 11 pound/4% weight loss last month, secondary to poor appetite post procedure  Nutrition consult appreciated  Ensure Enlive supplement 3 times a day added    GERD  Continue PPI    Personal history of cardiac pacemaker  AV pacing, stable      Additional Notes:      Full Code  Patient has spoken with ,   appointed sister medical decision maker,   Full Code, unless hopeless    Disposition:   Home with home health    Case discussed with:  [x]Patient  []Family  [x]Nursing  []Case Management  X vascular  DVT Prophylaxis:  []Lovenox  []Hep SQ  []SCDs  []Coumadin   [x]On Heparin gtt    Signed By: Lyndon Malin 51    June 5, 2017  8:54 PM

## 2017-06-06 NOTE — PROGRESS NOTES
[facility] Readmission Patient Interview    The following is related to the patient's last admission and the events following discharge from the hospital:      Date of last hospital discharge:      Date of Readmission:      Reason for Readmission:      Were you kept informed about your diagnoses during your stay in the hospital and what was being done to further evaluate and treat them? * in reference to index admission*      Interviewer Notes:       [] None of time   [] Some of time   [x] Most of time   [] All of time   At the time of your discharge, did someone talk to you about:  *if patient answers yes - ask them to recall what information they remember*     1. What your diagnoses were? 2. What tests or procedures needed to be done after you left? 3. What to watch out for regarding worsening of your disease? 4. What to do if you were experiencing worsening of your disease? 5. Who to contact (and how) if you were experiencing worsening of your disease? Interviewer Notes:\"[facility]  told me if s/s worsen and it it will get very bad, I need to call her or to go to ED\". [x] Yes  [] No  [] Not Sure   [x] Yes  [] No  [] Not Sure   [x] Yes  [] No  [] Not Sure   [x] Yes  [] No  [] Not Sure   [x] Yes  [] No  [] Not Sure   Were you asked about your understanding of these instructions? Interviewer Notes:    [x] Yes  [] No  [] Not Sure   Were the discharge instructions written down and given to you before you left? Interviewer Notes:    [x] Yes  [] No  [] Not  Sure   Were the written discharge instructions and plans easy to read and understand? Interviewer Notes:    [x] Yes  [] No  [] Not Sure   How confident were you about understanding these instructions? Interviewer Notes:  [] Very confident   [x] Somewhat confident   [] Not confident   [] Not Sure   Do you have a regular doctor who takes care of you for most things?    Primary Providers Name/Practice Name:    [] Yes [] No  [] Not Sure   At the time of your discharge, did someone talk to you about which medications to take when you left and which ones to discontinue? Interviewer Notes:    [x] Yes  [] No  [] Not Sure   Did you take your medications as they were prescribed? Interviewer Notes:    [x] Yes  [] No  [] Not Sure   If not, what were the difficulties you experienced with taking your medications? Comments:          After you left the hospital, did you have an appointment with your doctor? [x] Yes  [] No  [] Not Sure     If yes, who made the appointment? [] I did    [] My family   [] Hospital staff   [x] Not Sure   Were you able to get to this appointment? Interviewer Notes:\"I went to ED, because I've had blood in my stool\". [] Yes  [x] No  [] Not Sure   How do you think you became sick enough to be readmitted to the hospital?   Comment:    \"I was feeling really bad, really weak. My blood pressure was really low. I was in Dr. Dom Glez office from where I was sent to [facility]".        Source:  Modified from [facility], [facility]

## 2017-06-06 NOTE — CARDIO/PULMONARY
Cardiology Progress Note      6/6/2017 7:27 AM    Admit Date: 6/2/2017    Admit Diagnosis:   Colitis  Colitis      Assessment:     Active Problems:    Colitis (6/2/2017)        Plan:     1. Orthostatic Hypotension:  - BP well controlled despite holding anti-hypertensive therapy  - decrease or stop IV fluids if patient's appetite and oral intake continue to improve (still with constipation and GI discomfort)  - encouraged activity to help promote bowel movement, including sitting out of bed to chair  - consider restarting anti-hypertensive therapy if systolic BP persistently > 130mmHg         2. CAD/Angina:  - CCS Class 1  - two prior stents to OM1  - left sided discomfort, consistent with muscle strain in left shoulder, improved from yesterday  - would not advise ischemic workup at this time given atypical symptoms  - no significant wall motion abnormalities appreciated on echo, and without significant arrhythmia   - continue medical therapy only at this time with statin, and consider anti-platelet agent (no longer on full dose anticoagulation with heparin and also with relatively stable hemoglobin)  - hold beta blocker/atenolol given well controlled BP        3. General:  - follow up with primary team for management of colitis   - no evidence of significant leak from recent endovascular aorta repair, and with patent mesenteric arteries  - consider PT/OT evaluation (improvement in dizziness but with some gait instability and leg weakness)  - continue IV hydration until appetite improves to prevent dehydration and hypotension  - no ischemic workup required at this time           Subjective:     August Anderson is a 79 yo man admitted with colitis, recently undergoing AAA repair in May 2017. He feels well today, with improvement in his appetite and dizziness. He was able to ambulate to the bathroom yesterday evening and this morning, having only slight gait instability.   He also reports that his left sided shoulder pain has improved compared to yesterday.         Current Facility-Administered Medications   Medication Dose Route Frequency    heparin (porcine) injection 5,000 Units  5,000 Units SubCUTAneous Q8H    albuterol (PROVENTIL VENTOLIN) nebulizer solution 2.5 mg  2.5 mg Nebulization Q4H PRN    nicotine (NICODERM CQ) 14 mg/24 hr patch 1 Patch  1 Patch TransDERmal DAILY    pantoprazole (PROTONIX) tablet 40 mg  40 mg Oral ACB    ciprofloxacin HCl (CIPRO) tablet 500 mg  500 mg Oral Q12H    metroNIDAZOLE (FLAGYL) tablet 500 mg  500 mg Oral TID    LORazepam (ATIVAN) tablet 1 mg  1 mg Oral Q6H PRN    nystatin (MYCOSTATIN) 100,000 unit/gram cream   Topical BID    polyethylene glycol (MIRALAX) packet 17 g  17 g Oral DAILY    atorvastatin (LIPITOR) tablet 40 mg  40 mg Oral QHS    budesonide-formoterol (SYMBICORT) 160-4.5 mcg/actuation HFA inhaler 2 Puff  2 Puff Inhalation BID RT    oxyCODONE-acetaminophen (PERCOCET) 5-325 mg per tablet 1 Tab  1 Tab Oral Q4H PRN    levothyroxine (SYNTHROID) tablet 125 mcg  125 mcg Oral ACB    gabapentin (NEURONTIN) capsule 300 mg  300 mg Oral TID    omega-3 acid ethyl esters (LOVAZA) capsule 2,000 mg  2 g Oral DAILY WITH BREAKFAST    diphenhydrAMINE (BENADRYL) capsule 25 mg  25 mg Oral Q4H PRN    acetaminophen (TYLENOL) tablet 650 mg  650 mg Oral Q4H PRN         Objective:      Visit Vitals    /77 (BP 1 Location: Right arm, BP Patient Position: At rest)    Pulse (!) 52    Temp 98.1 °F (36.7 °C)    Resp 18    Ht 6' 1\" (1.854 m)    Wt 122.7 kg (270 lb 6.4 oz)    SpO2 99%    BMI 35.67 kg/m2         Intake/Output Summary (Last 24 hours) at 06/06/17 0727  Last data filed at 06/06/17 0630   Gross per 24 hour   Intake              960 ml   Output             1850 ml   Net             -890 ml        Physical Exam:    HEENT: no scleral icterus, EOMI  Cardiac: S1S2, no S3 appreciated, RRR, no appreciable murmur   Lungs: good air entry b/l, clear to auscultation Abdomen: Soft, +BS, no obvious hepatomegaly, mild tenderness to palpation  Extremities: trace edema, 4/5 power in lower extremities       Data Review:   Recent Labs      06/04/17   0303  06/03/17   1623  06/03/17   1404   WBC  7.9  7.6  7.6   HGB  10.9*  11.3*  11.4*   HCT  34.1*  34.6*  35.7*   PLT  247  239  200     Recent Labs      06/04/17   0303  06/03/17   1404   NA  136  137   K  3.7  4.4   CL  102  102   CO2  28  32   GLU  110*  113*   BUN  10  10   CREA  1.38*  1.31*   CA  8.0*  8.2*   MG   --   2.3   PHOS   --   2.8   ALB  2.4*   --    SGOT  28   --    ALT  16   --    INR  1.1  1.1       Recent Labs      06/04/17   0303  06/03/17   1404   TROIQ  <0.02  <0.02   CPK  79  92   CKMB  1.5  1.1         Telemetry: paced rhythm    Echocardiogram June 2017:   EF 55%, mild LVH, RVSP = 30mmHg, trivial pericardial effusion without hemodynamic compromise, normal LA size    CTA Chest:  IMPRESSION:   1. No evidence for pulmonary embolism. 2. Aortic stent graft. 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 Grossly patent thoracic aorta. 3. Findings of old granulomatous disease with punctate calcifications in the  spleen, solitary calcification in the liver. 4. Partially imaged abdominal aortic aneurysm. Abdominal Aorta Ultrasound, June 2017:  MESENTERIC:  1. Patent superior mesenteric artery without evidence of stenosis. 2. Patent celiac, splenic, and hepatic arteries origins without  evidence of stenosis. 3. Patent superior mesenteric vein. 4. Patent abdominal aorta with evidence of a repaired abdominal aortic  aneurysm (endograft). The largest measurement is 3.7 cm by 4.4 cm  proximal to the endograft.   5. Unable to visualize the inferior mesenteric artery.

## 2017-06-06 NOTE — PROGRESS NOTES
Hospitalist Progress Note    Patient: Nkechi García Age: 78 y.o. : 1950 MR#: [id number] SSN: [id number]  Date/Time: 2017 7:47 PM    Subjective:     Patient received sitting up in bed, reports a small BM with some bright red blood on wiping,  States rectum felt very dry    Review of Systems - Negative except constipation    Objective:   VS:   Visit Vitals    /59 (BP 1 Location: Left arm, BP Patient Position: At rest)    Pulse 65    Temp 98 °F (36.7 °C)    Resp 18    Ht 6' 1\" (1.854 m)    Wt 122.7 kg (270 lb 6.4 oz)    SpO2 98%    BMI 35.67 kg/m2      Tmax/24hrs: Temp (24hrs), Av.8 °F (36.6 °C), Min:97.3 °F (36.3 °C), Max:98.1 °F (36.7 °C)     Intake/Output Summary (Last 24 hours) at 17  Last data filed at 17 1821   Gross per 24 hour   Intake              720 ml   Output             1750 ml   Net            -1030 ml       General: alert and oriented ×3  HEENT:speech clear and goal-directed, conjunctiva clear, no scleral icterus, no JVD  Cardiovascular:  Heart rate regular 65  Pulmonary:  Decreased bases otherwise clear  GI: mild diffuse tenderness periumbilical, bowel sounds positive, soft   Extremities: moving all 4 extremities well, no edema, nontender calves   Additional:    Current Facility-Administered Medications   Medication Dose Route Frequency    dextrose 5% - 0.45% NaCl with KCl 20 mEq/L infusion  100 mL/hr IntraVENous CONTINUOUS    magnesium citrate solution 296 mL  296 mL Oral NOW    mineral oil (FLEET) enema   Rectal NOW    bisacodyl (DULCOLAX) tablet 15 mg  15 mg Oral ONCE    bisacodyl (DULCOLAX) suppository 10 mg  10 mg Rectal ONCE    heparin (porcine) injection 5,000 Units  5,000 Units SubCUTAneous Q8H    albuterol (PROVENTIL VENTOLIN) nebulizer solution 2.5 mg  2.5 mg Nebulization Q4H PRN    nicotine (NICODERM CQ) 14 mg/24 hr patch 1 Patch  1 Patch TransDERmal DAILY    pantoprazole (PROTONIX) tablet 40 mg  40 mg Oral ACB    ciprofloxacin HCl (CIPRO) tablet 500 mg  500 mg Oral Q12H    metroNIDAZOLE (FLAGYL) tablet 500 mg  500 mg Oral TID    LORazepam (ATIVAN) tablet 1 mg  1 mg Oral Q6H PRN    nystatin (MYCOSTATIN) 100,000 unit/gram cream   Topical BID    polyethylene glycol (MIRALAX) packet 17 g  17 g Oral DAILY    atorvastatin (LIPITOR) tablet 40 mg  40 mg Oral QHS    budesonide-formoterol (SYMBICORT) 160-4.5 mcg/actuation HFA inhaler 2 Puff  2 Puff Inhalation BID RT    oxyCODONE-acetaminophen (PERCOCET) 5-325 mg per tablet 1 Tab  1 Tab Oral Q4H PRN    levothyroxine (SYNTHROID) tablet 125 mcg  125 mcg Oral ACB    gabapentin (NEURONTIN) capsule 300 mg  300 mg Oral TID    omega-3 acid ethyl esters (LOVAZA) capsule 2,000 mg  2 g Oral DAILY WITH BREAKFAST    diphenhydrAMINE (BENADRYL) capsule 25 mg  25 mg Oral Q4H PRN    acetaminophen (TYLENOL) tablet 650 mg  650 mg Oral Q4H PRN     Labs:    Recent Results (from the past 24 hour(s))   METABOLIC PANEL, BASIC    Collection Time: 06/06/17 10:09 AM   Result Value Ref Range    Sodium 139 136 - 145 mmol/L    Potassium 4.8 3.5 - 5.5 mmol/L    Chloride 103 100 - 108 mmol/L    CO2 30 21 - 32 mmol/L    Anion gap 6 3.0 - 18 mmol/L    Glucose 126 (H) 74 - 99 mg/dL    BUN 5 (L) 7.0 - 18 MG/DL    Creatinine 1.15 0.6 - 1.3 MG/DL    BUN/Creatinine ratio 4 (L) 12 - 20      GFR est AA >60 >60 ml/min/1.73m2    GFR est non-AA >60 >60 ml/min/1.73m2    Calcium 8.3 (L) 8.5 - 10.1 MG/DL   CBC WITH AUTOMATED DIFF    Collection Time: 06/06/17 10:09 AM   Result Value Ref Range    WBC 6.6 4.6 - 13.2 K/uL    RBC 3.92 (L) 4.70 - 5.50 M/uL    HGB 11.3 (L) 13.0 - 16.0 g/dL    HCT 34.8 (L) 36.0 - 48.0 %    MCV 88.8 74.0 - 97.0 FL    MCH 28.8 24.0 - 34.0 PG    MCHC 32.5 31.0 - 37.0 g/dL    RDW 15.3 (H) 11.6 - 14.5 %    PLATELET 456 625 - 103 K/uL    MPV 10.5 9.2 - 11.8 FL    NEUTROPHILS 71 40 - 73 %    LYMPHOCYTES 18 (L) 21 - 52 %    MONOCYTES 7 3 - 10 %    EOSINOPHILS 4 0 - 5 %    BASOPHILS 0 0 - 2 %    ABS.  NEUTROPHILS 4.7 1.8 - 8.0 K/UL    ABS. LYMPHOCYTES 1.2 0.9 - 3.6 K/UL    ABS. MONOCYTES 0.4 0.05 - 1.2 K/UL    ABS. EOSINOPHILS 0.3 0.0 - 0.4 K/UL    ABS. BASOPHILS 0.0 0.0 - 0.1 K/UL    DF AUTOMATED         Imaging:  Xr Abd (ap And Erect Or Decub)    Result Date: 6/6/2017  ABDOMEN SUPINE AND UPRIGHT INDICATIONS: Constipation and ileus. CPT CODE: 74686 FINDINGS: The bowel gas pattern is normal. A moderate amount of colonic stool is present. There is no evidence of obstruction. There is a thoracic aortic stent graft and an abdominal aortic stent graft. Surgical clips are seen in the right upper quadrant likely from prior cholecystectomy. Impression: Essentially normal bowel gas pattern. Possible mild constipation.       Assessment/Plan:     CAD (coronary artery disease), native coronary artery        Colitis       Constipation due to pain medication therapy       Orthostatic hypotension           Patient has completed a 7 day course of Flagyl and Cipro  Persistent constipation, added magnesium citrate treatment tonight  Add fleets oil enema, bisacodyl 15 mg by mouth, start Senna 2 tabs twice a day  Witch hazel Tucks, Preparation H ointment, glycerin suppository  Obtain O2 walk test  Holding beta blocker  GI prophylaxis Protonix  DVT prophylaxis subcutaneous heparin    Additional Notes:      Full Code    Disposition:   OT recommending SNF    Case discussed with:  [x]Patient  []Family  [x]Nursing  []Case Management  DVT Prophylaxis:  []Lovenox  [x]Hep SQ  []SCDs  []Coumadin   []On Heparin gtt    Signed By: Tiffanie Malin 15    June 6, 2017 7:47 PM

## 2017-06-06 NOTE — ROUTINE PROCESS
200 - Received bedside report from Chrystal LUA, patient was resting in bed, HOB elevated, bed in lowest position, pt watching television and oriented to call button. Gave report to Chrystal BARCENAS RN, using SBAR, MAR, and Kardex.

## 2017-06-06 NOTE — ROUTINE PROCESS
Report received from DAVID Nayak RN. Lying in bed resting on rounds. Denies pain or shortness of breath. See assessment. Call bell in reach.

## 2017-06-06 NOTE — PROGRESS NOTES
Problem: Mobility Impaired (Adult and Pediatric)  Goal: *Acute Goals and Plan of Care (Insert Text)  Physical Therapy Goals  Initiated 6/6/2017 and to be accomplished within 7 day(s)  1. Patient will move from supine to sit and sit to supine in bed with modified independence. 2. Patient will transfer from bed to chair and chair to bed with modified independence using the least restrictive device. 3. Patient will perform sit to stand with modified independence. 4. Patient will ambulate with modified independence for 150 feet with the least restrictive device. 5. Patient will be independent with HEP for LE strengthening and balance. PHYSICAL THERAPY EVALUATION     Patient: Bhanu Basilio (47 y.o. male)  Date: 6/6/2017  Primary Diagnosis: Colitis  Colitis        Precautions:  Fall      ASSESSMENT :  Pt is 79 yo male admitted for colitis. Pt has recent hx of AAA repair in May 2017. Pt states that his L shoulder feels better, he has been sitting EOB, and been ambulating to bathroom with nursing. Evaluation concluded early due to transportation coming to take pt for imaging. Based on the objective data described below, the patient presents with impaired balance, LE weakness, and impaired gait resulting in impaired functional mobility. Patient will benefit from skilled intervention to address the above impairments.   Patients rehabilitation potential is considered to be Good  Factors which may influence rehabilitation potential include:   [ ]         None noted  [ ]         Mental ability/status  [ ]         Medical condition  [X]         Home/family situation and support systems  [ ]         Safety awareness  [ ]         Pain tolerance/management  [ ]         Other:        PLAN :  Recommendations and Planned Interventions:  [X]           Bed Mobility Training             [X]    Neuromuscular Re-Education  [X]           Transfer Training                   [ ]    Orthotic/Prosthetic Training  [X] Gait Training                          [ ]    Modalities  [X]           Therapeutic Exercises          [ ]    Edema Management/Control  [X]           Therapeutic Activities            [X]    Patient and Family Training/Education  [ ]           Other (comment):     Frequency/Duration: Patient will be followed by physical therapy 1-2 times per day/4-7 days per week to address goals. Discharge Recommendations: Home Health  Further Equipment Recommendations for Discharge: rollator and rolling walker       G-CODES      Mobility  Current  CJ= 20-39%   Goal  CI= 1-19%. The severity rating is based on the Level of Assistance required for Functional Mobility and ADLs.            G-CODES      Eval Complexity: History: HIGH Complexity :3+ comorbidities / personal factors will impact the outcome/ POC Exam:MEDIUM Complexity : 3 Standardized tests and measures addressing body structure, function, activity limitation and / or participation in recreation  Presentation: MEDIUM Complexity : Evolving with changing characteristics  Clinical Decision Making:Medium Complexity Overall Complexity:MEDIUM      SUBJECTIVE:   Patient agreeable to evaluation. OBJECTIVE DATA SUMMARY:       Past Medical History:   Diagnosis Date    Arthritis      CAD (coronary artery disease), native coronary artery       ALIDA X 2 to OM1    Carotid duplex 08/13/2013     Mild <50% bilateral ICA plaquing.  Complete heart block [facility]) June 2013     Medtronic dual chamber pacemaker    COPD (chronic obstructive pulmonary disease) (HCC)      Gastritis      GERD (gastroesophageal reflux disease)      High cholesterol      History of echocardiogram 06/04/2013     EF 60-65%. No WMA. Gr 1 DDfx. No significant valvular heart disease.  History of myocardial perfusion scan 04/18/2011     No ischemia or prior infarction. No WMA. EF 52%.   Neg EKG on pharm stress test.    Hypertension      Hypothyroidism      Lower extremity venous duplex 01/06/2015     No DVT. Superficial insufficiency of right GSV. Deep venous reflux in right CFV & fem vein. Deep venous reflux in left CFV.  Renal duplex 12/24/2014     Mild < 60% RRA stenosis. Low parenchymal & LRA flow. Renal asymmetry. Intrinsic/med disease in right kidney. AAA (3.8 x 4.0 cm) infrarenal.    Right groin hernia       not resolved    S/P AAA repair 05/18/2017    S/P cardiac cath 06/02/2013     Diffuse atherosclerosis throughout. pRCA 50%. mLM 30%. mLAD 40%. oD2 (sm) 100%. pLCX 30%. p/mOM1 95/80% (o/lapping 2.25 x 23-mm & 2.25 x 12-mm Xience stents, resid 0%). Past Surgical History:   Procedure Laterality Date    HX CHOLECYSTECTOMY   2008    HX CORONARY STENT PLACEMENT   6/2013    HX HERNIA REPAIR   1321     Umbilical    HX PACEMAKER   6/2013     Medtronic Pacemaker      Prior Level of Function/Home Situation: Mod I for ambulation using an old straight cane  Home Situation  Home Environment: Apartment  # Steps to Enter: 0 (Elevator available)  One/Two Story Residence: One story  Living Alone: Yes  Support Systems: Family member(s)  Patient Expects to be Discharged to[de-identified] Apartment  Current DME Used/Available at Home: Cane, straight  Critical Behavior:  Neurologic State: Alert  Orientation Level: Oriented X4  Cognition: Appropriate for age attention/concentration; Follows commands  Safety/Judgement: Awareness of environment  Psychosocial  Patient Behaviors: Calm; Cooperative  Purposeful Interaction: Yes     Strength:    Strength: Generally decreased, functional (Bilat LE)     Range Of Motion:  AROM: Generally decreased, functional (Bilat LE)     Functional Mobility:  Bed Mobility:  Supine to Sit: Modified independent; Additional time  Sit to Supine: Minimum assistance     Transfers:  Sit to Stand: Contact guard assistance  Stand to Sit: Contact guard assistance        Balance:   Sitting: Intact; With support (Uses bilat UE for support)  Standing: Impaired; With support  Standing - Static: Fair  Standing - Dynamic : Poor  Ambulation/Gait Training:  Distance (ft): 15 Feet (ft)  Assistive Device:  (Hand-Hold Assist)  Ambulation - Level of Assistance: Minimal assistance  Gait Description (WDL): Exceptions to WDL  Gait Abnormalities: Altered arm swing;Decreased step clearance;Trunk sway increased  Base of Support: Widened  Speed/Carole: Pace decreased (<100 feet/min)  Step Length: Left shortened;Right shortened  Swing Pattern: Left asymmetrical;Right asymmetrical     Therapeutic Exercises:   Supine<>Sit, Sit<>Stand, Ambulation     Pain:  Pt reports 0/10 pain or discomfort prior to treatment. Pt reports 5/10 pain or discomfort post treatment lower abdomen. Activity Tolerance:   Fair  Please refer to the flowsheet for vital signs taken during this treatment. After treatment:   [ ]         Patient left in no apparent distress sitting up in chair  [X]         Patient left in no apparent distress in bed - pt left in stretcher with transportation   [X]         Call bell left within reach  [ ]         Nursing notified  [ ]         Caregiver present  [ ]         Bed alarm activated      COMMUNICATION/EDUCATION:   [X]         Fall prevention education was provided and the patient/caregiver indicated understanding. [X]         Patient/family have participated as able in goal setting and plan of care. [X]         Patient/family agree to work toward stated goals and plan of care. [ ]         Patient understands intent and goals of therapy, but is neutral about his/her participation. [ ]         Patient is unable to participate in goal setting and plan of care.      Thank you for this referral.  Dakota Mcnamara, PT, DPT   Time Calculation: 14 mins

## 2017-06-06 NOTE — ROUTINE PROCESS
Bedside and Verbal shift change report given to DAVID Rodríguez RN (oncoming nurse) by Will Mckeon RN (offgoing nurse). Report included the following information SBAR, Kardex, Intake/Output, MAR and Recent Results.

## 2017-06-07 PROBLEM — I95.1 ORTHOSTATIC HYPOTENSION: Status: ACTIVE | Noted: 2017-06-07

## 2017-06-07 PROBLEM — K59.03 CONSTIPATION DUE TO PAIN MEDICATION THERAPY: Status: ACTIVE | Noted: 2017-06-07

## 2017-06-07 PROBLEM — K59.03 CONSTIPATION DUE TO PAIN MEDICATION THERAPY: Chronic | Status: ACTIVE | Noted: 2017-06-07

## 2017-06-07 LAB
ANION GAP BLD CALC-SCNC: 6 MMOL/L (ref 3–18)
BUN SERPL-MCNC: 4 MG/DL (ref 7–18)
BUN/CREAT SERPL: 3 (ref 12–20)
CALCIUM SERPL-MCNC: 8.7 MG/DL (ref 8.5–10.1)
CHLORIDE SERPL-SCNC: 101 MMOL/L (ref 100–108)
CO2 SERPL-SCNC: 30 MMOL/L (ref 21–32)
CREAT SERPL-MCNC: 1.18 MG/DL (ref 0.6–1.3)
GLUCOSE SERPL-MCNC: 110 MG/DL (ref 74–99)
POTASSIUM SERPL-SCNC: 4.1 MMOL/L (ref 3.5–5.5)
SODIUM SERPL-SCNC: 137 MMOL/L (ref 136–145)

## 2017-06-07 PROCEDURE — 74011250637 HC RX REV CODE- 250/637: Performed by: FAMILY MEDICINE

## 2017-06-07 PROCEDURE — 80048 BASIC METABOLIC PNL TOTAL CA: CPT | Performed by: HOSPITALIST

## 2017-06-07 PROCEDURE — 36415 COLL VENOUS BLD VENIPUNCTURE: CPT | Performed by: HOSPITALIST

## 2017-06-07 PROCEDURE — 97530 THERAPEUTIC ACTIVITIES: CPT

## 2017-06-07 PROCEDURE — 94640 AIRWAY INHALATION TREATMENT: CPT

## 2017-06-07 PROCEDURE — 74011250637 HC RX REV CODE- 250/637: Performed by: HOSPITALIST

## 2017-06-07 PROCEDURE — 65660000000 HC RM CCU STEPDOWN

## 2017-06-07 PROCEDURE — 74011250636 HC RX REV CODE- 250/636: Performed by: HOSPITALIST

## 2017-06-07 PROCEDURE — 97116 GAIT TRAINING THERAPY: CPT

## 2017-06-07 RX ORDER — SENNOSIDES 8.6 MG/1
2 TABLET ORAL 2 TIMES DAILY
Status: DISCONTINUED | OUTPATIENT
Start: 2017-06-07 | End: 2017-06-10 | Stop reason: HOSPADM

## 2017-06-07 RX ORDER — GLYCERIN ADULT
1 SUPPOSITORY, RECTAL RECTAL EVERY 12 HOURS
Status: DISCONTINUED | OUTPATIENT
Start: 2017-06-07 | End: 2017-06-10 | Stop reason: HOSPADM

## 2017-06-07 RX ADMIN — GABAPENTIN 300 MG: 300 CAPSULE ORAL at 10:13

## 2017-06-07 RX ADMIN — PANTOPRAZOLE SODIUM 40 MG: 40 TABLET, DELAYED RELEASE ORAL at 10:13

## 2017-06-07 RX ADMIN — LEVOTHYROXINE SODIUM 125 MCG: 125 TABLET ORAL at 10:13

## 2017-06-07 RX ADMIN — HEPARIN SODIUM 5000 UNITS: 5000 INJECTION, SOLUTION INTRAVENOUS; SUBCUTANEOUS at 19:58

## 2017-06-07 RX ADMIN — GABAPENTIN 300 MG: 300 CAPSULE ORAL at 16:00

## 2017-06-07 RX ADMIN — OXYCODONE HYDROCHLORIDE AND ACETAMINOPHEN 1 TABLET: 5; 325 TABLET ORAL at 19:58

## 2017-06-07 RX ADMIN — SENNOSIDES 17.2 MG: 8.6 TABLET, FILM COATED ORAL at 21:46

## 2017-06-07 RX ADMIN — OXYCODONE HYDROCHLORIDE AND ACETAMINOPHEN 1 TABLET: 5; 325 TABLET ORAL at 10:13

## 2017-06-07 RX ADMIN — OXYCODONE HYDROCHLORIDE AND ACETAMINOPHEN 1 TABLET: 5; 325 TABLET ORAL at 03:45

## 2017-06-07 RX ADMIN — BUDESONIDE AND FORMOTEROL FUMARATE DIHYDRATE 2 PUFF: 160; 4.5 AEROSOL RESPIRATORY (INHALATION) at 20:03

## 2017-06-07 RX ADMIN — HEPARIN SODIUM 5000 UNITS: 5000 INJECTION, SOLUTION INTRAVENOUS; SUBCUTANEOUS at 14:57

## 2017-06-07 RX ADMIN — GLYCERIN 1 SUPPOSITORY: 2.1 SUPPOSITORY RECTAL at 18:00

## 2017-06-07 RX ADMIN — OMEGA-3-ACID ETHYL ESTERS 2000 MG: 1 CAPSULE, LIQUID FILLED ORAL at 10:13

## 2017-06-07 RX ADMIN — GABAPENTIN 300 MG: 300 CAPSULE ORAL at 21:46

## 2017-06-07 RX ADMIN — NYSTATIN: 100000 CREAM TOPICAL at 18:00

## 2017-06-07 RX ADMIN — BUDESONIDE AND FORMOTEROL FUMARATE DIHYDRATE 2 PUFF: 160; 4.5 AEROSOL RESPIRATORY (INHALATION) at 08:11

## 2017-06-07 RX ADMIN — OXYCODONE HYDROCHLORIDE AND ACETAMINOPHEN 1 TABLET: 5; 325 TABLET ORAL at 14:58

## 2017-06-07 RX ADMIN — ATORVASTATIN CALCIUM 40 MG: 40 TABLET, FILM COATED ORAL at 21:46

## 2017-06-07 RX ADMIN — HEPARIN SODIUM 5000 UNITS: 5000 INJECTION, SOLUTION INTRAVENOUS; SUBCUTANEOUS at 03:47

## 2017-06-07 NOTE — ROUTINE PROCESS
2303 - Received report from [address] Garcia, patient was resting in bed with Dr. Deepika Madrigal at patient's bedside, [facility] elevated, bed in lowest position. Patient had a large bowel movement during the night. A small amount of blood was in the stool. The stool was soft and formed. Gave report to Jf Menard RN, using SBAR, MAR, and Kardex.

## 2017-06-07 NOTE — ROUTINE PROCESS
Assumed patient care, report received from [facility], resting in bed, watching tv, complained of pain 6/10 to lower abdomen and groin. Medicated for pain. HOB elevated to 30 degrees. Denies other needs at this time. 2208 - Due medications given including bowel regimen medications as listed on MAR, since patient states has not had any bowel movement since before he had surgery then also stated he had some loose, bloody stools after the surgery. Placed bedside commode to assess if any stools tonight. Will continue to monitor.

## 2017-06-07 NOTE — PROGRESS NOTES
Problem: Mobility Impaired (Adult and Pediatric)  Goal: *Acute Goals and Plan of Care (Insert Text)  Physical Therapy Goals  Initiated 6/6/2017 and to be accomplished within 7 day(s)  1. Patient will move from supine to sit and sit to supine in bed with modified independence. 2. Patient will transfer from bed to chair and chair to bed with modified independence using the least restrictive device. 3. Patient will perform sit to stand with modified independence. 4. Patient will ambulate with modified independence for 150 feet with the least restrictive device. 5. Patient will be independent with HEP for LE strengthening and balance. PHYSICAL THERAPY TREATMENT     Patient: Joni Benites (54 y.o. male)  Date: 6/7/2017  Diagnosis: Colitis  Colitis <principal problem not specified>       Precautions: Fall  Chart, physical therapy assessment, plan of care and goals were reviewed. ASSESSMENT:  Pt demo's increased activity tolerance as indicated by the ability to increase single trial ambulation distance to household levels. Pt will benefit from cont'd PT in [facility] to promote increased activity tolerance,increased strength, and maximal safety with functional mobility tasks. Progression toward goals:  [x ]      Improving appropriately and progressing toward goals  [x ]      Improving slowly and progressing toward goals  [ ]      Not making progress toward goals and plan of care will be adjusted       PLAN:  Patient continues to benefit from skilled intervention to address the above impairments. Continue treatment per established plan of care. Discharge Recommendations:  Home Health  Further Equipment Recommendations for Discharge:  rolling walker and N/A       SUBJECTIVE:   Patient stated I just feel weak.       OBJECTIVE DATA SUMMARY:   Critical Behavior:  Neurologic State: Alert  Orientation Level: Oriented X4  Cognition: Appropriate decision making  Safety/Judgement: Awareness of environment  Functional Mobility Training:  Bed Mobility:  Rolling: Supervision  Supine to Sit: Stand-by asssistance  Transfers:  Sit to Stand: Supervision  Stand to Sit: Supervision  Bed to Chair: Supervision  Other: stand step with RW  Balance:  Sitting: Intact  Standing: Impaired; With support  Standing - Static: Fair  Standing - Dynamic : Fair  Ambulation/Gait Training:  Distance (ft): 60 Feet (ft)  Assistive Device: Walker, rolling  Ambulation - Level of Assistance: Stand-by asssistance  Gait Abnormalities: Decreased step clearance;Trunk sway increased  Base of Support: Widened  Stance: Right increased; Left increased  Speed/Carole: Slow  Step Length: Right shortened;Left shortened    Pain:  Pt reports 3/10 pain or discomfort prior to treatment. Pt reports 4/10 pain or discomfort post treatment. Activity Tolerance:   Fair/fair+  Pt found on 2L O2 via NC and nurse asks that ambulation be trial on RA. Pt presents with SpO2   Please refer to the flowsheet for vital signs taken during this treatment.   After treatment:   [ ] Patient left in no apparent distress sitting up in chair  [X] Patient left in no apparent distress in bed  [X] Call bell left within reach  [ ] Nursing notified  [X] Caregiver present  [ ] Bed alarm activated      Constance Ledbetter PTA   Time Calculation: 23 mins

## 2017-06-07 NOTE — PROGRESS NOTES
Problem: Self Care Deficits Care Plan (Adult)  Goal: *Acute Goals and Plan of Care (Insert Text)  Occupational Therapy Goals  Initiated 6/6/2017 within 7 day(s). 1. Patient will perform lower body dressing with modified independence and no increased c/o pain  2. Patient will maneuver on and off BSC with modified independent and no increased pain  3. Patient will maneuver in bed in preparation for participation in his self care routine with modified independence and no c/o pain   Outcome: Progressing Towards Goal  OCCUPATIONAL THERAPY TREATMENT     Patient: Alexandru Worrell (44 y.o. male)  Date: 6/7/2017  Diagnosis: Colitis  Colitis <principal problem not specified>       Precautions: Fall  Chart, occupational therapy assessment, plan of care, and goals were reviewed. ASSESSMENT:  Pt is finishing up PT session upon entry, sitting EOB. Pt was educated on adaptive strategy for LB dressing. Pt required Mod A to don socks, and CGA for simulated donning of underwear/pants. Pt reports having difficulty w/LB dressing for a long time. Pt may benefit from AE training for LB dressing. Pt maneuvered to std in preparation for [facility] w/SBA, however reports feeling tired at this time and requesting to return to bed. Pt was able to maneuver to sup w/SBA, and scoot to [facility] w/Supervised A. Pt was educated on deep breathing technique during functional mobility for ALDs, pt verbalized and demonstrated understanding. Pt's O2 sats doffed at the beginning of the session, and per PT O2 sats remained >90% on RA throughout PT session. At the end of the OT session pt requesting to don O2 NC stating it makes him feel more confident w/ breathing. Pt's nurse Onesimo Xiong RN notified.   Progression toward goals:  [ ]          Improving appropriately and progressing toward goals  [X]          Improving slowly and progressing toward goals  [ ]          Not making progress toward goals and plan of care will be adjusted       PLAN:  Patient continues to benefit from skilled intervention to address the above impairments. Continue treatment per established plan of care. Discharge Recommendations:  Home Health  Further Equipment Recommendations for Discharge:  N/A      G-CODES:      Self Care  Current  CJ= 20-39%. The severity rating is based on the Level of Assistance required for Functional Mobility and ADLs. SUBJECTIVE:   Patient stated I am just tired my incision is still bothering me.       OBJECTIVE DATA SUMMARY:   Cognitive/Behavioral Status:  Neurologic State: Alert  Orientation Level: Oriented X4  Cognition: Follows commands, Appropriate decision making, Appropriate for age attention/concentration  Safety/Judgement: Awareness of environment     Functional Mobility and Transfers for ADLs:              Bed Mobility:  Rolling: Supervision  Supine to Sit: Stand-by asssistance  Sit to Supine: Stand-by asssistance                 Transfers:  Sit to Stand: Supervision     Bed to Chair: Supervision   Balance:  Sitting: Intact  Standing: Impaired; With support  Standing - Static: Fair  Standing - Dynamic : Fair     ADL Intervention:   Lower Body Dressing Assistance  Underpants: Contact guard assistance (simulated)  Socks: Moderate assistance  Pain:  Pt reports 5/10 pain or discomfort prior to treatment. Pt reports 5/10 pain or discomfort post treatment. Activity Tolerance:    Fair     Please refer to the flowsheet for vital signs taken during this treatment.   After treatment:   [ ]  Patient left in no apparent distress sitting up in chair  [X]  Patient left in no apparent distress in bed  [X]  Call bell left within reach  [X]  Nursing notified  [ ]  Caregiver present  [ ]  Bed alarm activated     Evie Ba MCLAU[place]HLIN/L  Time Calculation: 14 mins

## 2017-06-07 NOTE — PROGRESS NOTES
Occupational Therapy Note    Patient: Stephanie Mcgee (35 y.o. male)  Date: 6/7/2017  Diagnosis: Colitis  Colitis <principal problem not specified>       Precautions: Fall  Chart, occupational therapy assessment, plan of care, and goals were reviewed. Occupational Therapy treatment attempted. Patient is unable to participate due to:  []  Nausea/vomiting  []  Eating  [x]  Attempt x1(11:37) pt stating he just received Pain medication and wishes to participate in therapy at a later time  []  Pt lethargic  []  Off Unit  [] Other:    Will f/u later as schedule allows. Thank you.     ALEXANDER Nj

## 2017-06-07 NOTE — ROUTINE PROCESS
Bedside shift change report given to Evangelina Dong (oncoming nurse) by Irma Restrepo RN (offgoing nurse). Report given with SBAR, Kardex, Intake/Output, MAR and Recent Results.

## 2017-06-07 NOTE — PROGRESS NOTES
9634  Received report. Denies pain. Dressing to left and right groin clean,dry and intact with no complication noted to site. Positive PPP present. Call light within reach. 1115  Patient resting in bed. Call light within reach. 1400  O2 sat 96% on RA while ambulating. No acute distress noted. Bedside and Verbal shift change report given to STONEY VERDUZCO (oncoming nurse) by Nano Elena (offgoing nurse). Report included the following information SBAR and Kardex.

## 2017-06-08 PROBLEM — I95.1 ORTHOSTATIC HYPOTENSION: Status: RESOLVED | Noted: 2017-06-07 | Resolved: 2017-06-08

## 2017-06-08 PROBLEM — K52.9 COLITIS: Status: RESOLVED | Noted: 2017-06-02 | Resolved: 2017-06-08

## 2017-06-08 LAB
ANION GAP BLD CALC-SCNC: 6 MMOL/L (ref 3–18)
BASOPHILS # BLD AUTO: 0 K/UL (ref 0–0.1)
BASOPHILS # BLD: 1 % (ref 0–2)
BUN SERPL-MCNC: 5 MG/DL (ref 7–18)
BUN/CREAT SERPL: 4 (ref 12–20)
CALCIUM SERPL-MCNC: 8.8 MG/DL (ref 8.5–10.1)
CHLORIDE SERPL-SCNC: 101 MMOL/L (ref 100–108)
CO2 SERPL-SCNC: 33 MMOL/L (ref 21–32)
CREAT SERPL-MCNC: 1.15 MG/DL (ref 0.6–1.3)
DIFFERENTIAL METHOD BLD: ABNORMAL
EOSINOPHIL # BLD: 0.3 K/UL (ref 0–0.4)
EOSINOPHIL NFR BLD: 5 % (ref 0–5)
ERYTHROCYTE [DISTWIDTH] IN BLOOD BY AUTOMATED COUNT: 15.3 % (ref 11.6–14.5)
GLUCOSE SERPL-MCNC: 104 MG/DL (ref 74–99)
HCT VFR BLD AUTO: 36 % (ref 36–48)
HGB BLD-MCNC: 11.4 G/DL (ref 13–16)
LYMPHOCYTES # BLD AUTO: 24 % (ref 21–52)
LYMPHOCYTES # BLD: 1.6 K/UL (ref 0.9–3.6)
MCH RBC QN AUTO: 28.2 PG (ref 24–34)
MCHC RBC AUTO-ENTMCNC: 31.7 G/DL (ref 31–37)
MCV RBC AUTO: 89.1 FL (ref 74–97)
MONOCYTES # BLD: 0.6 K/UL (ref 0.05–1.2)
MONOCYTES NFR BLD AUTO: 9 % (ref 3–10)
NEUTS SEG # BLD: 4.2 K/UL (ref 1.8–8)
NEUTS SEG NFR BLD AUTO: 61 % (ref 40–73)
PLATELET # BLD AUTO: 223 K/UL (ref 135–420)
PMV BLD AUTO: 11.2 FL (ref 9.2–11.8)
POTASSIUM SERPL-SCNC: 4.8 MMOL/L (ref 3.5–5.5)
RBC # BLD AUTO: 4.04 M/UL (ref 4.7–5.5)
SODIUM SERPL-SCNC: 140 MMOL/L (ref 136–145)
WBC # BLD AUTO: 6.8 K/UL (ref 4.6–13.2)

## 2017-06-08 PROCEDURE — 94640 AIRWAY INHALATION TREATMENT: CPT

## 2017-06-08 PROCEDURE — 80048 BASIC METABOLIC PNL TOTAL CA: CPT | Performed by: HOSPITALIST

## 2017-06-08 PROCEDURE — 85025 COMPLETE CBC W/AUTO DIFF WBC: CPT | Performed by: HOSPITALIST

## 2017-06-08 PROCEDURE — 74011250637 HC RX REV CODE- 250/637: Performed by: FAMILY MEDICINE

## 2017-06-08 PROCEDURE — 97530 THERAPEUTIC ACTIVITIES: CPT

## 2017-06-08 PROCEDURE — 77010033678 HC OXYGEN DAILY

## 2017-06-08 PROCEDURE — 97110 THERAPEUTIC EXERCISES: CPT

## 2017-06-08 PROCEDURE — 97535 SELF CARE MNGMENT TRAINING: CPT

## 2017-06-08 PROCEDURE — 97116 GAIT TRAINING THERAPY: CPT

## 2017-06-08 PROCEDURE — 94760 N-INVAS EAR/PLS OXIMETRY 1: CPT

## 2017-06-08 PROCEDURE — 36415 COLL VENOUS BLD VENIPUNCTURE: CPT | Performed by: HOSPITALIST

## 2017-06-08 PROCEDURE — 74011250637 HC RX REV CODE- 250/637: Performed by: HOSPITALIST

## 2017-06-08 PROCEDURE — 74011250636 HC RX REV CODE- 250/636: Performed by: HOSPITALIST

## 2017-06-08 PROCEDURE — 65660000000 HC RM CCU STEPDOWN

## 2017-06-08 RX ORDER — OXYCODONE AND ACETAMINOPHEN 7.5; 325 MG/1; MG/1
1 TABLET ORAL
Status: ON HOLD | COMMUNITY
Start: 2017-06-01 | End: 2017-06-09

## 2017-06-08 RX ADMIN — HEPARIN SODIUM 5000 UNITS: 5000 INJECTION, SOLUTION INTRAVENOUS; SUBCUTANEOUS at 21:24

## 2017-06-08 RX ADMIN — OXYCODONE HYDROCHLORIDE AND ACETAMINOPHEN 1 TABLET: 5; 325 TABLET ORAL at 04:26

## 2017-06-08 RX ADMIN — OXYCODONE HYDROCHLORIDE AND ACETAMINOPHEN 1 TABLET: 5; 325 TABLET ORAL at 00:42

## 2017-06-08 RX ADMIN — NYSTATIN: 100000 CREAM TOPICAL at 09:08

## 2017-06-08 RX ADMIN — LEVOTHYROXINE SODIUM 125 MCG: 125 TABLET ORAL at 08:01

## 2017-06-08 RX ADMIN — GLYCERIN 1 SUPPOSITORY: 2.1 SUPPOSITORY RECTAL at 06:50

## 2017-06-08 RX ADMIN — BUDESONIDE AND FORMOTEROL FUMARATE DIHYDRATE 2 PUFF: 160; 4.5 AEROSOL RESPIRATORY (INHALATION) at 19:48

## 2017-06-08 RX ADMIN — OXYCODONE HYDROCHLORIDE AND ACETAMINOPHEN 1 TABLET: 5; 325 TABLET ORAL at 22:04

## 2017-06-08 RX ADMIN — OXYCODONE HYDROCHLORIDE AND ACETAMINOPHEN 1 TABLET: 5; 325 TABLET ORAL at 13:47

## 2017-06-08 RX ADMIN — GABAPENTIN 300 MG: 300 CAPSULE ORAL at 21:24

## 2017-06-08 RX ADMIN — GLYCERIN 1 SUPPOSITORY: 2.1 SUPPOSITORY RECTAL at 17:31

## 2017-06-08 RX ADMIN — BUDESONIDE AND FORMOTEROL FUMARATE DIHYDRATE 2 PUFF: 160; 4.5 AEROSOL RESPIRATORY (INHALATION) at 08:11

## 2017-06-08 RX ADMIN — POLYETHYLENE GLYCOL 3350 17 G: 17 POWDER, FOR SOLUTION ORAL at 09:05

## 2017-06-08 RX ADMIN — OXYCODONE HYDROCHLORIDE AND ACETAMINOPHEN 1 TABLET: 5; 325 TABLET ORAL at 17:52

## 2017-06-08 RX ADMIN — GABAPENTIN 300 MG: 300 CAPSULE ORAL at 17:31

## 2017-06-08 RX ADMIN — GABAPENTIN 300 MG: 300 CAPSULE ORAL at 09:05

## 2017-06-08 RX ADMIN — NYSTATIN: 100000 CREAM TOPICAL at 17:32

## 2017-06-08 RX ADMIN — ATORVASTATIN CALCIUM 40 MG: 40 TABLET, FILM COATED ORAL at 21:24

## 2017-06-08 RX ADMIN — HEPARIN SODIUM 5000 UNITS: 5000 INJECTION, SOLUTION INTRAVENOUS; SUBCUTANEOUS at 12:55

## 2017-06-08 RX ADMIN — OMEGA-3-ACID ETHYL ESTERS 2000 MG: 1 CAPSULE, LIQUID FILLED ORAL at 09:05

## 2017-06-08 RX ADMIN — SENNOSIDES 17.2 MG: 8.6 TABLET, FILM COATED ORAL at 09:05

## 2017-06-08 RX ADMIN — MINERAL OIL, PETROLATUM, PHENYLEPHRINE HCL, SHARK LIVER OIL: 14; 71.9; .25; 3 OINTMENT TOPICAL at 17:32

## 2017-06-08 RX ADMIN — HEPARIN SODIUM 5000 UNITS: 5000 INJECTION, SOLUTION INTRAVENOUS; SUBCUTANEOUS at 04:23

## 2017-06-08 RX ADMIN — OXYCODONE HYDROCHLORIDE AND ACETAMINOPHEN 1 TABLET: 5; 325 TABLET ORAL at 09:28

## 2017-06-08 RX ADMIN — PANTOPRAZOLE SODIUM 40 MG: 40 TABLET, DELAYED RELEASE ORAL at 09:05

## 2017-06-08 NOTE — ROUTINE PROCESS
Received report from [facility]. Pt AAOx3, NAD, breathing non labored, on room air, HOB up. Bed at the lowest level on lock position, call bell w/i reach. Bedside and Verbal shift change report given to STONEY Lemus (oncoming nurse) by me (offgoing nurse). Report included the following information SBAR, Kardex, Procedure Summary, Intake/Output, MAR, Recent Results and Cardiac Rhythm Paced.

## 2017-06-08 NOTE — PROGRESS NOTES
Problem: Mobility Impaired (Adult and Pediatric)  Goal: *Acute Goals and Plan of Care (Insert Text)  Physical Therapy Goals  Initiated 6/6/2017 and to be accomplished within 7 day(s)  1. Patient will move from supine to sit and sit to supine in bed with modified independence. 2. Patient will transfer from bed to chair and chair to bed with modified independence using the least restrictive device. 3. Patient will perform sit to stand with modified independence. 4. Patient will ambulate with modified independence for 150 feet with the least restrictive device. 5. Patient will be independent with HEP for LE strengthening and balance. PHYSICAL THERAPY TREATMENT     Patient: Kenya Patel (75 y.o. male)  Date: 6/8/2017  Diagnosis: Colitis  Colitis <principal problem not specified>       Precautions: Fall  Chart, physical therapy assessment, plan of care and goals were reviewed. ASSESSMENT:  Pt req's verbal cues for maximal engagement during gait training to promote increased safety throughout trial.  Increased activity tolerance noted as pt performs significantly increased single trial ambulation distance with decreased assist and on RA. Pt will [facility] to promote maximal strength,increased activity tolerance, and optimal safety awareness with functional mobility. Progression toward goals:  [X]      Improving appropriately and progressing toward goals  [X]      Improving slowly and progressing toward goals  [ ]      Not making progress toward goals and plan of care will be adjusted       PLAN:   Patient continues to benefit from skilled intervention to address the above impairments. Continue treatment per established plan of care. Discharge Recommendations:  Home Health  Further Equipment Recommendations for Discharge:  rolling walker and N/A          SUBJECTIVE:   Patient stated I'm feeling a little better. A little light headed but not bad.       OBJECTIVE DATA SUMMARY:   Critical Behavior:  Neurologic State: Alert  Orientation Level: Oriented X4  Cognition: Follows commands  Safety/Judgement: Awareness of environment  Functional Mobility Training:  Bed Mobility:  Rolling: Supervision  Supine to Sit: Supervision  Sit to Supine: Supervision  Transfers:  Sit to Stand: Supervision (verbal cues for sequence and safety)  Stand to Sit: Supervision  Balance:  Sitting: Intact  Standing: Impaired; With support  Standing - Static: Fair  Standing - Dynamic : Fair  Ambulation/Gait Training:  Distance (ft): 100 Feet (ft)  Assistive Device: Walker, rolling  Ambulation - Level of Assistance: Stand-by asssistance;Contact guard assistance  Gait Abnormalities: Decreased step clearance;Trunk sway increased  Base of Support: Widened  Stance: Right increased; Left increased  Speed/Carole: Slow  Step Length: Right shortened;Left shortened  Therapeutic Exercises:   Seated: laq,marches,h/t raises  Sets: 2  Reps: 10  Assist level: supervision  Pain:  Pt reports 0/10 pain or discomfort prior to treatment. Pt reports 0/10 pain or discomfort post treatment. Activity Tolerance:   Fair/fair+  Please refer to the flowsheet for vital signs taken during this treatment.   After treatment:   [X] Patient left in no apparent distress sitting up in chair  [ ] Patient left in no apparent distress in bed  [X] Call bell left within reach  [ ] Nursing notified  [ ] Caregiver present  [ ] Bed alarm activated      Florentino Rollins PTA   Time Calculation: 23 mins

## 2017-06-08 NOTE — ROUTINE PROCESS
Bedside shift change report given to Subhash Montesinos (oncoming nurse) by Aretha Gordon RN (offgoing nurse). Report given with SBAR, Kardex, Intake/Output, MAR and Recent Results.

## 2017-06-08 NOTE — PROGRESS NOTES
Problem: Self Care Deficits Care Plan (Adult)  Goal: *Acute Goals and Plan of Care (Insert Text)  Occupational Therapy Goals  Initiated 6/6/2017 within 7 day(s). 1. Patient will perform lower body dressing with modified independence and no increased c/o pain  2. Patient will maneuver on and off BSC with modified independent and no increased pain  3. Patient will maneuver in bed in preparation for participation in his self care routine with modified independence and no c/o pain   Outcome: Progressing Towards Goal  OCCUPATIONAL THERAPY TREATMENT     Patient: Jani Pathak (42 y.o. male)  Date: 6/8/2017  Diagnosis: Colitis  Colitis <principal problem not specified>       Precautions: Fall  Chart, occupational therapy assessment, plan of care, and goals were reviewed. ASSESSMENT:  Pt required significant amount of encouragement to participate. Pt required additional time to maneuver to EOB in preparation for ADLs, however was able to complete w/Supervised A. Pt was educated on AE for LB dressing (was issued reacher, sock aid). Following education pt was able to don socks w/min VCs and simulated donning of underwear w/SBA for std aspects w/use of AE. Pt is pleased w/AE and stating he will be using it for his LB dressing. Pt declines participation in txfrs today 2/2 having a low BP earlier this morning. Re-checked pt's vitals: /69, HR 67, O2 sats 95% on RA. Pt's nurse notified. Progression toward goals:  [X]          Improving appropriately and progressing toward goals  [ ]          Improving slowly and progressing toward goals  [ ]          Not making progress toward goals and plan of care will be adjusted       PLAN:  Patient continues to benefit from skilled intervention to address the above impairments. Continue treatment per established plan of care.   Discharge Recommendations:  Home Health  Further Equipment Recommendations for Discharge:  Raised toilet seat      G-CODES:      [place] Current  CI= 1-19%. The severity rating is based on the Level of Assistance required for Functional Mobility and ADLs. SUBJECTIVE:   Patient stated I am so thankful for these equipment. I can put my socks on now.       OBJECTIVE DATA SUMMARY:   Cognitive/Behavioral Status:  Neurologic State: Alert  Orientation Level: Oriented X4  Cognition: Follows commands, Appropriate decision making, Appropriate for age attention/concentration  Safety/Judgement: Awareness of environment     Functional Mobility and Transfers for ADLs:              Bed Mobility:  Rolling: Supervision  Supine to Sit: Supervision; Additional time  Sit to Supine: Supervision                 Transfers:  Sit to Stand: Supervision   Balance:  Sitting: Intact  Standing: Impaired; With support  Standing - Static: Fair     ADL Intervention:     Lower Body Dressing Assistance  Underpants: Stand-by assistance  Socks: Supervision/set-up  Adaptive Equipment Used: Reacher;Sock aid     Pain:  Pt reports 4/10 pain or discomfort prior to treatment. Pt reports 4/10 pain or discomfort post treatment. Activity Tolerance:    Fair     Please refer to the flowsheet for vital signs taken during this treatment.   After treatment:   [ ]  Patient left in no apparent distress sitting up in chair  [X]  Patient left in no apparent distress in bed  [X]  Call bell left within reach  [X]  Nursing notified  [ ]  Caregiver present  [ ]  Bed alarm activated     ARNOLDO Hodgson/L  Time Calculation: 39 mins

## 2017-06-08 NOTE — ROUTINE PROCESS
Assumed care, report received from Yolette. Patient in bed eating dinner, states pain to froin and lower abdomen 5/10. Pain med given, positioned in bed for comfort. He is able to turn in bed. Denies other needs at this time. Will continue to monitor. Call light placed in reach.

## 2017-06-08 NOTE — ROUTINE PROCESS
Bedside and Verbal shift change report given to Oralia Hunter RN (oncoming nurse) by Brianda Moncada RN (offgoing nurse). Report included the following information SBAR, Kardex, Intake/Output, MAR and Recent Results.

## 2017-06-08 NOTE — PROGRESS NOTES
Daily Progress Note  Colitis  Colitis  Hospital Problems as of 6/8/2017  Date Reviewed: 6/8/2017          Codes Class Noted - Resolved POA    Constipation due to pain medication therapy (Chronic) ICD-10-CM: K59.03  ICD-9-CM: 564.09, E947.9  6/7/2017 - Present Yes        Chronic pain (Chronic) ICD-10-CM: H40.01  ICD-9-CM: 338.29  12/10/2015 - Present Yes        CAD (coronary artery disease), native coronary artery (Chronic) ICD-10-CM: I25.10  ICD-9-CM: 414.01  Unknown - Present Yes    Overview Signed 11/9/2015 10:37 AM by Cruz Marrufo NP     ALIDA X 2 to OM1             RESOLVED: Orthostatic hypotension ICD-10-CM: I95.1  ICD-9-CM: 458.0  6/7/2017 - 6/8/2017 Yes        RESOLVED: Colitis ICD-10-CM: K52.9  ICD-9-CM: 558.9  6/2/2017 - 6/8/2017 Yes            PLAN:  Walk test done and 96%; remove oxygen (has been continuous) and monitor overnight  Continue duo-tahir and symbicort  Had increase in mobility today, OT now also recommending Shane Murillo, PT rec [facility]  Bowel program: Miralx daily and senna 17.2 mg BID  Continue to encourage smoking cessation  Glycerin suppository;annusol and tucks for anal irritation after constipated stool    GI prophylaxis: protonix  DVT prophylaxis: heparin subcu    Full Code    Disposition home with Shane Murillo      Review of Systems   Constitutional: Positive for activity change and fatigue. Negative for chills and diaphoresis. Ambulated this AM with nurse, pulse ox remained 96% RA  Ambulated 60 feet this afternoon with PT   HENT: Negative for congestion, drooling, facial swelling, mouth sores, nosebleeds, postnasal drip and trouble swallowing. Respiratory: Negative for cough, wheezing and stridor. Cardiovascular: Negative for palpitations and leg swelling. [place]   Gastrointestinal: Negative for abdominal distention, constipation, nausea and vomiting. Large BM last evening   Genitourinary: Negative for difficulty urinating and dysuria. Musculoskeletal: Positive for back pain. Negative for joint swelling. Chronic   Skin: Negative for color change. Neurological: Negative for dizziness, tremors, speech difficulty and headaches. Current Facility-Administered Medications   Medication Dose Route Frequency    phenyleph-shark pacheco oil-mo-pet (PREPARATION H) ointment   PeriANAL QID PRN    glycerin (adult) suppository 1 Suppository  1 Suppository Rectal Q12H    senna (SENOKOT) tablet 17.2 mg  2 Tab Oral BID    heparin (porcine) injection 5,000 Units  5,000 Units SubCUTAneous Q8H    albuterol (PROVENTIL VENTOLIN) nebulizer solution 2.5 mg  2.5 mg Nebulization Q4H PRN    nicotine (NICODERM CQ) 14 mg/24 hr patch 1 Patch  1 Patch TransDERmal DAILY    pantoprazole (PROTONIX) tablet 40 mg  40 mg Oral ACB    LORazepam (ATIVAN) tablet 1 mg  1 mg Oral Q6H PRN    nystatin (MYCOSTATIN) 100,000 unit/gram cream   Topical BID    polyethylene glycol (MIRALAX) packet 17 g  17 g Oral DAILY    atorvastatin (LIPITOR) tablet 40 mg  40 mg Oral QHS    budesonide-formoterol (SYMBICORT) 160-4.5 mcg/actuation HFA inhaler 2 Puff  2 Puff Inhalation BID RT    oxyCODONE-acetaminophen (PERCOCET) 5-325 mg per tablet 1 Tab  1 Tab Oral Q4H PRN    levothyroxine (SYNTHROID) tablet 125 mcg  125 mcg Oral ACB    gabapentin (NEURONTIN) capsule 300 mg  300 mg Oral TID    omega-3 acid ethyl esters (LOVAZA) capsule 2,000 mg  2 g Oral DAILY WITH BREAKFAST    diphenhydrAMINE (BENADRYL) capsule 25 mg  25 mg Oral Q4H PRN    acetaminophen (TYLENOL) tablet 650 mg  650 mg Oral Q4H PRN     Physical Exam   Constitutional: He is oriented to person, place, and time. No distress. HENT:   Head: Normocephalic. Mouth/Throat: No oropharyngeal exudate. Eyes: Conjunctivae are normal. No scleral icterus. Neck: Neck supple. Cardiovascular: Normal rate, regular rhythm and intact distal pulses. Exam reveals no friction rub. pacer   Pulmonary/Chest: Effort normal and breath sounds normal. He has no wheezes.  He has no rales.   Abdominal: Soft. Bowel sounds are normal. He exhibits no distension. There is no tenderness. There is no guarding. Musculoskeletal: Normal range of motion. He exhibits no edema or tenderness. Neurological: He is alert and oriented to person, place, and time. Skin: Skin is warm and dry. Tinea pedis, nystatin on board   Psychiatric: He has a normal mood and affect.      Recent Results (from the past 24 hour(s))   METABOLIC PANEL, BASIC    Collection Time: 06/07/17  3:27 AM   Result Value Ref Range    Sodium 137 136 - 145 mmol/L    Potassium 4.1 3.5 - 5.5 mmol/L    Chloride 101 100 - 108 mmol/L    CO2 30 21 - 32 mmol/L    Anion gap 6 3.0 - 18 mmol/L    Glucose 110 (H) 74 - 99 mg/dL    BUN 4 (L) 7.0 - 18 MG/DL    Creatinine 1.18 0.6 - 1.3 MG/DL    BUN/Creatinine ratio 3 (L) 12 - 20      GFR est AA >60 >60 ml/min/1.73m2    GFR est non-AA >60 >60 ml/min/1.73m2    Calcium 8.7 8.5 - 10.1 MG/DL

## 2017-06-08 NOTE — PROGRESS NOTES
Hospitalist Progress Note    Patient: Josie Amaya Age: 78 y.o. : 1950 MR#: [id number] SSN: [id number]  Date/Time: 2017 5:17 PM    Subjective:     Patient received sitting up in bed  Had BM today, no blood, local care helpful  Review of Systems - Negative except constipation    Objective:   VS:   Visit Vitals    BP 99/62 (BP 1 Location: Right arm, BP Patient Position: At rest)    Pulse 63    Temp 98 °F (36.7 °C)    Resp 18    Ht 6' 1\" (1.854 m)    Wt 122.8 kg (270 lb 12.8 oz)    SpO2 98%    BMI 35.73 kg/m2      Tmax/24hrs: Temp (24hrs), Av.9 °F (36.6 °C), Min:97.6 °F (36.4 °C), Max:98.1 °F (36.7 °C)       Intake/Output Summary (Last 24 hours) at 17 1717  Last data filed at 17 0930   Gross per 24 hour   Intake              975 ml   Output             2100 ml   Net            -1125 ml       General: alert and oriented ×3  HEENT:speech clear and goal-directed, conjunctiva clear, no scleral icterus, no JVD  Cardiovascular:  Heart rate regular 64  Pulmonary:  Decreased bases otherwise clear  GI: continues to report mild diffuse tenderness periumbilical, bowel sounds positive, soft   Extremities: moving all 4 extremities well, no edema, nontender calves   Additional:    Current Facility-Administered Medications   Medication Dose Route Frequency    phenyleph-shark pacheco oil-mo-pet (PREPARATION H) ointment   PeriANAL QID PRN    glycerin (adult) suppository 1 Suppository  1 Suppository Rectal Q12H    senna (SENOKOT) tablet 17.2 mg  2 Tab Oral BID    heparin (porcine) injection 5,000 Units  5,000 Units SubCUTAneous Q8H    albuterol (PROVENTIL VENTOLIN) nebulizer solution 2.5 mg  2.5 mg Nebulization Q4H PRN    nicotine (NICODERM CQ) 14 mg/24 hr patch 1 Patch  1 Patch TransDERmal DAILY    pantoprazole (PROTONIX) tablet 40 mg  40 mg Oral ACB    LORazepam (ATIVAN) tablet 1 mg  1 mg Oral Q6H PRN    nystatin (MYCOSTATIN) 100,000 unit/gram cream   Topical BID    polyethylene glycol (MIRALAX) packet 17 g  17 g Oral DAILY    atorvastatin (LIPITOR) tablet 40 mg  40 mg Oral QHS    budesonide-formoterol (SYMBICORT) 160-4.5 mcg/actuation HFA inhaler 2 Puff  2 Puff Inhalation BID RT    oxyCODONE-acetaminophen (PERCOCET) 5-325 mg per tablet 1 Tab  1 Tab Oral Q4H PRN    levothyroxine (SYNTHROID) tablet 125 mcg  125 mcg Oral ACB    gabapentin (NEURONTIN) capsule 300 mg  300 mg Oral TID    omega-3 acid ethyl esters (LOVAZA) capsule 2,000 mg  2 g Oral DAILY WITH BREAKFAST    diphenhydrAMINE (BENADRYL) capsule 25 mg  25 mg Oral Q4H PRN    acetaminophen (TYLENOL) tablet 650 mg  650 mg Oral Q4H PRN     Labs:    Recent Results (from the past 24 hour(s))   CBC WITH AUTOMATED DIFF    Collection Time: 06/08/17  2:50 AM   Result Value Ref Range    WBC 6.8 4.6 - 13.2 K/uL    RBC 4.04 (L) 4.70 - 5.50 M/uL    HGB 11.4 (L) 13.0 - 16.0 g/dL    HCT 36.0 36.0 - 48.0 %    MCV 89.1 74.0 - 97.0 FL    MCH 28.2 24.0 - 34.0 PG    MCHC 31.7 31.0 - 37.0 g/dL    RDW 15.3 (H) 11.6 - 14.5 %    PLATELET 474 899 - 783 K/uL    MPV 11.2 9.2 - 11.8 FL    NEUTROPHILS 61 40 - 73 %    LYMPHOCYTES 24 21 - 52 %    MONOCYTES 9 3 - 10 %    EOSINOPHILS 5 0 - 5 %    BASOPHILS 1 0 - 2 %    ABS. NEUTROPHILS 4.2 1.8 - 8.0 K/UL    ABS. LYMPHOCYTES 1.6 0.9 - 3.6 K/UL    ABS. MONOCYTES 0.6 0.05 - 1.2 K/UL    ABS. EOSINOPHILS 0.3 0.0 - 0.4 K/UL    ABS. BASOPHILS 0.0 0.0 - 0.1 K/UL    DF AUTOMATED     METABOLIC PANEL, BASIC    Collection Time: 06/08/17  2:50 AM   Result Value Ref Range    Sodium 140 136 - 145 mmol/L    Potassium 4.8 3.5 - 5.5 mmol/L    Chloride 101 100 - 108 mmol/L    CO2 33 (H) 21 - 32 mmol/L    Anion gap 6 3.0 - 18 mmol/L    Glucose 104 (H) 74 - 99 mg/dL    BUN 5 (L) 7.0 - 18 MG/DL    Creatinine 1.15 0.6 - 1.3 MG/DL    BUN/Creatinine ratio 4 (L) 12 - 20      GFR est AA >60 >60 ml/min/1.73m2    GFR est non-AA >60 >60 ml/min/1.73m2    Calcium 8.8 8.5 - 10.1 MG/DL       Imaging:  No results found.     Assessment/Plan:     CAD (coronary artery disease), native coronary artery        Colitis       Constipation due to pain medication therapy       Orthostatic hypotension           Patient has completed a 7 day course of Flagyl and Cipro  Appetite improving with constipation relieved, monitor abdominal pain  Persistent constipation relieved, continue Senna 2 tabs twice a day  Witch hazel Tucks, Preparation H ointment, glycerin suppository  Holding beta blocker, stopped IV, watch hypotension  Increased mobilty today, OT now agrees with Shane Murillo  GI prophylaxis Protonix  DVT prophylaxis subcutaneous heparin    Additional Notes:      Full Code    Disposition:   PT recommending Shane Murillo; rolling walker  Plan home tomorrow with Shane Murillo    Case discussed with:  [x]Patient  []Family  [x]Nursing  [x]Case Management X PT  DVT Prophylaxis:  []Lovenox  [x]Hep SQ  []SCDs  []Coumadin   []On Heparin gtt    Signed By: Tiffanie Madden 15    June 8, 2017 5:17 PM

## 2017-06-09 VITALS
TEMPERATURE: 98.6 F | SYSTOLIC BLOOD PRESSURE: 114 MMHG | HEART RATE: 71 BPM | HEIGHT: 73 IN | DIASTOLIC BLOOD PRESSURE: 72 MMHG | WEIGHT: 265.4 LBS | BODY MASS INDEX: 35.17 KG/M2 | OXYGEN SATURATION: 96 % | RESPIRATION RATE: 17 BRPM

## 2017-06-09 PROCEDURE — 74011250636 HC RX REV CODE- 250/636: Performed by: HOSPITALIST

## 2017-06-09 PROCEDURE — 94640 AIRWAY INHALATION TREATMENT: CPT

## 2017-06-09 PROCEDURE — 97530 THERAPEUTIC ACTIVITIES: CPT

## 2017-06-09 PROCEDURE — 97535 SELF CARE MNGMENT TRAINING: CPT

## 2017-06-09 PROCEDURE — 74011250637 HC RX REV CODE- 250/637: Performed by: FAMILY MEDICINE

## 2017-06-09 PROCEDURE — 74011250637 HC RX REV CODE- 250/637: Performed by: HOSPITALIST

## 2017-06-09 PROCEDURE — 94760 N-INVAS EAR/PLS OXIMETRY 1: CPT

## 2017-06-09 RX ORDER — SENNOSIDES 8.6 MG/1
2 TABLET ORAL 2 TIMES DAILY
Qty: 100 TAB | Refills: 0 | Status: SHIPPED | OUTPATIENT
Start: 2017-06-09 | End: 2017-09-10 | Stop reason: ALTCHOICE

## 2017-06-09 RX ORDER — IBUPROFEN 200 MG
1 TABLET ORAL DAILY
Qty: 30 PATCH | Refills: 0 | Status: SHIPPED | OUTPATIENT
Start: 2017-06-09 | End: 2017-07-09

## 2017-06-09 RX ORDER — FLUTICASONE PROPIONATE AND SALMETEROL 100; 50 UG/1; UG/1
1 POWDER RESPIRATORY (INHALATION) EVERY 12 HOURS
Qty: 1 INHALER | Refills: 0 | Status: SHIPPED | OUTPATIENT
Start: 2017-06-09

## 2017-06-09 RX ORDER — NYSTATIN 100000 U/G
1 CREAM TOPICAL
Qty: 15 G | Refills: 0 | Status: SHIPPED | OUTPATIENT
Start: 2017-06-09 | End: 2017-09-10 | Stop reason: ALTCHOICE

## 2017-06-09 RX ORDER — LORAZEPAM 1 MG/1
1 TABLET ORAL
Qty: 15 TAB | Refills: 0 | Status: SHIPPED | OUTPATIENT
Start: 2017-06-09

## 2017-06-09 RX ORDER — OXYCODONE AND ACETAMINOPHEN 7.5; 325 MG/1; MG/1
1 TABLET ORAL
Qty: 30 TAB | Refills: 0 | Status: SHIPPED | OUTPATIENT
Start: 2017-06-09 | End: 2017-09-10 | Stop reason: ALTCHOICE

## 2017-06-09 RX ADMIN — GLYCERIN 1 SUPPOSITORY: 2.1 SUPPOSITORY RECTAL at 17:37

## 2017-06-09 RX ADMIN — GABAPENTIN 300 MG: 300 CAPSULE ORAL at 15:54

## 2017-06-09 RX ADMIN — OXYCODONE HYDROCHLORIDE AND ACETAMINOPHEN 1 TABLET: 5; 325 TABLET ORAL at 15:54

## 2017-06-09 RX ADMIN — SENNOSIDES 17.2 MG: 8.6 TABLET, FILM COATED ORAL at 08:50

## 2017-06-09 RX ADMIN — NYSTATIN: 100000 CREAM TOPICAL at 08:55

## 2017-06-09 RX ADMIN — BUDESONIDE AND FORMOTEROL FUMARATE DIHYDRATE 2 PUFF: 160; 4.5 AEROSOL RESPIRATORY (INHALATION) at 07:52

## 2017-06-09 RX ADMIN — HEPARIN SODIUM 5000 UNITS: 5000 INJECTION, SOLUTION INTRAVENOUS; SUBCUTANEOUS at 05:56

## 2017-06-09 RX ADMIN — PANTOPRAZOLE SODIUM 40 MG: 40 TABLET, DELAYED RELEASE ORAL at 08:50

## 2017-06-09 RX ADMIN — NYSTATIN: 100000 CREAM TOPICAL at 17:38

## 2017-06-09 RX ADMIN — LEVOTHYROXINE SODIUM 125 MCG: 125 TABLET ORAL at 08:50

## 2017-06-09 RX ADMIN — OXYCODONE HYDROCHLORIDE AND ACETAMINOPHEN 1 TABLET: 5; 325 TABLET ORAL at 02:49

## 2017-06-09 RX ADMIN — OMEGA-3-ACID ETHYL ESTERS 2000 MG: 1 CAPSULE, LIQUID FILLED ORAL at 08:50

## 2017-06-09 RX ADMIN — OXYCODONE HYDROCHLORIDE AND ACETAMINOPHEN 1 TABLET: 5; 325 TABLET ORAL at 11:25

## 2017-06-09 RX ADMIN — POLYETHYLENE GLYCOL 3350 17 G: 17 POWDER, FOR SOLUTION ORAL at 08:50

## 2017-06-09 RX ADMIN — HEPARIN SODIUM 5000 UNITS: 5000 INJECTION, SOLUTION INTRAVENOUS; SUBCUTANEOUS at 11:25

## 2017-06-09 RX ADMIN — GABAPENTIN 300 MG: 300 CAPSULE ORAL at 08:50

## 2017-06-09 NOTE — PROGRESS NOTES
Bedside report received from Emilee Corley [facility] via [place]ie Settles and Kardex. Patient up in chair awake and alert. Denies pain at this time. Call light in reach. Patient instructed to call for assistance as needed. 18:50 pm Patient discharge home, discharge instruction reviewed with patient. Prescriptions given and copies of all forms. Patient voiced understanding of all instructions as explained by nurse and MD.  Patient awaiting ride home. 1930 pm patient discharged home in the care of his sister, all discharge instructions given and DME equipment.

## 2017-06-09 NOTE — PROGRESS NOTES
Problem: Self Care Deficits Care Plan (Adult)  Goal: *Acute Goals and Plan of Care (Insert Text)  Occupational Therapy Goals  Initiated 6/6/2017 within 7 day(s). 1. Patient will perform lower body dressing with modified independence and no increased c/o pain  2. Patient will maneuver on and off BSC with modified independent and no increased pain  3. Patient will maneuver in bed in preparation for participation in his self care routine with modified independence and no c/o pain   Outcome: Progressing Towards Goal  OCCUPATIONAL THERAPY TREATMENT     Patient: Joni Benites (66 y.o. male)  Date: 6/9/2017  Diagnosis: Colitis  Colitis <principal problem not specified>       Precautions: Fall  Chart, occupational therapy assessment, plan of care, and goals were reviewed. ASSESSMENT:  Pt cont to progress w/OT, demonstrates increased activity tolerance, however still requires frequent RBs during functional mobility and ADLs for proper breathing technique and for safety. Pt requires significant time to maneuver to EOB following which pt participated in LB dressing w/AE issued previous session. Pt initially doesn't recall proper use of sock aid, however w/min VCs and repetition pt was able to perform w/Supervised A for correct use of AE. Pt then requesting to use the commode, performed toilet txfr w/FWW and CGA 2/2 pt's posterior lean once std. Pt educated on the importance of shifting weight in order to facilitate good balance during functional txfrs. Pt reports feeling dizzy upon txfring back to EOB, required CGA to maintain midline as pt had 1 LOS posteriorly, was able to self-correct. Pt was educated on safe techniques for maneuvering in bed, including log roll, to prevent pain, pt required VCs to implement.    Pt's BP upon completion of the session 107/74 O2 sats 96% on RA, HR 98 bpm.  Progression toward goals:  [X]          Improving appropriately and progressing toward goals  [ ]          Improving slowly and progressing toward goals  [ ]          Not making progress toward goals and plan of care will be adjusted       PLAN:  Patient continues to benefit from skilled intervention to address the above impairments. Continue treatment per established plan of care. Discharge Recommendations:  Home Health  Further Equipment Recommendations for Discharge:  Raised toilet seat      G-CODES:      [place]  Current  CI= 1-19%. The severity rating is based on the Level of Assistance required for Functional Mobility and ADLs. SUBJECTIVE:   Patient stated I suppose I can get up.       OBJECTIVE DATA SUMMARY:   Cognitive/Behavioral Status:  Neurologic State: Alert  Orientation Level: Oriented X4  Cognition: Follows commands  Safety/Judgement: Awareness of environment     Functional Mobility and Transfers for ADLs:              Bed Mobility:  Rolling: Supervision  Supine to Sit: Supervision  Sit to Supine: Supervision                 Transfers:  Sit to Stand: Supervision              Toilet Transfer : Contact guard assistance                 Balance:  Sitting: Intact  Standing: Impaired; With support  Standing - Static: Good  Standing - Dynamic : Fair     ADL Intervention:   Lower Body Dressing Assistance  Underpants: Supervision/set-up (simulated)  Socks: Supervision/set-up  Adaptive Equipment Used: Reacher;Sock aid     Pain:  Pt reports 1/10 pain or discomfort prior to treatment. Pt reports 5/10 pain or discomfort post treatment. Pt's nurse notified     Activity Tolerance:    Fair     Please refer to the flowsheet for vital signs taken during this treatment.   After treatment:   [ ]  Patient left in no apparent distress sitting up in chair  [X]  Patient left in no apparent distress in bed  [X]  Call bell left within reach  [X]  Nursing notified  [ ]  Caregiver present  [ ]  Bed alarm activated     ALEXANDER Gallegos  Time Calculation: 38 mins

## 2017-06-09 NOTE — DISCHARGE INSTRUCTIONS
Constipation: Care Instructions  Your Care Instructions  Constipation means that you have a hard time passing stools (bowel movements). People pass stools from 3 times a day to once every 3 days. What is normal for you may be different. Constipation may occur with pain in the rectum and cramping. The pain may get worse when you try to pass stools. Sometimes there are small amounts of bright red blood on toilet paper or the surface of stools. This is because of enlarged veins near the rectum (hemorrhoids). A few changes in your diet and lifestyle may help you avoid ongoing constipation. Your doctor may also prescribe medicine to help loosen your stool. Some medicines can cause constipation. These include pain medicines and antidepressants. Tell your doctor about all the medicines you take. Your doctor may want to make a medicine change to ease your symptoms. Follow-up care is a key part of your treatment and safety. Be sure to make and go to all appointments, and call your doctor if you are having problems. It's also a good idea to know your test results and keep a list of the medicines you take. How can you care for yourself at home? · Drink plenty of fluids, enough so that your urine is light yellow or clear like water. If you have kidney, heart, or liver disease and have to limit fluids, talk with your doctor before you increase the amount of fluids you drink. · Include high-fiber foods in your diet each day. These include fruits, vegetables, beans, and whole grains. · Get at least 30 minutes of exercise on most days of the week. Walking is a good choice. You also may want to do other activities, such as running, swimming, cycling, or playing tennis or team sports. · Take a fiber supplement, such as Citrucel or Metamucil, every day. Read and follow all instructions on the label. · Schedule time each day for a bowel movement. A daily routine may help.  Take your time having your bowel movement. · Support your feet with a small step stool when you sit on the toilet. This helps flex your hips and places your pelvis in a squatting position. · Your doctor may recommend an over-the-counter laxative to relieve your constipation. Examples are Milk of Magnesia and MiraLax. Read and follow all instructions on the label. Do not use laxatives on a long-term basis. When should you call for help? Call your doctor now or seek immediate medical care if:  · You have new or worse belly pain. · You have new or worse nausea or vomiting. · You have blood in your stools. Watch closely for changes in your health, and be sure to contact your doctor if:  · Your constipation is getting worse. · You do not get better as expected. Where can you learn more? Go to http://daily-lance.info/. Enter 21 [id number] in the search box to learn more about \"Constipation: Care Instructions. \"  Current as of: May 27, 2016  Content Version: 11.2  © 8277-9311 [employer]. Care instructions adapted under license by [employer] (which disclaims liability or warranty for this information). If you have questions about a medical condition or this instruction, always ask your healthcare professional. [address] any warranty or liability for your use of this information. Chronic Pain: Care Instructions  Your Care Instructions  Chronic pain is pain that lasts a long time (months or even years) and may or may not have a clear cause. It is different from acute pain, which usually does have a clear cause--like an injury or illness--and gets better over time. Chronic pain:  · Lasts over time but may vary from day to day. · Does not go away despite efforts to end it. · May disrupt your sleep and lead to fatigue. · May cause depression or anxiety. · May make your muscles tense, causing more pain.   · Can disrupt your work, hobbies, home life, and relationships with friends and family. Chronic pain is a very real condition. It is not just in your head. Treatment can help and usually includes several methods used together, such as medicines, physical therapy, exercise, and other treatments. Learning how to relax and changing negative thought patterns can also help you cope. Chronic pain is complex. Taking an active role in your treatment will help you better manage your pain. Tell your doctor if you have trouble dealing with your pain. You may have to try several things before you find what works best for you. Follow-up care is a key part of your treatment and safety. Be sure to make and go to all appointments, and call your doctor if you are having problems. Its also a good idea to know your test results and keep a list of the medicines you take. How can you care for yourself at home? · Pace yourself. Break up large jobs into smaller tasks. Save harder tasks for days when you have less pain, or go back and forth between hard tasks and easier ones. Take rest breaks. · Relax, and reduce stress. Relaxation techniques such as deep breathing or meditation can help. · Keep moving. Gentle, daily exercise can help reduce pain over the long run. Try low- or no-impact exercises such as walking, swimming, and stationary biking. Do stretches to stay flexible. · Try heat, cold packs, and massage. · Get enough sleep. Chronic pain can make you tired and drain your energy. Talk with your doctor if you have trouble sleeping because of pain. · Think positive. Your thoughts can affect your pain level. Do things that you enjoy to distract yourself when you have pain instead of focusing on the pain. See a movie, read a book, listen to music, or spend time with a friend. · If you think you are depressed, talk to your doctor about treatment. · Keep a daily pain diary. Record how your moods, thoughts, sleep patterns, activities, and medicine affect your pain.  You may find that your pain is worse during or after certain activities or when you are feeling a certain emotion. Having a record of your pain can help you and your doctor find the best ways to treat your pain. · Take pain medicines exactly as directed. ¨ If the doctor gave you a prescription medicine for pain, take it as prescribed. ¨ If you are not taking a prescription pain medicine, ask your doctor if you can take an over-the-counter medicine. Reducing constipation caused by pain medicine  · Include fruits, vegetables, beans, and whole grains in your diet each day. These foods are high in fiber. · Drink plenty of fluids, enough so that your urine is light yellow or clear like water. If you have kidney, heart, or liver disease and have to limit fluids, talk with your doctor before you increase the amount of fluids you drink. · If your doctor recommends it, get more exercise. Walking is a good choice. Bit by bit, increase the amount you walk every day. Try for at least 30 minutes on most days of the week. · Schedule time each day for a bowel movement. A daily routine may help. Take your time and do not strain when having a bowel movement. When should you call for help? Call your doctor now or seek immediate medical care if:  · Your pain gets worse or is out of control. · You feel down or blue, or you do not enjoy things like you once did. You may be depressed, which is common in people with chronic pain. Depression can be treated. · You have vomiting or cramps for more than 2 hours. Watch closely for changes in your health, and be sure to contact your doctor if:  · You cannot sleep because of pain. · You are very worried or anxious about your pain. · You have trouble taking your pain medicine. · You have any concerns about your pain medicine. · You have trouble with bowel movements, such as:  ¨ No bowel movement in 3 days. ¨ Blood in the anal area, in your stool, or on the toilet paper. ¨ Diarrhea for more than 24 hours.   Where can you learn more? Go to http://daily-lance.info/. Enter N004 in the search box to learn more about \"Chronic Pain: Care Instructions. \"  Current as of: October 14, 2016  Content Version: 11.2  © 3406-7402 [employer]. Care instructions adapted under license by [email or web address] (which disclaims liability or warranty for this information). If you have questions about a medical condition or this instruction, always ask your healthcare professional. [place] [address] any warranty or liability for your use of this information.

## 2017-06-09 NOTE — DISCHARGE SUMMARY
Discharge Summary    Patient: Tevin Winston               Sex: male          DOA: 6/2/2017         YOB: 1950      Age:  78 y.o.        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  Constipation due to pain medication therapy, chronic  Hemorrhoids  Protein calorie malnutrition with 11 pound/4% weight loss last month, secondary to poor appetite post procedure  Coronary artery disease - stable  Tinea pedis    Problem List as of 6/9/2017  Date Reviewed: 6/9/2017          Codes Class Noted - Resolved    Constipation due to pain medication therapy (Chronic) ICD-10-CM: K59.03  ICD-9-CM: 564.09, E947.9  6/7/2017 - Present        SOB (shortness of breath) ICD-10-CM: R06.02  ICD-9-CM: 786.05  6/2/2017 - Present        AAA (abdominal aortic aneurysm) without rupture ([facility] ICD-10-CM: I71.4  ICD-9-CM: 441.4  5/18/2017 - Present        Spondylosis of lumbar region without myelopathy or radiculopathy ICD-10-CM: M47.816  ICD-9-CM: 721.3  8/11/2016 - Present        Lumbar facet arthropathy ([facility] ICD-10-CM: M12.88  ICD-9-CM: 721.3  8/11/2016 - Present        Arthritis of lumbar spine ICD-10-CM: M46.96  ICD-9-CM: 721.3  8/11/2016 - Present        Muscle spasm of back ICD-10-CM: M62.830  ICD-9-CM: 724.8  7/19/2016 - Present        Tobacco dependency ICD-10-CM: F17.200  ICD-9-CM: 305.1  7/19/2016 - Present        HNP (herniated nucleus pulposus), lumbar ICD-10-CM: M51.26  ICD-9-CM: 722.10  12/10/2015 - Present        Facet arthritis of lumbar region [facility]) ICD-10-CM: M46.96  ICD-9-CM: 721.3  12/10/2015 - Present        Chronic pain (Chronic) ICD-10-CM: M09.43  ICD-9-CM: 338.29  12/10/2015 - Present        Hypertriglyceridemia ICD-10-CM: E78.1  ICD-9-CM: 272.1  11/16/2015 - Present        Vitamin D deficiency ICD-10-CM: E55.9  ICD-9-CM: 268.9  11/16/2015 - Present        Elevated serum creatinine ICD-10-CM: R79.89  ICD-9-CM: 790.99  11/16/2015 - Present        Thyroid activity decreased ICD-10-CM: E03.9  ICD-9-CM: 244.9  11/9/2015 - Present        Right groin hernia ICD-10-CM: K40.90  ICD-9-CM: 550.90  Unknown - Present    Overview Signed 11/9/2015 10:37 AM by Osiel Gutierrez NP     not resolved             CAD (coronary artery disease), native coronary artery (Chronic) ICD-10-CM: I25.10  ICD-9-CM: 414.01  Unknown - Present    Overview Signed 11/9/2015 10:37 AM by Osiel Gutierrez NP     ALIDA X 2 to OM1             Gastroesophageal reflux disease without esophagitis ICD-10-CM: K21.9  ICD-9-CM: 530.81  11/9/2015 - Present        Renal artery atherosclerosis, unilateral ([address] ICD-10-CM: I70.1  ICD-9-CM: 440.1  12/23/2014 - Present        Varicose vein of leg ICD-10-CM: I83.90  ICD-9-CM: 454.9  12/23/2014 - Present        * ([place] (abdominal aortic aneurysm) (HCC) (Chronic) ICD-10-CM: I71.4  ICD-9-CM: 441.4  12/5/2014 - Present        Tobacco abuse ICD-10-CM: Z72.0  ICD-9-CM: 305.1  8/1/2014 - Present        Dyspnea ICD-10-CM: R06.00  ICD-9-CM: 786.09  4/10/2014 - Present        Lightheadedness ICD-10-CM: R42  ICD-9-CM: 780.4  4/10/2014 - Present        Vasovagal response ICD-10-CM: R55  ICD-9-CM: 780.2  4/10/2014 - Present        Coronary atherosclerosis of native coronary artery ICD-10-CM: I25.10  ICD-9-CM: 414.01  8/23/2013 - Present        Complete heart block (HCC) ICD-10-CM: I44.2  ICD-9-CM: 426.0  8/23/2013 - Present        HTN (hypertension) ICD-10-CM: I10  ICD-9-CM: 401.9  3/30/2013 - Present        Dyslipidemia ICD-10-CM: E78.5  ICD-9-CM: 272.4  3/30/2013 - Present        Aortic dissection, abdominal (HCC) ICD-10-CM: W75.76  ICD-9-CM: 441.02  3/30/2013 - Present        RESOLVED: Orthostatic hypotension ICD-10-CM: I95.1  ICD-9-CM: 458.0  6/7/2017 - 6/8/2017        RESOLVED: Colitis ICD-10-CM: K52.9  ICD-9-CM: 558.9  6/2/2017 - 6/8/2017        RESOLVED: Follow-up exam, 3-6 months since previous exam ICD-10-CM: F06  ICD-9-CM: V67.9  11/16/2015 - 2/8/2016        RESOLVED: Chest pain, unspecified ICD-10-CM: R07.9  ICD-9-CM: 786.50  3/30/2013 - 8/1/2014              Discharge Condition: Stable    Hospital Course:   Mr. Kaz Donnelly is a 78year old man with a history of CAD, Cardiac pacemaker, renal artery stenosis with CKDlll s/p stents; GERD, COPD, hypothyroidism, hypercholesterolemia, arthritis and hypertension   who underwent repair of a AAA 9/36/8281 without complication and was discharged on 5/19/2017. He reported the development of diarrhea 5/22/2017 and presented to the emergency room 5/23/17. He canceled his appointment with his PCP 5/24/17, stating he was already treated in the emergency room. He returned to the emergency room on 5/29/17, and did present to his PCP 5/30/17 with complaints of increased loose stools and fecal soiling, he stated he was prescribed Keflex and Flagyl from the emergency room could not afford the medication, and was hoping to get it in the office. Dr. Crenshaw Shae evaluation of the patient, and  CTA performed in ED  5/29/17 consistent with distal colon and sigmoid colitis, patient was given Cipro and Flagyl, with an emphasis to follow up with his scheduled appointment with Dr. Levar Mena. Patient presented to Dr. Levar Mena on 6/2/2017 for his follow-up. He denied any abdominal symptoms at that time, stating his abdominal pain and diarrhea had resolved. He reported increased shortness of breath to the point where he felt like he was not going to make it and stated he did not have any inhalers he had not used them in some time. He reported that he continued to smoke very heavily. Dr. Attila Martin sent the patient to the emergency room for evaluation regarding his shortness of breath.   Patient arrived to the emergency room with complaints of lightheadedness, shortness of breath, fatigue, bilateral groin pain, ongoing abdominal pain, diarrhea and persistent rectal bleeding that has gone on for weeks. In the emergency room he was hemodynamically stable, heart rate in the 60s, pulse ox 100%, in no acute respiratory distress, no wheezing; abdomen was nontender with no rebound or guarding; patient had external hemorrhoids without active bleeding; stool was brown and guaiac positive. Lactic acid was elevated at 3.2, a d-dimer was elevated at 8.72; repeat lactic acid after fluids was 1.1; CTAwas unable to be done due to an unsuitable IV access. Mr Alo Luevano was admitted with colitis, dehydration, rectal bleeding, shortness of breath with an elevated d-dimer. He had been placed on Lovenox pending PVL studies and CTA. CTA was obtained on 6/3/2017 showing no filling defects in the pulmonary arteries, no PE. PVLs of the lower extremities were performed on 6/3/2017 demonstrated no evidence of DVT in the veins visualized. PVL of  abdominal viscera was performed on 6/5/2017, arteries and veins visualized without evidence of stenosis; repaired abdominal aortic aneurysm patent, unable to visualize inferior mesenteric artery. Imaging was reviewed by Dr. Zhang Alanis who found the endograft was in good position; no leak to the aneurysm that was treated; some seroma in the groins, to be expected as he had several groin access surgeries before, no extravasion. Patient did continue to report intermittent chest pain, related to motion or change of position in bed and activity. Cardiology was consulted due to patient's troponinemia. Chest pain was found to be atypical consistent with muscle strain of the left shoulder, no ischemic workup was recommended. Patient demonstrated orthostatic hypotension was improving with IV hydration, his beta blockers were put on hold given his well controlled blood pressure.  Patient's Lovenox was stopped. Patient continued to complain of diarrhea 6/3/2017, stool for occult blood remained pending. On 6/5/17 patient complained of constipation and no BM since admission,  Constipation did not respond to MiraLAX and  Lactulose. He had a normal TSH in the office on 5/9/17, 3.71. On 6/6/17 the patient stated he had a small hard BM after an oil enema, with hemorrhoidal irritation. Local care was prescribed. Nursing was unable to record any BMs and patient was limited to [facility]. Patient had normal bowel movement 6/8/2017 with no blood, after Magnesium Citrate and local hemorrhoidal care. He has been started on a bowel program with Senokot 2 tabs twice a day and MiraLAX, with local hemorrhoidal care and bisacodyl when necessary. Patient's respiratory status has improved with abstinence from smoking, Symbicort and Ventolin nebulizer. Patient had nutritional consult 6/5/2017 for unintentional weight loss related to poor appetite; 11 pound weight loss, 4%, in the past month. Ensure Enlive supplements were added 3 times a day. Patient's appetite is improved today after relief of his constipation. The patient feels well today wants to go home. He is clinically stable and will follow up closely with Dr. Lee Marino on 6/12/2017; follow up with Dr. Michaela Mckeon 6/16/2017. Consults: Cardiology    Significant Diagnostic Studies: labs:   Results for Tristan Diaz (MRN [id number])    Ref.  Range 6/2/2017 19:55 6/3/2017 14:04 6/3/2017 16:23 6/4/2017 03:03 6/6/2017 10:09 6/8/2017 02:50   WBC Latest Ref Range: 4.6 - 13.2 K/uL 8.8 7.6 7.6 7.9 6.6 6.8   RBC Latest Ref Range: 4.70 - 5.50 M/uL 4.20 (L) 4.08 (L) 4.00 (L) 3.89 (L) 3.92 (L) 4.04 (L)   HGB Latest Ref Range: 13.0 - 16.0 g/dL 11.8 (L) 11.4 (L) 11.3 (L) 10.9 (L) 11.3 (L) 11.4 (L)   HCT Latest Ref Range: 36.0 - 48.0 % 36.1 35.7 (L) 34.6 (L) 34.1 (L) 34.8 (L) 36.0   MCV Latest Ref Range: 74.0 - 97.0 FL 86.0 87.5 86.5 87.7 88.8 89.1   MCH Latest Ref Range: 24.0 - 34.0 PG 28.1 27.9 28.3 28.0 28.8 28.2   MCHC Latest Ref Range: 31.0 - 37.0 g/dL 32.7 31.9 32.7 32.0 32.5 31.7   RDW Latest Ref Range: 11.6 - 14.5 % 15.0 (H) 15.0 (H) 15.1 (H) 15.4 (H) 15.3 (H) 15.3 (H)   PLATELET Latest Ref Range: 135 - 420 K/uL 268 200 239 247 215 223     Results for [facility] (MRN [id number])    Ref. Range 6/3/2017 14:04 6/4/2017 03:03 6/6/2017 10:09 6/7/2017 03:27 6/8/2017 02:50   Sodium Latest Ref Range: 136 - 145 mmol/L 137 136 139 137 140   Potassium Latest Ref Range: 3.5 - 5.5 mmol/L 4.4 3.7 4.8 4.1 4.8   Chloride Latest Ref Range: 100 - 108 mmol/L 102 102 103 101 101   CO2 Latest Ref Range: 21 - 32 mmol/L 32 28 30 30 33 (H)   Anion gap Latest Ref Range: 3.0 - 18 mmol/L 3 6 6 6 6   Glucose Latest Ref Range: 74 - 99 mg/dL 113 (H) 110 (H) 126 (H) 110 (H) 104 (H)   BUN Latest Ref Range: 7.0 - 18 MG/DL 10 10 5 (L) 4 (L) 5 (L)   Creatinine Latest Ref Range: 0.6 - 1.3 MG/DL 1.31 (H) 1.38 (H) 1.15 1.18 1.15   BUN/Creatinine ratio Latest Ref Range: 12 - 20   8 (L) 7 (L) 4 (L) 3 (L) 4 (L)   Calcium Latest Ref Range: 8.5 - 10.1 MG/DL 8.2 (L) 8.0 (L) 8.3 (L) 8.7 8.8   Phosphorus Latest Ref Range: 2.5 - 4.9 MG/DL 2.8       Magnesium Latest Ref Range: 1.6 - 2.6 mg/dL 2.3       GFR est non-AA Latest Ref Range: >60 ml/min/1.73m2 55 (L) 52 (L) >60 >60 >60   GFR est AA Latest Ref Range: >60 ml/min/1.73m2 >60 >60 >60 >60 >60     Results for Sarwat Bryant (MRN [id number])    Ref. Range 6/4/2017 03:03   Bilirubin, total Latest Ref Range: 0.2 - 1.0 MG/DL 0.3   Protein, total Latest Ref Range: 6.4 - 8.2 g/dL 6.3 (L)   Albumin Latest Ref Range: 3.4 - 5.0 g/dL 2.4 (L)   Globulin Latest Ref Range: 2.0 - 4.0 g/dL 3.9   A-G Ratio Latest Ref Range: 0.8 - 1.7   0.6 (L)   ALT (SGPT) Latest Ref Range: 16 - 61 U/L 16   AST Latest Ref Range: 15 - 37 U/L 28   Alk. phosphatase Latest Ref Range: 45 - 117 U/L 63     CTA chest 6/3/2017:  IMPRESSION:     1. No evidence for pulmonary embolism. 2. Aortic stent graft. 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 Grossly patent thoracic aorta. 3. Findings of old granulomatous disease with punctate calcifications in the  spleen, solitary calcification in the liver. 4. Partially imaged abdominal aortic aneurysm. PVL BLE 6/3/2017:  REASON FOR TEST  LLE pain, markedly elevated d dimer in setting of recent surgery,  Chest pain, SOB     Right Leg:-  Deep venous thrombosis: No  Superficial venous thrombosis: No  Deep venous insufficiency: Not examined  Superficial venous insufficiency: Not examined     Left Leg:-  Deep venous thrombosis: No  Superficial venous thrombosis: No  Deep venous insufficiency: Not examined  Superficial venous insufficiency: Not examined    PVL Abdomen 6/6/2017: AMENDED INTERPRETATION/FINDINGS  Duplex images were obtained using 2-D gray scale, color flow, and  spectral Doppler analysis. MESENTERIC:  1. Patent superior mesenteric artery without evidence of stenosis. 2. Patent celiac, splenic, and hepatic arteries origins without  evidence of stenosis. 3. Patent superior mesenteric vein. 4. Patent abdominal aorta with evidence of a repaired abdominal aortic  aneurysm (endograft). The largest measurement is 3.7 cm by 4.4 cm  proximal to the endograft. 5. Unable to visualize the inferior mesenteric artery.     AMENDED COMMENTS  Note: Abdominal aortic aneurysm noted, therefore the visceral/aortic  ratios may not accurately predict visceral artery stenosis. Echocardiogram 6/3/2017:  SUMMARY:  Left ventricle: Size was normal. Systolic function was normal. Ejection  fraction was estimated to be 55 %. There was moderate hypokinesis of the  apical septal wall(s). Wall thickness was mildly increased. Right ventricle: Systolic pressure was at the upper limits of normal.  Estimated peak pressure was 30 mmHg.     Inferior vena cava, hepatic veins: Respirophasic changes were blunted  (less than 50% variation). Pericardium: A trivial pericardial effusion was identified along the right  ventricular free wall. The fluid exhibited a fibrinous appearance. There  was no evidence of hemodynamic compromise. COMPARISONS:  There has been no significant change in overall LV function, but no  mention of pacemaker of apical septal wall motion abnormality made on the  report of the study fo 04-Jun-2013. Comparison was made with the report of  the previous study of 04-Jun-2013. The actual study could not be found  within the system for direct comparison. Discharge Medications:     Current Discharge Medication List      START taking these medications    Details   LORazepam (ATIVAN) 1 mg tablet Take 1 Tab by mouth every six (6) hours as needed. Max Daily Amount: 4 mg. Indications: anxiety  Qty: 15 Tab, Refills: 0      nicotine (NICODERM CQ) 14 mg/24 hr patch 1 Patch by TransDERmal route daily for 30 days. Qty: 30 Patch, Refills: 0      nystatin (MYCOSTATIN) topical cream Apply 1 g to affected area two (2) times daily as needed for Skin Irritation. Apply to athlete's feet BID  Qty: 15 g, Refills: 0      phenyleph-shark pacheco oil-mo-pet (PREPARATION H) rectal ointment by PeriANAL route four (4) times daily as needed for Pain. Qty: 30 g, Refills: 0      senna (SENOKOT) 8.6 mg tablet Take 2 Tabs by mouth two (2) times a day. Indications: Constipation  Qty: 100 Tab, Refills: 0         CONTINUE these medications which have CHANGED    Details   fluticasone-salmeterol (ADVAIR) 100-50 mcg/dose diskus inhaler Take 1 Puff by inhalation every twelve (12) hours. Indications: BRONCHOSPASM PREVENTION WITH COPD  Qty: 1 Inhaler, Refills: 0    Associated Diagnoses: COPD (chronic obstructive pulmonary disease) (HCC)      oxyCODONE-acetaminophen (PERCOCET 7.5) 7.5-325 mg per tablet Take 1 Tab by mouth every four (4) hours as needed. Max Daily Amount: 6 Tabs.  Indications: Pain  Qty: 30 Tab, Refills: 0         CONTINUE these medications which have NOT CHANGED    Details   pantoprazole (PROTONIX) 40 mg tablet Take 40 mg by mouth daily. atenolol (TENORMIN) 25 mg tablet Take 25 mg by mouth daily. levothyroxine (SYNTHROID) 125 mcg tablet       atorvastatin (LIPITOR) 40 mg tablet Take 40 mg by mouth nightly.      gabapentin (NEURONTIN) 300 mg capsule Take 2 by mouth every pm  Qty: 60 Cap, Refills: 5    Associated Diagnoses: HNP (herniated nucleus pulposus), lumbar      aspirin 81 mg chewable tablet Take 1 Tab by mouth daily. Qty: 30 Tab, Refills: 11    Associated Diagnoses: Essential hypertension      albuterol (PROVENTIL HFA, VENTOLIN HFA, PROAIR HFA) 90 mcg/actuation inhaler Take 2 Puffs by inhalation every six (6) hours as needed for Wheezing. Qty: 1 Inhaler, Refills: 0    Associated Diagnoses: Current smoker; Wheezing; Shortness of breath      nitroglycerin (NITROSTAT) 0.4 mg SL tablet 1 Tab by SubLINGual route every five (5) minutes as needed for Chest Pain (if chest pain not resolved after taking 3 call 911 ). Qty: 30 Tab, Refills: 3      lidocaine (LIDODERM) 5 % Apply patch to the affected area for 12 hours a day and remove for 12 hours a day. Qty: 1 Each, Refills: 0      bisacodyl (DULCOLAX, BISACODYL,) 5 mg EC tablet Take 1 Tab by mouth two (2) times a day. Take 1 tablet by mouth twice a day as needed for constipation. Qty: 20 Tab, Refills: 0      polyethylene glycol (MIRALAX) 17 gram/dose powder Take 17 g by mouth daily. 1 capful with 8 oz of water daily  Qty: 119 g, Refills: 0      omega-3 acid ethyl esters (LOVAZA) 1 gram capsule Take 2 Caps by mouth daily (with breakfast).   Qty: 180 Cap, Refills: 3    Associated Diagnoses: Hypertriglyceridemia         STOP taking these medications       ciprofloxacin HCl (CIPRO) 500 mg tablet Comments:   Reason for Stopping:         metroNIDAZOLE (FLAGYL) 500 mg tablet Comments:   Reason for Stopping:         acetaminophen (TYLENOL) 500 mg tablet Comments:   Reason for Stopping: oxyCODONE-acetaminophen (PERCOCET) 5-325 mg per tablet Comments:   Reason for Stopping:               Activity: Activity as tolerated                 Stop Smoking     Diet: Cardiac Diet with Ensure Enlive or similar supplement 3 times a day    Wound Care:   Ketoconazole cream to feet twice a day for tinea pedis x 2 weeks    Follow-up:   Dr. Katie Elizalde, [facility], 6/12/2017  Dr Neetu Villavicencio, Vascular, 6/16/2017      Bouchra Khan DO  6/9/2017 5:13 PM  [phone]

## 2017-06-16 ENCOUNTER — OFFICE VISIT (OUTPATIENT)
Dept: VASCULAR SURGERY | Age: 67
End: 2017-06-16

## 2017-06-16 VITALS
SYSTOLIC BLOOD PRESSURE: 118 MMHG | DIASTOLIC BLOOD PRESSURE: 62 MMHG | WEIGHT: 265 LBS | HEART RATE: 64 BPM | RESPIRATION RATE: 20 BRPM | BODY MASS INDEX: 35.12 KG/M2 | HEIGHT: 73 IN

## 2017-06-16 DIAGNOSIS — I71.40 AAA (ABDOMINAL AORTIC ANEURYSM) WITHOUT RUPTURE: Primary | ICD-10-CM

## 2017-06-16 NOTE — PROGRESS NOTES
Subjective:      Kimberley Gates is a 78 y.o. male who presents today for post op visit, status post EVAR. He has a surgical incision wound which is located on the bilateral groins. Current symptoms: wound healing as expected. 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 arthropathy  Constipation due to pain medication therapy, chronic  Hemorrhoids  Protein calorie malnutrition with 11 pound/4% weight loss last month, secondary to poor appetite post procedure  Coronary artery disease - stable    So overall, improving  He is getting home health  He is still dependent on a walker  But his bowel movements are more regular  Post op pain better  He is also making conscious effort of smoking cessation    Objective:     Visit Vitals    /62 (BP 1 Location: Left arm, BP Patient Position: Sitting)    Pulse 64    Resp 20    Ht 6' 1\" (1.854 m)    Wt 265 lb (120.2 kg)    BMI 34.96 kg/m2       Wound:   wound margins intact and healing well. No signs of infection. Assessment:     Wound check/discharge visit. Plan:       ICD-10-CM ICD-9-CM    1. AAA (abdominal aortic aneurysm) without rupture (HCC) I71.4 441.4 DUPLEX AORTA ILIAC GRAFT COMPLETE AMB     Orders Placed This Encounter    ENDOGRAFT     He ended up having some imaging studies done on readmission and EVAR patent  Will recheck a duplex in about 6 weeks  Call/return sooner if any new issues/concerns  He has close follow up with his cardiologist and PCP with [facility], and has visits there over the next 2 weeks    Follow-up Disposition:  Return in about 6 weeks (around 7/28/2017). VIJAY Díaz    Portions of this note have been entered using voice recognition software.

## 2017-06-16 NOTE — MR AVS SNAPSHOT
Visit Information Date & Time Provider Department Dept. Phone Encounter #  
 6/16/2017  9:45 AM [facility] [phone] [id number] Follow-up Instructions Return in about 6 weeks (around 7/28/2017). Your Appointments 7/17/2017  9:00 AM  
PROCEDURE with [facility] ([facility]) Appt Note: bhavin ruano 5 weeks [address] [facility] [address] [facility] [address]  
[phone] [address] [facility] [address] [facility]  
  
    
 7/25/2017  9:30 AM  
Follow Up with [facility] ([facility]) Appt Note: follow up after study [address] [facility] [address] [facility] [address]  
[phone] [address] [facility] [place] [address] [facility] [address] Upcoming Health Maintenance Date Due DTaP/Tdap/Td series (1 - Tdap) 3/5/1972 FOBT Q 1 YEAR AGE 50-75 3/5/2001 ZOSTER VACCINE AGE 60> 3/5/2011 GLAUCOMA SCREENING Q2Y 3/5/2016 Pneumococcal 65+ Low/Medium Risk (1 of 2 - PCV13) 3/5/2016 MEDICARE YEARLY EXAM 3/5/2016 INFLUENZA AGE 9 TO ADULT 8/1/2017 Allergies as of 6/16/2017  Review Complete On: 6/16/2017 By: Connie Madrigal LPN No Known Allergies Current Immunizations  Reviewed on 11/9/2015 Name Date Influenza Vaccine (Quad) PF 11/9/2015 Not reviewed this visit You Were Diagnosed With   
  
 Codes Comments AAA (abdominal aortic aneurysm) without rupture (HCC)    -  Primary ICD-10-CM: I71.4 ICD-9-CM: 529. 4 Vitals BP Pulse Resp Height(growth percentile) Weight(growth percentile) BMI  
 118/62 (BP 1 Location: Left arm, BP Patient Position: Sitting) 64 20 6' 1\" (1.854 m) 265 lb (120.2 kg) 34.96 kg/m2 Smoking Status Current Every Day Smoker Vitals History BMI and BSA Data Body Mass Index Body Surface Area 34.96 kg/m 2 2.49 m 2 Preferred Pharmacy Pharmacy Name Phone [facility] [address] [place], [address] Your Updated Medication List  
  
   
This list is accurate as of: 6/16/17 11:03 AM.  Always use your most recent med list.  
  
  
  
  
 albuterol 90 mcg/actuation inhaler Commonly known as:  PROVENTIL HFA, VENTOLIN HFA, PROAIR HFA Take 2 Puffs by inhalation every six (6) hours as needed for Wheezing. aspirin 81 mg chewable tablet Take 1 Tab by mouth daily. atenolol 25 mg tablet Commonly known as:  TENORMIN Take 25 mg by mouth daily. atorvastatin 40 mg tablet Commonly known as:  LIPITOR Take 40 mg by mouth nightly. bisacodyl 5 mg EC tablet Commonly known as:  DULCOLAX (BISACODYL) Take 1 Tab by mouth two (2) times a day. Take 1 tablet by mouth twice a day as needed for constipation. fluticasone-salmeterol 100-50 mcg/dose diskus inhaler Commonly known as:  ADVAIR Take 1 Puff by inhalation every twelve (12) hours. Indications: BRONCHOSPASM PREVENTION WITH COPD  
  
 gabapentin 300 mg capsule Commonly known as:  NEURONTIN Take 2 by mouth every pm  
  
 levothyroxine 125 mcg tablet Commonly known as:  SYNTHROID  
  
 lidocaine 5 % Commonly known as:  Jim Hitch Apply patch to the affected area for 12 hours a day and remove for 12 hours a day. LORazepam 1 mg tablet Commonly known as:  ATIVAN Take 1 Tab by mouth every six (6) hours as needed. Max Daily Amount: 4 mg. Indications: anxiety  
  
 nicotine 14 mg/24 hr patch Commonly known as:  NICODERM CQ  
1 Patch by TransDERmal route daily for 30 days. nitroglycerin 0.4 mg SL tablet Commonly known as:  NITROSTAT  
1 Tab by SubLINGual route every five (5) minutes as needed for Chest Pain (if chest pain not resolved after taking 3 call 911 ).   
  
 nystatin topical cream  
Commonly known as:  MYCOSTATIN  
 Apply 1 g to affected area two (2) times daily as needed for Skin Irritation. Apply to athlete's feet BID  
  
 omega-3 acid ethyl esters 1 gram capsule Commonly known as:  Suzanne Saritha Take 2 Caps by mouth daily (with breakfast). oxyCODONE-acetaminophen 7.5-325 mg per tablet Commonly known as:  PERCOCET 7.5 Take 1 Tab by mouth every four (4) hours as needed. Max Daily Amount: 6 Tabs. Indications: Pain  
  
 phenyleph-shark pacheco oil-mo-pet rectal ointment Commonly known as:  PREPARATION H  
by PeriANAL route four (4) times daily as needed for Pain.  
  
 polyethylene glycol 17 gram/dose powder Commonly known as:  Temo Terrell Take 17 g by mouth daily. 1 capful with 8 oz of water daily PROTONIX 40 mg tablet Generic drug:  pantoprazole Take 40 mg by mouth daily. senna 8.6 mg tablet Commonly known as:  Peter Kiewit Bao Take 2 Tabs by mouth two (2) times a day. Indications: Constipation Follow-up Instructions Return in about 6 weeks (around 7/28/2017). To-Do List   
 07/24/2017 Imaging:  DUPLEX AORTA ILIAC GRAFT COMPLETE AMB Please provide this summary of care documentation to your next provider. Your primary care clinician is listed as Gabriela Speaker. If you have any questions after today's visit, please call [phone].

## 2017-09-09 ENCOUNTER — APPOINTMENT (OUTPATIENT)
Dept: GENERAL RADIOLOGY | Age: 67
DRG: 378 | End: 2017-09-09
Attending: EMERGENCY MEDICINE
Payer: MEDICARE

## 2017-09-09 ENCOUNTER — APPOINTMENT (OUTPATIENT)
Dept: CT IMAGING | Age: 67
DRG: 378 | End: 2017-09-09
Attending: EMERGENCY MEDICINE
Payer: MEDICARE

## 2017-09-09 ENCOUNTER — HOSPITAL ENCOUNTER (INPATIENT)
Age: 67
LOS: 4 days | Discharge: HOME | DRG: 378 | End: 2017-09-14
Attending: EMERGENCY MEDICINE | Admitting: HOSPITALIST
Payer: MEDICARE

## 2017-09-09 LAB
ALBUMIN SERPL-MCNC: 3 G/DL (ref 3.4–5)
ALBUMIN/GLOB SERPL: 0.7 {RATIO} (ref 0.8–1.7)
ALP SERPL-CCNC: 94 U/L (ref 45–117)
ALT SERPL-CCNC: 11 U/L (ref 16–61)
ANION GAP SERPL CALC-SCNC: 10 MMOL/L (ref 3–18)
AST SERPL-CCNC: 19 U/L (ref 15–37)
BASOPHILS # BLD: 0 K/UL (ref 0–0.1)
BASOPHILS NFR BLD: 0 % (ref 0–2)
BILIRUB DIRECT SERPL-MCNC: 0.2 MG/DL (ref 0–0.2)
BILIRUB SERPL-MCNC: 0.9 MG/DL (ref 0.2–1)
BUN SERPL-MCNC: 11 MG/DL (ref 7–18)
BUN/CREAT SERPL: 8 (ref 12–20)
CALCIUM SERPL-MCNC: 8.7 MG/DL (ref 8.5–10.1)
CHLORIDE SERPL-SCNC: 101 MMOL/L (ref 100–108)
CO2 SERPL-SCNC: 25 MMOL/L (ref 21–32)
CREAT SERPL-MCNC: 1.33 MG/DL (ref 0.6–1.3)
DIFFERENTIAL METHOD BLD: ABNORMAL
EOSINOPHIL # BLD: 0.1 K/UL (ref 0–0.4)
EOSINOPHIL NFR BLD: 1 % (ref 0–5)
ERYTHROCYTE [DISTWIDTH] IN BLOOD BY AUTOMATED COUNT: 15.1 % (ref 11.6–14.5)
GLOBULIN SER CALC-MCNC: 4.5 G/DL (ref 2–4)
GLUCOSE SERPL-MCNC: 89 MG/DL (ref 74–99)
HCT VFR BLD AUTO: 40.6 % (ref 36–48)
HGB BLD-MCNC: 13.3 G/DL (ref 13–16)
LIPASE SERPL-CCNC: 66 U/L (ref 73–393)
LYMPHOCYTES # BLD: 2.1 K/UL (ref 0.9–3.6)
LYMPHOCYTES NFR BLD: 16 % (ref 21–52)
MCH RBC QN AUTO: 27 PG (ref 24–34)
MCHC RBC AUTO-ENTMCNC: 32.8 G/DL (ref 31–37)
MCV RBC AUTO: 82.5 FL (ref 74–97)
MONOCYTES # BLD: 1.1 K/UL (ref 0.05–1.2)
MONOCYTES NFR BLD: 8 % (ref 3–10)
NEUTS SEG # BLD: 9.8 K/UL (ref 1.8–8)
NEUTS SEG NFR BLD: 75 % (ref 40–73)
PLATELET # BLD AUTO: 234 K/UL (ref 135–420)
PMV BLD AUTO: 10.7 FL (ref 9.2–11.8)
POTASSIUM SERPL-SCNC: 3.9 MMOL/L (ref 3.5–5.5)
PROT SERPL-MCNC: 7.5 G/DL (ref 6.4–8.2)
RBC # BLD AUTO: 4.92 M/UL (ref 4.7–5.5)
SODIUM SERPL-SCNC: 136 MMOL/L (ref 136–145)
WBC # BLD AUTO: 13.2 K/UL (ref 4.6–13.2)

## 2017-09-09 PROCEDURE — 74011250636 HC RX REV CODE- 250/636: Performed by: EMERGENCY MEDICINE

## 2017-09-09 PROCEDURE — 74177 CT ABD & PELVIS W/CONTRAST: CPT

## 2017-09-09 PROCEDURE — 85730 THROMBOPLASTIN TIME PARTIAL: CPT | Performed by: EMERGENCY MEDICINE

## 2017-09-09 PROCEDURE — 80048 BASIC METABOLIC PNL TOTAL CA: CPT | Performed by: EMERGENCY MEDICINE

## 2017-09-09 PROCEDURE — 74022 RADEX COMPL AQT ABD SERIES: CPT

## 2017-09-09 PROCEDURE — 96374 THER/PROPH/DIAG INJ IV PUSH: CPT

## 2017-09-09 PROCEDURE — 85025 COMPLETE CBC W/AUTO DIFF WBC: CPT | Performed by: EMERGENCY MEDICINE

## 2017-09-09 PROCEDURE — 83690 ASSAY OF LIPASE: CPT | Performed by: EMERGENCY MEDICINE

## 2017-09-09 PROCEDURE — 96375 TX/PRO/DX INJ NEW DRUG ADDON: CPT

## 2017-09-09 PROCEDURE — 99285 EMERGENCY DEPT VISIT HI MDM: CPT

## 2017-09-09 PROCEDURE — 80076 HEPATIC FUNCTION PANEL: CPT | Performed by: EMERGENCY MEDICINE

## 2017-09-09 PROCEDURE — 85610 PROTHROMBIN TIME: CPT | Performed by: EMERGENCY MEDICINE

## 2017-09-09 PROCEDURE — 74011636320 HC RX REV CODE- 636/320: Performed by: EMERGENCY MEDICINE

## 2017-09-09 RX ORDER — HYDROMORPHONE HYDROCHLORIDE 2 MG/ML
1 INJECTION, SOLUTION INTRAMUSCULAR; INTRAVENOUS; SUBCUTANEOUS ONCE
Status: COMPLETED | OUTPATIENT
Start: 2017-09-09 | End: 2017-09-09

## 2017-09-09 RX ORDER — HYDROMORPHONE HYDROCHLORIDE 1 MG/ML
1 INJECTION, SOLUTION INTRAMUSCULAR; INTRAVENOUS; SUBCUTANEOUS ONCE
Status: DISCONTINUED | OUTPATIENT
Start: 2017-09-09 | End: 2017-09-09 | Stop reason: CLARIF

## 2017-09-09 RX ORDER — ONDANSETRON 2 MG/ML
4 INJECTION INTRAMUSCULAR; INTRAVENOUS
Status: COMPLETED | OUTPATIENT
Start: 2017-09-09 | End: 2017-09-09

## 2017-09-09 RX ADMIN — HYDROMORPHONE HYDROCHLORIDE 1 MG: 2 INJECTION, SOLUTION INTRAMUSCULAR; INTRAVENOUS; SUBCUTANEOUS at 22:19

## 2017-09-09 RX ADMIN — IOPAMIDOL 75 ML: 612 INJECTION, SOLUTION INTRAVENOUS at 22:48

## 2017-09-09 RX ADMIN — ONDANSETRON 4 MG: 2 INJECTION INTRAMUSCULAR; INTRAVENOUS at 22:19

## 2017-09-09 NOTE — Clinical Note
Status[de-identified] Inpatient [101] Type of Bed: Telemetry [19] Inpatient Hospitalization Certified Necessary for the Following Reasons: 3. Patient receiving treatment that can only be provided in an inpatient setting (further clarification in H&P documentation) Admitting Diagnosis: Diverticula of colon [750417] Admitting Physician: Ирина Howard [[id number]] Attending Physician: Ирина Howard [[id number]] Estimated Length of Stay: 2 Midnights Discharge Plan[de-identified] Home with Office Follow-up

## 2017-09-09 NOTE — IP AVS SNAPSHOT
[address] Patient: Pola Berg MRN: [id number] SPS:4/3/2167 You are allergic to the following No active allergies Recent Documentation Height Weight BMI Smoking Status 1.854 m 115.9 kg 33.71 kg/m2 Current Every Day Smoker Emergency Contacts Name Discharge Info Relation Home Work Mobile Katelynn Villa DISCHARGE CAREGIVER [3] Sister [23] [phone] About your hospitalization You were admitted on:  September 10, 2017 You last received care in the:  [facility] [address] You were discharged on:  September 14, 2017 Unit phone number:  [phone] Why you were hospitalized Your primary diagnosis was:  Diverticulitis Of Large Intestine Without Perforation Or Abscess With Bleeding Your diagnoses also included:  Htn (Hypertension), Gastroesophageal Reflux Disease Without Esophagitis, Protein-Calorie Malnutrition, Moderate (Hcc), History Of Aaa (Abdominal Aortic Aneurysm) Repair Providers Seen During Your Hospitalizations Provider Role Specialty Primary office phone Theta MD Burke Attending Provider Emergency Medicine [phone] Renae Gallegos DO Attending Provider Internal Medicine [phone] Your Primary Care Physician (PCP) Primary Care Physician Office Phone Office Fax [address], [address] [phone] Follow-up Information Follow up With Details Comments Contact Info Aldair Villa MD On 9/18/2017 Office will contact patient and give pickup time for appointment. [address] [address] 
[phone] Current Discharge Medication List  
  
START taking these medications Dose & Instructions Dispensing Information Comments Morning Noon Evening Bedtime  
 ciprofloxacin HCl 500 mg tablet Commonly known as:  CIPRO Your last dose was: Your next dose is:    
   
   
 Dose:  500 mg Take 1 Tab by mouth two (2) times a day for 5 days. You have this medication at home from Dr Cuauhtemoc Cornell Take 1 cipro 2 times a day for 5 days  Indications: DIVERTICULITIS OF GASTROINTESTINAL TRACT Quantity:  10 Tab Refills:  0  
     
   
   
   
  
 hydrocortisone-pramoxine rectal foam  
Commonly known as:  PROCTOFOAM HC Your last dose was: Your next dose is:    
   
   
 Dose:  1 Applicator Insert 1 Applicator into rectum two (2) times daily as needed for Hemorrhoids. Quantity:  1 Can Refills:  0  
     
   
   
   
  
 metroNIDAZOLE 500 mg tablet Commonly known as:  FLAGYL Your last dose was: Your next dose is: You have this medication at home from Dr Kathy James 1 with food 3 times a day for 5 days  Indications: DIVERTICULITIS OF GASTROINTESTINAL TRACT Quantity:  15 Tab Refills:  0  
     
   
   
   
  
 nicotine 14 mg/24 hr patch Commonly known as:  Clover Pinta Your last dose was: Your next dose is:    
   
   
 Dose:  1 Patch 1 Patch by TransDERmal route daily for 30 days. Indications: SMOKING CESSATION Quantity:  30 Patch Refills:  0  
     
   
   
   
  
 witch hazel-glycerin 50 % Padm Commonly known as:  TUCKS Your last dose was: Your next dose is:    
   
   
 Dose:  1 Container 1 Container by PeriANAL route as needed. Quantity:  1 Each Refills:  0 CONTINUE these medications which have CHANGED Dose & Instructions Dispensing Information Comments Morning Noon Evening Bedtime  
 gabapentin 300 mg capsule Commonly known as:  NEURONTIN What changed:  Another medication with the same name was removed. Continue taking this medication, and follow the directions you see here. Your last dose was:     
   
Your next dose is:    
   
   
 Dose:  300 mg  
 Take 300 mg by mouth three (3) times daily. Indications: NEUROPATHIC PAIN Refills:  0 HYDROcodone-acetaminophen 7.5-325 mg per tablet Commonly known as:  Jackie Jacobo What changed:  when to take this Your last dose was: Your next dose is:    
   
   
 Dose:  1 Tab Take 1 Tab by mouth every six (6) hours as needed for Pain. Max Daily Amount: 4 Tabs. Quantity:  20 Tab Refills:  0 phenyleph-shark pacheco oil-mo-pet rectal ointment Commonly known as:  PREPARATION H What changed:  reasons to take this Your last dose was: Your next dose is:    
   
   
 by PeriANAL route four (4) times daily as needed for Hemorrhoids or Pain for up to 7 days. Quantity:  30 g Refills:  0  
     
   
   
   
  
 senna 8.6 mg tablet Commonly known as:  Robert Gore What changed:  when to take this Your last dose was: Your next dose is:    
   
   
 Dose:  2 Tab Take 2 Tabs by mouth nightly. Indications: constipation Quantity:  100 Tab Refills:  0 CONTINUE these medications which have NOT CHANGED Dose & Instructions Dispensing Information Comments Morning Noon Evening Bedtime  
 albuterol 90 mcg/actuation inhaler Commonly known as:  PROVENTIL HFA, VENTOLIN HFA, PROAIR HFA Your last dose was: Your next dose is:    
   
   
 Dose:  2 Puff Take 2 Puffs by inhalation every six (6) hours as needed for Wheezing. Quantity:  1 Inhaler Refills:  0  
     
   
   
   
  
 aspirin 81 mg chewable tablet Your last dose was: Your next dose is:    
   
   
 Dose:  81 mg Take 1 Tab by mouth daily. Quantity:  30 Tab Refills:  11  
     
   
   
   
  
 atenolol 25 mg tablet Commonly known as:  TENORMIN Your last dose was: Your next dose is:    
   
   
 Dose:  25 mg Take 25 mg by mouth daily. Refills:  0 atorvastatin 40 mg tablet Commonly known as:  LIPITOR Your last dose was: Your next dose is:    
   
   
 Dose:  40 mg Take 40 mg by mouth nightly. Refills:  0  
     
   
   
   
  
 fluticasone-salmeterol 100-50 mcg/dose diskus inhaler Commonly known as:  ADVAIR Your last dose was: Your next dose is:    
   
   
 Dose:  1 Puff Take 1 Puff by inhalation every twelve (12) hours. Indications: BRONCHOSPASM PREVENTION WITH COPD Quantity:  1 Inhaler Refills:  0  
     
   
   
   
  
 levothyroxine 125 mcg tablet Commonly known as:  SYNTHROID Your last dose was: Your next dose is:    
   
   
  Refills:  0 LORazepam 1 mg tablet Commonly known as:  ATIVAN Your last dose was: Your next dose is:    
   
   
 Dose:  1 mg Take 1 Tab by mouth every six (6) hours as needed. Max Daily Amount: 4 mg. Indications: anxiety Quantity:  15 Tab Refills:  0  
     
   
   
   
  
 nitroglycerin 0.4 mg SL tablet Commonly known as:  NITROSTAT Your last dose was: Your next dose is:    
   
   
 Dose:  0.4 mg  
1 Tab by SubLINGual route every five (5) minutes as needed for Chest Pain (if chest pain not resolved after taking 3 call 911 ). Quantity:  30 Tab Refills:  3  
     
   
   
   
  
 polyethylene glycol 17 gram/dose powder Commonly known as:  Logan Rosa Your last dose was: Your next dose is:    
   
   
 Dose:  17 g Take 17 g by mouth daily. 1 capful with 8 oz of water daily Quantity:  119 g Refills:  0 PROTONIX 40 mg tablet Generic drug:  pantoprazole Your last dose was: Your next dose is:    
   
   
 Dose:  40 mg Take 40 mg by mouth daily. Refills:  0  
     
   
   
   
  
 raNITIdine 300 mg tablet Commonly known as:  ZANTAC Your last dose was: Your next dose is:    
   
   
 Dose:  1 Tab Take 1 Tab by mouth daily. Refills:  0 STOP taking these medications   
 bisacodyl 5 mg EC tablet Commonly known as:  DULCOLAX (BISACODYL)  
   
  
 lidocaine 5 % Commonly known as:  LIDODERM  
   
  
 nystatin topical cream  
Commonly known as:  MYCOSTATIN  
   
  
 omega-3 acid ethyl esters 1 gram capsule Commonly known as:  LOVAZA  
   
  
 oxyCODONE-acetaminophen 7.5-325 mg per tablet Commonly known as:  PERCOCET 7.5 Where to Get Your Medications Information on where to get these meds will be given to you by the nurse or doctor. ! Ask your nurse or doctor about these medications  
  ciprofloxacin HCl 500 mg tablet HYDROcodone-acetaminophen 7.5-325 mg per tablet  
 hydrocortisone-pramoxine rectal foam  
 metroNIDAZOLE 500 mg tablet  
 nicotine 14 mg/24 hr patch  
 phenyleph-shark pacheco oil-mo-pet rectal ointment  
 polyethylene glycol 17 gram/dose powder  
 senna 8.6 mg tablet  
 witch hazel-glycerin 50 % Padm Discharge Instructions DASH Diet: Care Instructions Your Care Instructions The DASH diet is an eating plan that can help lower your blood pressure. DASH stands for Dietary Approaches to Stop Hypertension. Hypertension is high blood pressure. The DASH diet focuses on eating foods that are high in calcium, potassium, and magnesium. These nutrients can lower blood pressure. The foods that are highest in these nutrients are fruits, vegetables, low-fat dairy products, nuts, seeds, and legumes. But taking calcium, potassium, and magnesium supplements instead of eating foods that are high in those nutrients does not have the same effect. The DASH diet also includes whole grains, fish, and poultry. The DASH diet is one of several lifestyle changes your doctor may recommend to lower your high blood pressure. Your doctor may also want you to decrease the amount of sodium in your diet.  Lowering sodium while following the DASH diet can lower blood pressure even further than just the DASH diet alone. Follow-up care is a key part of your treatment and safety. Be sure to make and go to all appointments, and call your doctor if you are having problems. It's also a good idea to know your test results and keep a list of the medicines you take. How can you care for yourself at home? Following the DASH diet · Eat 4 to 5 servings of fruit each day. A serving is 1 medium-sized piece of fruit, ½ cup chopped or canned fruit, 1/4 cup dried fruit, or 4 ounces (½ cup) of fruit juice. Choose fruit more often than fruit juice. · Eat 4 to 5 servings of vegetables each day. A serving is 1 cup of lettuce or raw leafy vegetables, ½ cup of chopped or cooked vegetables, or 4 ounces (½ cup) of vegetable juice. Choose vegetables more often than vegetable juice. · Get 2 to 3 servings of low-fat and fat-free dairy each day. A serving is 8 ounces of milk, 1 cup of yogurt, or 1 ½ ounces of cheese. · Eat 6 to 8 servings of grains each day. A serving is 1 slice of bread, 1 ounce of dry cereal, or ½ cup of cooked rice, pasta, or cooked cereal. Try to choose whole-grain products as much as possible. · Limit lean meat, poultry, and fish to 2 servings each day. A serving is 3 ounces, about the size of a deck of cards. · Eat 4 to 5 servings of nuts, seeds, and legumes (cooked dried beans, lentils, and split peas) each week. A serving is 1/3 cup of nuts, 2 tablespoons of seeds, or ½ cup of cooked beans or peas. · Limit fats and oils to 2 to 3 servings each day. A serving is 1 teaspoon of vegetable oil or 2 tablespoons of salad dressing. · Limit sweets and added sugars to 5 servings or less a week. A serving is 1 tablespoon jelly or jam, ½ cup sorbet, or 1 cup of lemonade. · Eat less than 2,300 milligrams (mg) of sodium a day. If you limit your sodium to 1,500 mg a day, you can lower your blood pressure even more. Tips for success · Start small. Do not try to make dramatic changes to your diet all at once. You might feel that you are missing out on your favorite foods and then be more likely to not follow the plan. Make small changes, and stick with them. Once those changes become habit, add a few more changes. · Try some of the following: ¨ Make it a goal to eat a fruit or vegetable at every meal and at snacks. This will make it easy to get the recommended amount of fruits and vegetables each day. ¨ Try yogurt topped with fruit and nuts for a snack or healthy dessert. ¨ Add lettuce, tomato, cucumber, and onion to sandwiches. ¨ Combine a ready-made pizza crust with low-fat mozzarella cheese and lots of vegetable toppings. Try using tomatoes, squash, spinach, broccoli, carrots, cauliflower, and onions. ¨ Have a variety of cut-up vegetables with a low-fat dip as an appetizer instead of chips and dip. ¨ Sprinkle sunflower seeds or chopped almonds over salads. Or try adding chopped walnuts or almonds to cooked vegetables. ¨ Try some vegetarian meals using beans and peas. Add garbanzo or kidney beans to salads. Make burritos and tacos with mashed conway beans or black beans. Where can you learn more? Go to http://daily-lance.info/. Enter E176 in the search box to learn more about \"DASH Diet: Care Instructions. \" Current as of: April 3, 2017 Content Version: 11.3 © 1453-1772 [employer]. Care instructions adapted under license by [employer] (which disclaims liability or warranty for this information). If you have questions about a medical condition or this instruction, always ask your healthcare professional. [place] [address] any warranty or liability for your use of this information. Learning About Diverticulosis and Diverticulitis What are diverticulosis and diverticulitis?  
 
In diverticulosis and diverticulitis, pouches called diverticula form in the wall of the large intestine, or colon. · In diverticulosis, the pouches do not cause any pain or other symptoms. · In diverticulitis, the pouches get inflamed or infected and cause symptoms. Doctors aren't sure what causes these pouches in the colon. But they think that a low-fiber diet may play a role. Without fiber to add bulk to the stool, the colon has to work harder than normal to push the stool forward. The pressure from this may cause pouches to form in weak spots along the colon. Some people with diverticulosis get diverticulitis. But experts don't know why this happens. What are the symptoms? · In diverticulosis, most people don't have symptoms. But pouches sometimes bleed. · In diverticulitis, symptoms may last from a few hours to a week or more. They include: ¨ Belly pain. This is usually in the lower left side. It is sometimes worse when you move. This is the most common symptom. ¨ Fever and chills. ¨ Bloating and gas. ¨ Diarrhea or constipation. ¨ Nausea and sometimes vomiting. ¨ Not feeling like eating. How can you prevent these problems? You may be able to lower your chance of getting diverticulitis. You can do this by taking steps to prevent constipation. · Eat fruits, vegetables, beans, and whole grains every day. These foods are high in fiber. · Drink plenty of fluids (enough so that your urine is light yellow or clear like water). If you have kidney, heart, or liver disease and have to limit fluids, talk with your doctor before you increase the amount of fluids you drink. · Get at least 30 minutes of exercise on most days of the week. Walking is a good choice. You also may want to do other activities, such as running, swimming, cycling, or playing tennis or team sports. · Take a fiber supplement, such as Citrucel or Metamucil, every day if needed. Read and follow all instructions on the label. · Schedule time each day for a bowel movement.  Having a daily routine may help. Take your time and do not strain when having a bowel movement. Some people avoid nuts, seeds, berries, and popcorn. They believe that these foods might get trapped in the diverticula and cause pain. But there is no proof that these foods cause diverticulitis or make it worse. How are these problems treated? · The best way to treat diverticulosis is to avoid constipation. (See the tips above.) · Treatment for diverticulitis includes antibiotics and often a change in your diet. You may need only liquids at first. Your doctor may suggest pain medicines for pain or belly cramps. In some cases, surgery may be needed. Follow-up care is a key part of your treatment and safety. Be sure to make and go to all appointments, and call your doctor if you are having problems. It's also a good idea to know your test results and keep a list of the medicines you take. Where can you learn more? Go to http://daily-lance.info/. Enter H315 in the search box to learn more about \"Learning About Diverticulosis and Diverticulitis. \" Current as of: August 9, 2016 Content Version: 11.3 © 7713-8099 [email or web address]. Care instructions adapted under license by [email or web address] (which disclaims liability or warranty for this information). If you have questions about a medical condition or this instruction, always ask your healthcare professional. [address] any warranty or liability for your use of this information. [employer] Activation Thank you for requesting access to [employer]. Please follow the instructions below to securely access and download your online medical record. [employer] allows you to send messages to your doctor, view your test results, renew your prescriptions, schedule appointments, and more. How Do I Sign Up? 1. In your internet browser, go to [email or web address] 
2. Click on the First Time User? Click Here link in the Sign In box.  You will be redirect to the New Member Sign Up page. 3. Enter your [employer] Access Code exactly as it appears below. You will not need to use this code after youve completed the sign-up process. If you do not sign up before the expiration date, you must request a new code. MyChart Access Code: Activation code not generated Current [facility]E Status: Patient Declined (This is the date your MyChart access code will ) 4. Enter the last four digits of your Social Security Number (xxxx) and Date of Birth (mm/dd/yyyy) as indicated and click Submit. You will be taken to the next sign-up page. 5. Create a [employer] ID. This will be your [facility]E login ID and cannot be changed, so think of one that is secure and easy to remember. 6. Create a [employer] password. You can change your password at any time. 7. Enter your Password Reset Question and Answer. This can be used at a later time if you forget your password. 8. Enter your e-mail address. You will receive e-mail notification when new information is available in [address]. 9. Click Sign Up. You can now view and download portions of your medical record. 10. Click the Download Summary menu link to download a portable copy of your medical information. Additional Information If you have questions, please visit the Frequently Asked Questions section of the [employer]E website at [email or web address] Remember, [facility] [facility]E is NOT to be used for urgent needs. For medical emergencies, dial 911. Patient armband removed and given to patient to take home. Patient was informed of the privacy risks if armband lost or stolen DISCHARGE SUMMARY from Nurse The following personal items are in your possession at time of discharge: 
 
Dental Appliances: None Visual Aid: At bedside, Glasses Home Medications: Kept at bedside Jewelry: Watch, Other (comment) Clothing: Footwear, Pants, Shirt, Socks, Undergarments, With patient Other Valuables: None Personal Items Sent to Safe: none PATIENT INSTRUCTIONS: 
 
 
F-face looks uneven A-arms unable to move or move unevenly S-speech slurred or non-existent T-time-call 911 as soon as signs and symptoms begin-DO NOT go Back to bed or wait to see if you get better-TIME IS BRAIN. Warning Signs of HEART ATTACK Call 911 if you have these symptoms: 
? Chest discomfort. Most heart attacks involve discomfort in the center of the chest that lasts more than a few minutes, or that goes away and comes back. It can feel like uncomfortable pressure, squeezing, fullness, or pain. ? Discomfort in other areas of the upper body. Symptoms can include pain or discomfort in one or both arms, the back, neck, jaw, or stomach. ? Shortness of breath with or without chest discomfort. ? Other signs may include breaking out in a cold sweat, nausea, or lightheadedness. Don't wait more than five minutes to call [address]! Fast action can save your life. Calling 911 is almost always the fastest way to get lifesaving treatment. Emergency Medical Services staff can begin treatment when they arrive  up to an hour sooner than if someone gets to the hospital by car. The discharge information has been reviewed with the patient. The patient verbalized understanding. Discharge medications reviewed with the patient and appropriate educational materials and side effects teaching were provided. Discharge Orders Procedure Order Date Status Priority Quantity Spec Type Associated Dx CALL YOUR DOCTOR For: Persistant nausea and vomiting. 09/14/17 1510 Normal Routine 1 Questions: For:  Persistant nausea and vomiting. DIET CARDIAC No options chosen 09/14/17 1510 Normal Routine 1 Questions: Additional options:  No options chosen ACTIVITY AFTER DISCHARGE Patient should: Resume activity as tolerated. 09/14/17 1510 Normal Routine 1 Questions: Patient should:  Resume activity as tolerated. MISC. LAB TEST 09/14/17 1510 Normal Routine 1 Questions: Test description:  Dr Radha Meza requests patient obtain a stool for C Diff next diarrhea episode and send to lab Specimen type:  stool MyChart Announcement We are excited to announce that we are making your provider's discharge notes available to you in [employer]. You will see these notes when they are completed and signed by the physician that discharged you from your recent hospital stay. If you have any questions or concerns about any information you see in [employer], please call the Health Information Department where you were seen or reach out to your Primary Care Provider for more information about your plan of care. General Information Please provide this summary of care documentation to your next provider. Patient Signature:  ____________________________________________________________ Date:  ____________________________________________________________  
  
Julio Cary Provider Signature:  ____________________________________________________________ Date:  ____________________________________________________________

## 2017-09-09 NOTE — IP AVS SNAPSHOT
Summary of Care Report The Summary of Care report has been created to help improve care coordination. Users with access to [employer] or [employer] (Web-based application) may access additional patient information including the Discharge Summary. If you are not currently a [employer] user and need more information, please call the number listed below in the [place] [address] section and ask to be connected with Medical Records. Facility Information Name Address Phone [address] [facility]  [address] [facility] [address] [phone] Patient Information Patient Name Sex  Reina Ramos ([id number]) Male 1950 Discharge Information Admitting Provider Service Area Unit Dexter Armenta, DO / [address] / [phone] Discharge Provider Discharge Date/Time Discharge Disposition Destination (none) 2017 Afternoon (Pending) AHR (none) Patient Language Language ENGLISH [13] Hospital Problems as of 2017  Reviewed: 2017  2:34 PM by Dexter Armenta, DO Class Noted - Resolved Last Modified POA Active Problems HTN (hypertension) (Chronic)  3/30/2013 - Present 2017 by Dexter Armenta, DO Yes Entered by Eyad Lin MD  
  Gastroesophageal reflux disease without esophagitis (Chronic)  2015 - Present 2017 by Dexter Armenta, DO Yes Entered by Valerie Humphrey NP  
  * (Principal)Diverticulitis of large intestine without perforation or abscess with bleeding  9/10/2017 - Present 9/10/2017 by Dexter Armenta, DO Yes Entered by VIJAY Bynum Protein-calorie malnutrition, moderate ([address]  2017 - Present 2017 by Dexter Armenta, DO Yes   Entered by Dexter Armenta, DO  
 History of AAA (abdominal aortic aneurysm) repair (Chronic)  9/12/2017 - Present 9/12/2017 by Michael Lim DO Yes Entered by Michael Lim DO Non-Hospital Problems as of 9/14/2017  Reviewed: 9/14/2017  2:34 PM by Michael Lim DO Class Noted - Resolved Last Modified Active Problems Dyslipidemia  3/30/2013 - Present 3/30/2013 Entered by Armando Mackay MD  
  Aortic dissection, abdominal ([facility] [address]  3/30/2013 - Present 3/30/2013 Entered by Armando Mackay MD  
  Coronary atherosclerosis of native coronary artery  8/23/2013 - Present 8/23/2013 by Darrick Burton MD  
  Entered by Darrick Burton MD  
  Complete heart block [facility])  8/23/2013 - Present 8/23/2013 by Darrick Burton MD  
  Entered by Darrick Burton MD  
  Dyspnea  4/10/2014 - Present 4/10/2014 Entered by Ute Ahn Lightheadedness  4/10/2014 - Present 4/10/2014 Entered by Ute Ahn Vasovagal response  4/10/2014 - Present 4/10/2014 Entered by Ute Ahn Tobacco abuse  8/1/2014 - Present 8/1/2014 by Darrick Burton MD  
  Entered by Darrick Burton MD  
  AAA (abdominal aortic aneurysm) [facility]) (Chronic)  12/5/2014 - Present 6/9/2017 by Michael Lim DO Entered by Leno Maki MD  
  Renal artery atherosclerosis, unilateral ([facility] [address]  12/23/2014 - Present 12/23/2014 by Leno Maki MD  
  Entered by Leno Maki MD  
  Varicose vein of leg  12/23/2014 - Present 11/9/2015 by Diego Martini, NP Entered by Leno Maki MD  
  Thyroid activity decreased  11/9/2015 - Present 11/9/2015 by Diego Every, NP Entered by Diego Every, NP Right groin hernia  Unknown - Present 11/9/2015 by Diego Every, NP Entered by Diego Every, NP Overview Signed 11/9/2015 10:37 AM by Diego Every, NP  
   not resolved   CAD (coronary artery disease), native coronary artery (Chronic)  Unknown - Present 6/7/2017 by Valeria Price DO Entered by Aaron Dorado NP Overview Signed 11/9/2015 10:37 AM by Aaron Dorado NP  
   ALIDA X 2 to OM1 Hypertriglyceridemia  11/16/2015 - Present 11/16/2015 by Aaron Dorado, NP Entered by Aaron Dorado, RANDY Vitamin D deficiency  11/16/2015 - Present 11/16/2015 by Aaron Dorado, NP Entered by Aaron Dorado, NP Elevated serum creatinine  11/16/2015 - Present 11/16/2015 by Aaron Dorado, NP Entered by Aaron Dorado, NP  
  HNP (herniated nucleus pulposus), lumbar  12/10/2015 - Present 12/10/2015 by Carlos Miller MD  
  Entered by Carlos Miller MD  
  Facet arthritis of lumbar region [facility])  12/10/2015 - Present 12/10/2015 by Carlos Miller MD  
  Entered by Carlos Miller MD  
  Chronic pain (Chronic)  12/10/2015 - Present 6/8/2017 by Valeria Price DO Entered by Carlos Miller MD  
  Muscle spasm of back  7/19/2016 - Present 7/19/2016 by Carlos Miller MD  
  Entered by Carlos Miller MD  
  Tobacco dependency  7/19/2016 - Present 7/19/2016 by Carlos Miller MD  
  Entered by Carlos Miller MD  
  Spondylosis of lumbar region without myelopathy or radiculopathy  8/11/2016 - Present 8/11/2016 by Jony Hsu MD  
  Entered by Jony Hsu MD  
  Lumbar facet arthropathy  8/11/2016 - Present 8/11/2016 by Jony Hsu MD  
  Entered by Jony Hsu MD  
  Arthritis of lumbar spine [facility])  8/11/2016 - Present 8/11/2016 by Jony Hsu MD  
  Entered by Jony Hsu MD  
  AAA (abdominal aortic aneurysm) without rupture ([facility] [address]  5/18/2017 - Present 5/18/2017 by VIJAY Holly Entered by VIJAY Holly  
  SOB (shortness of breath)  6/2/2017 - Present 6/2/2017 by Ronnell Ojeda MD  
  Entered by Ronnell Ojeda MD  
  Constipation due to pain medication therapy (Chronic)  6/7/2017 - Present 6/7/2017 by Valeria Price DO   Entered by Valeria Price DO  
 You are allergic to the following No active allergies Current Discharge Medication List  
  
START taking these medications Dose & Instructions Dispensing Information Comments  
 ciprofloxacin HCl 500 mg tablet Commonly known as:  CIPRO Dose:  500 mg Take 1 Tab by mouth two (2) times a day for 5 days. You have this medication at home from Dr Rdaha Schuster Take 1 cipro 2 times a day for 5 days  Indications: DIVERTICULITIS OF GASTROINTESTINAL TRACT Quantity:  10 Tab Refills:  0  
   
 hydrocortisone-pramoxine rectal foam  
Commonly known as:  PROCTOFOAM HC Dose:  1 Applicator Insert 1 Applicator into rectum two (2) times daily as needed for Hemorrhoids. Quantity:  1 Can Refills:  0  
   
 metroNIDAZOLE 500 mg tablet Commonly known as:  FLAGYL You have this medication at home from Dr Qi Murguia 1 with food 3 times a day for 5 days  Indications: DIVERTICULITIS OF GASTROINTESTINAL TRACT Quantity:  15 Tab Refills:  0  
   
 nicotine 14 mg/24 hr patch Commonly known as:  Dearl Docker Dose:  1 Patch 1 Patch by TransDERmal route daily for 30 days. Indications: SMOKING CESSATION Quantity:  30 Patch Refills:  0  
   
 witch hazel-glycerin 50 % Padm Commonly known as:  TUCKS Dose:  1 Container 1 Container by PeriANAL route as needed. Quantity:  1 Each Refills:  0 CONTINUE these medications which have CHANGED Dose & Instructions Dispensing Information Comments  
 gabapentin 300 mg capsule Commonly known as:  NEURONTIN What changed:  Another medication with the same name was removed. Continue taking this medication, and follow the directions you see here. Dose:  300 mg Take 300 mg by mouth three (3) times daily. Indications: NEUROPATHIC PAIN Refills:  0 HYDROcodone-acetaminophen 7.5-325 mg per tablet Commonly known as:  Rani Rivas What changed:  when to take this Dose:  1 Tab Take 1 Tab by mouth every six (6) hours as needed for Pain. Max Daily Amount: 4 Tabs. Quantity:  20 Tab Refills:  0 phenyleph-shark pacheco oil-mo-pet rectal ointment Commonly known as:  PREPARATION H What changed:  reasons to take this  
 by PeriANAL route four (4) times daily as needed for Hemorrhoids or Pain for up to 7 days. Quantity:  30 g Refills:  0  
   
 senna 8.6 mg tablet Commonly known as:  Robert Gore What changed:  when to take this Dose:  2 Tab Take 2 Tabs by mouth nightly. Indications: constipation Quantity:  100 Tab Refills:  0 CONTINUE these medications which have NOT CHANGED Dose & Instructions Dispensing Information Comments  
 albuterol 90 mcg/actuation inhaler Commonly known as:  PROVENTIL HFA, VENTOLIN HFA, PROAIR HFA Dose:  2 Puff Take 2 Puffs by inhalation every six (6) hours as needed for Wheezing. Quantity:  1 Inhaler Refills:  0  
   
 aspirin 81 mg chewable tablet Dose:  81 mg Take 1 Tab by mouth daily. Quantity:  30 Tab Refills:  11  
   
 atenolol 25 mg tablet Commonly known as:  TENORMIN Dose:  25 mg Take 25 mg by mouth daily. Refills:  0  
   
 atorvastatin 40 mg tablet Commonly known as:  LIPITOR Dose:  40 mg Take 40 mg by mouth nightly. Refills:  0  
   
 fluticasone-salmeterol 100-50 mcg/dose diskus inhaler Commonly known as:  ADVAIR Dose:  1 Puff Take 1 Puff by inhalation every twelve (12) hours. Indications: BRONCHOSPASM PREVENTION WITH COPD Quantity:  1 Inhaler Refills:  0  
   
 levothyroxine 125 mcg tablet Commonly known as:  SYNTHROID Refills:  0 LORazepam 1 mg tablet Commonly known as:  ATIVAN Dose:  1 mg Take 1 Tab by mouth every six (6) hours as needed. Max Daily Amount: 4 mg. Indications: anxiety Quantity:  15 Tab Refills:  0  
   
 nitroglycerin 0.4 mg SL tablet Commonly known as:  NITROSTAT  Dose:  0.4 mg  
 1 Tab by SubLINGual route every five (5) minutes as needed for Chest Pain (if chest pain not resolved after taking 3 call 911 ). Quantity:  30 Tab Refills:  3  
   
 polyethylene glycol 17 gram/dose powder Commonly known as:  Ferol Mickey Dose:  17 g Take 17 g by mouth daily. 1 capful with 8 oz of water daily Quantity:  119 g Refills:  0 PROTONIX 40 mg tablet Generic drug:  pantoprazole Dose:  40 mg Take 40 mg by mouth daily. Refills:  0  
   
 raNITIdine 300 mg tablet Commonly known as:  ZANTAC Dose:  1 Tab Take 1 Tab by mouth daily. Refills:  0 STOP taking these medications Comments  
 bisacodyl 5 mg EC tablet Commonly known as:  DULCOLAX (BISACODYL)  
   
   
 lidocaine 5 % Commonly known as:  LIDODERM  
   
   
 nystatin topical cream  
Commonly known as:  MYCOSTATIN  
   
   
 omega-3 acid ethyl esters 1 gram capsule Commonly known as:  LOVAZA  
   
   
 oxyCODONE-acetaminophen 7.5-325 mg per tablet Commonly known as:  PERCOCET 7.5 Current Immunizations Name Date Influenza Vaccine (Quad) PF 11/9/2015 Surgery Information ID Date/Time Status Primary Surgeon All Procedures Location [id number] 9/13/2017 [address], MD SIGMOIDOSCOPY FLEXIBLE w/ biopsies [facility] ENDOSCOPY Follow-up Information Follow up With Details Comments Contact Info Greer Gamboa MD On 9/18/2017 Office will contact patient and give pickup time for appointment. [address] [facility] [address] [phone] Discharge Instructions DASH Diet: Care Instructions Your Care Instructions The DASH diet is an eating plan that can help lower your blood pressure. DASH stands for Dietary Approaches to Stop Hypertension. Hypertension is high blood pressure.  
The DASH diet focuses on eating foods that are high in calcium, potassium, and magnesium. These nutrients can lower blood pressure. The foods that are highest in these nutrients are fruits, vegetables, low-fat dairy products, nuts, seeds, and legumes. But taking calcium, potassium, and magnesium supplements instead of eating foods that are high in those nutrients does not have the same effect. The DASH diet also includes whole grains, fish, and poultry. The DASH diet is one of several lifestyle changes your doctor may recommend to lower your high blood pressure. Your doctor may also want you to decrease the amount of sodium in your diet. Lowering sodium while following the DASH diet can lower blood pressure even further than just the DASH diet alone. Follow-up care is a key part of your treatment and safety. Be sure to make and go to all appointments, and call your doctor if you are having problems. It's also a good idea to know your test results and keep a list of the medicines you take. How can you care for yourself at home? Following the DASH diet · Eat 4 to 5 servings of fruit each day. A serving is 1 medium-sized piece of fruit, ½ cup chopped or canned fruit, 1/4 cup dried fruit, or 4 ounces (½ cup) of fruit juice. Choose fruit more often than fruit juice. · Eat 4 to 5 servings of vegetables each day. A serving is 1 cup of lettuce or raw leafy vegetables, ½ cup of chopped or cooked vegetables, or 4 ounces (½ cup) of vegetable juice. Choose vegetables more often than vegetable juice. · Get 2 to 3 servings of low-fat and fat-free dairy each day. A serving is 8 ounces of milk, 1 cup of yogurt, or 1 ½ ounces of cheese. · Eat 6 to 8 servings of grains each day. A serving is 1 slice of bread, 1 ounce of dry cereal, or ½ cup of cooked rice, pasta, or cooked cereal. Try to choose whole-grain products as much as possible. · Limit lean meat, poultry, and fish to 2 servings each day. A serving is 3 ounces, about the size of a deck of cards. · Eat 4 to 5 servings of nuts, seeds, and legumes (cooked dried beans, lentils, and split peas) each week. A serving is 1/3 cup of nuts, 2 tablespoons of seeds, or ½ cup of cooked beans or peas. · Limit fats and oils to 2 to 3 servings each day. A serving is 1 teaspoon of vegetable oil or 2 tablespoons of salad dressing. · Limit sweets and added sugars to 5 servings or less a week. A serving is 1 tablespoon jelly or jam, ½ cup sorbet, or 1 cup of lemonade. · Eat less than 2,300 milligrams (mg) of sodium a day. If you limit your sodium to 1,500 mg a day, you can lower your blood pressure even more. Tips for success · Start small. Do not try to make dramatic changes to your diet all at once. You might feel that you are missing out on your favorite foods and then be more likely to not follow the plan. Make small changes, and stick with them. Once those changes become habit, add a few more changes. · Try some of the following: ¨ Make it a goal to eat a fruit or vegetable at every meal and at snacks. This will make it easy to get the recommended amount of fruits and vegetables each day. ¨ Try yogurt topped with fruit and nuts for a snack or healthy dessert. ¨ Add lettuce, tomato, cucumber, and onion to sandwiches. ¨ Combine a ready-made pizza crust with low-fat mozzarella cheese and lots of vegetable toppings. Try using tomatoes, squash, spinach, broccoli, carrots, cauliflower, and onions. ¨ Have a variety of cut-up vegetables with a low-fat dip as an appetizer instead of chips and dip. ¨ Sprinkle sunflower seeds or chopped almonds over salads. Or try adding chopped walnuts or almonds to cooked vegetables. ¨ Try some vegetarian meals using beans and peas. Add garbanzo or kidney beans to salads. Make burritos and tacos with mashed conway beans or black beans. Where can you learn more? Go to http://daily-lance.info/.  
Enter V362 in the search box to learn more about \"DASH Diet: Care Instructions. \" Current as of: April 3, 2017 Content Version: 11.3 © 6931-9146 [employer]. Care instructions adapted under license by [employer] (which disclaims liability or warranty for this information). If you have questions about a medical condition or this instruction, always ask your healthcare professional. [address] any warranty or liability for your use of this information. Learning About Diverticulosis and Diverticulitis What are diverticulosis and diverticulitis? In diverticulosis and diverticulitis, pouches called diverticula form in the wall of the large intestine, or colon. · In diverticulosis, the pouches do not cause any pain or other symptoms. · In diverticulitis, the pouches get inflamed or infected and cause symptoms. Doctors aren't sure what causes these pouches in the colon. But they think that a low-fiber diet may play a role. Without fiber to add bulk to the stool, the colon has to work harder than normal to push the stool forward. The pressure from this may cause pouches to form in weak spots along the colon. Some people with diverticulosis get diverticulitis. But experts don't know why this happens. What are the symptoms? · In diverticulosis, most people don't have symptoms. But pouches sometimes bleed. · In diverticulitis, symptoms may last from a few hours to a week or more. They include: ¨ Belly pain. This is usually in the lower left side. It is sometimes worse when you move. This is the most common symptom. ¨ Fever and chills. ¨ Bloating and gas. ¨ Diarrhea or constipation. ¨ Nausea and sometimes vomiting. ¨ Not feeling like eating. How can you prevent these problems? You may be able to lower your chance of getting diverticulitis. You can do this by taking steps to prevent constipation. · Eat fruits, vegetables, beans, and whole grains every day. These foods are high in fiber. · Drink plenty of fluids (enough so that your urine is light yellow or clear like water). If you have kidney, heart, or liver disease and have to limit fluids, talk with your doctor before you increase the amount of fluids you drink. · Get at least 30 minutes of exercise on most days of the week. Walking is a good choice. You also may want to do other activities, such as running, swimming, cycling, or playing tennis or team sports. · Take a fiber supplement, such as Citrucel or Metamucil, every day if needed. Read and follow all instructions on the label. · Schedule time each day for a bowel movement. Having a daily routine may help. Take your time and do not strain when having a bowel movement. Some people avoid nuts, seeds, berries, and popcorn. They believe that these foods might get trapped in the diverticula and cause pain. But there is no proof that these foods cause diverticulitis or make it worse. How are these problems treated? · The best way to treat diverticulosis is to avoid constipation. (See the tips above.) · Treatment for diverticulitis includes antibiotics and often a change in your diet. You may need only liquids at first. Your doctor may suggest pain medicines for pain or belly cramps. In some cases, surgery may be needed. Follow-up care is a key part of your treatment and safety. Be sure to make and go to all appointments, and call your doctor if you are having problems. It's also a good idea to know your test results and keep a list of the medicines you take. Where can you learn more? Go to http://daily-lance.info/. Enter Q258 in the search box to learn more about \"Learning About Diverticulosis and Diverticulitis. \" Current as of: August 9, 2016 Content Version: 11.3 © 5793-9531 [employer].  Care instructions adapted under license by [employer] (which disclaims liability or warranty for this information). If you have questions about a medical condition or this instruction, always ask your healthcare professional. [address] any warranty or liability for your use of this information. [employer] Activation Thank you for requesting access to [employer]ty. Please follow the instructions below to securely access and download your online medical record. MeSixty allows you to send messages to your doctor, view your test results, renew your prescriptions, schedule appointments, and more. How Do I Sign Up? 1. In your internet browser, go to [email or web address] 
2. Click on the First Time User? Click Here link in the Sign In box. You will be redirect to the New Member Sign Up page. 3. Enter your MeSixty Access Code exactly as it appears below. You will not need to use this code after youve completed the sign-up process. If you do not sign up before the expiration date, you must request a new code. Me[employer]ty Access Code: Activation code not generated Current MeSixty Status: Patient Declined (This is the date your MeSixty access code will ) 4. Enter the last four digits of your Social Security Number (xxxx) and Date of Birth (mm/dd/yyyy) as indicated and click Submit. You will be taken to the next sign-up page. 5. Create a MeSixty ID. This will be your MeSixty login ID and cannot be changed, so think of one that is secure and easy to remember. 6. Create a MeSixty password. You can change your password at any time. 7. Enter your Password Reset Question and Answer. This can be used at a later time if you forget your password. 8. Enter your e-mail address. You will receive e-mail notification when new information is available in [address] 9. Click Sign Up. You can now view and download portions of your medical record. 10. Click the Download Summary menu link to download a portable copy of your medical information. Additional Information If you have questions, please visit the Frequently Asked Questions section of the MyChart website at [email or web address] Remember, MyChart is NOT to be used for urgent needs. For medical emergencies, dial 911. Patient armband removed and given to patient to take home. Patient was informed of the privacy risks if armband lost or stolen DISCHARGE SUMMARY from Nurse The following personal items are in your possession at time of discharge: 
 
Dental Appliances: None Visual Aid: At bedside, Glasses Home Medications: Kept at bedside Jewelry: Watch, Other (comment) Clothing: Footwear, Pants, Shirt, Socks, Undergarments, With patient Other Valuables: None Personal Items Sent to Safe: none PATIENT INSTRUCTIONS: 
 
After general anesthesia or intravenous sedation, for 24 hours or while taking prescription Narcotics: · Limit your activities · Do not drive and operate hazardous machinery · Do not make important personal or business decisions · Do  not drink alcoholic beverages · If you have not urinated within 8 hours after discharge, please contact your surgeon on call. Report the following to your surgeon: 
· Excessive pain, swelling, redness or odor of or around the surgical area · Temperature over 100.5 · Nausea and vomiting lasting longer than 4 hours or if unable to take medications · Any signs of decreased circulation or nerve impairment to extremity: change in color, persistent  numbness, tingling, coldness or increase pain · Any questions What to do at Home: 
Recommended activity: Activity as tolerated If you experience any of the following symptoms increased nausea and vomiting, pain not resolved by over the counter medications, fever greater than 101.4 please follow up with PCP. *  Please give a list of your current medications to your Primary Care Provider.  
 
*  Please update this list whenever your medications are discontinued, doses are 
    changed, or new medications (including over-the-counter products) are added. *  Please carry medication information at all times in case of emergency situations. These are general instructions for a healthy lifestyle: No smoking/ No tobacco products/ Avoid exposure to second hand smoke Surgeon General's Warning:  Quitting smoking now greatly reduces serious risk to your health. Obesity, smoking, and sedentary lifestyle greatly increases your risk for illness A healthy diet, regular physical exercise & weight monitoring are important for maintaining a healthy lifestyle You may be retaining fluid if you have a history of heart failure or if you experience any of the following symptoms:  Weight gain of 3 pounds or more overnight or 5 pounds in a week, increased swelling in our hands or feet or shortness of breath while lying flat in bed. Please call your doctor as soon as you notice any of these symptoms; do not wait until your next office visit. Recognize signs and symptoms of STROKE: 
 
F-face looks uneven A-arms unable to move or move unevenly S-speech slurred or non-existent T-time-call 911 as soon as signs and symptoms begin-DO NOT go Back to bed or wait to see if you get better-TIME IS BRAIN. Warning Signs of HEART ATTACK Call 911 if you have these symptoms: 
? Chest discomfort. Most heart attacks involve discomfort in the center of the chest that lasts more than a few minutes, or that goes away and comes back. It can feel like uncomfortable pressure, squeezing, fullness, or pain. ? Discomfort in other areas of the upper body. Symptoms can include pain or discomfort in one or both arms, the back, neck, jaw, or stomach. ? Shortness of breath with or without chest discomfort. ? Other signs may include breaking out in a cold sweat, nausea, or lightheadedness. Don't wait more than five minutes to call [address]!  Fast action can save your life. Calling 911 is almost always the fastest way to get lifesaving treatment. Emergency Medical Services staff can begin treatment when they arrive  up to an hour sooner than if someone gets to the hospital by car. The discharge information has been reviewed with the patient. The patient verbalized understanding. Discharge medications reviewed with the patient and appropriate educational materials and side effects teaching were provided. Chart Review Routing History Recipient Method Report Sent By Marianela Saini MD  
Fax: [phone] Phone: [phone] Fax IP Auto Routed Trans Asael, Transcription [EDITRANS] 4/9/2012  4:00 PM 04/09/2012 Joan Mckeon MD  
Fax: [phone] Phone: [phone] Fax IP Auto Routed Trans Asael, Transcription [EDITRANS] 4/9/2012  4:00 PM 04/09/2012 Ros Perales MD  
Phone: [phone] In Basket IP Auto Routed Trans Asael, Transcription [EDITRANS] 4/9/2012  4:00 PM 04/09/2012 Joan Mckeon MD  
Fax: [phone] Phone: [phone] Fax Clara Passey Routejose e Mcgrath MD [[id number]] 3/31/2013  9:44 AM 03/31/2013 Billy Kovacs MD  
Phone: [phone] In Basket IP Auto Routed Rose Mcgrath MD [[id number]] 3/31/2013  9:44 AM 03/31/2013 Dara Ramos MD  
Phone: [phone] In Basket IP Auto Routed Rose Mcgrath MD [[id number]] 3/31/2013  9:44 AM 03/31/2013 Danette Rodrigues MD  
Phone: [phone] In Basket IP Auto Routed Rose Mcgrath MD [[id number]] 3/31/2013  9:44 AM 03/31/2013 Joan Mckeon MD  
Fax: [phone] Phone: [phone] Fax IP Auto Routed [employer], [place] [[id number]] 4/1/2013  8:05 AM 04/01/2013 Danette Rodrigues MD  
Phone: [phone] In [employer] Routed [employer], [place] [[id number]] 4/1/2013  8:05 AM 04/01/2013 Danette Rodrigues MD  
Phone: [phone] In [employer] Routed MD Susan [[id number]] 4/8/2013  2:38 PM 04/08/2013 Elena Vale MD  
Phone: [phone] In [employer] IP Auto Routed Roselyn Mcgrath MD [[id number]] 6/2/2013  9:40 PM 06/02/2013 Mayur Umanzor DO Phone: [phone] In [employer] IP Auto Routed [place], [place] [[id number]] 6/3/2013  7:45 AM 06/03/2013 [facility] Fax: [phone] Fax [facility] IP AMB RESULT REPORT IMAGING Vandana Weems [[id number]] 6/3/2013  9:22 PM 06/03/2013 Elena Vale MD  
Phone: [phone] In [employer] Routed [employer] [[id number]] 6/8/2013 11:00 PM 06/08/2013 Philippe Ferguson MD  
Phone: [phone] In [employer] Routed [employer] [[id number]] 6/8/2013 11:00 PM 06/08/2013 Aide Haskins MD  
Fax: [phone] Phone: [phone] Fax IP Auto Routed [employer] [[id number]] 6/8/2013 11:00 PM 06/08/2013 Mireya Perry MD  
Fax: [phone] Phone: [phone] Fax Note Review Shamar Vallejo, [address] [facility] 8/29/2014  9:00 AM 08/29/2014 Herman Vega MD  
Phone: [phone] In [employer] Routed [employer], [address] [facility] [[id number]] 1/15/2015  7:52 AM 01/15/2015 Mireya Perry MD  
Fax: [phone] Phone: [phone] Fax IP Auto Routed [employer], [address] [facility] [[id number]] 1/15/2015  7:52 AM 01/15/2015 Mireya Perry MD  
Fax: [phone] Phone: [phone] Fax Note Review Shamar Yoni, [address] 2/24/2015  2:04 PM 02/24/2015 Jose Ware MD  
Fax: [phone] Phone: [phone] Fax Note Review Shamar Vallejo, [address] 2/24/2015  2:04 PM 02/24/2015 Rick Erickson MD  
Phone: [phone] In [employer] Routed [employer], [facility] [[id number]] 5/19/2017  8:43 AM 05/19/2017 Rick Erickson MD  
Phone: [phone] In [employer] Routed [employer], [facility] [[id number]] 5/22/2017  8:03 AM 05/22/2017 Maged Antonio MD  
Fax: [phone] Phone: [phone] Fax [facility] CUSTOM LAB REPORT Bob Carty, [address] [[id number]] 9/14/2017  1:22 PM 9/13/2017 Vergie Paget, MD  
Fax: [phone] Phone: [phone] Fax [facility] AMB RESULT REPORT IMAGING Bob Carty NP [[id number]] 9/14/2017  1:23 PM 9/9/2017

## 2017-09-09 NOTE — IP AVS SNAPSHOT
[address] Patient: Le Thrasher MRN: [id number] Current Discharge Medication List  
  
START taking these medications Dose & Instructions Dispensing Information Comments Morning Noon Evening Bedtime  
 ciprofloxacin HCl 500 mg tablet Commonly known as:  CIPRO Your last dose was: Your next dose is:    
   
   
 Dose:  500 mg Take 1 Tab by mouth two (2) times a day for 5 days. You have this medication at home from Dr Nick Jarvis Take 1 cipro 2 times a day for 5 days  Indications: DIVERTICULITIS OF GASTROINTESTINAL TRACT Quantity:  10 Tab Refills:  0  
     
   
   
   
  
 hydrocortisone-pramoxine rectal foam  
Commonly known as:  PROCTOFOAM HC Your last dose was: Your next dose is:    
   
   
 Dose:  1 Applicator Insert 1 Applicator into rectum two (2) times daily as needed for Hemorrhoids. Quantity:  1 Can Refills:  0  
     
   
   
   
  
 metroNIDAZOLE 500 mg tablet Commonly known as:  FLAGYL Your last dose was: Your next dose is: You have this medication at home from Dr Muriel Arriola 1 with food 3 times a day for 5 days  Indications: DIVERTICULITIS OF GASTROINTESTINAL TRACT Quantity:  15 Tab Refills:  0  
     
   
   
   
  
 nicotine 14 mg/24 hr patch Commonly known as:  Natalie Ripper Your last dose was: Your next dose is:    
   
   
 Dose:  1 Patch 1 Patch by TransDERmal route daily for 30 days. Indications: SMOKING CESSATION Quantity:  30 Patch Refills:  0  
     
   
   
   
  
 witch hazel-glycerin 50 % Padm Commonly known as:  TUCKS Your last dose was: Your next dose is:    
   
   
 Dose:  1 Container 1 Container by PeriANAL route as needed. Quantity:  1 Each Refills:  0 CONTINUE these medications which have CHANGED Dose & Instructions Dispensing Information Comments Morning Noon Evening Bedtime  
 gabapentin 300 mg capsule Commonly known as:  NEURONTIN What changed:  Another medication with the same name was removed. Continue taking this medication, and follow the directions you see here. Your last dose was: Your next dose is:    
   
   
 Dose:  300 mg Take 300 mg by mouth three (3) times daily. Indications: NEUROPATHIC PAIN Refills:  0 HYDROcodone-acetaminophen 7.5-325 mg per tablet Commonly known as:  Viola [place] What changed:  when to take this Your last dose was: Your next dose is:    
   
   
 Dose:  1 Tab Take 1 Tab by mouth every six (6) hours as needed for Pain. Max Daily Amount: 4 Tabs. Quantity:  20 Tab Refills:  0 phenyleph-shark pacheco oil-mo-pet rectal ointment Commonly known as:  PREPARATION H What changed:  reasons to take this Your last dose was: Your next dose is:    
   
   
 by PeriANAL route four (4) times daily as needed for Hemorrhoids or Pain for up to 7 days. Quantity:  30 g Refills:  0  
     
   
   
   
  
 senna 8.6 mg tablet Commonly known as:  Robert Gore What changed:  when to take this Your last dose was: Your next dose is:    
   
   
 Dose:  2 Tab Take 2 Tabs by mouth nightly. Indications: constipation Quantity:  100 Tab Refills:  0 CONTINUE these medications which have NOT CHANGED Dose & Instructions Dispensing Information Comments Morning Noon Evening Bedtime  
 albuterol 90 mcg/actuation inhaler Commonly known as:  PROVENTIL HFA, VENTOLIN HFA, PROAIR HFA Your last dose was: Your next dose is:    
   
   
 Dose:  2 Puff Take 2 Puffs by inhalation every six (6) hours as needed for Wheezing. Quantity:  1 Inhaler Refills:  0  
     
   
   
   
  
 aspirin 81 mg chewable tablet Your last dose was: Your next dose is:    
   
   
 Dose:  81 mg Take 1 Tab by mouth daily. Quantity:  30 Tab Refills:  11  
     
   
   
   
  
 atenolol 25 mg tablet Commonly known as:  TENORMIN Your last dose was: Your next dose is:    
   
   
 Dose:  25 mg Take 25 mg by mouth daily. Refills:  0  
     
   
   
   
  
 atorvastatin 40 mg tablet Commonly known as:  LIPITOR Your last dose was: Your next dose is:    
   
   
 Dose:  40 mg Take 40 mg by mouth nightly. Refills:  0  
     
   
   
   
  
 fluticasone-salmeterol 100-50 mcg/dose diskus inhaler Commonly known as:  ADVAIR Your last dose was: Your next dose is:    
   
   
 Dose:  1 Puff Take 1 Puff by inhalation every twelve (12) hours. Indications: BRONCHOSPASM PREVENTION WITH COPD Quantity:  1 Inhaler Refills:  0  
     
   
   
   
  
 levothyroxine 125 mcg tablet Commonly known as:  SYNTHROID Your last dose was: Your next dose is:    
   
   
  Refills:  0 LORazepam 1 mg tablet Commonly known as:  ATIVAN Your last dose was: Your next dose is:    
   
   
 Dose:  1 mg Take 1 Tab by mouth every six (6) hours as needed. Max Daily Amount: 4 mg. Indications: anxiety Quantity:  15 Tab Refills:  0  
     
   
   
   
  
 nitroglycerin 0.4 mg SL tablet Commonly known as:  NITROSTAT Your last dose was: Your next dose is:    
   
   
 Dose:  0.4 mg  
1 Tab by SubLINGual route every five (5) minutes as needed for Chest Pain (if chest pain not resolved after taking 3 call 911 ). Quantity:  30 Tab Refills:  3  
     
   
   
   
  
 polyethylene glycol 17 gram/dose powder Commonly known as:  Layo Younger Your last dose was: Your next dose is:    
   
   
 Dose:  17 g Take 17 g by mouth daily. 1 capful with 8 oz of water daily Quantity:  119 g Refills:  0 PROTONIX 40 mg tablet Generic drug:  pantoprazole Your last dose was: Your next dose is:    
   
   
 Dose:  40 mg Take 40 mg by mouth daily. Refills:  0  
     
   
   
   
  
 raNITIdine 300 mg tablet Commonly known as:  ZANTAC Your last dose was: Your next dose is:    
   
   
 Dose:  1 Tab Take 1 Tab by mouth daily. Refills:  0 STOP taking these medications   
 bisacodyl 5 mg EC tablet Commonly known as:  DULCOLAX (BISACODYL)  
   
  
 lidocaine 5 % Commonly known as:  LIDODERM  
   
  
 nystatin topical cream  
Commonly known as:  MYCOSTATIN  
   
  
 omega-3 acid ethyl esters 1 gram capsule Commonly known as:  LOVAZA  
   
  
 oxyCODONE-acetaminophen 7.5-325 mg per tablet Commonly known as:  PERCOCET 7.5 Where to Get Your Medications Information on where to get these meds will be given to you by the nurse or doctor. ! Ask your nurse or doctor about these medications  
  ciprofloxacin HCl 500 mg tablet HYDROcodone-acetaminophen 7.5-325 mg per tablet  
 hydrocortisone-pramoxine rectal foam  
 metroNIDAZOLE 500 mg tablet  
 nicotine 14 mg/24 hr patch  
 phenyleph-shark pacheco oil-mo-pet rectal ointment  
 polyethylene glycol 17 gram/dose powder  
 senna 8.6 mg tablet  
 witch hazel-glycerin 50 % Padm

## 2017-09-10 PROBLEM — K57.33 DIVERTICULITIS LARGE INTESTINE W/O PERFORATION OR ABSCESS W/BLEEDING: Status: ACTIVE | Noted: 2017-09-10

## 2017-09-10 PROBLEM — K57.30 DIVERTICULA OF COLON: Status: ACTIVE | Noted: 2017-09-10

## 2017-09-10 PROBLEM — K57.33 DIVERTICULITIS OF LARGE INTESTINE WITHOUT PERFORATION OR ABSCESS WITH BLEEDING: Status: ACTIVE | Noted: 2017-09-10

## 2017-09-10 LAB
APTT PPP: 28.1 SEC (ref 23–36.4)
BASOPHILS # BLD: 0 K/UL (ref 0–0.1)
BASOPHILS NFR BLD: 0 % (ref 0–2)
DIFFERENTIAL METHOD BLD: ABNORMAL
EOSINOPHIL # BLD: 0.2 K/UL (ref 0–0.4)
EOSINOPHIL NFR BLD: 2 % (ref 0–5)
ERYTHROCYTE [DISTWIDTH] IN BLOOD BY AUTOMATED COUNT: 15.2 % (ref 11.6–14.5)
HCT VFR BLD AUTO: 39.3 % (ref 36–48)
HGB BLD-MCNC: 12.7 G/DL (ref 13–16)
INR PPP: 1 (ref 0.8–1.2)
LACTATE BLD-SCNC: 0.5 MMOL/L (ref 0.4–2)
LYMPHOCYTES # BLD: 2.3 K/UL (ref 0.9–3.6)
LYMPHOCYTES NFR BLD: 22 % (ref 21–52)
MCH RBC QN AUTO: 27 PG (ref 24–34)
MCHC RBC AUTO-ENTMCNC: 32.3 G/DL (ref 31–37)
MCV RBC AUTO: 83.4 FL (ref 74–97)
MONOCYTES # BLD: 0.8 K/UL (ref 0.05–1.2)
MONOCYTES NFR BLD: 7 % (ref 3–10)
NEUTS SEG # BLD: 7.2 K/UL (ref 1.8–8)
NEUTS SEG NFR BLD: 69 % (ref 40–73)
PLATELET # BLD AUTO: 206 K/UL (ref 135–420)
PMV BLD AUTO: 10.3 FL (ref 9.2–11.8)
PROTHROMBIN TIME: 12.7 SEC (ref 11.5–15.2)
RBC # BLD AUTO: 4.71 M/UL (ref 4.7–5.5)
WBC # BLD AUTO: 10.5 K/UL (ref 4.6–13.2)

## 2017-09-10 PROCEDURE — 85025 COMPLETE CBC W/AUTO DIFF WBC: CPT | Performed by: HOSPITALIST

## 2017-09-10 PROCEDURE — 65660000000 HC RM CCU STEPDOWN

## 2017-09-10 PROCEDURE — 74011000250 HC RX REV CODE- 250: Performed by: HOSPITALIST

## 2017-09-10 PROCEDURE — 74011250637 HC RX REV CODE- 250/637: Performed by: HOSPITALIST

## 2017-09-10 PROCEDURE — 94640 AIRWAY INHALATION TREATMENT: CPT

## 2017-09-10 PROCEDURE — 83605 ASSAY OF LACTIC ACID: CPT

## 2017-09-10 PROCEDURE — 36415 COLL VENOUS BLD VENIPUNCTURE: CPT | Performed by: HOSPITALIST

## 2017-09-10 PROCEDURE — 74011250636 HC RX REV CODE- 250/636

## 2017-09-10 PROCEDURE — C9113 INJ PANTOPRAZOLE SODIUM, VIA: HCPCS | Performed by: HOSPITALIST

## 2017-09-10 PROCEDURE — 74011250636 HC RX REV CODE- 250/636: Performed by: HOSPITALIST

## 2017-09-10 RX ORDER — SODIUM CHLORIDE 9 MG/ML
125 INJECTION, SOLUTION INTRAVENOUS CONTINUOUS
Status: DISCONTINUED | OUTPATIENT
Start: 2017-09-10 | End: 2017-09-14 | Stop reason: HOSPADM

## 2017-09-10 RX ORDER — SODIUM CHLORIDE 0.9 % (FLUSH) 0.9 %
5-10 SYRINGE (ML) INJECTION EVERY 8 HOURS
Status: DISCONTINUED | OUTPATIENT
Start: 2017-09-10 | End: 2017-09-14 | Stop reason: HOSPADM

## 2017-09-10 RX ORDER — METRONIDAZOLE 500 MG/100ML
500 INJECTION, SOLUTION INTRAVENOUS EVERY 8 HOURS
Status: DISCONTINUED | OUTPATIENT
Start: 2017-09-10 | End: 2017-09-14 | Stop reason: HOSPADM

## 2017-09-10 RX ORDER — ALBUTEROL SULFATE 0.83 MG/ML
2.5 SOLUTION RESPIRATORY (INHALATION)
Status: DISCONTINUED | OUTPATIENT
Start: 2017-09-10 | End: 2017-09-14 | Stop reason: HOSPADM

## 2017-09-10 RX ORDER — ACETAMINOPHEN 650 MG/1
650 SUPPOSITORY RECTAL
Status: DISCONTINUED | OUTPATIENT
Start: 2017-09-10 | End: 2017-09-14 | Stop reason: HOSPADM

## 2017-09-10 RX ORDER — DICYCLOMINE HYDROCHLORIDE 10 MG/1
10 CAPSULE ORAL
Status: DISCONTINUED | OUTPATIENT
Start: 2017-09-10 | End: 2017-09-14 | Stop reason: HOSPADM

## 2017-09-10 RX ORDER — HYDROMORPHONE HYDROCHLORIDE 1 MG/ML
1 INJECTION, SOLUTION INTRAMUSCULAR; INTRAVENOUS; SUBCUTANEOUS
Status: DISCONTINUED | OUTPATIENT
Start: 2017-09-10 | End: 2017-09-14 | Stop reason: HOSPADM

## 2017-09-10 RX ORDER — SODIUM CHLORIDE 0.9 % (FLUSH) 0.9 %
5-10 SYRINGE (ML) INJECTION AS NEEDED
Status: DISCONTINUED | OUTPATIENT
Start: 2017-09-10 | End: 2017-09-14 | Stop reason: HOSPADM

## 2017-09-10 RX ORDER — HYDROCODONE BITARTRATE AND ACETAMINOPHEN 7.5; 325 MG/1; MG/1
1 TABLET ORAL
Status: ON HOLD | COMMUNITY
Start: 2017-08-17 | End: 2017-09-14

## 2017-09-10 RX ORDER — ONDANSETRON 2 MG/ML
INJECTION INTRAMUSCULAR; INTRAVENOUS
Status: COMPLETED
Start: 2017-09-10 | End: 2017-09-10

## 2017-09-10 RX ORDER — NITROGLYCERIN 0.4 MG/1
0.4 TABLET SUBLINGUAL
Status: DISCONTINUED | OUTPATIENT
Start: 2017-09-10 | End: 2017-09-14 | Stop reason: HOSPADM

## 2017-09-10 RX ORDER — LEVOFLOXACIN 5 MG/ML
500 INJECTION, SOLUTION INTRAVENOUS ONCE
Status: DISCONTINUED | OUTPATIENT
Start: 2017-09-10 | End: 2017-09-10 | Stop reason: SDUPTHER

## 2017-09-10 RX ORDER — LEVOFLOXACIN 5 MG/ML
500 INJECTION, SOLUTION INTRAVENOUS EVERY 24 HOURS
Status: DISCONTINUED | OUTPATIENT
Start: 2017-09-10 | End: 2017-09-14 | Stop reason: HOSPADM

## 2017-09-10 RX ORDER — FACIAL-BODY WIPES
10 EACH TOPICAL DAILY PRN
Status: DISCONTINUED | OUTPATIENT
Start: 2017-09-10 | End: 2017-09-14 | Stop reason: HOSPADM

## 2017-09-10 RX ORDER — ONDANSETRON 2 MG/ML
4 INJECTION INTRAMUSCULAR; INTRAVENOUS
Status: DISCONTINUED | OUTPATIENT
Start: 2017-09-10 | End: 2017-09-14 | Stop reason: HOSPADM

## 2017-09-10 RX ORDER — NALOXONE HYDROCHLORIDE 0.4 MG/ML
0.4 INJECTION, SOLUTION INTRAMUSCULAR; INTRAVENOUS; SUBCUTANEOUS AS NEEDED
Status: DISCONTINUED | OUTPATIENT
Start: 2017-09-10 | End: 2017-09-14 | Stop reason: HOSPADM

## 2017-09-10 RX ORDER — HYDROMORPHONE HYDROCHLORIDE 1 MG/ML
1 INJECTION, SOLUTION INTRAMUSCULAR; INTRAVENOUS; SUBCUTANEOUS
Status: DISCONTINUED | OUTPATIENT
Start: 2017-09-10 | End: 2017-09-10 | Stop reason: CLARIF

## 2017-09-10 RX ORDER — METRONIDAZOLE 500 MG/100ML
500 INJECTION, SOLUTION INTRAVENOUS
Status: DISCONTINUED | OUTPATIENT
Start: 2017-09-10 | End: 2017-09-10 | Stop reason: SDUPTHER

## 2017-09-10 RX ORDER — RANITIDINE 300 MG/1
1 TABLET ORAL DAILY
COMMUNITY
Start: 2017-08-22

## 2017-09-10 RX ORDER — GABAPENTIN 300 MG/1
300 CAPSULE ORAL 3 TIMES DAILY
COMMUNITY

## 2017-09-10 RX ORDER — DIPHENHYDRAMINE HYDROCHLORIDE 50 MG/ML
25 INJECTION, SOLUTION INTRAMUSCULAR; INTRAVENOUS
Status: DISCONTINUED | OUTPATIENT
Start: 2017-09-10 | End: 2017-09-14 | Stop reason: HOSPADM

## 2017-09-10 RX ORDER — OXYCODONE HYDROCHLORIDE 5 MG/1
5 TABLET ORAL
Status: DISCONTINUED | OUTPATIENT
Start: 2017-09-10 | End: 2017-09-14 | Stop reason: HOSPADM

## 2017-09-10 RX ORDER — BUDESONIDE AND FORMOTEROL FUMARATE DIHYDRATE 80; 4.5 UG/1; UG/1
2 AEROSOL RESPIRATORY (INHALATION)
Status: DISCONTINUED | OUTPATIENT
Start: 2017-09-10 | End: 2017-09-14 | Stop reason: HOSPADM

## 2017-09-10 RX ADMIN — ONDANSETRON 4 MG: 2 INJECTION INTRAMUSCULAR; INTRAVENOUS at 06:35

## 2017-09-10 RX ADMIN — SODIUM CHLORIDE 40 MG: 9 INJECTION INTRAMUSCULAR; INTRAVENOUS; SUBCUTANEOUS at 08:52

## 2017-09-10 RX ADMIN — Medication 5 ML: at 07:00

## 2017-09-10 RX ADMIN — METRONIDAZOLE 500 MG: 500 INJECTION, SOLUTION INTRAVENOUS at 20:20

## 2017-09-10 RX ADMIN — BUDESONIDE AND FORMOTEROL FUMARATE DIHYDRATE 2 PUFF: 80; 4.5 AEROSOL RESPIRATORY (INHALATION) at 20:20

## 2017-09-10 RX ADMIN — Medication 5 ML: at 14:00

## 2017-09-10 RX ADMIN — OXYCODONE HYDROCHLORIDE 5 MG: 5 TABLET ORAL at 20:21

## 2017-09-10 RX ADMIN — Medication 10 ML: at 23:31

## 2017-09-10 RX ADMIN — METRONIDAZOLE 500 MG: 500 INJECTION, SOLUTION INTRAVENOUS at 11:57

## 2017-09-10 RX ADMIN — SODIUM CHLORIDE 40 MG: 9 INJECTION INTRAMUSCULAR; INTRAVENOUS; SUBCUTANEOUS at 20:21

## 2017-09-10 RX ADMIN — SODIUM CHLORIDE 125 ML/HR: 900 INJECTION, SOLUTION INTRAVENOUS at 08:53

## 2017-09-10 NOTE — ED NOTES
Pt arrived to the ED with co ABD pain and rectal bleeding that started tonight with diarrhea x 3 days.

## 2017-09-10 NOTE — ED PROVIDER NOTES
Patient is a 78 y.o. male presenting with abdominal pain, diarrhea, and anal bleeding. The history is provided by the patient. Abdominal Pain    This is a new problem. Associated symptoms include diarrhea and nausea. Pertinent negatives include no fever and no vomiting. Diarrhea    Associated symptoms include abdominal pain and anal bleeding. Pertinent negatives include no vomiting. Rectal Bleeding   Associated symptoms include abdominal pain. Pt presents with c/o lower abd pain, nausea, diarrhea, and lower GI bleeding. Has had symptoms x 4d. Denies fever, vomiting. Had AAA repair this summer and still has some pain in his lower abd but this is worse. Estimates 10x loose stools in the past 24hrs. No meds tried for relief. Smokes 1 ppd; denies ETOH, illicits. Past Medical History:   Diagnosis Date    Arthritis     CAD (coronary artery disease), native coronary artery     ALIDA X 2 to OM1    Carotid duplex 08/13/2013    Mild <50% bilateral ICA plaquing.  Complete heart block [facility]) June 2013    Medtronic dual chamber pacemaker    COPD (chronic obstructive pulmonary disease) ([facility] [address]     Diverticulitis of large intestine without perforation or abscess with bleeding 9/10/2017    Gastritis     GERD (gastroesophageal reflux disease)     High cholesterol     History of AAA (abdominal aortic aneurysm) repair 9/12/2017    History of echocardiogram 06/04/2013    EF 60-65%. No WMA. Gr 1 DDfx. No significant valvular heart disease.  History of myocardial perfusion scan 04/18/2011    No ischemia or prior infarction. No WMA. EF 52%. Neg EKG on pharm stress test.    Hypertension     Hypothyroidism     Lower extremity venous duplex 01/06/2015    No DVT. Superficial insufficiency of right GSV. Deep venous reflux in right CFV & fem vein. Deep venous reflux in left CFV.  Renal duplex 12/24/2014    Mild < 60% RRA stenosis. Low parenchymal & LRA flow. Renal asymmetry.   Intrinsic/med disease in right kidney. AAA (3.8 x 4.0 cm) infrarenal.    Right groin hernia     not resolved    S/P AAA repair 05/18/2017    S/P cardiac cath 06/02/2013    Diffuse atherosclerosis throughout. pRCA 50%. mLM 30%. mLAD 40%. oD2 (sm) 100%. pLCX 30%. p/mOM1 95/80% (o/lapping 2.25 x 23-mm & 2.25 x 12-mm Xience stents, resid 0%). Past Surgical History:   Procedure Laterality Date    FLEXIBLE SIGMOIDOSCOPY N/A 9/13/2017    SIGMOIDOSCOPY FLEXIBLE w/ biopsies performed by Onelia Justice MD at [address] HX AAA REPAIR N/A 05/18/2017    Dr Julia Guo  2008    [address]  6/2013    HX HERNIA REPAIR  [id number]    Umbilical    HX PACEMAKER  6/2013    Medtronic Pacemaker     [place] SIGMOIDOSCOPY,BIOPSY  9/13/2017              Family History:   Problem Relation Age of Onset    Cancer Mother      pancreatic    Heart Attack Father     Heart Disease Father     Diabetes Sister     Heart Disease Sister     Diabetes Brother     Stroke Brother        Social History     Social History    Marital status:      Spouse name: N/A    Number of children: N/A    Years of education: N/A     Occupational History    Not on file. Social History Main Topics    Smoking status: Current Every Day Smoker     Packs/day: 1.00     Types: Cigarettes    Smokeless tobacco: Never Used    Alcohol use No    Drug use: No    Sexual activity: Not on file     Other Topics Concern     Service No    Blood Transfusions No    Caffeine Concern Yes    Occupational Exposure No    Hobby Hazards No    Sleep Concern No    Stress Concern No    Weight Concern No    Special Diet No    Back Care Yes    Exercise No    Bike Helmet No    Seat Belt Yes     Social History Narrative         ALLERGIES: Review of patient's allergies indicates no known allergies. Review of Systems   Constitutional: Positive for appetite change. Negative for fever. HENT: Negative. Respiratory: Negative. Cardiovascular: Negative. Gastrointestinal: Positive for abdominal pain, anal bleeding, diarrhea and nausea. Negative for vomiting. Endocrine: Negative. Genitourinary: Negative. Musculoskeletal: Negative. Skin: Negative. Allergic/Immunologic: Negative. Neurological: Negative. Hematological: Negative. Psychiatric/Behavioral: Negative. Vitals:    09/14/17 0400 09/14/17 0902 09/14/17 1208 09/14/17 1701   BP: 133/70 123/70 115/69 130/73   Pulse: 61 61 60 61   Resp: 18 18 18 18   Temp: 98.5 °F (36.9 °C) 98.1 °F (36.7 °C) 98 °F (36.7 °C) 98.4 °F (36.9 °C)   SpO2: 100% 97% 95% 95%   Weight:       Height:                Physical Exam   Constitutional: Vital signs are normal. He appears well-developed and well-nourished. He is active. Non-toxic appearance. He does not appear ill. He appears distressed. HENT:   Head: Normocephalic and atraumatic. Neck: Normal range of motion. Neck supple. Carotid bruit is not present. No tracheal deviation present. No thyromegaly present. Cardiovascular: Normal rate, regular rhythm and normal heart sounds. Exam reveals no gallop and no friction rub. No murmur heard. Pulmonary/Chest: Effort normal and breath sounds normal. No stridor. No respiratory distress. He has no wheezes. He has no rales. He exhibits no tenderness. Abdominal: Soft. He exhibits no distension and no mass. There is tenderness. There is no rebound, no guarding and no CVA tenderness. Bilateral lower quadrant TTP. Genitourinary: Rectal exam shows guaiac positive stool. Musculoskeletal: Normal range of motion. Neurological: He is alert. Skin: Skin is warm, dry and intact. He is not diaphoretic. No pallor. Psychiatric: He has a normal mood and affect. His speech is normal and behavior is normal. Judgment and thought content normal.   Nursing note and vitals reviewed.        MDM  Number of Diagnoses or Management Options  Diagnosis management comments: Differential: lower gi bleed; diverticulitis; colitis; mesenteric ischemia; enteritis; anemia; dehydration    Start IVF, IV ABX. Normal lactic. Admit to Dr. Robbie Arias for [facility]. Wants general surgery consulted. Amount and/or Complexity of Data Reviewed  Clinical lab tests: reviewed and ordered  Tests in the radiology section of CPT®: ordered and reviewed      ED Course       Procedures      No results found for this or any previous visit (from the past 12 hour(s)). 12:49 AM  Diagnosis: colitis; lower GI bleed      Disposition: admit    Follow-up Information     Follow up With Details Comments Contact Info    Marie Barone MD On 9/18/2017 Office will contact patient and give pickup time for appointment. [address] [address] [phone]            Discharge Medication List as of 9/14/2017  5:07 PM      START taking these medications    Details   hydrocortisone-pramoxine (PROCTOFOAM HC) rectal foam Insert 1 Applicator into rectum two (2) times daily as needed for Hemorrhoids. , Disp-1 Can, R-0, Print      nicotine (NICODERM CQ) 14 mg/24 hr patch 1 Patch by TransDERmal route daily for 30 days. Indications: SMOKING CESSATION, Print, Disp-30 Patch, R-0      witch hazel-glycerin (TUCKS) 50 % padm 1 Container by PeriANAL route as needed. , Print, Disp-1 Each, R-0      ciprofloxacin HCl (CIPRO) 500 mg tablet Take 1 Tab by mouth two (2) times a day for 5 days.  You have this medication at home from Dr Ramirez Chisholm  Take 1 cipro 2 times a day for 5 days  Indications: DIVERTICULITIS OF GASTROINTESTINAL TRACT, Print, Disp-10 Tab, R-0      metroNIDAZOLE (FLAGYL) 500 mg tablet You have this medication at home from Dr Steven Santana 1 with food 3 times a day for 5 days  Indications: DIVERTICULITIS OF GASTROINTESTINAL TRACT, Print, Disp-15 Tab, R-0         CONTINUE these medications which have CHANGED    Details   HYDROcodone-acetaminophen (NORCO) 7.5-325 mg per tablet Take 1 Tab by mouth every six (6) hours as needed for Pain. Max Daily Amount: 4 Tabs., Print, Disp-20 Tab, R-0      polyethylene glycol (MIRALAX) 17 gram/dose powder Take 17 g by mouth daily. 1 capful with 8 oz of water daily, Print, Disp-119 g, R-0      senna (SENOKOT) 8.6 mg tablet Take 2 Tabs by mouth nightly. Indications: constipation, Print, Disp-100 Tab, R-0      phenyleph-shark pacheco oil-mo-pet (PREPARATION H) rectal ointment by PeriANAL route four (4) times daily as needed for Hemorrhoids or Pain for up to 7 days. , Print, Disp-30 g, R-0         CONTINUE these medications which have NOT CHANGED    Details   raNITIdine (ZANTAC) 300 mg tablet Take 1 Tab by mouth daily. , Historical Med      gabapentin (NEURONTIN) 300 mg capsule Take 300 mg by mouth three (3) times daily. Indications: NEUROPATHIC PAIN, Historical Med      fluticasone-salmeterol (ADVAIR) 100-50 mcg/dose diskus inhaler Take 1 Puff by inhalation every twelve (12) hours. Indications: BRONCHOSPASM PREVENTION WITH COPD, Phone In, Disp-1 Inhaler, R-0      LORazepam (ATIVAN) 1 mg tablet Take 1 Tab by mouth every six (6) hours as needed. Max Daily Amount: 4 mg. Indications: anxiety, Print, Disp-15 Tab, R-0      pantoprazole (PROTONIX) 40 mg tablet Take 40 mg by mouth daily. , Historical Med      atenolol (TENORMIN) 25 mg tablet Take 25 mg by mouth daily. , Historical Med      levothyroxine (SYNTHROID) 125 mcg tablet Historical Med      atorvastatin (LIPITOR) 40 mg tablet Take 40 mg by mouth nightly., Historical Med      aspirin 81 mg chewable tablet Take 1 Tab by mouth daily. , No Print, Disp-30 Tab, R-11      albuterol (PROVENTIL HFA, VENTOLIN HFA, PROAIR HFA) 90 mcg/actuation inhaler Take 2 Puffs by inhalation every six (6) hours as needed for Wheezing., Sample, Disp-1 Inhaler, R-0      nitroglycerin (NITROSTAT) 0.4 mg SL tablet 1 Tab by SubLINGual route every five (5) minutes as needed for Chest Pain (if chest pain not resolved after taking 3 call 911 ). , Print, Disp-30 Tab, R-3         STOP taking these medications       oxyCODONE-acetaminophen (PERCOCET 7.5) 7.5-325 mg per tablet Comments:   Reason for Stopping:         nystatin (MYCOSTATIN) topical cream Comments:   Reason for Stopping:         lidocaine (LIDODERM) 5 % Comments:   Reason for Stopping:         bisacodyl (DULCOLAX, BISACODYL,) 5 mg EC tablet Comments:   Reason for Stopping:         omega-3 acid ethyl esters (LOVAZA) 1 gram capsule Comments:   Reason for Stopping:

## 2017-09-10 NOTE — ED NOTES
Pt provided a urinal. Pt states, I feel dehydrated. I don't have to go. \" Will continue to monitor.

## 2017-09-10 NOTE — PROGRESS NOTES
Hospitalist Progress Note    Patient: Magy Taylor Age: 78 y.o. : 1950 MR#: [id number] SSN: [id number]  Date/Time: 9/10/2017 3:13 PM    Subjective:     Patient resting in bed alert, speech clear    Review of Systems -   Patient states positive for several loose stools with small amount of blood rectum   Negative for headache, shortness of breath, chest pain, nausea or vomiting       Objective:   VS:   Visit Vitals    /70 (BP 1 Location: Left arm, BP Patient Position: At rest)    Pulse 90    Temp 97 °F (36.1 °C)    Resp 18    SpO2 100%      Tmax/24hrs: Temp (24hrs), Av.3 °F (36.3 °C), Min:97 °F (36.1 °C), Max:97.7 °F (36.5 °C)     Intake/Output Summary (Last 24 hours) at 09/10/17 1513  Last data filed at 09/10/17 1200   Gross per 24 hour   Intake           102.08 ml   Output              200 ml   Net           -97.92 ml       General: alert and oriented ×3  HEENT:conjunctiva clear, no scleral icterus, no focal deficits  Cardiovascular:  Paced rhythm heart rate 60  Pulmonary:  Clear to bases  GI:  Diffuse tenderness on palpation, bowel sounds positive, soft  Extremities:  Moving all 4 extremities well, calves are nontender and soft, no cords, positive pulses  Additional:    Current Facility-Administered Medications   Medication Dose Route Frequency    0.9% sodium chloride infusion  125 mL/hr IntraVENous CONTINUOUS    levoFLOXacin (LEVAQUIN) 500 mg in D5W IVPB  500 mg IntraVENous Q24H    metroNIDAZOLE (FLAGYL) IVPB premix 500 mg  500 mg IntraVENous Q8H    HYDROmorphone (DILAUDID) syringe 1 mg  1 mg IntraVENous Q4H PRN    budesonide-formoterol (SYMBICORT) 80-4.5 mcg inhaler  2 Puff Inhalation BID RT    nitroglycerin (NITROSTAT) tablet 0.4 mg  0.4 mg SubLINGual Q5MIN PRN    pantoprazole (PROTONIX) 40 mg in sodium chloride 0.9 % 10 mL injection  40 mg IntraVENous Q12H    albuterol (PROVENTIL VENTOLIN) nebulizer solution 2.5 mg  2.5 mg Nebulization Q4H PRN    sodium chloride (NS) flush 5-10 mL  5-10 mL IntraVENous Q8H    sodium chloride (NS) flush 5-10 mL  5-10 mL IntraVENous PRN    acetaminophen (TYLENOL) suppository 650 mg  650 mg Rectal Q4H PRN    naloxone (NARCAN) injection 0.4 mg  0.4 mg IntraVENous PRN    dicyclomine (BENTYL) capsule 10 mg  10 mg Oral Q6H PRN    diphenhydrAMINE (BENADRYL) injection 25 mg  25 mg IntraVENous Q4H PRN    ondansetron (ZOFRAN) injection 4 mg  4 mg IntraVENous Q4H PRN    bisacodyl (DULCOLAX) suppository 10 mg  10 mg Rectal DAILY PRN    oxyCODONE IR (ROXICODONE) tablet 5 mg  5 mg Oral Q4H PRN     Labs:    Recent Results (from the past 24 hour(s))   PTT    Collection Time: 09/09/17  8:11 PM   Result Value Ref Range    aPTT 28.1 23.0 - 36.4 SEC   PROTHROMBIN TIME + INR    Collection Time: 09/09/17  8:11 PM   Result Value Ref Range    Prothrombin time 12.7 11.5 - 15.2 sec    INR 1.0 0.8 - 1.2     CBC WITH AUTOMATED DIFF    Collection Time: 09/09/17  8:15 PM   Result Value Ref Range    WBC 13.2 4.6 - 13.2 K/uL    RBC 4.92 4.70 - 5.50 M/uL    HGB 13.3 13.0 - 16.0 g/dL    HCT 40.6 36.0 - 48.0 %    MCV 82.5 74.0 - 97.0 FL    MCH 27.0 24.0 - 34.0 PG    MCHC 32.8 31.0 - 37.0 g/dL    RDW 15.1 (H) 11.6 - 14.5 %    PLATELET 011 611 - 688 K/uL    MPV 10.7 9.2 - 11.8 FL    NEUTROPHILS 75 (H) 40 - 73 %    LYMPHOCYTES 16 (L) 21 - 52 %    MONOCYTES 8 3 - 10 %    EOSINOPHILS 1 0 - 5 %    BASOPHILS 0 0 - 2 %    ABS. NEUTROPHILS 9.8 (H) 1.8 - 8.0 K/UL    ABS. LYMPHOCYTES 2.1 0.9 - 3.6 K/UL    ABS. MONOCYTES 1.1 0.05 - 1.2 K/UL    ABS. EOSINOPHILS 0.1 0.0 - 0.4 K/UL    ABS.  BASOPHILS 0.0 0.0 - 0.1 K/UL    DF AUTOMATED     METABOLIC PANEL, BASIC    Collection Time: 09/09/17  8:15 PM   Result Value Ref Range    Sodium 136 136 - 145 mmol/L    Potassium 3.9 3.5 - 5.5 mmol/L    Chloride 101 100 - 108 mmol/L    CO2 25 21 - 32 mmol/L    Anion gap 10 3.0 - 18 mmol/L    Glucose 89 74 - 99 mg/dL    BUN 11 7.0 - 18 MG/DL    Creatinine 1.33 (H) 0.6 - 1.3 MG/DL    BUN/Creatinine ratio 8 (L) 12 - 20      GFR est AA >60 >60 ml/min/1.73m2    GFR est non-AA 54 (L) >60 ml/min/1.73m2    Calcium 8.7 8.5 - 10.1 MG/DL   LIPASE    Collection Time: 09/09/17  8:15 PM   Result Value Ref Range    Lipase 66 (L) 73 - 393 U/L   HEPATIC FUNCTION PANEL    Collection Time: 09/09/17  8:15 PM   Result Value Ref Range    Protein, total 7.5 6.4 - 8.2 g/dL    Albumin 3.0 (L) 3.4 - 5.0 g/dL    Globulin 4.5 (H) 2.0 - 4.0 g/dL    A-G Ratio 0.7 (L) 0.8 - 1.7      Bilirubin, total 0.9 0.2 - 1.0 MG/DL    Bilirubin, direct 0.2 0.0 - 0.2 MG/DL    Alk. phosphatase 94 45 - 117 U/L    AST (SGOT) 19 15 - 37 U/L    ALT (SGPT) 11 (L) 16 - 61 U/L   POC LACTIC ACID    Collection Time: 09/10/17 12:32 AM   Result Value Ref Range    Lactic Acid (POC) 0.5 0.4 - 2.0 mmol/L   CBC WITH AUTOMATED DIFF    Collection Time: 09/10/17  6:39 AM   Result Value Ref Range    WBC 10.5 4.6 - 13.2 K/uL    RBC 4.71 4.70 - 5.50 M/uL    HGB 12.7 (L) 13.0 - 16.0 g/dL    HCT 39.3 36.0 - 48.0 %    MCV 83.4 74.0 - 97.0 FL    MCH 27.0 24.0 - 34.0 PG    MCHC 32.3 31.0 - 37.0 g/dL    RDW 15.2 (H) 11.6 - 14.5 %    PLATELET 082 824 - 077 K/uL    MPV 10.3 9.2 - 11.8 FL    NEUTROPHILS 69 40 - 73 %    LYMPHOCYTES 22 21 - 52 %    MONOCYTES 7 3 - 10 %    EOSINOPHILS 2 0 - 5 %    BASOPHILS 0 0 - 2 %    ABS. NEUTROPHILS 7.2 1.8 - 8.0 K/UL    ABS. LYMPHOCYTES 2.3 0.9 - 3.6 K/UL    ABS. MONOCYTES 0.8 0.05 - 1.2 K/UL    ABS. EOSINOPHILS 0.2 0.0 - 0.4 K/UL    ABS. BASOPHILS 0.0 0.0 - 0.1 K/UL    DF AUTOMATED         Imaging:  Xr Abd Acute W 1 V Chest    Result Date: 9/10/2017  PROCEDURE:  Abdomen flat and upright with chest x-ray. CPT code 87459. INDICATION:  Abdominal pain. COMPARISON:  6/6/17. FINDINGS:  No evidence of acute small bowel obstruction is detected. Mild nonspecific increase in small bowel gas is noted in the abdomen. No mass effect or pathological calcification is identified.   There is evidence of thoracic aortic stent deployment from the ascending aorta to the descending thoracic aorta. In the abdominal region, another stent placement is observed, extending into the iliac arteries bilaterally. No evidence of free intraperitoneal air is detected. A cardiac pacemaker is observed. No pneumothorax. No infiltrate or consolidation. There is thickening of the soft tissue adjacent to the lateral margin of the stent in the aortic arch suggestive of residual changes related to stent placement. IMPRESSION: 1. Nonobstructive abdomen. Nonspecific mild increase in small bowel gas. 2.  No evidence of acute cardiopulmonary disease. 3.  Status post aortic stent deployment at the thoracic aorta and in the abdominal aorta separately. Ct Abd Pelv W Cont    Result Date: 9/9/2017  CT of the Abdomen and Pelvis With Contrast CPT CODE: 37168 CLINICAL HISTORY: Abdominal pain and rectal bleeding that started tonight with diarrhea for 3 days. . TECHNIQUE: After uneventful administration of oral and nonionic intravenous contrast ( 75 cc Isovue 689), 5 mm helical scan was obtained of the abdomen and pelvis. Sagittal and coronal reformations then created with the original data set. All CT scans at this facility are performed using dose optimization techniques as appropriate to a performed exam, to include automated exposure control, adjustment of the mA and/or kV according to patient's size (including appropriate matching for site specific examinations), or use of iterative reconstruction technique. COMPARISON:/3/17, abdominal CTA 5/29/2017 FINDINGS: CT Abdomen: Lung bases: Clear. No effusions. Heart and pericardium: Pacer wires at right atrium and right ventricle. Stented descending thoracic aorta with dilatation measuring 4.5 cm at the hiatus as before. Liver: Normal. Gallbladder: Surgically absent. Spleen: Contains punctate granulomas. Otherwise unremarkable. Pancreas: Normal. Adrenal glands: Normal. Kidneys: Stable renal cysts, largest at interpolar region right kidney measuring 2 cm. Retroperitoneum: There has been stenting of infrarenal AAA with aortobiiliac stent, separate stenting of the renal artery origins. . Stent traverses AAA measuring maximum 4.5 cm, previously 4.8 cm. Lymph nodes: No adenopathy upper abdomen, retroperitoneum, mesentery. Bowel: Stomach and duodenum and small bowel and the appendix (66) are normal. Right colon normal. Transverse colon collapsed and unremarkable. Proximal descending colon normal. Scattered diverticula at distal left colon. There is some haziness around the distal left colon. No thick walled diverticula are seen. CT PELVIS: There is diffuse thickening of sigmoid colon proximally with one or 2 diverticula that appears thickened (axial 75, coronal 31). Thickening of rectum and low sigmoid colon is decreased when compared with previous study. No diverticula through this segment of sigmoid. No extraluminal air. No fluid collections. Peritoneal space: No free fluid. : Prostate and urinary bladder are unremarkable. . MUSCULOSKELETAL: Atrophy at right gluteus medius and minimus muscles. Seroma right groin has resolved. Umbilical hernia is wide mouthed, containing bowel without bowel obstruction (59). IMPRESSION: Decreased wall thickening at rectum and low sigmoid when compared with previous study. Differential includes infectious proctocolitis or ischemic bowel. New thickening of the wall of the upper sigmoid with multiple diverticula including one or 2 that appear thickened. Could represent diverticulitis. Other infectious process or ischemic bowel also possible. Umbilical hernia containing bowel without bowel obstruction. Stable appearance aortic dilatation at the hiatus and stented abdominal aorta. Renal cysts.       Assessment/Plan:     Hospital Problems  Date Reviewed: 9/10/2017          Codes Class Noted POA    * (Principal)Diverticulitis of large intestine without perforation or abscess with bleeding ICD-10-CM: K57.33  ICD-9-CM: 562.13  9/10/2017 Yes Gastroesophageal reflux disease without esophagitis ICD-10-CM: K21.9  ICD-9-CM: 530.81  11/9/2015 Yes        HTN (hypertension) ICD-10-CM: I10  ICD-9-CM: 401.9  3/30/2013 Yes          General surgery consultation appreciated  Continue Levaquin and Flagyl  Advanced to GI light diet  Continue parenteral fluids  Monitor every stool for occult blood  Requested RN to have patient use bedside commode and monitor all stools for color character, consistency and quantity  Monitor CBC  Nebulizer when necessary    GI prophylaxis Protonix  DVT prophylaxis support stockings and SCDs: discussed with RN patient was not wearing his SCD's, discussed need for compliance with patie and not on any pharmaceutical DVT prophylaxis    Additional Notes:      Full Code    Disposition:    Home     Case discussed with:  [x]Patient  []Family  [x]Nursing  []Case Management  DVT Prophylaxis:  []Lovenox  []Hep SQ  [x]SCDs  []Coumadin   []On Heparin gtt    Signed By: Lyndon Nixon 51    September 10, 2017 3:13 PM

## 2017-09-10 NOTE — PROGRESS NOTES
Surgery  Pt seen and examined  CT images reviewed  No sign of acute abd  Labs ok  No surgery planned

## 2017-09-10 NOTE — PROGRESS NOTES
conducted an initial consultation and Spiritual Assessment for Whitney Melgar, who is a 78 y.o.,male. Patients Primary Language is: [place]. According to the patients EMR Orthodoxy Affiliation is: [facility].     The reason the Patient came to the hospital is:   Patient Active Problem List    Diagnosis Date Noted    Diverticulitis of large intestine without perforation or abscess with bleeding 09/10/2017    Constipation due to pain medication therapy 06/07/2017    SOB (shortness of breath) 06/02/2017    AAA (abdominal aortic aneurysm) without rupture ([address] 05/18/2017    Spondylosis of lumbar region without myelopathy or radiculopathy 08/11/2016    Lumbar facet arthropathy 08/11/2016    Arthritis of lumbar spine ([address] 08/11/2016    Muscle spasm of back 07/19/2016    Tobacco dependency 07/19/2016    HNP (herniated nucleus pulposus), lumbar 12/10/2015    Facet arthritis of lumbar region ([address] 12/10/2015    Chronic pain 12/10/2015    Hypertriglyceridemia 11/16/2015    Vitamin D deficiency 11/16/2015    Elevated serum creatinine 11/16/2015    Thyroid activity decreased 11/09/2015    Gastroesophageal reflux disease without esophagitis 11/09/2015    Right groin hernia     CAD (coronary artery disease), native coronary artery     Renal artery atherosclerosis, unilateral ([address] 12/23/2014    Varicose vein of leg 12/23/2014    AAA (abdominal aortic aneurysm) ([address] 12/05/2014    Tobacco abuse 08/01/2014    Dyspnea 04/10/2014    Lightheadedness 04/10/2014    Vasovagal response 04/10/2014    Coronary atherosclerosis of native coronary artery 08/23/2013    Complete heart block ([address] 08/23/2013    HTN (hypertension) 03/30/2013    Dyslipidemia 03/30/2013    Aortic dissection, abdominal ([address] 03/30/2013        The  provided the following Interventions:  Initiated a relationship of care and support.    Explored issues of richie, belief, spirituality and Baptism/ritual needs while hospitalized. Listened empathically. Provided chaplaincy education. Provided information about Spiritual Care Services. Offered prayer and assurance of continued prayers on patient's behalf. Chart reviewed. The following outcomes where achieved:  Patient shared limited information about both their medical narrative and spiritual journey/beliefs.  confirmed Patient's Shinto Affiliation. Patient processed feeling about current hospitalization. Patient expressed gratitude for 's visit. Assessment:  Patient does not have any Religion/cultural needs that will affect patients preferences in health care. There are no spiritual or Religion issues which require intervention at this time. Plan:  Chaplains will continue to follow and will provide pastoral care on an as needed/requested basis.  recommends bedside caregivers page  on duty if patient shows signs of acute spiritual or emotional distress.     [facility]  [phone]

## 2017-09-10 NOTE — CONSULTS
[address]    Name:  Anai Tee  MR#:  [id number]  :  1950  Account #:  [de-identified]  Date of Adm:  2017  Date of Consultation:  09/10/2017      REASON FOR CONSULTATION: Abdominal pain, nausea and  diarrhea. HISTORY OF PRESENT ILLNESS: The patient is a 64-year-old  gentleman with a fairly complicated past medical history who presents  with 3-4 days of abdominal pain, nausea and some occasional bloody  diarrhea. He has had sweats, but has not measure his temperature. He  remains mildly hungry. His significant past medical history recently is  that he had an endovascular AAA repair in the summer and had some  thickened rectum and sigmoid afterwards. He has had approximately  10 stools over the last 24 hours prior to admission. He was seen in the  emergency room and workup revealed that he had a CT scan back in  May that showed a thickened rectum, but his sigmoid is mildly  thickened at this point, unlike his last CT. PAST MEDICAL HISTORY SIGNIFICANT FOR:    ALLERGIES: NO KNOWN MEDICAL ALLERGIES. MEDICATIONS: Prior to admission medicines include:  1. Zantac. 2. Norco.  3. Neurontin. 4. Advair. 5. Ativan. 6. Protonix. 7. Tenormin. 8. Synthroid. 9. Lipitor. 10. Proventil. 11. Nitrostat. PAST MEDICAL HISTORY: Significant for aortic and peripheral  vascular disease, heart block, COPD, gastritis, GERD, hypertension,  hypothyroidism, mild superficial venous insufficiency. PAST SURGICAL HISTORY: Includes cholecystectomy, coronary  stents, several hernia repairs, a pacemaker and endovascular aortic  repair. FAMILY HISTORY: Positive for heart disease, diabetes and stroke. SOCIAL HISTORY: He is  and lives at home by himself. He is  an every day smoker and a nondrinker. REVIEW OF SYSTEMS  CONSTITUTIONAL: Negative for constitutional complaints. HEENT: Negative for HEENT complaints.   RESPIRATORY: Negative for respiratory complaints. CARDIAC: Negative for cardiac complaints. ABDOMINAL: Positive for abdominal pain, anal bleeding, diarrhea and  nausea. : Negative for  complaints. MUSCULOSKELETAL: Negative for musculoskeletal complaints. SKIN: Negative for skin complaints. NEGATIVE: Negative for neurologic complaints. NEGATIVE: Negative for psychiatric complaints. PHYSICAL EXAMINATION  GENERAL: Awake, alert and oriented x3 in no apparent distress. HEAD AND NECK: Normocephalic, atraumatic. Pupils are equal.  Sclerae anicteric. NOSE AND THROAT: Clear. CHEST: Clear bilaterally. HEART: Regular rate and rhythm. ABDOMEN: Soft, nontender, without rebound or guarding. He has no  hemorrhoids externally. EXTREMITIES: Warm and dry. NEUROLOGIC: He is intact. LABORATORY DATA: His latest laboratory data show a white blood  cell count of 10,500 and hemoglobin and hematocrit of 12.7 and 39.3. His latest electrolytes were normal except for a creatinine of 1.3. His CT scan, the images of which I have reviewed, show a thickened  rectum which appears to be chronic and a mildly thickened sigmoid  colon. IMPRESSION: Low level sigmoid diverticulitis versus chronic ischemia  of the bowel wall due to aortic disease. He clearly looks well and is  able to talk and have a conversation without difficulty. Would continue  antibiotics. Follow labs, hydration and bowel rest. We will follow with  you.         MD Orlando Lisa / Drew Brady  D:  09/10/2017   11:21  T:  09/10/2017   12:22  Job #:  [id number]

## 2017-09-10 NOTE — H&P
History & Physical    Patient: Lety Alcala MRN: [id number]  CSN: [id number]    YOB: 1950  Age: 78 y.o. Sex: male      DOA: 9/9/2017       HPI:     Lety Alcala is a 78 y.o. male with a history of CAD, cardiac pacemaker, renal artery stenosis with CKDlll S/P stents; GERD, COPD, hypothyroidism, hypercholesterolemia, hypertension and arthritis. He had a AAA repair 8/99/7282 without complication and was discharged on 5/19/2017. He presented to the emergency room 5/23/2017 and 5/29/2017 with reports of loose stools and fecal soiling was prescribed Keflex and Flagyl and stated he could not afford the medication; was admitted on 6/2/2017 to 6/9/2017 with distal colon and sigmoid colitis. Patient reported to the emergency room the evening of 9/9/2017 complaining of abdominal pain with rectal bleeding that started that evening after 3 days of diarrhea. On my history patient reports poor appetite and constipation from not eating much passing gas bubbles to rectum. Questioned patient re: blood in stool; he stated the RN saw the blood on the tissues he had tucked below his buttocks, when he got undressed for his CT scan. Review of systems:  HEENT currently feels lightheaded and dehydrated  Chest denies any chest pain or shortness of breath  Abdomen reports left-sided and lower abdominal pain for 3 days                   Reports hemorrhoids but not using any local care at this time, reports run out of his stool softener and not using. Extremities denies pain or weakness    Past Medical History:   Diagnosis Date    Arthritis     CAD (coronary artery disease), native coronary artery     ALIDA X 2 to OM1    Carotid duplex 08/13/2013    Mild <50% bilateral ICA plaquing.       Complete heart block [facility]) June 2013    Medtronic dual chamber pacemaker    COPD (chronic obstructive pulmonary disease) ([facility] [address]     Diverticulitis of large intestine without perforation or abscess with bleeding 9/10/2017    Gastritis     GERD (gastroesophageal reflux disease)     High cholesterol     History of echocardiogram 06/04/2013    EF 60-65%. No WMA. Gr 1 DDfx. No significant valvular heart disease.  History of myocardial perfusion scan 04/18/2011    No ischemia or prior infarction. No WMA. EF 52%. Neg EKG on pharm stress test.    Hypertension     Hypothyroidism     Lower extremity venous duplex 01/06/2015    No DVT. Superficial insufficiency of right GSV. Deep venous reflux in right CFV & fem vein. Deep venous reflux in left CFV.  Renal duplex 12/24/2014    Mild < 60% RRA stenosis. Low parenchymal & LRA flow. Renal asymmetry. Intrinsic/med disease in right kidney. AAA (3.8 x 4.0 cm) infrarenal.    Right groin hernia     not resolved    S/P AAA repair 05/18/2017    S/P cardiac cath 06/02/2013    Diffuse atherosclerosis throughout. pRCA 50%. mLM 30%. mLAD 40%. oD2 (sm) 100%. pLCX 30%. p/mOM1 95/80% (o/lapping 2.25 x 23-mm & 2.25 x 12-mm Xience stents, resid 0%).                    Past Surgical History:   Procedure Laterality Date    HX CHOLECYSTECTOMY  2008    HX CORONARY STENT PLACEMENT  6/2013    HX HERNIA REPAIR  3892    Umbilical    HX PACEMAKER  6/2013    Medtronic Pacemaker        Family History   Problem Relation Age of Onset    Cancer Mother      pancreatic    Heart Attack Father     Heart Disease Father     Diabetes Sister     Heart Disease Sister     Diabetes Brother     Stroke Brother        Social History     Social History    Marital status:      Spouse name: N/A    Number of children: N/A    Years of education: N/A     Social History Main Topics    Smoking status: Current Every Day Smoker     Packs/day: 1.00     Types: Cigarettes    Smokeless tobacco: Never Used    Alcohol use No    Drug use: No    Sexual activity: Not Asked     Other Topics Concern     Service No    Blood Transfusions No    Caffeine Concern Yes    Occupational Exposure No    Hobby Hazards No    Sleep Concern No    Stress Concern No    Weight Concern No    Special Diet No    Back Care Yes    Exercise No    Bike Helmet No    Seat Belt Yes     Social History Narrative       Prior to Admission medications    Medication Sig Start Date End Date Taking? Authorizing Provider   raNITIdine (ZANTAC) 300 mg tablet Take 1 Tab by mouth daily. 8/22/17  Yes Historical Provider   HYDROcodone-acetaminophen (NORCO) 7.5-325 mg per tablet Take 1 Tab by mouth four (4) times daily as needed for Pain. 8/17/17  Yes Historical Provider   gabapentin (NEURONTIN) 300 mg capsule Take 300 mg by mouth three (3) times daily. Indications: NEUROPATHIC PAIN   Yes Historical Provider   fluticasone-salmeterol (ADVAIR) 100-50 mcg/dose diskus inhaler Take 1 Puff by inhalation every twelve (12) hours. Indications: BRONCHOSPASM PREVENTION WITH COPD 6/9/17  Yes Nilda Li, DO   LORazepam (ATIVAN) 1 mg tablet Take 1 Tab by mouth every six (6) hours as needed. Max Daily Amount: 4 mg. Indications: anxiety 6/9/17  Yes Nilda Li, DO   pantoprazole (PROTONIX) 40 mg tablet Take 40 mg by mouth daily. Yes Historical Provider   atenolol (TENORMIN) 25 mg tablet Take 25 mg by mouth daily. Yes Historical Provider   levothyroxine (SYNTHROID) 125 mcg tablet  6/28/16  Yes Historical Provider   atorvastatin (LIPITOR) 40 mg tablet Take 40 mg by mouth nightly. Yes Historical Provider   aspirin 81 mg chewable tablet Take 1 Tab by mouth daily. 11/9/15  Yes Jennifer Myles NP   albuterol (PROVENTIL HFA, VENTOLIN HFA, PROAIR HFA) 90 mcg/actuation inhaler Take 2 Puffs by inhalation every six (6) hours as needed for Wheezing.  11/9/15  Yes Jennifer Myles NP   nitroglycerin (NITROSTAT) 0.4 mg SL tablet 1 Tab by SubLINGual route every five (5) minutes as needed for Chest Pain (if chest pain not resolved after taking 3 call 911 ). 6/7/13   Nick Wiley MD       No Known Allergies           Physical Exam: Visit Vitals    /64    Pulse 64    Temp 97.7 °F (36.5 °C)    Resp 11    SpO2 98%       Physical Exam:  GENERAL: alert, cooperative, mild distress, pale  HEENT speech is clear and goal-directed, no focal deficits detected, conjunctiva clear, no scleral icterus, mucous membranes moist. no cervical or supraclavicular adenopathy, no JVD  Chest: lungs clear to bases, heart rate is regular 64, monitor with paced rhythm  Abdomen: soft, +guarding, + tenderness LLQ and hypogastrum  Extremities: no edema, +pulses, moving all 4 extremities well    Current Facility-Administered Medications   Medication Dose Route Frequency    sodium chloride 0.9 % bolus infusion 1,000 mL  1,000 mL IntraVENous ONCE    0.9% sodium chloride infusion  125 mL/hr IntraVENous CONTINUOUS    levoFLOXacin (LEVAQUIN) 500 mg in D5W IVPB  500 mg IntraVENous Q24H    metroNIDAZOLE (FLAGYL) IVPB premix 500 mg  500 mg IntraVENous Q8H    HYDROmorphone (DILAUDID) syringe 1 mg  1 mg IntraVENous Q4H PRN     Current Outpatient Prescriptions   Medication Sig    raNITIdine (ZANTAC) 300 mg tablet Take 1 Tab by mouth daily.  HYDROcodone-acetaminophen (NORCO) 7.5-325 mg per tablet Take 1 Tab by mouth four (4) times daily as needed for Pain.  gabapentin (NEURONTIN) 300 mg capsule Take 300 mg by mouth three (3) times daily. Indications: NEUROPATHIC PAIN    fluticasone-salmeterol (ADVAIR) 100-50 mcg/dose diskus inhaler Take 1 Puff by inhalation every twelve (12) hours. Indications: BRONCHOSPASM PREVENTION WITH COPD    LORazepam (ATIVAN) 1 mg tablet Take 1 Tab by mouth every six (6) hours as needed. Max Daily Amount: 4 mg. Indications: anxiety    pantoprazole (PROTONIX) 40 mg tablet Take 40 mg by mouth daily.  atenolol (TENORMIN) 25 mg tablet Take 25 mg by mouth daily.  levothyroxine (SYNTHROID) 125 mcg tablet     atorvastatin (LIPITOR) 40 mg tablet Take 40 mg by mouth nightly.  aspirin 81 mg chewable tablet Take 1 Tab by mouth daily.  albuterol (PROVENTIL HFA, VENTOLIN HFA, PROAIR HFA) 90 mcg/actuation inhaler Take 2 Puffs by inhalation every six (6) hours as needed for Wheezing.  nitroglycerin (NITROSTAT) 0.4 mg SL tablet 1 Tab by SubLINGual route every five (5) minutes as needed for Chest Pain (if chest pain not resolved after taking 3 call 911 ). Lab/Data Review:  Labs: Results:       Chemistry Recent Labs      09/09/17 2015   GLU  89   NA  136   K  3.9   CL  101   CO2  25   BUN  11   CREA  1.33*   CA  8.7   AGAP  10   BUCR  8*   AP  94   TP  7.5   ALB  3.0*   GLOB  4.5*   AGRAT  0.7*      CBC w/Diff Recent Labs      09/09/17 2015   WBC  13.2   RBC  4.92   HGB  13.3   HCT  40.6   PLT  234   GRANS  75*   LYMPH  16*   EOS  1      Coagulation Recent Labs      09/09/17 2011   PTP  12.7   INR  1.0   APTT  28.1       Iron/Ferritin No results for input(s): IRON in the last 72 hours. No lab exists for component: TIBCCALC   BNP No results for input(s): BNPP in the last 72 hours. Cardiac Enzymes No results for input(s): CPK, CKND1, FLAVIA in the last 72 hours. No lab exists for component: CKRMB, TROIP   Liver Enzymes Recent Labs      09/09/17 2015   TP  7.5   ALB  3.0*   AP  94   SGOT  19      Thyroid Studies Lab Results   Component Value Date/Time    TSH 1.47 11/09/2015 09:23 AM          All Micro Results     None          Imaging Reviewed:  FINDINGS:      CT Abdomen: Lung bases: Clear. No effusions.     Heart and pericardium: Pacer wires at right atrium and right ventricle. Stented  descending thoracic aorta with dilatation measuring 4.5 cm at the hiatus as  before.     Liver: Normal.     Gallbladder: Surgically absent.     Spleen: Contains punctate granulomas.  Otherwise unremarkable.     Pancreas: Normal.     Adrenal glands: Normal.     Kidneys: Stable renal cysts, largest at interpolar region right kidney measuring  2 cm.     Retroperitoneum: There has been stenting of infrarenal AAA with aortobiiliac  stent, separate stenting of the renal artery origins. . Stent traverses AAA  measuring maximum 4.5 cm, previously 4.8 cm.     Lymph nodes: No adenopathy upper abdomen, retroperitoneum, mesentery.     Bowel: Stomach and duodenum and small bowel and the appendix (66) are normal.  Right colon normal. Transverse colon collapsed and unremarkable. Proximal  descending colon normal. Scattered diverticula at distal left colon. There is  some haziness around the distal left colon. No thick walled diverticula are  seen.     CT PELVIS: There is diffuse thickening of sigmoid colon proximally with one or 2  diverticula that appears thickened (axial 75, coronal 31). Thickening of rectum  and low sigmoid colon is decreased when compared with previous study. No  diverticula through this segment of sigmoid. No extraluminal air. No fluid  collections.     Peritoneal space: No free fluid.     : Prostate and urinary bladder are unremarkable. .     MUSCULOSKELETAL: Atrophy at right gluteus medius and minimus muscles. Seroma  right groin has resolved. Umbilical hernia is wide mouthed, containing bowel  without bowel obstruction (59).          IMPRESSION  IMPRESSION:     Decreased wall thickening at rectum and low sigmoid when compared with previous  study. Differential includes infectious proctocolitis or ischemic bowel.     New thickening of the wall of the upper sigmoid with multiple diverticula  including one or 2 that appear thickened. Could represent diverticulitis.  Other  infectious process or ischemic bowel also possible.     Umbilical hernia containing bowel without bowel obstruction.     Stable appearance aortic dilatation at the hiatus and stented abdominal aorta.     Renal cysts.       Imaging      CT ABD PELV W CONT (Order #[id number]) on 9/9/2017 - Imaging Information       Assessment:   Principal Problem:    Diverticulitis of large intestine without perforation or abscess with bleeding (9/10/2017)    Active Problems:    HTN (hypertension) (3/30/2013)      Gastroesophageal reflux disease without esophagitis (11/9/2015)      Diverticulitis large intestine w/o perforation or abscess w/bleeding (9/10/2017)      Plan:     Admit to telemetry  Parenteral fluids  Continue Levaquin and Flagyl  Monitor H&H  Consult surgery Dr. Brittnee Cheng agrees to follow  Nothing by mouth until cleared by surgery    Full code    Michael Lim,   9/10/2017, 3:52 AM  [phone]

## 2017-09-11 ENCOUNTER — APPOINTMENT (OUTPATIENT)
Dept: GENERAL RADIOLOGY | Age: 67
DRG: 378 | End: 2017-09-11
Attending: HOSPITALIST
Payer: MEDICARE

## 2017-09-11 LAB
ANION GAP SERPL CALC-SCNC: 10 MMOL/L (ref 3–18)
ATRIAL RATE: 98 BPM
BASOPHILS # BLD: 0 K/UL (ref 0–0.1)
BASOPHILS NFR BLD: 0 % (ref 0–2)
BUN SERPL-MCNC: 7 MG/DL (ref 7–18)
BUN/CREAT SERPL: 6 (ref 12–20)
CALCIUM SERPL-MCNC: 8.2 MG/DL (ref 8.5–10.1)
CALCULATED R AXIS, ECG10: -86 DEGREES
CALCULATED T AXIS, ECG11: 83 DEGREES
CHLORIDE SERPL-SCNC: 105 MMOL/L (ref 100–108)
CO2 SERPL-SCNC: 25 MMOL/L (ref 21–32)
CREAT SERPL-MCNC: 1.16 MG/DL (ref 0.6–1.3)
DIAGNOSIS, 93000: NORMAL
DIFFERENTIAL METHOD BLD: ABNORMAL
EOSINOPHIL # BLD: 0.2 K/UL (ref 0–0.4)
EOSINOPHIL NFR BLD: 2 % (ref 0–5)
ERYTHROCYTE [DISTWIDTH] IN BLOOD BY AUTOMATED COUNT: 15.2 % (ref 11.6–14.5)
GLUCOSE BLD STRIP.AUTO-MCNC: 96 MG/DL (ref 70–110)
GLUCOSE SERPL-MCNC: 85 MG/DL (ref 74–99)
HCT VFR BLD AUTO: 35.6 % (ref 36–48)
HGB BLD-MCNC: 11.4 G/DL (ref 13–16)
LACTATE SERPL-SCNC: 1.7 MMOL/L (ref 0.4–2)
LYMPHOCYTES # BLD: 1.9 K/UL (ref 0.9–3.6)
LYMPHOCYTES NFR BLD: 27 % (ref 21–52)
MAGNESIUM SERPL-MCNC: 2 MG/DL (ref 1.6–2.6)
MCH RBC QN AUTO: 26.6 PG (ref 24–34)
MCHC RBC AUTO-ENTMCNC: 32 G/DL (ref 31–37)
MCV RBC AUTO: 83.2 FL (ref 74–97)
MONOCYTES # BLD: 0.5 K/UL (ref 0.05–1.2)
MONOCYTES NFR BLD: 7 % (ref 3–10)
NEUTS SEG # BLD: 4.5 K/UL (ref 1.8–8)
NEUTS SEG NFR BLD: 64 % (ref 40–73)
PHOSPHATE SERPL-MCNC: 2.6 MG/DL (ref 2.5–4.9)
PLATELET # BLD AUTO: 180 K/UL (ref 135–420)
PMV BLD AUTO: 10.2 FL (ref 9.2–11.8)
POTASSIUM SERPL-SCNC: 3.7 MMOL/L (ref 3.5–5.5)
Q-T INTERVAL, ECG07: 454 MS
QRS DURATION, ECG06: 194 MS
QTC CALCULATION (BEZET), ECG08: 573 MS
RBC # BLD AUTO: 4.28 M/UL (ref 4.7–5.5)
SODIUM SERPL-SCNC: 140 MMOL/L (ref 136–145)
TROPONIN I SERPL-MCNC: <0.02 NG/ML (ref 0–0.04)
TROPONIN I SERPL-MCNC: <0.02 NG/ML (ref 0–0.04)
VENTRICULAR RATE, ECG03: 96 BPM
WBC # BLD AUTO: 7.1 K/UL (ref 4.6–13.2)

## 2017-09-11 PROCEDURE — 74011000250 HC RX REV CODE- 250: Performed by: HOSPITALIST

## 2017-09-11 PROCEDURE — 36415 COLL VENOUS BLD VENIPUNCTURE: CPT | Performed by: HOSPITALIST

## 2017-09-11 PROCEDURE — 74011250637 HC RX REV CODE- 250/637: Performed by: HOSPITALIST

## 2017-09-11 PROCEDURE — 65660000000 HC RM CCU STEPDOWN

## 2017-09-11 PROCEDURE — 74011250636 HC RX REV CODE- 250/636: Performed by: HOSPITALIST

## 2017-09-11 PROCEDURE — 85025 COMPLETE CBC W/AUTO DIFF WBC: CPT | Performed by: HOSPITALIST

## 2017-09-11 PROCEDURE — 82962 GLUCOSE BLOOD TEST: CPT

## 2017-09-11 PROCEDURE — 83735 ASSAY OF MAGNESIUM: CPT | Performed by: HOSPITALIST

## 2017-09-11 PROCEDURE — 80048 BASIC METABOLIC PNL TOTAL CA: CPT | Performed by: HOSPITALIST

## 2017-09-11 PROCEDURE — 94640 AIRWAY INHALATION TREATMENT: CPT

## 2017-09-11 PROCEDURE — C9113 INJ PANTOPRAZOLE SODIUM, VIA: HCPCS | Performed by: HOSPITALIST

## 2017-09-11 PROCEDURE — 93005 ELECTROCARDIOGRAM TRACING: CPT

## 2017-09-11 PROCEDURE — 84100 ASSAY OF PHOSPHORUS: CPT | Performed by: HOSPITALIST

## 2017-09-11 PROCEDURE — 83605 ASSAY OF LACTIC ACID: CPT | Performed by: HOSPITALIST

## 2017-09-11 PROCEDURE — 84484 ASSAY OF TROPONIN QUANT: CPT | Performed by: HOSPITALIST

## 2017-09-11 PROCEDURE — 71010 XR CHEST PORT: CPT

## 2017-09-11 RX ORDER — FAMOTIDINE 20 MG/1
40 TABLET, FILM COATED ORAL
Status: DISCONTINUED | OUTPATIENT
Start: 2017-09-11 | End: 2017-09-14 | Stop reason: HOSPADM

## 2017-09-11 RX ORDER — RANITIDINE 150 MG/1
300 TABLET, FILM COATED ORAL
Status: DISCONTINUED | OUTPATIENT
Start: 2017-09-11 | End: 2017-09-11

## 2017-09-11 RX ORDER — ATORVASTATIN CALCIUM 40 MG/1
40 TABLET, FILM COATED ORAL
Status: DISCONTINUED | OUTPATIENT
Start: 2017-09-11 | End: 2017-09-14 | Stop reason: HOSPADM

## 2017-09-11 RX ORDER — IBUPROFEN 200 MG
1 TABLET ORAL DAILY
Status: DISCONTINUED | OUTPATIENT
Start: 2017-09-11 | End: 2017-09-14 | Stop reason: HOSPADM

## 2017-09-11 RX ORDER — LEVOTHYROXINE SODIUM 125 UG/1
125 TABLET ORAL
Status: DISCONTINUED | OUTPATIENT
Start: 2017-09-12 | End: 2017-09-14 | Stop reason: HOSPADM

## 2017-09-11 RX ORDER — ATENOLOL 25 MG/1
25 TABLET ORAL DAILY
Status: DISCONTINUED | OUTPATIENT
Start: 2017-09-11 | End: 2017-09-14 | Stop reason: HOSPADM

## 2017-09-11 RX ORDER — LORAZEPAM 1 MG/1
1 TABLET ORAL
Status: DISCONTINUED | OUTPATIENT
Start: 2017-09-11 | End: 2017-09-14 | Stop reason: HOSPADM

## 2017-09-11 RX ORDER — LORAZEPAM 2 MG/ML
0.5 INJECTION INTRAMUSCULAR ONCE
Status: COMPLETED | OUTPATIENT
Start: 2017-09-11 | End: 2017-09-11

## 2017-09-11 RX ORDER — GABAPENTIN 300 MG/1
300 CAPSULE ORAL 3 TIMES DAILY
Status: DISCONTINUED | OUTPATIENT
Start: 2017-09-11 | End: 2017-09-14 | Stop reason: HOSPADM

## 2017-09-11 RX ORDER — GUAIFENESIN 100 MG/5ML
81 LIQUID (ML) ORAL DAILY
Status: DISCONTINUED | OUTPATIENT
Start: 2017-09-12 | End: 2017-09-14 | Stop reason: HOSPADM

## 2017-09-11 RX ADMIN — METRONIDAZOLE 500 MG: 500 INJECTION, SOLUTION INTRAVENOUS at 08:00

## 2017-09-11 RX ADMIN — BUDESONIDE AND FORMOTEROL FUMARATE DIHYDRATE 2 PUFF: 80; 4.5 AEROSOL RESPIRATORY (INHALATION) at 20:57

## 2017-09-11 RX ADMIN — Medication 10 ML: at 07:40

## 2017-09-11 RX ADMIN — LORAZEPAM 0.5 MG: 2 INJECTION INTRAMUSCULAR; INTRAVENOUS at 08:13

## 2017-09-11 RX ADMIN — HYDROCORTISONE ACETATE AND PRAMOXINE HYDROCHLORIDE 1 APPLICATOR: 100; 100 AEROSOL, FOAM TOPICAL at 13:27

## 2017-09-11 RX ADMIN — SODIUM CHLORIDE 125 ML/HR: 900 INJECTION, SOLUTION INTRAVENOUS at 19:29

## 2017-09-11 RX ADMIN — Medication 10 ML: at 22:00

## 2017-09-11 RX ADMIN — ATENOLOL 25 MG: 25 TABLET ORAL at 18:13

## 2017-09-11 RX ADMIN — SODIUM CHLORIDE 40 MG: 9 INJECTION INTRAMUSCULAR; INTRAVENOUS; SUBCUTANEOUS at 08:31

## 2017-09-11 RX ADMIN — HYDROCORTISONE ACETATE AND PRAMOXINE HYDROCHLORIDE 1 APPLICATOR: 100; 100 AEROSOL, FOAM TOPICAL at 21:39

## 2017-09-11 RX ADMIN — MINERAL OIL, PETROLATUM, PHENYLEPHRINE HCL, SHARK LIVER OIL: 14; 71.9; .25; 3 OINTMENT TOPICAL at 13:27

## 2017-09-11 RX ADMIN — ATORVASTATIN CALCIUM 40 MG: 40 TABLET, FILM COATED ORAL at 21:22

## 2017-09-11 RX ADMIN — METRONIDAZOLE 500 MG: 500 INJECTION, SOLUTION INTRAVENOUS at 21:22

## 2017-09-11 RX ADMIN — ALBUTEROL SULFATE 2.5 MG: 2.5 SOLUTION RESPIRATORY (INHALATION) at 07:55

## 2017-09-11 RX ADMIN — HYDROMORPHONE HYDROCHLORIDE 1 MG: 1 INJECTION, SOLUTION INTRAMUSCULAR; INTRAVENOUS; SUBCUTANEOUS at 04:41

## 2017-09-11 RX ADMIN — GABAPENTIN 300 MG: 300 CAPSULE ORAL at 21:22

## 2017-09-11 RX ADMIN — Medication 10 ML: at 13:28

## 2017-09-11 RX ADMIN — BUDESONIDE AND FORMOTEROL FUMARATE DIHYDRATE 2 PUFF: 80; 4.5 AEROSOL RESPIRATORY (INHALATION) at 09:00

## 2017-09-11 RX ADMIN — GABAPENTIN 300 MG: 300 CAPSULE ORAL at 18:13

## 2017-09-11 RX ADMIN — MINERAL OIL, PETROLATUM, PHENYLEPHRINE HCL, SHARK LIVER OIL: 14; 71.9; .25; 3 OINTMENT TOPICAL at 21:40

## 2017-09-11 RX ADMIN — HYDROMORPHONE HYDROCHLORIDE 1 MG: 1 INJECTION, SOLUTION INTRAMUSCULAR; INTRAVENOUS; SUBCUTANEOUS at 00:08

## 2017-09-11 RX ADMIN — HYDROMORPHONE HYDROCHLORIDE 1 MG: 1 INJECTION, SOLUTION INTRAMUSCULAR; INTRAVENOUS; SUBCUTANEOUS at 21:36

## 2017-09-11 RX ADMIN — ONDANSETRON 4 MG: 2 INJECTION INTRAMUSCULAR; INTRAVENOUS at 21:22

## 2017-09-11 RX ADMIN — LEVOFLOXACIN 500 MG: 5 INJECTION, SOLUTION INTRAVENOUS at 04:35

## 2017-09-11 RX ADMIN — ONDANSETRON 4 MG: 2 INJECTION INTRAMUSCULAR; INTRAVENOUS at 07:50

## 2017-09-11 RX ADMIN — SODIUM CHLORIDE 40 MG: 9 INJECTION INTRAMUSCULAR; INTRAVENOUS; SUBCUTANEOUS at 21:35

## 2017-09-11 RX ADMIN — FAMOTIDINE 40 MG: 20 TABLET ORAL at 21:22

## 2017-09-11 RX ADMIN — METRONIDAZOLE 500 MG: 500 INJECTION, SOLUTION INTRAVENOUS at 13:27

## 2017-09-11 RX ADMIN — SODIUM CHLORIDE 125 ML/HR: 900 INJECTION, SOLUTION INTRAVENOUS at 00:10

## 2017-09-11 NOTE — PROGRESS NOTES
Problem: Falls - Risk of  Goal: *Absence of Falls  Document [place] Fall Risk and appropriate interventions in the flowsheet.    Outcome: Progressing Towards Goal  Fall Risk Interventions:              Medication Interventions: Patient to call before getting OOB, Teach patient to arise slowly

## 2017-09-11 NOTE — PROGRESS NOTES
[facility] [address] [id number]  [phone]  Colon and Rectal Surgery Progress Note      Patient: Radha Arciniega MRN: [id number]  SSN: [id number]    YOB: 1950  Age: 78 y.o. Sex: male      Admit Date: 9/9/2017    LOS: 1 day     Subjective:     Pt states that his abdominal pain is episodic. Has been present since shortly after procedure end of may. Has loose stools. Has food fear due to fecal urgency. Has been seeing some blood in stools as well, less since on abx. Had colonoscopy approximately 5 years ago by Dr. Roney White he states showed polyps and is due for repeat soon.      Objective:     Vitals:    09/11/17 0900 09/11/17 1156 09/11/17 1552 09/11/17 1556   BP:  109/65  136/77   Pulse:  62  66   Resp:  20  18   Temp:  97.3 °F (36.3 °C)  98 °F (36.7 °C)   SpO2: 94% 99%  98%   Weight:   115.9 kg (255 lb 8 oz)    Height:   6' 1\" (1.854 m)         Intake and Output:  Current Shift: 09/11 0701 - 09/11 1900  In: 120 [P.O.:120]  Out: -   Last three shifts: 09/09 1901 - 09/11 0700  In: 102.1 [I.V.:102.1]  Out: 300 [Urine:300]    Physical Exam:     abd soft, moderate LLQ tenderness, ND  No guarding, rebound    Lab/Data Review:    CMP:   Lab Results   Component Value Date/Time     09/11/2017 04:05 PM    K 3.7 09/11/2017 04:05 PM     09/11/2017 04:05 PM    CO2 25 09/11/2017 04:05 PM    AGAP 10 09/11/2017 04:05 PM    GLU 85 09/11/2017 04:05 PM    BUN 7 09/11/2017 04:05 PM    CREA 1.16 09/11/2017 04:05 PM    GFRAA >60 09/11/2017 04:05 PM    GFRNA >60 09/11/2017 04:05 PM    CA 8.2 (L) 09/11/2017 04:05 PM    MG 2.0 09/11/2017 04:05 PM    PHOS 2.6 09/11/2017 04:05 PM     CBC:   Lab Results   Component Value Date/Time    WBC 7.1 09/11/2017 05:11 AM    HGB 11.4 (L) 09/11/2017 05:11 AM    HCT 35.6 (L) 09/11/2017 05:11 AM     09/11/2017 05:11 AM      Lactate normal 0.5 yesterday    Assessment:     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 Alex Tatum from GI to see patient in am to consider flex sig to evaluate severity of disease  Will make NPO after midnight if he is able to perform procedure tomorrow  Otherwise re-advance diet in AM    Signed By: John Moreno MD        September 11, 2017

## 2017-09-11 NOTE — PROGRESS NOTES
Famotidine was therapeutically interchanged for Ranitidine per the P&T Committee approved Therapeutic Interchanges Policy.

## 2017-09-11 NOTE — PROGRESS NOTES
Bedside and Verbal shift change report given to YiRN (oncoming nurse) by Danna Antonio RN (offgoing nurse). Report included the following information SBAR, Kardex, MAR and Recent Results. SITUATION:  Code Status: Full Code  Reason for Admission: Diverticula of colon  Diverticulitis large intestine w/o perforation or abscess w/bleeding  Hospital day: 0  Problem List:       Hospital Problems  Date Reviewed: 9/10/2017          Codes Class Noted POA    * (Principal)Diverticulitis of large intestine without perforation or abscess with bleeding ICD-10-CM: K57.33  ICD-9-CM: 562.13  9/10/2017 Yes        Gastroesophageal reflux disease without esophagitis ICD-10-CM: K21.9  ICD-9-CM: 530.81  11/9/2015 Yes        HTN (hypertension) ICD-10-CM: I10  ICD-9-CM: 401.9  3/30/2013 Yes              BACKGROUND:   Past Medical History:   Past Medical History:   Diagnosis Date    Arthritis     CAD (coronary artery disease), native coronary artery     ALIDA X 2 to OM1    Carotid duplex 08/13/2013    Mild <50% bilateral ICA plaquing.  Complete heart block [facility]) June 2013    Medtronic dual chamber pacemaker    COPD (chronic obstructive pulmonary disease) ([facility] [address]     Diverticulitis of large intestine without perforation or abscess with bleeding 9/10/2017    Gastritis     GERD (gastroesophageal reflux disease)     High cholesterol     History of echocardiogram 06/04/2013    EF 60-65%. No WMA. Gr 1 DDfx. No significant valvular heart disease.  History of myocardial perfusion scan 04/18/2011    No ischemia or prior infarction. No WMA. EF 52%. Neg EKG on pharm stress test.    Hypertension     Hypothyroidism     Lower extremity venous duplex 01/06/2015    No DVT. Superficial insufficiency of right GSV. Deep venous reflux in right CFV & fem vein. Deep venous reflux in left CFV.  Renal duplex 12/24/2014    Mild < 60% RRA stenosis. Low parenchymal & LRA flow. Renal asymmetry.   Intrinsic/med disease in right kidney. AAA (3.8 x 4.0 cm) infrarenal.    Right groin hernia     not resolved    S/P AAA repair 05/18/2017    S/P cardiac cath 06/02/2013    Diffuse atherosclerosis throughout. pRCA 50%. mLM 30%. mLAD 40%. oD2 (sm) 100%. pLCX 30%. p/mOM1 95/80% (o/lapping 2.25 x 23-mm & 2.25 x 12-mm Xience stents, resid 0%). Patient taking anticoagulants yes    Patient has a defibrillator: no     ASSESSMENT:  Changes in Assessment Throughout Shift: Patient alert and oriented x's 4. Vitals stable, afebrile, in NSR, on RA. Denying pain. 1 BM today, nurse did not see. Diet now ordered, GI lite. Voiding adequately. Will continue to monitor.      Significant Changes in 24 hours (for example, RR/code, fall)  Patient has Central Line: no  Patient has Romero Cath: no   PT  IV Patency  OR Checklist  Pending Tests    Last Vitals:  Vitals w/ MEWS Score (last day)     Date/Time MEWS Score Pulse Resp Temp BP Level of Consciousness SpO2    09/10/17 2040 1 60 20 97.9 °F (36.6 °C) 143/76 Alert 100 %    09/10/17 2021 -- -- -- -- -- -- 99 %    09/10/17 1553 1 61 18 98 °F (36.7 °C) 124/73 Alert 100 %    09/10/17 1224 1 90 18 97 °F (36.1 °C) 116/70 Alert 100 %    09/10/17 0909 1 60 18 97.4 °F (36.3 °C) 103/59 Alert 97 %    09/10/17 0505 1 60 16 97.1 °F (36.2 °C) 121/69 Alert 96 %    09/10/17 0045 -- 64 11 -- 114/64 -- 98 %    09/10/17 0030 -- 63 12 -- 103/57 -- 98 %    09/10/17 0015 -- 63 11 -- 117/60 -- 99 %    09/09/17 2345 -- 66 11 -- 112/66 -- 98 %    09/09/17 2230 -- 70 12 -- -- -- 91 %    09/09/17 2215 -- 70 15 -- -- -- 100 %    09/09/17 2200 -- 70 14 -- -- -- 99 %    09/09/17 2115 -- 69 10 -- 114/68 -- 98 %    09/09/17 2100 -- 68 13 -- 117/68 -- 99 %    09/09/17 2045 -- 70 14 -- 122/71 -- 97 %    09/09/17 2030 -- 70 14 -- 93/52 -- 98 %    09/09/17 2015 -- 67 20 -- 132/72 -- 99 %    09/09/17 2000 1 73 16 97.7 °F (36.5 °C) 115/69 Alert 100 %            PAIN    Pain Assessment    Pain Intensity 1: 3 (09/10/17 2100)         Pain Intervention(s) 1: Medication (see MAR)    Patient Stated Pain Goal: 0  Intervention effective: yes    Last 3 Weights: There were no vitals filed for this visit. Weight change:     INTAKE/OUPUT    Current Shift:      Last three shifts: 09/09 0701 - 09/10 1900  In: 102.1 [I.V.:102.1]  Out: 300 [Urine:300]    RECOMMENDATIONS AND DISCHARGE PLANNING  Patient needs and requests: NA    Pending tests/procedures: NA      Discharge plan for patient: NA    Discharge planning Needs or Barriers: to be determined    Estimated Discharge Date: to be determined Posted on Whiteboard in Patients Room: no       \"HEALS\" SAFETY CHECK  A safety check occurred in the patient's room between off going nurse and oncoming nurse listed above. The safety check included the below items:    H  High Alert Medications Verify all high alert medication drips (heparin, PCA, etc.)  E  Equipment Suction is set up for ALL patients (with jose)  Red plugs utilized for all equipment (IV pumps, etc.)  WOWs wiped down at end of shift. Room stocked with oxygen, suction, and other unit-specific supplies  A  Alarms Bed alarm is set for fall risk patients  Ensure chair alarm is in place and activated if patient is up in a chair  L  Lines Check IV for any infiltration  Romero bag is empty if patient has a Romero   Tubing and IV bags are labeled  S  Safety  Room is clean, patient is clean, and equipment is clean. Hallways are clear from equipment besides carts. Fall bracelet on for fall risk patients  Ensure room is clear and free of clutter  Suction is set up for ALL patients (with jose)  Hallways are clear from equipment besides carts.    Isolation precautions followed, supplies available outside room, sign posted    Marcus Liang RN

## 2017-09-11 NOTE — INTERDISCIPLINARY ROUNDS
IDR/SLIDR Summary          Patient: Lilia Farrar MRN: [id number]    Age: 78 y.o. YOB: 1950 Room/Bed: [facility]   Admit Diagnosis: Diverticula of colon  Diverticulitis large intestine w/o perforation or abscess w/bleeding  Principal Diagnosis: Diverticulitis of large intestine without perforation or abscess with bleeding   Goals: Resolve GI complaints of pain  Readmission: NO  Quality Measure: Not applicable  VTE Prophylaxis: Mechanical  Influenza Vaccine screening completed? YES  Pneumococcal Vaccine screening completed? YES  Mobility needs: No   Nutrition plan:No  Consults:Case Management    Financial concerns:No  Escalated to CM? NO  RRAT Score: 15   Interventions:  Testing due for pt today? NO  LOS: 1 days Expected length of stay 3 days  Discharge plan: Home   PCP: Nai Pearson MD  Transportation needs: No    Days before discharge:two or more days before discharge   Discharge disposition: Home    Signed:      Leata Babinski, RN  9/11/2017  1:21 AM

## 2017-09-11 NOTE — PROGRESS NOTES
NUTRITION    Nursing Referral: [facility]   Nutrition Consult: General Nutrition Management & Supplements      RECOMMENDATIONS / PLAN:     - Add food preferences  - Add nutrition supplements: Ensure Pudding BID  - Continue RD inpatient monitoring and evaluation. NUTRITION INTERVENTIONS & DIAGNOSIS:     [x] Meals/Snacks: modified diet  [x] Medical food supplementation: add  [x] IV fluids: NS at 125 mL/hr     Nutrition Diagnosis:  Inadequate oral intake related to decreased appetite as evidenced by poor/no po intake x 1 week PTA and since admission    Patient meets criteria for Severe Protein Calorie Malnutrition as evidenced by:   ASPEN Malnutrition Criteria  Acute Illness, Chronic Illness, or Social/Enviornmental: Acute illness  Energy Intake: <50% est energy req for > 5 days  Weight Loss: Greater than 7.5% x 3 mos  ASPEN Malnutrition Score - Acute Illness: 12  Acute Illness - Malnutrition Diagnosis: Severe malnutrition. ASSESSMENT:     Pt reported poor appetite and meal intake x 1 week PTA and since admission; 0-5% po intake meals today. Had significant unplanned wt loss PTA. Discussed food preferences. Discussed adding nutrition supplement; pt agreed with plan, options discussed    Average po intake adequate to meet patients estimated nutritional needs:   [] Yes     [x] No   [] Unable to determine at this time    Diet: DIET GI LITE (POST SURGICAL)      Food Allergies:  None known   Current Appetite:   [] Good     [] Fair     [x] Poor     [] Other:  Appetite/meal intake prior to admission:   [] Good     [] Fair     [x] Poor (x 1 week)    [] Other:  Feeding Limitations:  [] Swallowing difficulty    [] Chewing difficulty    [] Other:  Current Meal Intake: No data found.       BM:  9/10  Skin Integrity:  No pressure ulcer or wound noted  Edema:  None   Pertinent Medications: Reviewed    Recent Labs      09/09/17 2015   NA  136   K  3.9   CL  101   CO2  25   GLU  89   BUN  11   CREA  1.33*   CA  8.7   ALB  3.0* SGOT  19   ALT  11*       Intake/Output Summary (Last 24 hours) at 09/11/17 1552  Last data filed at 09/11/17 1309   Gross per 24 hour   Intake              120 ml   Output                0 ml   Net              120 ml       Anthropometrics:  Ht Readings from Last 1 Encounters:   09/11/17 6' 1\" (1.854 m)     Last 3 Recorded Weights in this Encounter    09/11/17 1552   Weight: 112.4 kg (247 lb 11.2 oz)     Body mass index is 32.68 kg/(m^2). Weight History:  Pt reported unplanned wt loss PTA. Noted wt loss of 28 lb (10%) x 3 months PTA per chart hx    Weight Metrics 9/11/2017 6/16/2017 6/9/2017 6/2/2017 6/2/2017 6/2/2017 5/29/2017   Weight 247 lb 11.2 oz 265 lb 265 lb 6.4 oz - 275 lb - 275 lb 3 oz   BMI 32.68 kg/m2 34.96 kg/m2 - 35.02 kg/m2 - 36.28 kg/m2 36.31 kg/m2        Admitting Diagnosis: Diverticula of colon  Diverticulitis large intestine w/o perforation or abscess w/bleeding  Pertinent PMHx:  CAD, COPD, GERD, high cholesterol, gastritic, HTN, hypothyroidism     Education Needs:        [x] None identified  [] Identified - Not appropriate at this time  []  Identified and addressed - refer to education log  Learning Limitations:   [x] None identified  [] Identified    Cultural, Jewish & ethnic food preferences:  [x] None identified    [] Identified and addressed     ESTIMATED NUTRITION NEEDS:     Calories: 9879-1213 kcal (MSJx1.2-1.3) based on  [x] Actual BW: 116      [] IBW   Protein:  gm (0.8-1 gm/kg) based on  [x] Actual BW      [] IBW   Fluid: 1 mL/kcal     MONITORING & EVALUATION:     Nutrition Goal(s):   1. Po intake of meals will meet >75% of patient estimated nutritional needs within the next 7 days.   Outcome:  [] Met/Ongoing    []  Not Met    [x] New/Initial Goal     Monitoring:   [x] Diet tolerance   [x] Meal intake   [x] Supplement intake   [] GI symptoms/ability to tolerate po diet   [] Respiratory status   [] Plan of care      Previous Recommendations (for follow-up assessments only):     []   Implemented       []   Not Implemented (RD to address)     [] No Recommendation Made     Discharge Planning:  Cardiac; GI soft diet  [x] Participated in care planning, discharge planning, & interdisciplinary rounds as appropriate      Lian Patino, [address] [place] [address]   Pager: [phone]

## 2017-09-11 NOTE — PHYSICIAN ADVISORY
Letter of Determination: Upgrade from Observation to Inpatient Status    This patient was originally hospitalized as observation status on 9/9/2017 for abdominal pain, possible colitis versus diverticular disease. This patient now meets for Inpatient Admission in accordance with CMS regulation Section 43 .3. Specifically, patient's stay is now over Two Midnights and was medically necessary. The patient's stay was medically necessary because IV administration of Flagyl awaiting surgical consultation. Consistent with CMS guidelines, patient meets for inpatient status. It is our recommendation that this patient's hospitalization status should be upgraded from OBSERVATION to INPATIENT status.      The final decision of the patient's hospitalization status depends on the attending physician's decision.      Dr. Virginia Carr MD, MPH, [facility]  Physician Advisor, Katey Gaitan, [address] [place] [address], [address]  [phone]  [email or web address]

## 2017-09-12 ENCOUNTER — ANESTHESIA EVENT (OUTPATIENT)
Dept: ENDOSCOPY | Age: 67
DRG: 378 | End: 2017-09-12
Payer: MEDICARE

## 2017-09-12 PROBLEM — E44.0 PROTEIN-CALORIE MALNUTRITION, MODERATE (HCC): Status: ACTIVE | Noted: 2017-09-12

## 2017-09-12 PROBLEM — Z98.890 HISTORY OF AAA (ABDOMINAL AORTIC ANEURYSM) REPAIR: Chronic | Status: ACTIVE | Noted: 2017-09-12

## 2017-09-12 LAB
ALBUMIN SERPL-MCNC: 2.3 G/DL (ref 3.4–5)
ALBUMIN/GLOB SERPL: 0.6 {RATIO} (ref 0.8–1.7)
ALP SERPL-CCNC: 58 U/L (ref 45–117)
ALT SERPL-CCNC: 8 U/L (ref 16–61)
ANION GAP SERPL CALC-SCNC: 9 MMOL/L (ref 3–18)
AST SERPL-CCNC: 13 U/L (ref 15–37)
BASOPHILS # BLD: 0 K/UL (ref 0–0.1)
BASOPHILS NFR BLD: 1 % (ref 0–2)
BILIRUB SERPL-MCNC: 0.4 MG/DL (ref 0.2–1)
BUN SERPL-MCNC: 5 MG/DL (ref 7–18)
BUN/CREAT SERPL: 5 (ref 12–20)
CALCIUM SERPL-MCNC: 8.1 MG/DL (ref 8.5–10.1)
CHLORIDE SERPL-SCNC: 107 MMOL/L (ref 100–108)
CO2 SERPL-SCNC: 24 MMOL/L (ref 21–32)
CREAT SERPL-MCNC: 1.07 MG/DL (ref 0.6–1.3)
DIFFERENTIAL METHOD BLD: ABNORMAL
EOSINOPHIL # BLD: 0.1 K/UL (ref 0–0.4)
EOSINOPHIL NFR BLD: 2 % (ref 0–5)
ERYTHROCYTE [DISTWIDTH] IN BLOOD BY AUTOMATED COUNT: 15.2 % (ref 11.6–14.5)
GLOBULIN SER CALC-MCNC: 3.8 G/DL (ref 2–4)
GLUCOSE SERPL-MCNC: 80 MG/DL (ref 74–99)
HCT VFR BLD AUTO: 34.5 % (ref 36–48)
HGB BLD-MCNC: 11 G/DL (ref 13–16)
LYMPHOCYTES # BLD: 1.5 K/UL (ref 0.9–3.6)
LYMPHOCYTES NFR BLD: 25 % (ref 21–52)
MCH RBC QN AUTO: 26.4 PG (ref 24–34)
MCHC RBC AUTO-ENTMCNC: 31.9 G/DL (ref 31–37)
MCV RBC AUTO: 82.9 FL (ref 74–97)
MONOCYTES # BLD: 0.6 K/UL (ref 0.05–1.2)
MONOCYTES NFR BLD: 9 % (ref 3–10)
NEUTS SEG # BLD: 3.8 K/UL (ref 1.8–8)
NEUTS SEG NFR BLD: 63 % (ref 40–73)
PLATELET # BLD AUTO: 178 K/UL (ref 135–420)
PMV BLD AUTO: 10.1 FL (ref 9.2–11.8)
POTASSIUM SERPL-SCNC: 3.3 MMOL/L (ref 3.5–5.5)
PROT SERPL-MCNC: 6.1 G/DL (ref 6.4–8.2)
RBC # BLD AUTO: 4.16 M/UL (ref 4.7–5.5)
SODIUM SERPL-SCNC: 140 MMOL/L (ref 136–145)
TROPONIN I SERPL-MCNC: <0.02 NG/ML (ref 0–0.04)
WBC # BLD AUTO: 6 K/UL (ref 4.6–13.2)

## 2017-09-12 PROCEDURE — 74011000250 HC RX REV CODE- 250: Performed by: HOSPITALIST

## 2017-09-12 PROCEDURE — 65660000000 HC RM CCU STEPDOWN

## 2017-09-12 PROCEDURE — 85025 COMPLETE CBC W/AUTO DIFF WBC: CPT | Performed by: HOSPITALIST

## 2017-09-12 PROCEDURE — 74011250636 HC RX REV CODE- 250/636: Performed by: HOSPITALIST

## 2017-09-12 PROCEDURE — 93005 ELECTROCARDIOGRAM TRACING: CPT

## 2017-09-12 PROCEDURE — 84484 ASSAY OF TROPONIN QUANT: CPT | Performed by: HOSPITALIST

## 2017-09-12 PROCEDURE — 74011250637 HC RX REV CODE- 250/637: Performed by: HOSPITALIST

## 2017-09-12 PROCEDURE — 80053 COMPREHEN METABOLIC PANEL: CPT | Performed by: HOSPITALIST

## 2017-09-12 PROCEDURE — C9113 INJ PANTOPRAZOLE SODIUM, VIA: HCPCS | Performed by: HOSPITALIST

## 2017-09-12 PROCEDURE — 36415 COLL VENOUS BLD VENIPUNCTURE: CPT | Performed by: HOSPITALIST

## 2017-09-12 PROCEDURE — 94640 AIRWAY INHALATION TREATMENT: CPT

## 2017-09-12 RX ORDER — POTASSIUM CHLORIDE 20 MEQ/1
20 TABLET, EXTENDED RELEASE ORAL 2 TIMES DAILY
Status: COMPLETED | OUTPATIENT
Start: 2017-09-12 | End: 2017-09-12

## 2017-09-12 RX ADMIN — HYDROMORPHONE HYDROCHLORIDE 1 MG: 1 INJECTION, SOLUTION INTRAMUSCULAR; INTRAVENOUS; SUBCUTANEOUS at 16:50

## 2017-09-12 RX ADMIN — ONDANSETRON 4 MG: 2 INJECTION INTRAMUSCULAR; INTRAVENOUS at 16:50

## 2017-09-12 RX ADMIN — GABAPENTIN 300 MG: 300 CAPSULE ORAL at 17:47

## 2017-09-12 RX ADMIN — HYDROMORPHONE HYDROCHLORIDE 1 MG: 1 INJECTION, SOLUTION INTRAMUSCULAR; INTRAVENOUS; SUBCUTANEOUS at 11:03

## 2017-09-12 RX ADMIN — POTASSIUM CHLORIDE 20 MEQ: 20 TABLET, EXTENDED RELEASE ORAL at 19:33

## 2017-09-12 RX ADMIN — METRONIDAZOLE 500 MG: 500 INJECTION, SOLUTION INTRAVENOUS at 20:50

## 2017-09-12 RX ADMIN — HYDROCORTISONE ACETATE AND PRAMOXINE HYDROCHLORIDE 1 APPLICATOR: 100; 100 AEROSOL, FOAM TOPICAL at 21:11

## 2017-09-12 RX ADMIN — FAMOTIDINE 40 MG: 20 TABLET ORAL at 21:10

## 2017-09-12 RX ADMIN — ATORVASTATIN CALCIUM 40 MG: 40 TABLET, FILM COATED ORAL at 21:10

## 2017-09-12 RX ADMIN — METRONIDAZOLE 500 MG: 500 INJECTION, SOLUTION INTRAVENOUS at 04:15

## 2017-09-12 RX ADMIN — LEVOTHYROXINE SODIUM 125 MCG: 125 TABLET ORAL at 07:03

## 2017-09-12 RX ADMIN — ONDANSETRON 4 MG: 2 INJECTION INTRAMUSCULAR; INTRAVENOUS at 20:51

## 2017-09-12 RX ADMIN — METRONIDAZOLE 500 MG: 500 INJECTION, SOLUTION INTRAVENOUS at 10:59

## 2017-09-12 RX ADMIN — ATENOLOL 25 MG: 25 TABLET ORAL at 09:10

## 2017-09-12 RX ADMIN — SODIUM CHLORIDE 40 MG: 9 INJECTION INTRAMUSCULAR; INTRAVENOUS; SUBCUTANEOUS at 21:21

## 2017-09-12 RX ADMIN — HYDROCORTISONE ACETATE AND PRAMOXINE HYDROCHLORIDE 1 APPLICATOR: 100; 100 AEROSOL, FOAM TOPICAL at 09:00

## 2017-09-12 RX ADMIN — GABAPENTIN 300 MG: 300 CAPSULE ORAL at 09:11

## 2017-09-12 RX ADMIN — Medication 10 ML: at 21:21

## 2017-09-12 RX ADMIN — HYDROMORPHONE HYDROCHLORIDE 1 MG: 1 INJECTION, SOLUTION INTRAMUSCULAR; INTRAVENOUS; SUBCUTANEOUS at 20:51

## 2017-09-12 RX ADMIN — Medication 10 ML: at 14:00

## 2017-09-12 RX ADMIN — BUDESONIDE AND FORMOTEROL FUMARATE DIHYDRATE 2 PUFF: 80; 4.5 AEROSOL RESPIRATORY (INHALATION) at 07:57

## 2017-09-12 RX ADMIN — Medication 10 ML: at 17:47

## 2017-09-12 RX ADMIN — SODIUM CHLORIDE 40 MG: 9 INJECTION INTRAMUSCULAR; INTRAVENOUS; SUBCUTANEOUS at 09:11

## 2017-09-12 RX ADMIN — POTASSIUM CHLORIDE 20 MEQ: 20 TABLET, EXTENDED RELEASE ORAL at 11:58

## 2017-09-12 RX ADMIN — LEVOFLOXACIN 500 MG: 5 INJECTION, SOLUTION INTRAVENOUS at 04:16

## 2017-09-12 RX ADMIN — Medication 10 ML: at 07:04

## 2017-09-12 RX ADMIN — BUDESONIDE AND FORMOTEROL FUMARATE DIHYDRATE 2 PUFF: 80; 4.5 AEROSOL RESPIRATORY (INHALATION) at 21:30

## 2017-09-12 RX ADMIN — GABAPENTIN 300 MG: 300 CAPSULE ORAL at 21:10

## 2017-09-12 NOTE — PROGRESS NOTES
Bedside and Verbal shift change report given to Neisha Rodriguez RN   (oncoming nurse) by Tawanna Olvera (offgoing nurse). Report included the following information SBAR and Kardex.

## 2017-09-12 NOTE — PROGRESS NOTES
Hospitalist Progress Note    Patient: Wendy Hernandez Age: 78 y.o. : 1950 MR#: [id number] SSN: [id number]  Date/Time: 2017 11:45 AM    Subjective:     I had a small BM today    Review of Systems -   Patient states out of bed to bedside commode several times since last evening, no dizziness,headache, chest pain or shortness of breath         Objective:   VS:   Visit Vitals    /90 (BP 1 Location: Left arm, BP Patient Position: At rest)    Pulse 63    Temp 97 °F (36.1 °C)    Resp 20    Ht 6' 1\" (1.854 m)    Wt 115.9 kg (255 lb 8 oz)    SpO2 98%    BMI 33.71 kg/m2      Tmax/24hrs: Temp (24hrs), Av.7 °F (36.5 °C), Min:97 °F (36.1 °C), Max:98.4 °F (36.9 °C)     Intake/Output Summary (Last 24 hours) at 17 1145  Last data filed at 17 0957   Gross per 24 hour   Intake              120 ml   Output             1550 ml   Net            -1430 ml       General: alert and oriented ×3  HEENT:speech clear and goal directed, no focal deficits,  Mucous membranes moist, no JVD  Cardiovascular: heart rate regular 64   Pulmonary:  Clear to bases  GI: soft, bowel sounds positive, mild left lower quadrant tenderness on palpation   Extremities:  Moving all 4 extremities well, no edema, + pulses  Additional:    Current Facility-Administered Medications   Medication Dose Route Frequency    phenyleph-shark pacheco oil-mo-pet (PREPARATION H) ointment   PeriANAL QID PRN    witch hazel-glycerin (TUCKS) 50 % pads   PeriANAL PRN    hydrocortisone-pramoxine (PROCTOFOAM HC) 1-1 % rectal foam 1 Applicator  1 Applicator PeriANAL P67G    aspirin chewable tablet 81 mg  81 mg Oral DAILY    atenolol (TENORMIN) tablet 25 mg  25 mg Oral DAILY    atorvastatin (LIPITOR) tablet 40 mg  40 mg Oral QHS    gabapentin (NEURONTIN) capsule 300 mg  300 mg Oral TID    levothyroxine (SYNTHROID) tablet 125 mcg  125 mcg Oral 7am    famotidine (PEPCID) tablet 40 mg  40 mg Oral QHS    nicotine (NICODERM CQ) 14 mg/24 hr patch 1 Patch  1 Patch TransDERmal DAILY    LORazepam (ATIVAN) tablet 1 mg  1 mg Oral Q4H PRN    0.9% sodium chloride infusion  125 mL/hr IntraVENous CONTINUOUS    levoFLOXacin (LEVAQUIN) 500 mg in D5W IVPB  500 mg IntraVENous Q24H    metroNIDAZOLE (FLAGYL) IVPB premix 500 mg  500 mg IntraVENous Q8H    HYDROmorphone (DILAUDID) syringe 1 mg  1 mg IntraVENous Q4H PRN    budesonide-formoterol (SYMBICORT) 80-4.5 mcg inhaler  2 Puff Inhalation BID RT    nitroglycerin (NITROSTAT) tablet 0.4 mg  0.4 mg SubLINGual Q5MIN PRN    pantoprazole (PROTONIX) 40 mg in sodium chloride 0.9 % 10 mL injection  40 mg IntraVENous Q12H    albuterol (PROVENTIL VENTOLIN) nebulizer solution 2.5 mg  2.5 mg Nebulization Q4H PRN    sodium chloride (NS) flush 5-10 mL  5-10 mL IntraVENous Q8H    sodium chloride (NS) flush 5-10 mL  5-10 mL IntraVENous PRN    acetaminophen (TYLENOL) suppository 650 mg  650 mg Rectal Q4H PRN    naloxone (NARCAN) injection 0.4 mg  0.4 mg IntraVENous PRN    dicyclomine (BENTYL) capsule 10 mg  10 mg Oral Q6H PRN    diphenhydrAMINE (BENADRYL) injection 25 mg  25 mg IntraVENous Q4H PRN    ondansetron (ZOFRAN) injection 4 mg  4 mg IntraVENous Q4H PRN    bisacodyl (DULCOLAX) suppository 10 mg  10 mg Rectal DAILY PRN    oxyCODONE IR (ROXICODONE) tablet 5 mg  5 mg Oral Q4H PRN     Labs:    Recent Results (from the past 24 hour(s))   EKG, 12 LEAD, SUBSEQUENT    Collection Time: 09/11/17  3:21 PM   Result Value Ref Range    Ventricular Rate 67 BPM    Atrial Rate 67 BPM    P-R Interval 340 ms    QRS Duration 202 ms    Q-T Interval 512 ms    QTC Calculation (Bezet) 541 ms    Calculated P Axis 67 degrees    Calculated R Axis -85 degrees    Calculated T Axis 92 degrees    Diagnosis       Sinus rhythm with 1st degree AV block with fusion complexes  Left axis deviation  Left ventricular hypertrophy with QRS widening  Possible Lateral infarct , age undetermined  Abnormal ECG  When compared with ECG of 11-SEP-2017 08:18,  Sinus rhythm has replaced Electronic ventricular pacemaker     METABOLIC PANEL, BASIC    Collection Time: 09/11/17  4:05 PM   Result Value Ref Range    Sodium 140 136 - 145 mmol/L    Potassium 3.7 3.5 - 5.5 mmol/L    Chloride 105 100 - 108 mmol/L    CO2 25 21 - 32 mmol/L    Anion gap 10 3.0 - 18 mmol/L    Glucose 85 74 - 99 mg/dL    BUN 7 7.0 - 18 MG/DL    Creatinine 1.16 0.6 - 1.3 MG/DL    BUN/Creatinine ratio 6 (L) 12 - 20      GFR est AA >60 >60 ml/min/1.73m2    GFR est non-AA >60 >60 ml/min/1.73m2    Calcium 8.2 (L) 8.5 - 10.1 MG/DL   LACTIC ACID    Collection Time: 09/11/17  4:05 PM   Result Value Ref Range    Lactic acid 1.7 0.4 - 2.0 MMOL/L   MAGNESIUM    Collection Time: 09/11/17  4:05 PM   Result Value Ref Range    Magnesium 2.0 1.6 - 2.6 mg/dL   PHOSPHORUS    Collection Time: 09/11/17  4:05 PM   Result Value Ref Range    Phosphorus 2.6 2.5 - 4.9 MG/DL   TROPONIN I    Collection Time: 09/11/17  4:05 PM   Result Value Ref Range    Troponin-I, Qt. <0.02 0.0 - 0.045 NG/ML   TROPONIN I    Collection Time: 09/11/17 10:52 PM   Result Value Ref Range    Troponin-I, Qt. <0.02 0.0 - 0.045 NG/ML   TROPONIN I    Collection Time: 09/12/17  4:04 AM   Result Value Ref Range    Troponin-I, Qt. <0.02 0.0 - 3.111 NG/ML   METABOLIC PANEL, COMPREHENSIVE    Collection Time: 09/12/17  4:04 AM   Result Value Ref Range    Sodium 140 136 - 145 mmol/L    Potassium 3.3 (L) 3.5 - 5.5 mmol/L    Chloride 107 100 - 108 mmol/L    CO2 24 21 - 32 mmol/L    Anion gap 9 3.0 - 18 mmol/L    Glucose 80 74 - 99 mg/dL    BUN 5 (L) 7.0 - 18 MG/DL    Creatinine 1.07 0.6 - 1.3 MG/DL    BUN/Creatinine ratio 5 (L) 12 - 20      GFR est AA >60 >60 ml/min/1.73m2    GFR est non-AA >60 >60 ml/min/1.73m2    Calcium 8.1 (L) 8.5 - 10.1 MG/DL    Bilirubin, total 0.4 0.2 - 1.0 MG/DL    ALT (SGPT) 8 (L) 16 - 61 U/L    AST (SGOT) 13 (L) 15 - 37 U/L    Alk.  phosphatase 58 45 - 117 U/L    Protein, total 6.1 (L) 6.4 - 8.2 g/dL    Albumin 2.3 (L) 3.4 - 5.0 g/dL    Globulin 3.8 2.0 - 4.0 g/dL    A-G Ratio 0.6 (L) 0.8 - 1.7     CBC WITH AUTOMATED DIFF    Collection Time: 09/12/17  4:04 AM   Result Value Ref Range    WBC 6.0 4.6 - 13.2 K/uL    RBC 4.16 (L) 4.70 - 5.50 M/uL    HGB 11.0 (L) 13.0 - 16.0 g/dL    HCT 34.5 (L) 36.0 - 48.0 %    MCV 82.9 74.0 - 97.0 FL    MCH 26.4 24.0 - 34.0 PG    MCHC 31.9 31.0 - 37.0 g/dL    RDW 15.2 (H) 11.6 - 14.5 %    PLATELET 035 280 - 761 K/uL    MPV 10.1 9.2 - 11.8 FL    NEUTROPHILS 63 40 - 73 %    LYMPHOCYTES 25 21 - 52 %    MONOCYTES 9 3 - 10 %    EOSINOPHILS 2 0 - 5 %    BASOPHILS 1 0 - 2 %    ABS. NEUTROPHILS 3.8 1.8 - 8.0 K/UL    ABS. LYMPHOCYTES 1.5 0.9 - 3.6 K/UL    ABS. MONOCYTES 0.6 0.05 - 1.2 K/UL    ABS. EOSINOPHILS 0.1 0.0 - 0.4 K/UL    ABS. BASOPHILS 0.0 0.0 - 0.1 K/UL    DF AUTOMATED     EKG, 12 LEAD, SUBSEQUENT    Collection Time: 09/12/17  6:55 AM   Result Value Ref Range    Ventricular Rate 67 BPM    Atrial Rate 67 BPM    P-R Interval 304 ms    QRS Duration 210 ms    Q-T Interval 496 ms    QTC Calculation (Bezet) 524 ms    Calculated R Axis -88 degrees    Calculated T Axis 92 degrees    Diagnosis       Electronic atrial pacemaker  Left axis deviation  Nonspecific intraventricular block  Inferior infarct , age undetermined  Abnormal ECG  When compared with ECG of 11-SEP-2017 15:21,  Electronic atrial pacemaker has replaced Sinus rhythm  Borderline criteria for Lateral infarct are no longer present         Imaging:  No results found.     Assessment/Plan:     Hospital Problems  Date Reviewed: 9/12/2017          Codes Class Noted POA    Protein-calorie malnutrition, moderate ([facility] [address] ICD-10-CM: E44.0  ICD-9-CM: 263.0  9/12/2017 Yes        History of AAA (abdominal aortic aneurysm) repair (Chronic) ICD-10-CM: V95.977  ICD-9-CM: V45.89  9/12/2017 Yes        * (Principal)Diverticulitis of large intestine without perforation or abscess with bleeding ICD-10-CM: K57.33  ICD-9-CM: 562.13  9/10/2017 Yes        Gastroesophageal reflux disease without esophagitis (Chronic) ICD-10-CM: K21.9  ICD-9-CM: 530.81  11/9/2015 Yes        HTN (hypertension) (Chronic) ICD-10-CM: I10  ICD-9-CM: 401.9  3/30/2013 Yes            Dr. Karlos Cabral follow appreciated,  Patient at risk for ischemic bowel secondary to h/o AAA repair  Discussed with Dr. Chepe Jones, for flexible sigmoidoscopy in the a.m.   Nothing by mouth after midnight, continue parenteral fluids  Continue antibiotics  Continue PPI and H2 blocker as patient has been at home  Okay to resume clear liquids  Monitoring stools, H&H  No further respiratory complaints    GI prophylaxis: Pepcid  DVT prophylaxis SCD    Additional Notes:      Full Code    Disposition:   Home    Case discussed with:  [x]Patient  []Family  [x]Nursing  [x]Case Management  X GI  DVT Prophylaxis:  []Lovenox  []Hep SQ  [x]SCDs  []Coumadin   []On Heparin gtt    Signed By: Colletta Romans, [address]    September 12, 2017 11:45 AM

## 2017-09-12 NOTE — ROUTINE PROCESS
NO STATUS CHANGE;MED PER ORDER;BACK ON CLEAR LIQUID DIET;NPO P MD FOR FLEX SIG TOMORROW;VERBALIZED [email or web address]

## 2017-09-12 NOTE — PROGRESS NOTES
[facility] [address] [id number]  [phone]  Colon and Rectal Surgery Progress Note      Patient: Wendy Hernandez MRN: [id number]  SSN: [id number]    YOB: 1950  Age: 78 y.o. Sex: male      Admit Date: 9/9/2017    LOS: 2 days     Subjective:     Pain unchanged. Seen by GI today who plan flex sig tomorrow.      Objective:     Vitals:    09/11/17 2059 09/12/17 0837 09/12/17 1110 09/12/17 1506   BP:  149/85 151/90 151/80   Pulse:  60 63 64   Resp:  20 20 18   Temp:  98 °F (36.7 °C) 97 °F (36.1 °C) 98.6 °F (37 °C)   SpO2: 98% 96% 98% 96%   Weight:       Height:            Intake and Output:  Current Shift:    Last three shifts: 09/11 0701 - 09/12 1900  In: 120 [P.O.:120]  Out: 1900 [Urine:1900]    Physical Exam:     abd soft, less tender LLQ today  No guarding, rebound    Lab/Data Review:    CMP:   Lab Results   Component Value Date/Time     09/12/2017 04:04 AM    K 3.3 (L) 09/12/2017 04:04 AM     09/12/2017 04:04 AM    CO2 24 09/12/2017 04:04 AM    AGAP 9 09/12/2017 04:04 AM    GLU 80 09/12/2017 04:04 AM    BUN 5 (L) 09/12/2017 04:04 AM    CREA 1.07 09/12/2017 04:04 AM    GFRAA >60 09/12/2017 04:04 AM    GFRNA >60 09/12/2017 04:04 AM    CA 8.1 (L) 09/12/2017 04:04 AM    ALB 2.3 (L) 09/12/2017 04:04 AM    TP 6.1 (L) 09/12/2017 04:04 AM    GLOB 3.8 09/12/2017 04:04 AM    AGRAT 0.6 (L) 09/12/2017 04:04 AM    SGOT 13 (L) 09/12/2017 04:04 AM    ALT 8 (L) 09/12/2017 04:04 AM     CBC:   Lab Results   Component Value Date/Time    WBC 6.0 09/12/2017 04:04 AM    HGB 11.0 (L) 09/12/2017 04:04 AM    HCT 34.5 (L) 09/12/2017 04:04 AM     09/12/2017 04:04 AM          Assessment:     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

## 2017-09-12 NOTE — MED STUDENT NOTES
*ATTENTION:  This note has been created by a medical student for educational purposes only. Please do not refer to the content of this note for clinical decision-making, billing, or other purposes. Please see attending physicians note to obtain clinical information on this patient. *         [employer]  [phone]    Impression:   1. Abdominal pain with rectal bleeding  2. Hemorrhoids    CAD  cardiac pacemaker,  renal artery stenosis with CKDlll S/P stents  GERD  COPD   Hypothyroidism  Hypercholesterolemia  HTN  s/p AAA repair 9/24/0189 without complication  Recent admission 6/2/2017 to 6/9/2017 with distal colon and sigmoid colitis. Plan:     1. Chief Complaint:       HPI:  Beni Osman is a 78 y.o. male who is being seen on consult for ***. PMH:   Past Medical History:   Diagnosis Date    Arthritis     CAD (coronary artery disease), native coronary artery     ALIDA X 2 to OM1    Carotid duplex 08/13/2013    Mild <50% bilateral ICA plaquing.  Complete heart block [facility]) June 2013    Medtronic dual chamber pacemaker    COPD (chronic obstructive pulmonary disease) ([facility] [address]     Diverticulitis of large intestine without perforation or abscess with bleeding 9/10/2017    Gastritis     GERD (gastroesophageal reflux disease)     High cholesterol     History of echocardiogram 06/04/2013    EF 60-65%. No WMA. Gr 1 DDfx. No significant valvular heart disease.  History of myocardial perfusion scan 04/18/2011    No ischemia or prior infarction. No WMA. EF 52%. Neg EKG on pharm stress test.    Hypertension     Hypothyroidism     Lower extremity venous duplex 01/06/2015    No DVT. Superficial insufficiency of right GSV. Deep venous reflux in right CFV & fem vein. Deep venous reflux in left CFV.  Renal duplex 12/24/2014    Mild < 60% RRA stenosis. Low parenchymal & LRA flow. Renal asymmetry. Intrinsic/med disease in right kidney.   AAA (3.8 x 4.0 cm) infrarenal.    Right groin hernia     not resolved    S/P AAA repair 05/18/2017    S/P cardiac cath 06/02/2013    Diffuse atherosclerosis throughout. pRCA 50%. mLM 30%. mLAD 40%. oD2 (sm) 100%. pLCX 30%. p/mOM1 95/80% (o/lapping 2.25 x 23-mm & 2.25 x 12-mm Xience stents, resid 0%). PSH:   Past Surgical History:   Procedure Laterality Date    HX CHOLECYSTECTOMY  2008    HX CORONARY STENT PLACEMENT  6/2013    HX HERNIA REPAIR  4414    Umbilical    HX PACEMAKER  6/2013    Medtronic Pacemaker        Social HX:   Social History     Social History    Marital status:      Spouse name: N/A    Number of children: N/A    Years of education: N/A     Occupational History    Not on file. Social History Main Topics    Smoking status: Current Every Day Smoker     Packs/day: 1.00     Types: Cigarettes    Smokeless tobacco: Never Used    Alcohol use No    Drug use: No    Sexual activity: Not on file     Other Topics Concern     Service No    Blood Transfusions No    Caffeine Concern Yes    Occupational Exposure No    Hobby Hazards No    Sleep Concern No    Stress Concern No    Weight Concern No    Special Diet No    Back Care Yes    Exercise No    Bike Helmet No    Seat Belt Yes     Social History Narrative       FHX:   Family History   Problem Relation Age of Onset    Cancer Mother      pancreatic    Heart Attack Father     Heart Disease Father     Diabetes Sister     Heart Disease Sister     Diabetes Brother     Stroke Brother        Allergy:   No Known Allergies    Home Medications:     Prescriptions Prior to Admission   Medication Sig    raNITIdine (ZANTAC) 300 mg tablet Take 1 Tab by mouth daily.  HYDROcodone-acetaminophen (NORCO) 7.5-325 mg per tablet Take 1 Tab by mouth four (4) times daily as needed for Pain.  gabapentin (NEURONTIN) 300 mg capsule Take 300 mg by mouth three (3) times daily.  Indications: NEUROPATHIC PAIN    fluticasone-salmeterol (ADVAIR) 100-50 mcg/dose diskus inhaler Take 1 Puff by inhalation every twelve (12) hours. Indications: BRONCHOSPASM PREVENTION WITH COPD    LORazepam (ATIVAN) 1 mg tablet Take 1 Tab by mouth every six (6) hours as needed. Max Daily Amount: 4 mg. Indications: anxiety    pantoprazole (PROTONIX) 40 mg tablet Take 40 mg by mouth daily.  atenolol (TENORMIN) 25 mg tablet Take 25 mg by mouth daily.  levothyroxine (SYNTHROID) 125 mcg tablet     atorvastatin (LIPITOR) 40 mg tablet Take 40 mg by mouth nightly.  aspirin 81 mg chewable tablet Take 1 Tab by mouth daily.  albuterol (PROVENTIL HFA, VENTOLIN HFA, PROAIR HFA) 90 mcg/actuation inhaler Take 2 Puffs by inhalation every six (6) hours as needed for Wheezing.  nitroglycerin (NITROSTAT) 0.4 mg SL tablet 1 Tab by SubLINGual route every five (5) minutes as needed for Chest Pain (if chest pain not resolved after taking 3 call 911 ). Review of Systems:     A fourteen point review of systems was obtained, and was negative except per HPI. Visit Vitals    /85 (BP 1 Location: Left arm, BP Patient Position: At rest)    Pulse 60    Temp 98 °F (36.7 °C)    Resp 20    Ht 6' 1\" (1.854 m)    Wt 115.9 kg (255 lb 8 oz)    SpO2 96%    BMI 33.71 kg/m2       Physical Assessment:     constitutional: appearance: well developed, well nourished, in no acute distress.    skin: no rashes, jaundice  eyes: inspection: normal conjunctivae and lids; no scleral icterus pupils: equal, round, reactive to light; extraocular movements intact  ENMT: mouth: mucous membranes moist, no thrush  neck:  no masses or tenderness; normal range of motion  respiratory: breath sounds clear, no wheeze/rales/rhonchi  cardiovascular: normal rate, regular rhythm without murmur  abdominal: soft, bowel sounds present, non-tender to palpation without rebound or guarding; no appreciable mass; no appreciable hepatosplenomegaly; no appreciable fluid wave  extremities: no clubbing, cyanosis, edema  neurologic:  Cranial nerves II-XII grossly intact; no asterixis   psychiatric:  oriented to time, space and person. Basic Metabolic Profile   Recent Labs      09/12/17 0404  09/11/17   1605   NA  140  140   K  3.3*  3.7   CL  107  105   CO2  24  25   BUN  5*  7   GLU  80  85   CA  8.1*  8.2*   MG   --   2.0   PHOS   --   2.6         CBC w/Diff    Recent Labs      09/12/17 0404   WBC  6.0   RBC  4.16*   HGB  11.0*   HCT  34.5*   MCV  82.9   MCH  26.4   MCHC  31.9   RDW  15.2*   PLT  178    Recent Labs      09/12/17 0404   GRANS  63   LYMPH  25   EOS  2        Hepatic Function   Recent Labs      09/12/17 0404 09/09/17 2015   ALB  2.3*  3.0*   TP  6.1*  7.5   TBILI  0.4  0.9   SGOT  13*  19   AP  58  94   LPSE   --   66*          AmatMurtaza Cross, MS4, EVMS    Supervised by: Elizabeth Hooper MD  [facility], [facility]  [email or web address]

## 2017-09-12 NOTE — CONSULTS
[email or web address]  [phone]    Impression:   1. GERD  2. S/p AAA repair 5/2017  3. S/p cholecystectomy  4.current smoker  5. Abdominal pain/rectal bleed starting after 3 days of diarrhea- colo approximately 5 years ago showing polyps. CT abdomen decreased wall thickening at rectum and low sigmoid. 6. 6/2017- H/o  Distal/sigmoid colitis       Plan:     1. Clears for now. NPO p MN for flex sig. Tap water enema prep ordered      Chief Complaint: abdominal pain, loose stool      HPI:  Dayron Aguiar is a 78 y.o. male who is being seen on consult for colitis/diverticulitis. Pt presented to the ER for loose stools and fecal soiling with abdominal pain over the last 3 days. Pt was hospitalized on 6/2017 for colitis. Presently pt denies abdominal pain or diarrhea since starting antibiotics. Pt denies melena, hematemesis or hematochezia. Most recent colo was about 5 years ago shoeing polyps as per patient    PMH:   Past Medical History:   Diagnosis Date    Arthritis     CAD (coronary artery disease), native coronary artery     ALIDA X 2 to OM1    Carotid duplex 08/13/2013    Mild <50% bilateral ICA plaquing.  Complete heart block [facility]) June 2013    Medtronic dual chamber pacemaker    COPD (chronic obstructive pulmonary disease) ([facility] [address]     Diverticulitis of large intestine without perforation or abscess with bleeding 9/10/2017    Gastritis     GERD (gastroesophageal reflux disease)     High cholesterol     History of echocardiogram 06/04/2013    EF 60-65%. No WMA. Gr 1 DDfx. No significant valvular heart disease.  History of myocardial perfusion scan 04/18/2011    No ischemia or prior infarction. No WMA. EF 52%. Neg EKG on pharm stress test.    Hypertension     Hypothyroidism     Lower extremity venous duplex 01/06/2015    No DVT. Superficial insufficiency of right GSV. Deep venous reflux in right CFV & fem vein. Deep venous reflux in left CFV.     Renal duplex 12/24/2014    Mild < 60% RRA stenosis. Low parenchymal & LRA flow. Renal asymmetry. Intrinsic/med disease in right kidney. AAA (3.8 x 4.0 cm) infrarenal.    Right groin hernia     not resolved    S/P AAA repair 05/18/2017    S/P cardiac cath 06/02/2013    Diffuse atherosclerosis throughout. pRCA 50%. mLM 30%. mLAD 40%. oD2 (sm) 100%. pLCX 30%. p/mOM1 95/80% (o/lapping 2.25 x 23-mm & 2.25 x 12-mm Xience stents, resid 0%). PSH:   Past Surgical History:   Procedure Laterality Date    HX CHOLECYSTECTOMY  2008    HX CORONARY STENT PLACEMENT  6/2013    HX HERNIA REPAIR  3241    Umbilical    HX PACEMAKER  6/2013    Medtronic Pacemaker        Social HX:   Social History     Social History    Marital status:      Spouse name: N/A    Number of children: N/A    Years of education: N/A     Occupational History    Not on file. Social History Main Topics    Smoking status: Current Every Day Smoker     Packs/day: 1.00     Types: Cigarettes    Smokeless tobacco: Never Used    Alcohol use No    Drug use: No    Sexual activity: Not on file     Other Topics Concern     Service No    Blood Transfusions No    Caffeine Concern Yes    Occupational Exposure No    Hobby Hazards No    Sleep Concern No    Stress Concern No    Weight Concern No    Special Diet No    Back Care Yes    Exercise No    Bike Helmet No    Seat Belt Yes     Social History Narrative       FHX:   Family History   Problem Relation Age of Onset    Cancer Mother      pancreatic    Heart Attack Father     Heart Disease Father     Diabetes Sister     Heart Disease Sister     Diabetes Brother     Stroke Brother        Allergy:   No Known Allergies    Home Medications:     Prescriptions Prior to Admission   Medication Sig    raNITIdine (ZANTAC) 300 mg tablet Take 1 Tab by mouth daily.  HYDROcodone-acetaminophen (NORCO) 7.5-325 mg per tablet Take 1 Tab by mouth four (4) times daily as needed for Pain.     gabapentin (NEURONTIN) 300 mg capsule Take 300 mg by mouth three (3) times daily. Indications: NEUROPATHIC PAIN    fluticasone-salmeterol (ADVAIR) 100-50 mcg/dose diskus inhaler Take 1 Puff by inhalation every twelve (12) hours. Indications: BRONCHOSPASM PREVENTION WITH COPD    LORazepam (ATIVAN) 1 mg tablet Take 1 Tab by mouth every six (6) hours as needed. Max Daily Amount: 4 mg. Indications: anxiety    pantoprazole (PROTONIX) 40 mg tablet Take 40 mg by mouth daily.  atenolol (TENORMIN) 25 mg tablet Take 25 mg by mouth daily.  levothyroxine (SYNTHROID) 125 mcg tablet     atorvastatin (LIPITOR) 40 mg tablet Take 40 mg by mouth nightly.  aspirin 81 mg chewable tablet Take 1 Tab by mouth daily.  albuterol (PROVENTIL HFA, VENTOLIN HFA, PROAIR HFA) 90 mcg/actuation inhaler Take 2 Puffs by inhalation every six (6) hours as needed for Wheezing.  nitroglycerin (NITROSTAT) 0.4 mg SL tablet 1 Tab by SubLINGual route every five (5) minutes as needed for Chest Pain (if chest pain not resolved after taking 3 call 911 ). Review of Systems:     A fourteen point review of systems was obtained, and was negative except per HPI. Visit Vitals    /90 (BP 1 Location: Left arm, BP Patient Position: At rest)    Pulse 63    Temp 97 °F (36.1 °C)    Resp 20    Ht 6' 1\" (1.854 m)    Wt 115.9 kg (255 lb 8 oz)    SpO2 98%    BMI 33.71 kg/m2       Physical Assessment:     constitutional: appearance: well developed, well nourished, in no acute distress.    skin: no rashes, jaundice  eyes: inspection: normal conjunctivae and lids; no scleral icterus pupils: equal, round, reactive to light; extraocular movements intact  ENMT: mouth: mucous membranes moist, no thrush  neck:  no masses or tenderness; normal range of motion  respiratory: breath sounds clear, no wheeze/rales/rhonchi  cardiovascular: normal rate, regular rhythm without murmur  abdominal: soft, bowel sounds present, non-tender to palpation without rebound or guarding; no appreciable mass; no appreciable hepatosplenomegaly; no appreciable fluid wave  extremities: no clubbing, cyanosis, edema  neurologic:  Cranial nerves II-XII grossly intact; no asterixis   psychiatric:  oriented to time, space and person. Basic Metabolic Profile   Recent Labs      09/12/17 0404 09/11/17   1605   NA  140  140   K  3.3*  3.7   CL  107  105   CO2  24  25   BUN  5*  7   GLU  80  85   CA  8.1*  8.2*   MG   --   2.0   PHOS   --   2.6         CBC w/Diff    Recent Labs      09/12/17 0404   WBC  6.0   RBC  4.16*   HGB  11.0*   HCT  34.5*   MCV  82.9   MCH  26.4   MCHC  31.9   RDW  15.2*   PLT  178    Recent Labs      09/12/17 0404   GRANS  63   LYMPH  25   EOS  2        Hepatic Function   Recent Labs      09/12/17 0404 09/09/17 2015   ALB  2.3*  3.0*   TP  6.1*  7.5   TBILI  0.4  0.9   SGOT  13*  19   AP  58  94   LPSE   --   66*          Shaheen Hill NP  [facility] [place] [place]  [email or web address]

## 2017-09-12 NOTE — PROGRESS NOTES
Hospitalist Progress Note    Patient: Beni Osman Age: 78 y.o. : 1950 MR#: [id number] SSN: [id number]  Date/Time: 2017 10:01 PM    Subjective:     Patient resting in bed alert, speech clear    Review of Systems -   Event of SOB this am on return from [facility] in bathroom  Reminded patient and nurse that I ordered BSC, as we still have no   Stool evaluations since admission.        Objective:   VS:   Visit Vitals    /77 (BP 1 Location: Left arm)    Pulse 66    Temp 98 °F (36.7 °C)    Resp 18    Ht 6' 1\" (1.854 m)    Wt 115.9 kg (255 lb 8 oz)    SpO2 98%    BMI 33.71 kg/m2      Tmax/24hrs: Temp (24hrs), Av.8 °F (36.6 °C), Min:97.3 °F (36.3 °C), Max:98.2 °F (36.8 °C)       Intake/Output Summary (Last 24 hours) at 17 2201  Last data filed at 17 1309   Gross per 24 hour   Intake              120 ml   Output                0 ml   Net              120 ml       General: alert and oriented ×3  HEENT: speech clear and goal directed, conjunctiva clear, no scleral icterus, mucous membranes moist, no focal deficits, no JVD  Cardiovascular:  Paced rhythm heart rate 66  Pulmonary:  Lungs are clear to bases  GI:  bowel sounds positive, soft, mild tenderness LLQ  Extremities:  Moving all 4 extremities well, calves are nontender and soft, no cords palpable, positive pulses  Additional:    Current Facility-Administered Medications   Medication Dose Route Frequency    phenyleph-shark pacheco oil-mo-pet (PREPARATION H) ointment   PeriANAL QID PRN    witch hazel-glycerin (TUCKS) 50 % pads   PeriANAL PRN    hydrocortisone-pramoxine (PROCTOFOAM HC) 1-1 % rectal foam 1 Applicator  1 Applicator PeriANAL B30V    [START ON 2017] aspirin chewable tablet 81 mg  81 mg Oral DAILY    atenolol (TENORMIN) tablet 25 mg  25 mg Oral DAILY    atorvastatin (LIPITOR) tablet 40 mg  40 mg Oral QHS    gabapentin (NEURONTIN) capsule 300 mg  300 mg Oral TID    [START ON 2017] levothyroxine (SYNTHROID) tablet 125 mcg  125 mcg Oral 7am    famotidine (PEPCID) tablet 40 mg  40 mg Oral QHS    0.9% sodium chloride infusion  125 mL/hr IntraVENous CONTINUOUS    levoFLOXacin (LEVAQUIN) 500 mg in D5W IVPB  500 mg IntraVENous Q24H    metroNIDAZOLE (FLAGYL) IVPB premix 500 mg  500 mg IntraVENous Q8H    HYDROmorphone (DILAUDID) syringe 1 mg  1 mg IntraVENous Q4H PRN    budesonide-formoterol (SYMBICORT) 80-4.5 mcg inhaler  2 Puff Inhalation BID RT    nitroglycerin (NITROSTAT) tablet 0.4 mg  0.4 mg SubLINGual Q5MIN PRN    pantoprazole (PROTONIX) 40 mg in sodium chloride 0.9 % 10 mL injection  40 mg IntraVENous Q12H    albuterol (PROVENTIL VENTOLIN) nebulizer solution 2.5 mg  2.5 mg Nebulization Q4H PRN    sodium chloride (NS) flush 5-10 mL  5-10 mL IntraVENous Q8H    sodium chloride (NS) flush 5-10 mL  5-10 mL IntraVENous PRN    acetaminophen (TYLENOL) suppository 650 mg  650 mg Rectal Q4H PRN    naloxone (NARCAN) injection 0.4 mg  0.4 mg IntraVENous PRN    dicyclomine (BENTYL) capsule 10 mg  10 mg Oral Q6H PRN    diphenhydrAMINE (BENADRYL) injection 25 mg  25 mg IntraVENous Q4H PRN    ondansetron (ZOFRAN) injection 4 mg  4 mg IntraVENous Q4H PRN    bisacodyl (DULCOLAX) suppository 10 mg  10 mg Rectal DAILY PRN    oxyCODONE IR (ROXICODONE) tablet 5 mg  5 mg Oral Q4H PRN     Labs:    Recent Results (from the past 24 hour(s))   CBC WITH AUTOMATED DIFF    Collection Time: 09/11/17  5:11 AM   Result Value Ref Range    WBC 7.1 4.6 - 13.2 K/uL    RBC 4.28 (L) 4.70 - 5.50 M/uL    HGB 11.4 (L) 13.0 - 16.0 g/dL    HCT 35.6 (L) 36.0 - 48.0 %    MCV 83.2 74.0 - 97.0 FL    MCH 26.6 24.0 - 34.0 PG    MCHC 32.0 31.0 - 37.0 g/dL    RDW 15.2 (H) 11.6 - 14.5 %    PLATELET 377 957 - 092 K/uL    MPV 10.2 9.2 - 11.8 FL    NEUTROPHILS 64 40 - 73 %    LYMPHOCYTES 27 21 - 52 %    MONOCYTES 7 3 - 10 %    EOSINOPHILS 2 0 - 5 %    BASOPHILS 0 0 - 2 %    ABS. NEUTROPHILS 4.5 1.8 - 8.0 K/UL    ABS. LYMPHOCYTES 1.9 0.9 - 3.6 K/UL    ABS. MONOCYTES 0.5 0.05 - 1.2 K/UL    ABS. EOSINOPHILS 0.2 0.0 - 0.4 K/UL    ABS. BASOPHILS 0.0 0.0 - 0.1 K/UL    DF AUTOMATED     GLUCOSE, POC    Collection Time: 09/11/17  8:11 AM   Result Value Ref Range    Glucose (POC) 96 70 - 110 mg/dL   EKG, 12 LEAD, INITIAL    Collection Time: 09/11/17  8:18 AM   Result Value Ref Range    Ventricular Rate 96 BPM    Atrial Rate 98 BPM    QRS Duration 194 ms    Q-T Interval 454 ms    QTC Calculation (Bezet) 573 ms    Calculated R Axis -86 degrees    Calculated T Axis 83 degrees    Diagnosis       Electronic ventricular pacemaker  When compared with ECG of 04-JUN-2017 07:07,  Vent.  rate has increased BY  35 BPM  Confirmed by Marie Rodas MD, --- ([id number]) on 9/11/2017 2:44:29 PM     EKG, 12 LEAD, SUBSEQUENT    Collection Time: 09/11/17  3:21 PM   Result Value Ref Range    Ventricular Rate 67 BPM    Atrial Rate 67 BPM    P-R Interval 340 ms    QRS Duration 202 ms    Q-T Interval 512 ms    QTC Calculation (Bezet) 541 ms    Calculated P Axis 67 degrees    Calculated R Axis -85 degrees    Calculated T Axis 92 degrees    Diagnosis       Sinus rhythm with 1st degree AV block with fusion complexes  Left axis deviation  Left ventricular hypertrophy with QRS widening  Possible Lateral infarct , age undetermined  Abnormal ECG  When compared with ECG of 11-SEP-2017 08:18,  Sinus rhythm has replaced Electronic ventricular pacemaker     METABOLIC PANEL, BASIC    Collection Time: 09/11/17  4:05 PM   Result Value Ref Range    Sodium 140 136 - 145 mmol/L    Potassium 3.7 3.5 - 5.5 mmol/L    Chloride 105 100 - 108 mmol/L    CO2 25 21 - 32 mmol/L    Anion gap 10 3.0 - 18 mmol/L    Glucose 85 74 - 99 mg/dL    BUN 7 7.0 - 18 MG/DL    Creatinine 1.16 0.6 - 1.3 MG/DL    BUN/Creatinine ratio 6 (L) 12 - 20      GFR est AA >60 >60 ml/min/1.73m2    GFR est non-AA >60 >60 ml/min/1.73m2    Calcium 8.2 (L) 8.5 - 10.1 MG/DL   LACTIC ACID    Collection Time: 09/11/17  4:05 PM   Result Value Ref Range    Lactic acid 1.7 0.4 - 2.0 MMOL/L   MAGNESIUM    Collection Time: 09/11/17  4:05 PM   Result Value Ref Range    Magnesium 2.0 1.6 - 2.6 mg/dL   PHOSPHORUS    Collection Time: 09/11/17  4:05 PM   Result Value Ref Range    Phosphorus 2.6 2.5 - 4.9 MG/DL   TROPONIN I    Collection Time: 09/11/17  4:05 PM   Result Value Ref Range    Troponin-I, Qt. <0.02 0.0 - 0.045 NG/ML       Imaging:  Xr Chest Port    Result Date: 9/11/2017  Portable chest x-ray, upright AP view, time 0826 hours on 9/11/2017: INDICATION: Shortness of breath. History of hypertension, cardiac pacer, and gadolinium thoracic aortic aneurysm with endovascular graft. COMPARISON STUDY: Chest x-ray on 9/9/2017,/2/17. FINDINGS: The study again demonstrates vascular endograft in thoracic aorta from the upper portion of ascending thoracic aorta through lower descending thoracic aorta. The patient appears to be minimally distorted due to the left. Redemonstration of left subclavian bipolar cardiac pacers. There is minimal pulmonary vascular prominence without clear cephalization of flow. No evidence of pulmonary edema. At the left lung base there are minimal streaky atelectatic changes. Otherwise, lungs are clear without any other diagnostic finding. IMPRESSION: Only minimal pulmonary vascular prominence, without pulmonary edema. Minimal streaky atelectasis at left lung base. Redemonstration of previous placement of thoracic aortic endograft. No other diagnostic finding or interval change.       Assessment/Plan:     Hospital Problems  Date Reviewed: 9/10/2017          Codes Class Noted POA    * (Principal)Diverticulitis of large intestine without perforation or abscess with bleeding ICD-10-CM: K57.33  ICD-9-CM: 562.13  9/10/2017 Yes        Gastroesophageal reflux disease without esophagitis ICD-10-CM: K21.9  ICD-9-CM: 530.81  11/9/2015 Yes        HTN (hypertension) ICD-10-CM: I10  ICD-9-CM: 401.9  3/30/2013 Yes                 Protein Calorie malnutrition    Dietician has consulted with patient, supplements added  General surgery consultation appreciated  GI consulted: Dr Margarito Barahona to evaluate patient  Continue Levaquin and Flagyl  Advanced to GI light diet  Continue parenteral fluids  Monitor EVERY stool for occult blood  Requested RN to have patient use bedside commode and monitor all stools for color character, consistency and quantity  Monitor CBC  Nebulizer when necessary    GI prophylaxis Protonix  DVT prophylaxis support stockings and SCDs: discussed with RN patient was STILL not wearing his SCD's, Again, discussed need for compliance with   Mr Rhunette Nissen, especially since he is not on pharmaceutical DVT prophylaxis    Additional Notes:      Full Code    Disposition:    Home     Case discussed with:  [x]Patient  []Family  [x]Nursing  []Case Management  DVT Prophylaxis:  []Lovenox  []Hep SQ  [x]SCDs  []Coumadin   []On Heparin gtt    Signed By: Lyndon Blunt 51    September 11, 2017 10:01 PM

## 2017-09-13 ENCOUNTER — ANESTHESIA (OUTPATIENT)
Dept: ENDOSCOPY | Age: 67
DRG: 378 | End: 2017-09-13
Payer: MEDICARE

## 2017-09-13 LAB
ALBUMIN SERPL-MCNC: 2.3 G/DL (ref 3.4–5)
ALBUMIN/GLOB SERPL: 0.6 {RATIO} (ref 0.8–1.7)
ALP SERPL-CCNC: 59 U/L (ref 45–117)
ALT SERPL-CCNC: 9 U/L (ref 16–61)
ANION GAP SERPL CALC-SCNC: 8 MMOL/L (ref 3–18)
APTT PPP: 26.9 SEC (ref 23–36.4)
AST SERPL-CCNC: 14 U/L (ref 15–37)
ATRIAL RATE: 67 BPM
BASOPHILS # BLD: 0 K/UL (ref 0–0.1)
BASOPHILS NFR BLD: 0 % (ref 0–2)
BILIRUB SERPL-MCNC: 0.3 MG/DL (ref 0.2–1)
BUN SERPL-MCNC: 4 MG/DL (ref 7–18)
BUN/CREAT SERPL: 3 (ref 12–20)
CALCIUM SERPL-MCNC: 8.2 MG/DL (ref 8.5–10.1)
CALCULATED R AXIS, ECG10: -88 DEGREES
CALCULATED T AXIS, ECG11: 92 DEGREES
CHLORIDE SERPL-SCNC: 104 MMOL/L (ref 100–108)
CO2 SERPL-SCNC: 27 MMOL/L (ref 21–32)
CREAT SERPL-MCNC: 1.18 MG/DL (ref 0.6–1.3)
DIAGNOSIS, 93000: NORMAL
DIFFERENTIAL METHOD BLD: ABNORMAL
EOSINOPHIL # BLD: 0.2 K/UL (ref 0–0.4)
EOSINOPHIL NFR BLD: 3 % (ref 0–5)
ERYTHROCYTE [DISTWIDTH] IN BLOOD BY AUTOMATED COUNT: 15.4 % (ref 11.6–14.5)
GLOBULIN SER CALC-MCNC: 4 G/DL (ref 2–4)
GLUCOSE SERPL-MCNC: 93 MG/DL (ref 74–99)
HCT VFR BLD AUTO: 35.7 % (ref 36–48)
HGB BLD-MCNC: 11.3 G/DL (ref 13–16)
INR PPP: 1.1 (ref 0.8–1.2)
LACTATE SERPL-SCNC: 0.6 MMOL/L (ref 0.4–2)
LYMPHOCYTES # BLD: 1.6 K/UL (ref 0.9–3.6)
LYMPHOCYTES NFR BLD: 24 % (ref 21–52)
MAGNESIUM SERPL-MCNC: 2 MG/DL (ref 1.6–2.6)
MCH RBC QN AUTO: 26.6 PG (ref 24–34)
MCHC RBC AUTO-ENTMCNC: 31.7 G/DL (ref 31–37)
MCV RBC AUTO: 84 FL (ref 74–97)
MONOCYTES # BLD: 0.7 K/UL (ref 0.05–1.2)
MONOCYTES NFR BLD: 11 % (ref 3–10)
NEUTS SEG # BLD: 4.1 K/UL (ref 1.8–8)
NEUTS SEG NFR BLD: 62 % (ref 40–73)
P-R INTERVAL, ECG05: 304 MS
PHOSPHATE SERPL-MCNC: 2.8 MG/DL (ref 2.5–4.9)
PLATELET # BLD AUTO: 183 K/UL (ref 135–420)
PMV BLD AUTO: 10.6 FL (ref 9.2–11.8)
POTASSIUM SERPL-SCNC: 3.6 MMOL/L (ref 3.5–5.5)
PROT SERPL-MCNC: 6.3 G/DL (ref 6.4–8.2)
PROTHROMBIN TIME: 13.9 SEC (ref 11.5–15.2)
Q-T INTERVAL, ECG07: 496 MS
QRS DURATION, ECG06: 210 MS
QTC CALCULATION (BEZET), ECG08: 524 MS
RBC # BLD AUTO: 4.25 M/UL (ref 4.7–5.5)
SODIUM SERPL-SCNC: 139 MMOL/L (ref 136–145)
VENTRICULAR RATE, ECG03: 67 BPM
WBC # BLD AUTO: 6.7 K/UL (ref 4.6–13.2)

## 2017-09-13 PROCEDURE — 74011250636 HC RX REV CODE- 250/636: Performed by: HOSPITALIST

## 2017-09-13 PROCEDURE — 74011000250 HC RX REV CODE- 250

## 2017-09-13 PROCEDURE — 88305 TISSUE EXAM BY PATHOLOGIST: CPT | Performed by: INTERNAL MEDICINE

## 2017-09-13 PROCEDURE — 84100 ASSAY OF PHOSPHORUS: CPT | Performed by: HOSPITALIST

## 2017-09-13 PROCEDURE — 74011250636 HC RX REV CODE- 250/636: Performed by: NURSE ANESTHETIST, CERTIFIED REGISTERED

## 2017-09-13 PROCEDURE — 85730 THROMBOPLASTIN TIME PARTIAL: CPT | Performed by: HOSPITALIST

## 2017-09-13 PROCEDURE — 85025 COMPLETE CBC W/AUTO DIFF WBC: CPT | Performed by: HOSPITALIST

## 2017-09-13 PROCEDURE — 74011250636 HC RX REV CODE- 250/636

## 2017-09-13 PROCEDURE — 74011000250 HC RX REV CODE- 250: Performed by: HOSPITALIST

## 2017-09-13 PROCEDURE — 76040000019: Performed by: INTERNAL MEDICINE

## 2017-09-13 PROCEDURE — 85610 PROTHROMBIN TIME: CPT | Performed by: HOSPITALIST

## 2017-09-13 PROCEDURE — 0DBP8ZX EXCISION OF RECTUM, VIA NATURAL OR ARTIFICIAL OPENING ENDOSCOPIC, DIAGNOSTIC: ICD-10-PCS | Performed by: INTERNAL MEDICINE

## 2017-09-13 PROCEDURE — 36415 COLL VENOUS BLD VENIPUNCTURE: CPT | Performed by: HOSPITALIST

## 2017-09-13 PROCEDURE — 77030008565 HC TBNG SUC IRR ERBE -B: Performed by: INTERNAL MEDICINE

## 2017-09-13 PROCEDURE — 80053 COMPREHEN METABOLIC PANEL: CPT | Performed by: HOSPITALIST

## 2017-09-13 PROCEDURE — 83735 ASSAY OF MAGNESIUM: CPT | Performed by: HOSPITALIST

## 2017-09-13 PROCEDURE — 77030018846 HC SOL IRR STRL H20 ICUM -A: Performed by: INTERNAL MEDICINE

## 2017-09-13 PROCEDURE — 83605 ASSAY OF LACTIC ACID: CPT | Performed by: HOSPITALIST

## 2017-09-13 PROCEDURE — 74011250637 HC RX REV CODE- 250/637: Performed by: HOSPITALIST

## 2017-09-13 PROCEDURE — 65660000000 HC RM CCU STEPDOWN

## 2017-09-13 PROCEDURE — 94640 AIRWAY INHALATION TREATMENT: CPT

## 2017-09-13 PROCEDURE — 76060000031 HC ANESTHESIA FIRST 0.5 HR: Performed by: INTERNAL MEDICINE

## 2017-09-13 PROCEDURE — 77010033678 HC OXYGEN DAILY

## 2017-09-13 PROCEDURE — C9113 INJ PANTOPRAZOLE SODIUM, VIA: HCPCS | Performed by: HOSPITALIST

## 2017-09-13 PROCEDURE — 74011000250 HC RX REV CODE- 250: Performed by: NURSE ANESTHETIST, CERTIFIED REGISTERED

## 2017-09-13 PROCEDURE — 77030009426 HC FCPS BIOP ENDOSC BSC -B: Performed by: INTERNAL MEDICINE

## 2017-09-13 RX ORDER — SODIUM CHLORIDE 0.9 % (FLUSH) 0.9 %
5-10 SYRINGE (ML) INJECTION EVERY 8 HOURS
Status: DISCONTINUED | OUTPATIENT
Start: 2017-09-13 | End: 2017-09-13 | Stop reason: HOSPADM

## 2017-09-13 RX ORDER — SODIUM CHLORIDE 0.9 % (FLUSH) 0.9 %
5-10 SYRINGE (ML) INJECTION AS NEEDED
Status: DISCONTINUED | OUTPATIENT
Start: 2017-09-13 | End: 2017-09-13 | Stop reason: HOSPADM

## 2017-09-13 RX ORDER — ONDANSETRON 2 MG/ML
4 INJECTION INTRAMUSCULAR; INTRAVENOUS AS NEEDED
Status: CANCELLED | OUTPATIENT
Start: 2017-09-13

## 2017-09-13 RX ORDER — LIDOCAINE HYDROCHLORIDE 20 MG/ML
INJECTION, SOLUTION EPIDURAL; INFILTRATION; INTRACAUDAL; PERINEURAL AS NEEDED
Status: DISCONTINUED | OUTPATIENT
Start: 2017-09-13 | End: 2017-09-13 | Stop reason: HOSPADM

## 2017-09-13 RX ORDER — SODIUM CHLORIDE, SODIUM LACTATE, POTASSIUM CHLORIDE, CALCIUM CHLORIDE 600; 310; 30; 20 MG/100ML; MG/100ML; MG/100ML; MG/100ML
75 INJECTION, SOLUTION INTRAVENOUS CONTINUOUS
Status: DISCONTINUED | OUTPATIENT
Start: 2017-09-13 | End: 2017-09-13 | Stop reason: HOSPADM

## 2017-09-13 RX ORDER — PROPOFOL 10 MG/ML
INJECTION, EMULSION INTRAVENOUS AS NEEDED
Status: DISCONTINUED | OUTPATIENT
Start: 2017-09-13 | End: 2017-09-13 | Stop reason: HOSPADM

## 2017-09-13 RX ORDER — SODIUM CHLORIDE 0.9 % (FLUSH) 0.9 %
5-10 SYRINGE (ML) INJECTION AS NEEDED
Status: CANCELLED | OUTPATIENT
Start: 2017-09-13

## 2017-09-13 RX ORDER — SODIUM CHLORIDE, SODIUM LACTATE, POTASSIUM CHLORIDE, CALCIUM CHLORIDE 600; 310; 30; 20 MG/100ML; MG/100ML; MG/100ML; MG/100ML
75 INJECTION, SOLUTION INTRAVENOUS CONTINUOUS
Status: CANCELLED | OUTPATIENT
Start: 2017-09-13

## 2017-09-13 RX ADMIN — HYDROMORPHONE HYDROCHLORIDE 1 MG: 1 INJECTION, SOLUTION INTRAMUSCULAR; INTRAVENOUS; SUBCUTANEOUS at 14:43

## 2017-09-13 RX ADMIN — SODIUM CHLORIDE 40 MG: 9 INJECTION INTRAMUSCULAR; INTRAVENOUS; SUBCUTANEOUS at 20:37

## 2017-09-13 RX ADMIN — METRONIDAZOLE 500 MG: 500 INJECTION, SOLUTION INTRAVENOUS at 20:33

## 2017-09-13 RX ADMIN — SODIUM CHLORIDE, SODIUM LACTATE, POTASSIUM CHLORIDE, AND CALCIUM CHLORIDE 75 ML/HR: 600; 310; 30; 20 INJECTION, SOLUTION INTRAVENOUS at 08:13

## 2017-09-13 RX ADMIN — ATORVASTATIN CALCIUM 40 MG: 40 TABLET, FILM COATED ORAL at 21:25

## 2017-09-13 RX ADMIN — HYDROCORTISONE ACETATE AND PRAMOXINE HYDROCHLORIDE 1 APPLICATOR: 100; 100 AEROSOL, FOAM TOPICAL at 20:41

## 2017-09-13 RX ADMIN — MINERAL OIL, PETROLATUM, PHENYLEPHRINE HCL, SHARK LIVER OIL: 14; 71.9; .25; 3 OINTMENT TOPICAL at 20:44

## 2017-09-13 RX ADMIN — ONDANSETRON 4 MG: 2 INJECTION INTRAMUSCULAR; INTRAVENOUS at 19:27

## 2017-09-13 RX ADMIN — SODIUM CHLORIDE 125 ML/HR: 900 INJECTION, SOLUTION INTRAVENOUS at 11:41

## 2017-09-13 RX ADMIN — HYDROMORPHONE HYDROCHLORIDE 1 MG: 1 INJECTION, SOLUTION INTRAMUSCULAR; INTRAVENOUS; SUBCUTANEOUS at 01:16

## 2017-09-13 RX ADMIN — METRONIDAZOLE 500 MG: 500 INJECTION, SOLUTION INTRAVENOUS at 03:35

## 2017-09-13 RX ADMIN — SODIUM CHLORIDE 125 ML/HR: 900 INJECTION, SOLUTION INTRAVENOUS at 02:45

## 2017-09-13 RX ADMIN — GABAPENTIN 300 MG: 300 CAPSULE ORAL at 18:12

## 2017-09-13 RX ADMIN — Medication 10 ML: at 06:45

## 2017-09-13 RX ADMIN — BUDESONIDE AND FORMOTEROL FUMARATE DIHYDRATE 2 PUFF: 80; 4.5 AEROSOL RESPIRATORY (INHALATION) at 19:42

## 2017-09-13 RX ADMIN — FAMOTIDINE 20 MG: 10 INJECTION INTRAVENOUS at 08:25

## 2017-09-13 RX ADMIN — SODIUM CHLORIDE 125 ML/HR: 900 INJECTION, SOLUTION INTRAVENOUS at 19:34

## 2017-09-13 RX ADMIN — PROPOFOL 20 MG: 10 INJECTION, EMULSION INTRAVENOUS at 10:13

## 2017-09-13 RX ADMIN — Medication 10 ML: at 07:53

## 2017-09-13 RX ADMIN — HYDROMORPHONE HYDROCHLORIDE 1 MG: 1 INJECTION, SOLUTION INTRAMUSCULAR; INTRAVENOUS; SUBCUTANEOUS at 06:34

## 2017-09-13 RX ADMIN — METRONIDAZOLE 500 MG: 500 INJECTION, SOLUTION INTRAVENOUS at 11:58

## 2017-09-13 RX ADMIN — ONDANSETRON 4 MG: 2 INJECTION INTRAMUSCULAR; INTRAVENOUS at 14:43

## 2017-09-13 RX ADMIN — ASPIRIN 81 MG 81 MG: 81 TABLET ORAL at 11:55

## 2017-09-13 RX ADMIN — ATENOLOL 25 MG: 25 TABLET ORAL at 11:55

## 2017-09-13 RX ADMIN — ONDANSETRON 4 MG: 2 INJECTION INTRAMUSCULAR; INTRAVENOUS at 06:41

## 2017-09-13 RX ADMIN — Medication 10 ML: at 15:34

## 2017-09-13 RX ADMIN — FAMOTIDINE 40 MG: 20 TABLET ORAL at 21:25

## 2017-09-13 RX ADMIN — LIDOCAINE HYDROCHLORIDE 40 MG: 20 INJECTION, SOLUTION EPIDURAL; INFILTRATION; INTRACAUDAL; PERINEURAL at 10:11

## 2017-09-13 RX ADMIN — GABAPENTIN 300 MG: 300 CAPSULE ORAL at 11:55

## 2017-09-13 RX ADMIN — GABAPENTIN 300 MG: 300 CAPSULE ORAL at 21:25

## 2017-09-13 RX ADMIN — Medication 5 ML: at 14:00

## 2017-09-13 RX ADMIN — LEVOFLOXACIN 500 MG: 5 INJECTION, SOLUTION INTRAVENOUS at 02:42

## 2017-09-13 RX ADMIN — MINERAL OIL, PETROLATUM, PHENYLEPHRINE HCL, SHARK LIVER OIL: 14; 71.9; .25; 3 OINTMENT TOPICAL at 15:39

## 2017-09-13 RX ADMIN — SODIUM CHLORIDE 40 MG: 9 INJECTION INTRAMUSCULAR; INTRAVENOUS; SUBCUTANEOUS at 09:00

## 2017-09-13 RX ADMIN — PROPOFOL 20 MG: 10 INJECTION, EMULSION INTRAVENOUS at 10:11

## 2017-09-13 RX ADMIN — PROPOFOL 20 MG: 10 INJECTION, EMULSION INTRAVENOUS at 10:15

## 2017-09-13 RX ADMIN — HYDROMORPHONE HYDROCHLORIDE 1 MG: 1 INJECTION, SOLUTION INTRAMUSCULAR; INTRAVENOUS; SUBCUTANEOUS at 19:27

## 2017-09-13 NOTE — PROGRESS NOTES
Problem: Falls - Risk of  Goal: *Absence of Falls  Document [place] Fall Risk and appropriate interventions in the flowsheet.    Outcome: Progressing Towards Goal  Fall Risk Interventions:              Medication Interventions: Patient to call before getting OOB     Elimination Interventions: Call light in reach     History of Falls Interventions: Evaluate medications/consider consulting pharmacy

## 2017-09-13 NOTE — ANESTHESIA PREPROCEDURE EVALUATION
Anesthetic History               Review of Systems / Medical History  Patient summary reviewed and pertinent labs reviewed    Pulmonary    COPD: moderate    Sleep apnea  Undiagnosed apnea and smoker         Neuro/Psych              Cardiovascular    Hypertension        Dysrhythmias   Pacemaker, past MI and CAD    Exercise tolerance: <4 METS  Comments: S/p AAA repair   GI/Hepatic/Renal     GERD: well controlled    Renal disease: CRI       Endo/Other      Hypothyroidism: well controlled  Arthritis     Other Findings   Comments:   Risk Factors for Postoperative nausea/vomiting:       History of postoperative nausea/vomiting? NO       Female? NO       Motion sickness? NO       Intended opioid administration for postoperative analgesia? NO      Smoking Abstinence  Current Smoker? YES  Elective Surgery? NO  Seen preoperatively by anesthesiologist or proxy prior to day of surgery? YES  Pt abstained from smoking 24 hours prior to anesthesia?  NO         Physical Exam    Airway  Mallampati: III  TM Distance: > 6 cm  Neck ROM: normal range of motion   Mouth opening: Normal     Cardiovascular    Rhythm: regular  Rate: normal         Dental    Dentition: Poor dentition     Pulmonary  Breath sounds clear to auscultation               Abdominal  GI exam deferred       Other Findings            Anesthetic Plan    ASA: 3  Anesthesia type: MAC          Induction: Intravenous  Anesthetic plan and risks discussed with: Patient

## 2017-09-13 NOTE — PERIOP NOTES
TRANSFER - OUT REPORT:    Verbal report given to [facility]) on Latisha Hairston  being transferred to [address] [place] [address](unit) for routine progression of care       Report consisted of patients Situation, Background, Assessment and   Recommendations(SBAR). Information from the following report(s) SBAR, Kardex, OR Summary, Procedure Summary, Intake/Output, MAR and Recent Results was reviewed with the receiving nurse. Lines:   Peripheral IV 09/12/17 Right Hand (Active)   Site Assessment Clean, dry, & intact 9/12/2017 11:15 PM   Phlebitis Assessment 0 9/12/2017 11:15 PM   Infiltration Assessment 0 9/12/2017 11:15 PM   Dressing Status Clean, dry, & intact 9/12/2017 11:15 PM   Dressing Type Transparent 9/12/2017 11:15 PM   Hub Color/Line Status Blue; Infusing;Flushed 9/12/2017 11:15 PM        Opportunity for questions and clarification was provided.       Patient transported with:   Registered Nurse

## 2017-09-13 NOTE — PROCEDURES
[email or web address]  [facility], [address]    Flex Sig Procedure Note                 Indications:  Colitis on CT     :  Hai Massey MD    Referring Provider: Kiarra Garcia MD    Sedation:  MAC anesthesia    Procedure Details:  After informed consent was obtained with all risks and benefits of procedure explained and preoperative exam completed, the patient was taken to the endoscopy suite and placed in the left lateral decubitus position. Upon sequential sedation as per above, a digital rectal exam was performed and was normal.  The Olympus videocolonoscope was inserted in the rectum and carefully advanced to the descending colon. The quality of preparation was adequate. The colonoscope was slowly withdrawn with careful evaluation between folds. Retroflexion in the rectum was performed and was normal.    Findings:   Rectum: Mild edema and exudates in the mucosa-bx taken. Mucosa is pink and viable. Some pseudomembranes also seen. External hemorrhoids noted with some excoriation  Sigmoid: Mild edema and exudates in the mucosa. Mucosa is pink and viable. Some pseudomembranes also seen  Descending Colon: Mild edema and exudates in the mucosa. Mucosa is pink and viable. Some pseudomembranes also seen    Interventions:  Bx taken as above    Specimen Removed:   ID Type Source Tests Collected by Time Destination   1 : Rectal bxs Preservative Rectum  Hai Massey MD 9/13/2017 1015 Pathology       Complications: None. EBL:  None. Recommendations:    1. Resume diet  2. Await bx  3.  Further plans per Dr. Tierra Black MD  9/13/2017  10:22 AM

## 2017-09-13 NOTE — CDMP QUERY
Please clarify if this patient is being treated/managed for:    =>Ischemic Colitis or Pseudomembranous Colitis  =>Other Explanation of clinical findings  =>Unable to Determine (no explanation of clinical findings)    The medical record reflects the following:  Risk:  GI bleed, hx AAA repair    Clinical Indicators:  --9/12 surgery PN \"Likely ischemic colitis from endograft placement. Flex sig tomorrow to examine severity of ischemia\"  --9/12 PN \"Dr. Nelda Hinkle follow appreciated,  Patient at risk for ischemic bowel secondary to h/o AAA repair\"  --post op flex sig dx colitis  --9/14 CRS PN \"Tolerated flex sig well yesterday, which showed viable appearing colon with pseudomembranes. \"   \"Likely ischemic colitis from endograft placement\"      Treatment: abx    Please clarify and document your clinical opinion in the progress notes and discharge summary including the definitive and/or presumptive diagnosis, (suspected or probable), related to the above clinical findings. Please include clinical findings supporting your diagnosis. If you DECLINE this query or would like to communicate with [facility], please utilize the \"[facility] message box\" at the TOP of the Progress Note on the right.       Thank you,  Christina Leahy RN BSN CCDS [phone]

## 2017-09-13 NOTE — PROGRESS NOTES
[facility] [address] [address]  [phone]  Colon and Rectal Surgery Progress Note      Patient: Bev Michel MRN: [id number]  SSN: [id number]    YOB: 1950  Age: 78 y.o. Sex: male      Admit Date: 9/9/2017    LOS: 3 days     Subjective:     Patient has no complaints of pain this morning. Tolerated enemas well. Pending flex sig today. Objective:     Vitals:    09/12/17 2000 09/12/17 2130 09/13/17 0000 09/13/17 0400   BP: 132/78  134/66 133/72   Pulse: 60  61 62   Resp: 18  18 18   Temp: 98.2 °F (36.8 °C)  98.3 °F (36.8 °C) 98.2 °F (36.8 °C)   SpO2: 97% 96% 98% 96%   Weight:       Height:            Intake and Output:  Current Shift:    Last three shifts: 09/11 1901 - 09/13 0700  In: 8054.2 [I.V.:8054.2]  Out: 2650 [Urine:2650]    Physical Exam:     Abdomen soft, non-distended, non-tender     Lab/Data Review: All lab results for the last 24 hours reviewed.      Assessment:     Likely ischemic colitis from endograft placement    Plan:     Flex sig today  Continue Abx   No plan for surgical intervention at this time     Signed By: Samuel Johnson MD        September 13, 2017

## 2017-09-13 NOTE — ANESTHESIA POSTPROCEDURE EVALUATION
Post-Anesthesia Evaluation and Assessment    Patient: Colton Mcneill MRN: [id number]  SSN: [id number]    YOB: 1950  Age: 78 y.o. Sex: male       Cardiovascular Function/Vital Signs  Visit Vitals    /70    Pulse 60    Temp 36.6 °C (97.9 °F)    Resp 17    Ht 6' 1\" (1.854 m)    Wt 115.9 kg (255 lb 8 oz)    SpO2 97%    BMI 33.71 kg/m2       Patient is status post MAC anesthesia for Procedure(s):  SIGMOIDOSCOPY FLEXIBLE w/ biopsies. Nausea/Vomiting: None    Postoperative hydration reviewed and adequate. Pain:  Pain Scale 1: Numeric (0 - 10) (09/13/17 1025)  Pain Intensity 1: 0 (09/13/17 1025)   Managed    Neurological Status: At baseline    Mental Status and Level of Consciousness: Arousable    Pulmonary Status:   O2 Device: Room air (09/13/17 1025)   Adequate oxygenation and airway patent    Complications related to anesthesia: None    Post-anesthesia assessment completed.  No concerns    Signed By: Yisel Kemp MD     September 13, 2017

## 2017-09-13 NOTE — ROUTINE PROCESS
Bedside and Verbal shift change report given to Jenniffer Omalley LPN (oncoming nurse) by Ky Chaidez RN (offgoing nurse). Report included the following information SBAR, Kardex, MAR and Recent Results. SITUATION:    Code Status: Full Code   Reason for Admission: Diverticula of colon   Diverticulitis large intestine w/o perforation or abscess w/bleeding   dx    [facility] day: 3   Problem List:       Hospital Problems  Date Reviewed: 9/12/2017          Codes Class Noted POA    Protein-calorie malnutrition, moderate ([facility] [address] ICD-10-CM: E44.0  ICD-9-CM: 263.0  9/12/2017 Yes        History of AAA (abdominal aortic aneurysm) repair (Chronic) ICD-10-CM: G79.938  ICD-9-CM: V45.89  9/12/2017 Yes        * (Principal)Diverticulitis of large intestine without perforation or abscess with bleeding ICD-10-CM: K57.33  ICD-9-CM: 562.13  9/10/2017 Yes        Gastroesophageal reflux disease without esophagitis (Chronic) ICD-10-CM: K21.9  ICD-9-CM: 530.81  11/9/2015 Yes        HTN (hypertension) (Chronic) ICD-10-CM: I10  ICD-9-CM: 401.9  3/30/2013 Yes              BACKGROUND:    Past Medical History:   Past Medical History:   Diagnosis Date    Arthritis     CAD (coronary artery disease), native coronary artery     ALIDA X 2 to OM1    Carotid duplex 08/13/2013    Mild <50% bilateral ICA plaquing.  Complete heart block [facility]) June 2013    Medtronic dual chamber pacemaker    COPD (chronic obstructive pulmonary disease) ([facility] [address]     Diverticulitis of large intestine without perforation or abscess with bleeding 9/10/2017    Gastritis     GERD (gastroesophageal reflux disease)     High cholesterol     History of AAA (abdominal aortic aneurysm) repair 9/12/2017    History of echocardiogram 06/04/2013    EF 60-65%. No WMA. Gr 1 DDfx. No significant valvular heart disease.  History of myocardial perfusion scan 04/18/2011    No ischemia or prior infarction. No WMA. EF 52%.   Neg EKG on pharm stress test.    Hypertension  Hypothyroidism     Lower extremity venous duplex 01/06/2015    No DVT. Superficial insufficiency of right GSV. Deep venous reflux in right CFV & fem vein. Deep venous reflux in left CFV.  Renal duplex 12/24/2014    Mild < 60% RRA stenosis. Low parenchymal & LRA flow. Renal asymmetry. Intrinsic/med disease in right kidney. AAA (3.8 x 4.0 cm) infrarenal.    Right groin hernia     not resolved    S/P AAA repair 05/18/2017    S/P cardiac cath 06/02/2013    Diffuse atherosclerosis throughout. pRCA 50%. mLM 30%. mLAD 40%. oD2 (sm) 100%. pLCX 30%. p/mOM1 95/80% (o/lapping 2.25 x 23-mm & 2.25 x 12-mm Xience stents, resid 0%).                      Patient taking anticoagulants no     ASSESSMENT:    Changes in Assessment Throughout Shift: none     Patient has Central Line: no Reasons if yes:    Patient has Romero Cath: no Reasons if yes:       Last Vitals:     Vitals:    09/12/17 2000 09/12/17 2130 09/13/17 0000 09/13/17 0400   BP: 132/78  134/66 133/72   Pulse: 60  61 62   Resp: 18  18 18   Temp: 98.2 °F (36.8 °C)  98.3 °F (36.8 °C) 98.2 °F (36.8 °C)   SpO2: 97% 96% 98% 96%   Weight:       Height:            IV and DRAINS (will only show if present)   [REMOVED] Peripheral IV 09/09/17 Right Antecubital-Site Assessment: Clean, dry, & intact  Peripheral IV 09/12/17 Right Hand-Site Assessment: Clean, dry, & intact     WOUND (if present)   Wound Type:  none   Dressing present Dressing Present : No   Wound Concerns/Notes:  none     PAIN    Pain Assessment    Pain Intensity 1: 7 (09/13/17 0634)    Pain Location 1: Abdomen    Pain Intervention(s) 1: Medication (see MAR)    Patient Stated Pain Goal: 0  o Interventions for Pain:  Pain meds prn   o Intervention effective: yes  o Time of last intervention: 3837   o Reassessment Completed: no      Last 3 Weights:  Last 3 Recorded Weights in this Encounter    09/11/17 1552   Weight: 115.9 kg (255 lb 8 oz)     Weight change:      INTAKE/OUPUT    Current Shift:      Last three shifts: 09/11 1901 - 09/13 0700  In: 8054.2 [I.V.:8054.2]  Out: 2650 [Urine:2650]     LAB RESULTS     Recent Labs      09/13/17   0333  09/12/17   0404  09/11/17   0511   WBC  6.7  6.0  7.1   HGB  11.3*  11.0*  11.4*   HCT  35.7*  34.5*  35.6*   PLT  183  178  180        Recent Labs      09/13/17   0333  09/12/17   0404  09/11/17   1605   NA  139  140  140   K  3.6  3.3*  3.7   GLU  93  80  85   BUN  4*  5*  7   CREA  1.18  1.07  1.16   CA  8.2*  8.1*  8.2*   MG  2.0   --   2.0   INR  1.1   --    --        RECOMMENDATIONS AND DISCHARGE PLANNING     1. Pending tests/procedures/ Plan of Care or Other Needs: sigmoidoscopy this am      2. Discharge plan for patient and Needs/Barriers: none    3. Estimated Discharge Date: 09/16/17 Posted on Whiteboard in Patients Room: no      4. The patient's care plan was reviewed with the oncoming nurse. \"HEALS\" SAFETY CHECK      Fall Risk    Total Score: 3    Safety Measures: Safety Measures: Bed/Chair-Wheels locked, Bed in low position, Call light within reach    A safety check occurred in the patient's room between off going nurse and oncoming nurse listed above. The safety check included the below items  Area Items   H  High Alert Medications - Verify all high alert medication drips (heparin, PCA, etc.)   E  Equipment - Suction is set up for ALL patients (with yanker)  - Red plugs utilized for all equipment (IV pumps, etc.)  - WOWs wiped down at end of shift.  - Room stocked with oxygen, suction, and other unit-specific supplies   A  Alarms - Bed alarm is set for fall risk patients  - Ensure chair alarm is in place and activated if patient is up in a chair   L  Lines - Check IV for any infiltration  - Romero bag is empty if patient has a Romero   - Tubing and IV bags are labeled   S  Safety   - Room is clean, patient is clean, and equipment is clean. - Hallways are clear from equipment besides carts.    - Fall bracelet on for fall risk patients  - Ensure room is clear and free of clutter  - Suction is set up for ALL patients (with jose)  - Hallways are clear from equipment besides carts.    - Isolation precautions followed, supplies available outside room, sign posted     Manny Sanchez RN

## 2017-09-13 NOTE — INTERDISCIPLINARY ROUNDS
IDR/SLIDR Summary          Patient: Magy Taylor MRN: [id number]    Age: 78 y.o. YOB: 1950 Room/Bed: [facility]   Admit Diagnosis: Diverticula of colon  Diverticulitis large intestine w/o perforation or abscess w/bleeding  dx  Principal Diagnosis: Diverticulitis of large intestine without perforation or abscess with bleeding   Goals: flexible sigmoidoscopy in AM  Readmission: NO  Quality Measure: Not applicable  VTE Prophylaxis: Not needed  Influenza Vaccine screening completed? YES  Pneumococcal Vaccine screening completed? YES  Mobility needs: No   Nutrition plan:Yes  Consults:Case Management    Financial concerns:No  Escalated to CM? YES  RRAT Score: 15   Interventions:H2H  Testing due for pt today?  YES  LOS: 3 days Expected length of stay 3 days  Discharge plan: home after procedure   PCP: Fabrice Haney MD  Transportation needs: No    Days before discharge:two or more days before discharge   Discharge disposition: Home    Signed:     Jaimie Ahumada RN  9/13/2017  12:15 AM

## 2017-09-13 NOTE — PROGRESS NOTES
Problem: Falls - Risk of  Goal: *Absence of Falls  Document [place] Fall Risk and appropriate interventions in the flowsheet.    Outcome: Progressing Towards Goal  Fall Risk Interventions:  Mobility Interventions: Patient to call before getting OOB           Medication Interventions: Bed/chair exit alarm     Elimination Interventions: Call light in reach     History of Falls Interventions: Evaluate medications/consider consulting pharmacy

## 2017-09-13 NOTE — CDMP QUERY
Because there is conflicting Dx, please clarify if you agree with RD assessment for:    =>Severe protein Calorie Malnutrition  =>Other Explanation of clinical findings  =>Unable to Determine (no explanation of clinical findings)    The medical record reflects the following:    Risk:  79 yo with GI bleed     Clinical Indicators:  --9/11 RD Patient meets criteria for Severe Protein Calorie Malnutrition as evidenced by:   ASPEN Malnutrition Criteria  Acute Illness, Chronic Illness, or Social/Enviornmental: Acute illness  Energy Intake: <50% est energy req for > 5 days  Weight Loss: Greater than 7.5% x 3 mos  ASPEN Malnutrition Score - Acute Illness: 12  Acute Illness - Malnutrition Diagnosis: Severe malnutrition. --9/12 PN \"Protein-calorie malnutrition, moderate\"    Treatment: RD plan \"Add food preferences, Add nutrition supplements: Ensure Pudding BID, Continue RD inpatient monitoring and evaluation. \"    Please clarify and document your clinical opinion in the progress notes and discharge summary including the definitive and/or presumptive diagnosis, (suspected or probable), related to the above clinical findings. Please include clinical findings supporting your diagnosis. If you DECLINE this query or would like to communicate with [facility], please utilize the \"[facility] message box\" at the TOP of the Progress Note on the right.       Thank you,  Dale Byrne RN BSN CCDS [phone]

## 2017-09-14 VITALS
OXYGEN SATURATION: 95 % | DIASTOLIC BLOOD PRESSURE: 73 MMHG | TEMPERATURE: 98.4 F | HEART RATE: 61 BPM | HEIGHT: 73 IN | SYSTOLIC BLOOD PRESSURE: 130 MMHG | BODY MASS INDEX: 33.86 KG/M2 | RESPIRATION RATE: 18 BRPM | WEIGHT: 255.5 LBS

## 2017-09-14 PROCEDURE — 74011250637 HC RX REV CODE- 250/637: Performed by: HOSPITALIST

## 2017-09-14 PROCEDURE — C9113 INJ PANTOPRAZOLE SODIUM, VIA: HCPCS | Performed by: HOSPITALIST

## 2017-09-14 PROCEDURE — 94640 AIRWAY INHALATION TREATMENT: CPT

## 2017-09-14 PROCEDURE — 74011000250 HC RX REV CODE- 250: Performed by: HOSPITALIST

## 2017-09-14 PROCEDURE — 74011250636 HC RX REV CODE- 250/636: Performed by: HOSPITALIST

## 2017-09-14 RX ORDER — METRONIDAZOLE 500 MG/1
TABLET ORAL
Qty: 15 TAB | Refills: 0 | Status: SHIPPED | OUTPATIENT
Start: 2017-09-14 | End: 2017-10-14

## 2017-09-14 RX ORDER — HYDROCODONE BITARTRATE AND ACETAMINOPHEN 7.5; 325 MG/1; MG/1
1 TABLET ORAL
Qty: 20 TAB | Refills: 0 | Status: SHIPPED | OUTPATIENT
Start: 2017-09-14 | End: 2017-10-14

## 2017-09-14 RX ORDER — CIPROFLOXACIN 500 MG/1
500 TABLET ORAL 2 TIMES DAILY
Qty: 10 TAB | Refills: 0 | Status: SHIPPED | OUTPATIENT
Start: 2017-09-14 | End: 2017-09-19

## 2017-09-14 RX ORDER — POLYETHYLENE GLYCOL 3350 17 G/17G
17 POWDER, FOR SOLUTION ORAL DAILY
Qty: 119 G | Refills: 0 | Status: SHIPPED | OUTPATIENT
Start: 2017-09-14

## 2017-09-14 RX ORDER — IBUPROFEN 200 MG
1 TABLET ORAL DAILY
Qty: 30 PATCH | Refills: 0 | Status: SHIPPED | OUTPATIENT
Start: 2017-09-14 | End: 2017-10-14

## 2017-09-14 RX ORDER — SENNOSIDES 8.6 MG/1
2 TABLET ORAL
Qty: 100 TAB | Refills: 0 | Status: SHIPPED | OUTPATIENT
Start: 2017-09-14

## 2017-09-14 RX ADMIN — MINERAL OIL, PETROLATUM, PHENYLEPHRINE HCL, SHARK LIVER OIL: 14; 71.9; .25; 3 OINTMENT TOPICAL at 09:17

## 2017-09-14 RX ADMIN — Medication 10 ML: at 15:32

## 2017-09-14 RX ADMIN — Medication 10 ML: at 00:28

## 2017-09-14 RX ADMIN — Medication 10 ML: at 06:22

## 2017-09-14 RX ADMIN — HYDROMORPHONE HYDROCHLORIDE 1 MG: 1 INJECTION, SOLUTION INTRAMUSCULAR; INTRAVENOUS; SUBCUTANEOUS at 10:51

## 2017-09-14 RX ADMIN — ONDANSETRON 4 MG: 2 INJECTION INTRAMUSCULAR; INTRAVENOUS at 00:32

## 2017-09-14 RX ADMIN — GABAPENTIN 300 MG: 300 CAPSULE ORAL at 16:23

## 2017-09-14 RX ADMIN — ASPIRIN 81 MG 81 MG: 81 TABLET ORAL at 09:08

## 2017-09-14 RX ADMIN — HYDROCORTISONE ACETATE AND PRAMOXINE HYDROCHLORIDE 1 APPLICATOR: 100; 100 AEROSOL, FOAM TOPICAL at 09:17

## 2017-09-14 RX ADMIN — HYDROMORPHONE HYDROCHLORIDE 1 MG: 1 INJECTION, SOLUTION INTRAMUSCULAR; INTRAVENOUS; SUBCUTANEOUS at 05:46

## 2017-09-14 RX ADMIN — HYDROMORPHONE HYDROCHLORIDE 1 MG: 1 INJECTION, SOLUTION INTRAMUSCULAR; INTRAVENOUS; SUBCUTANEOUS at 15:32

## 2017-09-14 RX ADMIN — ONDANSETRON 4 MG: 2 INJECTION INTRAMUSCULAR; INTRAVENOUS at 15:32

## 2017-09-14 RX ADMIN — SODIUM CHLORIDE 40 MG: 9 INJECTION INTRAMUSCULAR; INTRAVENOUS; SUBCUTANEOUS at 09:07

## 2017-09-14 RX ADMIN — ONDANSETRON 4 MG: 2 INJECTION INTRAMUSCULAR; INTRAVENOUS at 10:51

## 2017-09-14 RX ADMIN — ATENOLOL 25 MG: 25 TABLET ORAL at 09:08

## 2017-09-14 RX ADMIN — METRONIDAZOLE 500 MG: 500 INJECTION, SOLUTION INTRAVENOUS at 11:57

## 2017-09-14 RX ADMIN — HYDROMORPHONE HYDROCHLORIDE 1 MG: 1 INJECTION, SOLUTION INTRAMUSCULAR; INTRAVENOUS; SUBCUTANEOUS at 00:28

## 2017-09-14 RX ADMIN — LEVOFLOXACIN 500 MG: 5 INJECTION, SOLUTION INTRAVENOUS at 04:35

## 2017-09-14 RX ADMIN — ONDANSETRON 4 MG: 2 INJECTION INTRAMUSCULAR; INTRAVENOUS at 05:51

## 2017-09-14 RX ADMIN — GABAPENTIN 300 MG: 300 CAPSULE ORAL at 09:08

## 2017-09-14 RX ADMIN — BUDESONIDE AND FORMOTEROL FUMARATE DIHYDRATE 2 PUFF: 80; 4.5 AEROSOL RESPIRATORY (INHALATION) at 07:37

## 2017-09-14 RX ADMIN — SODIUM CHLORIDE 125 ML/HR: 900 INJECTION, SOLUTION INTRAVENOUS at 05:51

## 2017-09-14 RX ADMIN — LEVOTHYROXINE SODIUM 125 MCG: 125 TABLET ORAL at 06:00

## 2017-09-14 RX ADMIN — METRONIDAZOLE 500 MG: 500 INJECTION, SOLUTION INTRAVENOUS at 03:35

## 2017-09-14 NOTE — PROGRESS NOTES
Problem: Falls - Risk of  Goal: *Absence of Falls  Document [place] Fall Risk and appropriate interventions in the flowsheet.    Outcome: Progressing Towards Goal  Fall Risk Interventions:  Mobility Interventions: Patient to call before getting OOB           Medication Interventions: Bed/chair exit alarm, Patient to call before getting OOB     Elimination Interventions: Bed/chair exit alarm, Call light in reach, Patient to call for help with toileting needs, Urinal in reach     History of Falls Interventions: Bed/chair exit alarm, Door open when patient unattended

## 2017-09-14 NOTE — ROUTINE PROCESS
Bedside and Verbal shift change report given to Nirmal Washburn RN (oncoming nurse) by Alba Zhong RN (offgoing nurse). Report included the following information SBAR, Kardex, MAR and Recent Results. SITUATION:    Code Status: Full Code  Reason for Admission: Diverticula of colon  Diverticulitis large intestine w/o perforation or abscess w/bleeding   dx    [facility] day: 4   Problem List:       Hospital Problems  Date Reviewed: 9/13/2017          Codes Class Noted POA    Protein-calorie malnutrition, moderate ([facility] [address] ICD-10-CM: E44.0  ICD-9-CM: 263.0  9/12/2017 Yes        History of AAA (abdominal aortic aneurysm) repair (Chronic) ICD-10-CM: R04.956  ICD-9-CM: V45.89  9/12/2017 Yes        * (Principal)Diverticulitis of large intestine without perforation or abscess with bleeding ICD-10-CM: K57.33  ICD-9-CM: 562.13  9/10/2017 Yes        Gastroesophageal reflux disease without esophagitis (Chronic) ICD-10-CM: K21.9  ICD-9-CM: 530.81  11/9/2015 Yes        HTN (hypertension) (Chronic) ICD-10-CM: I10  ICD-9-CM: 401.9  3/30/2013 Yes              BACKGROUND:    Past Medical History:   Past Medical History:   Diagnosis Date    Arthritis     CAD (coronary artery disease), native coronary artery     ALIDA X 2 to OM1    Carotid duplex 08/13/2013    Mild <50% bilateral ICA plaquing.  Complete heart block [facility]) June 2013    Medtronic dual chamber pacemaker    COPD (chronic obstructive pulmonary disease) ([facility] [address]     Diverticulitis of large intestine without perforation or abscess with bleeding 9/10/2017    Gastritis     GERD (gastroesophageal reflux disease)     High cholesterol     History of AAA (abdominal aortic aneurysm) repair 9/12/2017    History of echocardiogram 06/04/2013    EF 60-65%. No WMA. Gr 1 DDfx. No significant valvular heart disease.  History of myocardial perfusion scan 04/18/2011    No ischemia or prior infarction. No WMA. EF 52%.   Neg EKG on pharm stress test.    Hypertension     Hypothyroidism     Lower extremity venous duplex 01/06/2015    No DVT. Superficial insufficiency of right GSV. Deep venous reflux in right CFV & fem vein. Deep venous reflux in left CFV.  Renal duplex 12/24/2014    Mild < 60% RRA stenosis. Low parenchymal & LRA flow. Renal asymmetry. Intrinsic/med disease in right kidney. AAA (3.8 x 4.0 cm) infrarenal.    Right groin hernia     not resolved    S/P AAA repair 05/18/2017    S/P cardiac cath 06/02/2013    Diffuse atherosclerosis throughout. pRCA 50%. mLM 30%. mLAD 40%. oD2 (sm) 100%. pLCX 30%. p/mOM1 95/80% (o/lapping 2.25 x 23-mm & 2.25 x 12-mm Xience stents, resid 0%).                      Patient taking anticoagulants no     ASSESSMENT:    Changes in Assessment Throughout Shift: none     Patient has Central Line: no Reasons if yes:    Patient has Romero Cath: no Reasons if yes:       Last Vitals:     Vitals:    09/13/17 1945 09/13/17 2000 09/14/17 0000 09/14/17 0400   BP:  116/67 134/78 133/70   Pulse:  62 62 61   Resp:  18 18 18   Temp:  99.1 °F (37.3 °C) 98.3 °F (36.8 °C) 98.5 °F (36.9 °C)   SpO2: 99% 100% 100% 100%   Weight:       Height:            IV and DRAINS (will only show if present)   [REMOVED] Peripheral IV 09/09/17 Right Antecubital-Site Assessment: Clean, dry, & intact  Peripheral IV 09/12/17 Right Hand-Site Assessment: Clean, dry, & intact     WOUND (if present)   Wound Type:  none   Dressing present Dressing Present : No   Wound Concerns/Notes:  none     PAIN    Pain Assessment    Pain Intensity 1: 0 (09/14/17 0615)    Pain Location 1: Abdomen    Pain Intervention(s) 1: Medication (see MAR)    Patient Stated Pain Goal: 0  o Interventions for Pain:  Pain meds prn   o Intervention effective: yes  o Time of last intervention:0456  o Reassessment Completed: no      Last 3 Weights:  Last 3 Recorded Weights in this Encounter    09/11/17 1552   Weight: 115.9 kg (255 lb 8 oz)     Weight change:      INTAKE/OUPUT    Current Shift: Last three shifts: 09/12 1901 - 09/14 0700  In: 11222 [P.O.:240; I.V.:81004]  Out: 5050 [Urine:5050]     LAB RESULTS     Recent Labs      09/13/17   0333  09/12/17   0404   WBC  6.7  6.0   HGB  11.3*  11.0*   HCT  35.7*  34.5*   PLT  183  178        Recent Labs      09/13/17   0333  09/12/17   0404  09/11/17   1605   NA  139  140  140   K  3.6  3.3*  3.7   GLU  93  80  85   BUN  4*  5*  7   CREA  1.18  1.07  1.16   CA  8.2*  8.1*  8.2*   MG  2.0   --   2.0   INR  1.1   --    --        RECOMMENDATIONS AND DISCHARGE PLANNING     1. Pending tests/procedures/ Plan of Care or Other Needs: sigmoidoscopy this am      2. Discharge plan for patient and Needs/Barriers: none    3. Estimated Discharge Date: 09/16/17 Posted on Whiteboard in Patients Room: no      4. The patient's care plan was reviewed with the oncoming nurse. \"HEALS\" SAFETY CHECK      Fall Risk    Total Score: 3    Safety Measures: Safety Measures: Bed/Chair alarm on, Bed/Chair-Wheels locked, Bed in low position, Call light within reach, Fall prevention (comment), Gripper socks, Side rails X 3    A safety check occurred in the patient's room between off going nurse and oncoming nurse listed above. The safety check included the below items  Area Items   H  High Alert Medications - Verify all high alert medication drips (heparin, PCA, etc.)   E  Equipment - Suction is set up for ALL patients (with yanker)  - Red plugs utilized for all equipment (IV pumps, etc.)  - WOWs wiped down at end of shift.  - Room stocked with oxygen, suction, and other unit-specific supplies   A  Alarms - Bed alarm is set for fall risk patients  - Ensure chair alarm is in place and activated if patient is up in a chair   L  Lines - Check IV for any infiltration  - Romero bag is empty if patient has a Romero   - Tubing and IV bags are labeled   S  Safety   - Room is clean, patient is clean, and equipment is clean. - Hallways are clear from equipment besides carts.    - Fall bracelet on for fall risk patients  - Ensure room is clear and free of clutter  - Suction is set up for ALL patients (with jose)  - Hallways are clear from equipment besides carts.    - Isolation precautions followed, supplies available outside room, sign posted     Tom Mcnamara RN

## 2017-09-14 NOTE — DISCHARGE INSTRUCTIONS
DASH Diet: Care Instructions  Your Care Instructions  The DASH diet is an eating plan that can help lower your blood pressure. DASH stands for Dietary Approaches to Stop Hypertension. Hypertension is high blood pressure. The DASH diet focuses on eating foods that are high in calcium, potassium, and magnesium. These nutrients can lower blood pressure. The foods that are highest in these nutrients are fruits, vegetables, low-fat dairy products, nuts, seeds, and legumes. But taking calcium, potassium, and magnesium supplements instead of eating foods that are high in those nutrients does not have the same effect. The DASH diet also includes whole grains, fish, and poultry. The DASH diet is one of several lifestyle changes your doctor may recommend to lower your high blood pressure. Your doctor may also want you to decrease the amount of sodium in your diet. Lowering sodium while following the DASH diet can lower blood pressure even further than just the DASH diet alone. Follow-up care is a key part of your treatment and safety. Be sure to make and go to all appointments, and call your doctor if you are having problems. It's also a good idea to know your test results and keep a list of the medicines you take. How can you care for yourself at home? Following the DASH diet  · Eat 4 to 5 servings of fruit each day. A serving is 1 medium-sized piece of fruit, ½ cup chopped or canned fruit, 1/4 cup dried fruit, or 4 ounces (½ cup) of fruit juice. Choose fruit more often than fruit juice. · Eat 4 to 5 servings of vegetables each day. A serving is 1 cup of lettuce or raw leafy vegetables, ½ cup of chopped or cooked vegetables, or 4 ounces (½ cup) of vegetable juice. Choose vegetables more often than vegetable juice. · Get 2 to 3 servings of low-fat and fat-free dairy each day. A serving is 8 ounces of milk, 1 cup of yogurt, or 1 ½ ounces of cheese. · Eat 6 to 8 servings of grains each day.  A serving is 1 slice of bread, 1 ounce of dry cereal, or ½ cup of cooked rice, pasta, or cooked cereal. Try to choose whole-grain products as much as possible. · Limit lean meat, poultry, and fish to 2 servings each day. A serving is 3 ounces, about the size of a deck of cards. · Eat 4 to 5 servings of nuts, seeds, and legumes (cooked dried beans, lentils, and split peas) each week. A serving is 1/3 cup of nuts, 2 tablespoons of seeds, or ½ cup of cooked beans or peas. · Limit fats and oils to 2 to 3 servings each day. A serving is 1 teaspoon of vegetable oil or 2 tablespoons of salad dressing. · Limit sweets and added sugars to 5 servings or less a week. A serving is 1 tablespoon jelly or jam, ½ cup sorbet, or 1 cup of lemonade. · Eat less than 2,300 milligrams (mg) of sodium a day. If you limit your sodium to 1,500 mg a day, you can lower your blood pressure even more. Tips for success  · Start small. Do not try to make dramatic changes to your diet all at once. You might feel that you are missing out on your favorite foods and then be more likely to not follow the plan. Make small changes, and stick with them. Once those changes become habit, add a few more changes. · Try some of the following:  ¨ Make it a goal to eat a fruit or vegetable at every meal and at snacks. This will make it easy to get the recommended amount of fruits and vegetables each day. ¨ Try yogurt topped with fruit and nuts for a snack or healthy dessert. ¨ Add lettuce, tomato, cucumber, and onion to sandwiches. ¨ Combine a ready-made pizza crust with low-fat mozzarella cheese and lots of vegetable toppings. Try using tomatoes, squash, spinach, broccoli, carrots, cauliflower, and onions. ¨ Have a variety of cut-up vegetables with a low-fat dip as an appetizer instead of chips and dip. ¨ Sprinkle sunflower seeds or chopped almonds over salads. Or try adding chopped walnuts or almonds to cooked vegetables. ¨ Try some vegetarian meals using beans and peas. Add garbanzo or kidney beans to salads. Make burritos and tacos with mashed conway beans or black beans. Where can you learn more? Go to http://daily-lance.info/. Enter H060 in the search box to learn more about \"DASH Diet: Care Instructions. \"  Current as of: April 3, 2017  Content Version: 11.3  © 8991-3092 [employer]. Care instructions adapted under license by [employer] (which disclaims liability or warranty for this information). If you have questions about a medical condition or this instruction, always ask your healthcare professional. [place] [address] any warranty or liability for your use of this information. Learning About Diverticulosis and Diverticulitis  What are diverticulosis and diverticulitis? In diverticulosis and diverticulitis, pouches called diverticula form in the wall of the large intestine, or colon. · In diverticulosis, the pouches do not cause any pain or other symptoms. · In diverticulitis, the pouches get inflamed or infected and cause symptoms. Doctors aren't sure what causes these pouches in the colon. But they think that a low-fiber diet may play a role. Without fiber to add bulk to the stool, the colon has to work harder than normal to push the stool forward. The pressure from this may cause pouches to form in weak spots along the colon. Some people with diverticulosis get diverticulitis. But experts don't know why this happens. What are the symptoms? · In diverticulosis, most people don't have symptoms. But pouches sometimes bleed. · In diverticulitis, symptoms may last from a few hours to a week or more. They include:  ¨ Belly pain. This is usually in the lower left side. It is sometimes worse when you move. This is the most common symptom. ¨ Fever and chills. ¨ Bloating and gas. ¨ Diarrhea or constipation. ¨ Nausea and sometimes vomiting. ¨ Not feeling like eating.   How can you prevent these problems? You may be able to lower your chance of getting diverticulitis. You can do this by taking steps to prevent constipation. · Eat fruits, vegetables, beans, and whole grains every day. These foods are high in fiber. · Drink plenty of fluids (enough so that your urine is light yellow or clear like water). If you have kidney, heart, or liver disease and have to limit fluids, talk with your doctor before you increase the amount of fluids you drink. · Get at least 30 minutes of exercise on most days of the week. Walking is a good choice. You also may want to do other activities, such as running, swimming, cycling, or playing tennis or team sports. · Take a fiber supplement, such as Citrucel or Metamucil, every day if needed. Read and follow all instructions on the label. · Schedule time each day for a bowel movement. Having a daily routine may help. Take your time and do not strain when having a bowel movement. Some people avoid nuts, seeds, berries, and popcorn. They believe that these foods might get trapped in the diverticula and cause pain. But there is no proof that these foods cause diverticulitis or make it worse. How are these problems treated? · The best way to treat diverticulosis is to avoid constipation. (See the tips above.)  · Treatment for diverticulitis includes antibiotics and often a change in your diet. You may need only liquids at first. Your doctor may suggest pain medicines for pain or belly cramps. In some cases, surgery may be needed. Follow-up care is a key part of your treatment and safety. Be sure to make and go to all appointments, and call your doctor if you are having problems. It's also a good idea to know your test results and keep a list of the medicines you take. Where can you learn more? Go to http://daily-lance.info/. Enter N074 in the search box to learn more about \"Learning About Diverticulosis and Diverticulitis. \"  Current as of: August 9, 2016  Content Version: 11.3  © 9776-8997 [employer]. Care instructions adapted under license by [employer] (which disclaims liability or warranty for this information). If you have questions about a medical condition or this instruction, always ask your healthcare professional. [address] any warranty or liability for your use of this information. [employer] Activation    Thank you for requesting access to Living Proof. Please follow the instructions below to securely access and download your online medical record. Living Proof allows you to send messages to your doctor, view your test results, renew your prescriptions, schedule appointments, and more. How Do I Sign Up? 1. In your internet browser, go to [email or web address]  2. Click on the First Time User? Click Here link in the Sign In box. You will be redirect to the New Member Sign Up page. 3. Enter your Living Proof Access Code exactly as it appears below. You will not need to use this code after youve completed the sign-up process. If you do not sign up before the expiration date, you must request a new code. Living Proof Access Code: Activation code not generated  Current Living Proof Status: Patient Declined (This is the date your [employer] access code will )    4. Enter the last four digits of your Social Security Number (xxxx) and Date of Birth (mm/dd/yyyy) as indicated and click Submit. You will be taken to the next sign-up page. 5. Create a Living Proof ID. This will be your Living Proof login ID and cannot be changed, so think of one that is secure and easy to remember. 6. Create a Living Proof password. You can change your password at any time. 7. Enter your Password Reset Question and Answer. This can be used at a later time if you forget your password. 8. Enter your e-mail address. You will receive e-mail notification when new information is available in [address] 9. Click Sign Up.  You can now view and download portions of your medical record. 10. Click the Download Summary menu link to download a portable copy of your medical information. Additional Information    If you have questions, please visit the Frequently Asked Questions section of the [employer] website at [email or web address] Remember, MyChart is NOT to be used for urgent needs. For medical emergencies, dial 911. Patient armband removed and given to patient to take home. Patient was informed of the privacy risks if armband lost or stolen    DISCHARGE SUMMARY from Nurse    The following personal items are in your possession at time of discharge:    Dental Appliances: None  Visual Aid: At bedside, Glasses     Home Medications: Kept at bedside  Jewelry: Watch, Other (comment)  Clothing: Footwear, Pants, Shirt, Socks, Undergarments, With patient  Other Valuables: None  Personal Items Sent to Safe: none          PATIENT INSTRUCTIONS:    After general anesthesia or intravenous sedation, for 24 hours or while taking prescription Narcotics:  · Limit your activities  · Do not drive and operate hazardous machinery  · Do not make important personal or business decisions  · Do  not drink alcoholic beverages  · If you have not urinated within 8 hours after discharge, please contact your surgeon on call. Report the following to your surgeon:  · Excessive pain, swelling, redness or odor of or around the surgical area  · Temperature over 100.5  · Nausea and vomiting lasting longer than 4 hours or if unable to take medications  · Any signs of decreased circulation or nerve impairment to extremity: change in color, persistent  numbness, tingling, coldness or increase pain  · Any questions        What to do at Home:  Recommended activity: Activity as tolerated    If you experience any of the following symptoms increased nausea and vomiting, pain not resolved by over the counter medications, fever greater than 101.4 please follow up with PCP.       *  Please give a list of your current medications to your Primary Care Provider. *  Please update this list whenever your medications are discontinued, doses are      changed, or new medications (including over-the-counter products) are added. *  Please carry medication information at all times in case of emergency situations. These are general instructions for a healthy lifestyle:    No smoking/ No tobacco products/ Avoid exposure to second hand smoke    Surgeon General's Warning:  Quitting smoking now greatly reduces serious risk to your health. Obesity, smoking, and sedentary lifestyle greatly increases your risk for illness    A healthy diet, regular physical exercise & weight monitoring are important for maintaining a healthy lifestyle    You may be retaining fluid if you have a history of heart failure or if you experience any of the following symptoms:  Weight gain of 3 pounds or more overnight or 5 pounds in a week, increased swelling in our hands or feet or shortness of breath while lying flat in bed. Please call your doctor as soon as you notice any of these symptoms; do not wait until your next office visit. Recognize signs and symptoms of STROKE:    F-face looks uneven    A-arms unable to move or move unevenly    S-speech slurred or non-existent    T-time-call 911 as soon as signs and symptoms begin-DO NOT go       Back to bed or wait to see if you get better-TIME IS BRAIN. Warning Signs of HEART ATTACK     Call 911 if you have these symptoms:   Chest discomfort. Most heart attacks involve discomfort in the center of the chest that lasts more than a few minutes, or that goes away and comes back. It can feel like uncomfortable pressure, squeezing, fullness, or pain.  Discomfort in other areas of the upper body. Symptoms can include pain or discomfort in one or both arms, the back, neck, jaw, or stomach.  Shortness of breath with or without chest discomfort.    Other signs may include breaking out in a cold sweat, nausea, or lightheadedness. Don't wait more than five minutes to call 911 - MINUTES MATTER! Fast action can save your life. Calling 911 is almost always the fastest way to get lifesaving treatment. Emergency Medical Services staff can begin treatment when they arrive -- up to an hour sooner than if someone gets to the hospital by car. The discharge information has been reviewed with the patient. The patient verbalized understanding. Discharge medications reviewed with the patient and appropriate educational materials and side effects teaching were provided.

## 2017-09-14 NOTE — PROGRESS NOTES
Problem: Falls - Risk of  Goal: *Absence of Falls  Document [place] Fall Risk and appropriate interventions in the flowsheet.    Outcome: Progressing Towards Goal  Fall Risk Interventions:  Mobility Interventions: Patient to call before getting OOB           Medication Interventions: Bed/chair exit alarm     Elimination Interventions: Bed/chair exit alarm     History of Falls Interventions: Bed/chair exit alarm

## 2017-09-14 NOTE — PROGRESS NOTES
[facility] [id number]  [phone]  Colon and Rectal Surgery Progress Note      Patient: Rosalinda Byrd MRN: [id number]  SSN: [id number]    YOB: 1950  Age: 78 y.o. Sex: male      Admit Date: 9/9/2017    LOS: 4 days     Subjective:     Patient complains of mild abdominal pain. Tolerated flex sig well yesterday, which showed viable appearing colon with pseudomembranes. No BM for 24 hours. Objective:     Vitals:    09/13/17 1945 09/13/17 2000 09/14/17 0000 09/14/17 0400   BP:  116/67 134/78 133/70   Pulse:  62 62 61   Resp:  18 18 18   Temp:  99.1 °F (37.3 °C) 98.3 °F (36.8 °C) 98.5 °F (36.9 °C)   SpO2: 99% 100% 100% 100%   Weight:       Height:            Intake and Output:  Current Shift:    Last three shifts: 09/12 1901 - 09/14 0700  In: 90827 [P.O.:240; I.V.:16102]  Out: 5050 [Urine:5050]    Physical Exam:     Abdomen soft, non-distended, non-tender     Lab/Data Review: All lab results for the last 24 hours reviewed.      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

## 2017-09-14 NOTE — DISCHARGE SUMMARY
Discharge Summary    Patient: Nancy Chew               Sex: male          DOA: 9/9/2017         YOB: 1950      Age:  78 y.o.        LOS:  LOS: 4 days                Admit Date: 9/9/2017    Discharge Date: 9/14/2017    Admission Diagnoses: Diverticula of colon  Diverticulitis large intestine w/o perforation or abscess w/bleeding  dx    Discharge Diagnoses:    Problem List as of 9/14/2017  Date Reviewed: 9/14/2017          Codes Class Noted - Resolved    Protein-calorie malnutrition, moderate ([facility] ICD-10-CM: E44.0  ICD-9-CM: 263.0  9/12/2017 - Present        History of AAA (abdominal aortic aneurysm) repair (Chronic) ICD-10-CM: F89.481  ICD-9-CM: V45.89  9/12/2017 - Present        * (Principal)Diverticulitis of large intestine without perforation or abscess with bleeding ICD-10-CM: K57.33  ICD-9-CM: 562.13  9/10/2017 - Present        Constipation due to pain medication therapy (Chronic) ICD-10-CM: K59.03  ICD-9-CM: 564.09, E947.9  6/7/2017 - Present        SOB (shortness of breath) ICD-10-CM: R06.02  ICD-9-CM: 786.05  6/2/2017 - Present        AAA (abdominal aortic aneurysm) without rupture ([facility] ICD-10-CM: I71.4  ICD-9-CM: 441.4  5/18/2017 - Present        Spondylosis of lumbar region without myelopathy or radiculopathy ICD-10-CM: M47.816  ICD-9-CM: 721.3  8/11/2016 - Present        Lumbar facet arthropathy ICD-10-CM: M12.88  ICD-9-CM: 721.3  8/11/2016 - Present        Arthritis of lumbar spine ([facility] ICD-10-CM: M46.96  ICD-9-CM: 721.3  8/11/2016 - Present        Muscle spasm of back ICD-10-CM: M62.830  ICD-9-CM: 724.8  7/19/2016 - Present        Tobacco dependency ICD-10-CM: F17.200  ICD-9-CM: 305.1  7/19/2016 - Present        HNP (herniated nucleus pulposus), lumbar ICD-10-CM: M51.26  ICD-9-CM: 722.10  12/10/2015 - Present        Facet arthritis of lumbar region [facility]) ICD-10-CM: M46.96  ICD-9-CM: 721.3  12/10/2015 - Present        Chronic pain (Chronic) ICD-10-CM: P27.12  ICD-9-CM: 338.29 12/10/2015 - Present        Hypertriglyceridemia ICD-10-CM: E78.1  ICD-9-CM: 272.1  11/16/2015 - Present        Vitamin D deficiency ICD-10-CM: E55.9  ICD-9-CM: 268.9  11/16/2015 - Present        Elevated serum creatinine ICD-10-CM: R79.89  ICD-9-CM: 790.99  11/16/2015 - Present        Thyroid activity decreased ICD-10-CM: E03.9  ICD-9-CM: 244.9  11/9/2015 - Present        Right groin hernia ICD-10-CM: K40.90  ICD-9-CM: 550.90  Unknown - Present    Overview Signed 11/9/2015 10:37 AM by Sonny Whitlock NP     not resolved             CAD (coronary artery disease), native coronary artery (Chronic) ICD-10-CM: I25.10  ICD-9-CM: 414.01  Unknown - Present    Overview Signed 11/9/2015 10:37 AM by Sonny Whitlock NP     ALIDA X 2 to OM1             Gastroesophageal reflux disease without esophagitis (Chronic) ICD-10-CM: K21.9  ICD-9-CM: 530.81  11/9/2015 - Present        Renal artery atherosclerosis, unilateral ([address] ICD-10-CM: I70.1  ICD-9-CM: 440.1  12/23/2014 - Present        Varicose vein of leg ICD-10-CM: I83.90  ICD-9-CM: 454.9  12/23/2014 - Present        AAA (abdominal aortic aneurysm) (HCC) (Chronic) ICD-10-CM: I71.4  ICD-9-CM: 441.4  12/5/2014 - Present        Tobacco abuse ICD-10-CM: Z72.0  ICD-9-CM: 305.1  8/1/2014 - Present        Dyspnea ICD-10-CM: R06.00  ICD-9-CM: 786.09  4/10/2014 - Present        Lightheadedness ICD-10-CM: R42  ICD-9-CM: 780.4  4/10/2014 - Present        Vasovagal response ICD-10-CM: R55  ICD-9-CM: 780.2  4/10/2014 - Present        Coronary atherosclerosis of native coronary artery ICD-10-CM: I25.10  ICD-9-CM: 414.01  8/23/2013 - Present        Complete heart block (HCC) ICD-10-CM: I44.2  ICD-9-CM: 426.0  8/23/2013 - Present        HTN (hypertension) (Chronic) ICD-10-CM: I10  ICD-9-CM: 401.9  3/30/2013 - Present        Dyslipidemia ICD-10-CM: E78.5  ICD-9-CM: 272.4  3/30/2013 - Present        Aortic dissection, abdominal ([facility] ICD-10-CM: R51.74  ICD-9-CM: 441.02  3/30/2013 - Present RESOLVED: Orthostatic hypotension ICD-10-CM: I95.1  ICD-9-CM: 458.0  6/7/2017 - 6/8/2017        RESOLVED: Colitis ICD-10-CM: K52.9  ICD-9-CM: 558.9  6/2/2017 - 6/8/2017        RESOLVED: Follow-up exam, 3-6 months since previous exam ICD-10-CM: U53  ICD-9-CM: V67.9  11/16/2015 - 2/8/2016        RESOLVED: Chest pain, unspecified ICD-10-CM: R07.9  ICD-9-CM: 786.50  3/30/2013 - 8/1/2014              Discharge Condition: [facility] Course:   Mr. Rudy Mishra is a 78year old male with a past medical history of AAA repair, CAD, GERD, hypothyroid, hypertension, tobacco use, hemorrhoids and colitis in June 2017. The patient presented to the Emergency room on 9/9/2017 with complaints of lower abdominal pain, nausea, diarrhea with 10 stools in the last 24 hours and blood from the rectum. CT scan was concerning for multiple diverticula including 2 that were thickened consistent with diverticulitis in the sigmoid. The patient was admitted to telemetry with acute diverticulitis, initiated on parenteral fluids, and IV Levaquin and Flagyl. General surgery was consulted with concern for ischemic bowel, due to the patient's history of endovascular AAA repair, and a possibility of stent constriction to mesenteric supply. Gastroenterology was consulted, sigmoidoscopy was performed on 9/13/2017. Gross evaluation demonstrated no evidence of ischemia; pseudomembranes were seen raising the possibility of Pseudomembranous colitis. Further testing for probable pseudomembranous colitis will be pursued outpatient. Biopsies have returned today and no ischemic bowel was detected. The patient was also evaluated by the dietitian and found to meet ASPEN malnutrition criteria score 12, for severe protein calorie malnutrition: acute illness, < 50% est energy req for > 5 days, weight loss > 7.5% x 3 months. Meals were adjusted to accommodate his food preferences; Ensure pudding twice a day added.   This will need to be closely followed outpatient. Patient is tolerating a regular diet today. He feels improved and wants to go home. He will be discharged on a bowel  Program to include MiraLAX and Senokot; he has been instructed in a low residue high fiber diet; he will resume use of witch hazel, Preparation H and Proctofoam for his hemorrhoids; he will finish 5 more days of Cipro  And Flagyl. Consults: Gastroenterology and General Surgery    Significant Diagnostic Studies:   CT of the abdomen and pelvis 9/9/2017:  Decreased wall thickening at rectum and low sigmoid when compared with previous  study. Differential includes infectious proctocolitis or ischemic bowel.      New thickening of the wall of the upper sigmoid with multiple diverticula  including one or 2 that appear thickened. Could represent diverticulitis. Other  infectious process or ischemic bowel also possible.      Umbilical hernia containing bowel without bowel obstruction.      Stable appearance aortic dilatation at the hiatus and stented abdominal aorta.      Renal cysts.        Chest x-ray 9/11/2017:  IMPRESSION:     Only minimal pulmonary vascular prominence, without pulmonary edema.     Minimal streaky atelectasis at left lung base.     Redemonstration of previous placement of thoracic aortic endograft.     No other diagnostic finding or interval change. EKG 9/13/2017  Diagnosis   Final      AV sequential or dual chamber electronic pacemaker   Abnormal ECG   When compared with ECG of 11-SEP-2017 15:21,   AV sequential or dual chamber electronic pacemaker has replaced Sinus rhythm   with atrial sensing and ventricular pacing     Confirmed by Willi Freire ([id number]) on 9/13/2017 8:35:10      Results for Jorden Amador (MRN [id number])    Ref.  Range 9/11/2017 05:11 9/12/2017 04:04 9/13/2017 03:33   WBC Latest Ref Range: 4.6 - 13.2 K/uL 7.1 6.0 6.7   RBC Latest Ref Range: 4.70 - 5.50 M/uL 4.28 (L) 4.16 (L) 4.25 (L)   HGB Latest Ref Range: 13.0 - 16.0 g/dL 11.4 (L) 11.0 (L) 11.3 (L)   HCT Latest Ref Range: 36.0 - 48.0 % 35.6 (L) 34.5 (L) 35.7 (L)   MCV Latest Ref Range: 74.0 - 97.0 FL 83.2 82.9 84.0   MCH Latest Ref Range: 24.0 - 34.0 PG 26.6 26.4 26.6   MCHC Latest Ref Range: 31.0 - 37.0 g/dL 32.0 31.9 31.7   RDW Latest Ref Range: 11.6 - 14.5 % 15.2 (H) 15.2 (H) 15.4 (H)   PLATELET Latest Ref Range: 135 - 420 K/uL 180 178 183     Results for Jerry Chisholm (MRN [id number])    Ref. Range 9/12/2017 04:04 9/13/2017 03:33   Sodium Latest Ref Range: 136 - 145 mmol/L 140 139   Potassium Latest Ref Range: 3.5 - 5.5 mmol/L 3.3 (L) 3.6   Chloride Latest Ref Range: 100 - 108 mmol/L 107 104   CO2 Latest Ref Range: 21 - 32 mmol/L 24 27   Anion gap Latest Ref Range: 3.0 - 18 mmol/L 9 8   Glucose Latest Ref Range: 74 - 99 mg/dL 80 93   BUN Latest Ref Range: 7.0 - 18 MG/DL 5 (L) 4 (L)   Creatinine Latest Ref Range: 0.6 - 1.3 MG/DL 1.07 1.18   BUN/Creatinine ratio Latest Ref Range: 12 - 20   5 (L) 3 (L)   Calcium Latest Ref Range: 8.5 - 10.1 MG/DL 8.1 (L) 8.2 (L)   Phosphorus Latest Ref Range: 2.5 - 4.9 MG/DL  2.8   Magnesium Latest Ref Range: 1.6 - 2.6 mg/dL  2.0   GFR est non-AA Latest Ref Range: >60 ml/min/1.73m2 >60 >60   GFR est AA Latest Ref Range: >60 ml/min/1.73m2 >60 >60   Bilirubin, total Latest Ref Range: 0.2 - 1.0 MG/DL 0.4 0.3   Protein, total Latest Ref Range: 6.4 - 8.2 g/dL 6.1 (L) 6.3 (L)   Albumin Latest Ref Range: 3.4 - 5.0 g/dL 2.3 (L) 2.3 (L)   Globulin Latest Ref Range: 2.0 - 4.0 g/dL 3.8 4.0   A-G Ratio Latest Ref Range: 0.8 - 1.7   0.6 (L) 0.6 (L)   ALT (SGPT) Latest Ref Range: 16 - 61 U/L 8 (L) 9 (L)   AST Latest Ref Range: 15 - 37 U/L 13 (L) 14 (L)   Alk. phosphatase Latest Ref Range: 45 - 117 U/L 58 59     Results for Jerry Chisholm (MRN [id number])    Ref. Range 9/11/2017 16:05 9/11/2017 22:52 9/12/2017 04:04   Troponin-I, Qt. Latest Ref Range: 0.0 - 0.045 NG/ML <0.02 <0.02 <0.02   Results for Jerry Chisholm (MRN [id number])    Ref.  Range 9/9/2017 20:15 9/11/2017 16:05 9/11/2017 22:52   Lipase Latest Ref Range: 73 - 393 U/L 66 (L)     Lactic acid Latest Ref Range: 0.4 - 2.0 MMOL/L  1.7      Rectal biopsy 9/13/2017:     TYPE OF SPECIMEN:   A: RECTAL BIOPSY, RULE OUT ISCHEMIA   FINAL DIAGNOSIS:   RECTUM, BIOPSY    MILD HYPERPLASTIC CHANGES. NO ISCHEMIC, ACTIVE, OR MICROSCOPIC PROCTITIS IDENTIFIED. GROSS DESCRIPTION:   The specimen is received in formalin labeled with the patients name iNckolas Lopez and rectal biopsy, rule out ischemia. The specimen consists of three portions of pale tan soft tissue fragments ranging in size from 0.3 cm. up to 0.4 cm. The specimen is completely submitted in one cassette. (yobany)   DAC/rc   MICROSCOPIC DESCRIPTION:   Representative sections through the rectal biopsy, rule out ischemia show three benign-appearing fragments of rectal mucosal tissues with admixed mucinous debris. The fragments show some mild surface hyperplastic-type change. No ischemic, microscopic, or active proctitis is identified. No dysplasia or evidence of malignancy is seen. OMEGA GRACE M.D./praomd Ngo. Adams Alexander M.D. Electronically Signed  9/14/2017     Discharge Medications:     Current Discharge Medication List      START taking these medications    Details   hydrocortisone-pramoxine (PROCTOFOAM HC) rectal foam Insert 1 Applicator into rectum two (2) times daily as needed for Hemorrhoids. Qty: 1 Can, Refills: 0      nicotine (NICODERM CQ) 14 mg/24 hr patch 1 Patch by TransDERmal route daily for 30 days. Indications: SMOKING CESSATION  Qty: 30 Patch, Refills: 0      witch hazel-glycerin (TUCKS) 50 % padm 1 Container by PeriANAL route as needed. Qty: 1 Each, Refills: 0      ciprofloxacin HCl (CIPRO) 500 mg tablet Take 1 Tab by mouth two (2) times a day for 5 days.  You have this medication at home from Dr Eladia Sanchez  Take 1 cipro 2 times a day for 5 days  Indications: DIVERTICULITIS OF GASTROINTESTINAL TRACT  Qty: 10 Tab, Refills: 0 metroNIDAZOLE (FLAGYL) 500 mg tablet You have this medication at home from Dr Stepan Bob  Take 1 with food 3 times a day for 5 days  Indications: DIVERTICULITIS OF GASTROINTESTINAL TRACT  Qty: 15 Tab, Refills: 0         CONTINUE these medications which have CHANGED    Details   HYDROcodone-acetaminophen (NORCO) 7.5-325 mg per tablet Take 1 Tab by mouth every six (6) hours as needed for Pain. Max Daily Amount: 4 Tabs. Qty: 20 Tab, Refills: 0      polyethylene glycol (MIRALAX) 17 gram/dose powder Take 17 g by mouth daily. 1 capful with 8 oz of water daily  Qty: 119 g, Refills: 0      senna (SENOKOT) 8.6 mg tablet Take 2 Tabs by mouth nightly. Indications: constipation  Qty: 100 Tab, Refills: 0      phenyleph-shark pacheco oil-mo-pet (PREPARATION H) rectal ointment by PeriANAL route four (4) times daily as needed for Hemorrhoids or Pain for up to 7 days. Qty: 30 g, Refills: 0         CONTINUE these medications which have NOT CHANGED    Details   raNITIdine (ZANTAC) 300 mg tablet Take 1 Tab by mouth daily. gabapentin (NEURONTIN) 300 mg capsule Take 300 mg by mouth three (3) times daily. Indications: NEUROPATHIC PAIN      fluticasone-salmeterol (ADVAIR) 100-50 mcg/dose diskus inhaler Take 1 Puff by inhalation every twelve (12) hours. Indications: BRONCHOSPASM PREVENTION WITH COPD  Qty: 1 Inhaler, Refills: 0    Associated Diagnoses: COPD (chronic obstructive pulmonary disease) ([facility])      LORazepam (ATIVAN) 1 mg tablet Take 1 Tab by mouth every six (6) hours as needed. Max Daily Amount: 4 mg. Indications: anxiety  Qty: 15 Tab, Refills: 0      pantoprazole (PROTONIX) 40 mg tablet Take 40 mg by mouth daily. atenolol (TENORMIN) 25 mg tablet Take 25 mg by mouth daily. levothyroxine (SYNTHROID) 125 mcg tablet       atorvastatin (LIPITOR) 40 mg tablet Take 40 mg by mouth nightly. aspirin 81 mg chewable tablet Take 1 Tab by mouth daily.   Qty: 30 Tab, Refills: 11    Associated Diagnoses: Essential hypertension albuterol (PROVENTIL HFA, VENTOLIN HFA, PROAIR HFA) 90 mcg/actuation inhaler Take 2 Puffs by inhalation every six (6) hours as needed for Wheezing. Qty: 1 Inhaler, Refills: 0    Associated Diagnoses: Current smoker; Wheezing; Shortness of breath      nitroglycerin (NITROSTAT) 0.4 mg SL tablet 1 Tab by SubLINGual route every five (5) minutes as needed for Chest Pain (if chest pain not resolved after taking 3 call 911 ).   Qty: 30 Tab, Refills: 3         STOP taking these medications       oxyCODONE-acetaminophen (PERCOCET 7.5) 7.5-325 mg per tablet Comments:   Reason for Stopping:         nystatin (MYCOSTATIN) topical cream Comments:   Reason for Stopping:         lidocaine (LIDODERM) 5 % Comments:   Reason for Stopping:         bisacodyl (DULCOLAX, BISACODYL,) 5 mg EC tablet Comments:   Reason for Stopping:         omega-3 acid ethyl esters (LOVAZA) 1 gram capsule Comments:   Reason for Stopping:               Activity: Activity as tolerated    Diet: Cardiac Diet    Wound Care: None needed    Follow-up:   Dr Susana Tang tomorrow  Dr Harman Goodell in 2 weeks      Dallas Bowels, DO  9/14/2017  3:11 PM  [phone]

## 2017-09-14 NOTE — PROGRESS NOTES
Hospitalist Progress Note    Patient: Lety Alcala Age: 78 y.o. : 1950 MR#: [id number] SSN: [id number]  Date/Time: 2017 11:03 PM    Subjective:     Patient had sigmoidoscopy and tolerated procedure well  Biopsies are pending and per GI can be followed outpatient. Review of Systems -   Negative for headache, chest pain or nausea.        Objective:   VS:   Visit Vitals    /67 (BP 1 Location: Left arm, BP Patient Position: At rest)    Pulse 62    Temp 99.1 °F (37.3 °C)    Resp 18    Ht 6' 1\" (1.854 m)    Wt 115.9 kg (255 lb 8 oz)    SpO2 100%    BMI 33.71 kg/m2      Tmax/24hrs: Temp (24hrs), Av.2 °F (36.8 °C), Min:97.8 °F (36.6 °C), Max:99.1 °F (37.3 °C)     Intake/Output Summary (Last 24 hours) at 17 2303  Last data filed at 17 2127   Gross per 24 hour   Intake          8224.17 ml   Output             2850 ml   Net          5374.17 ml       General: alert and oriented x 3  HEENT:conjunctiva clear, no scleral icterus, no focal deficits, no JVD  Cardiovascular:HR reg 62, paced on tele    Pulmonary:clear to bases    GI:  Soft, BS+, mild tenderness to palpation LLQ, improved from prior  Extremities: moving aall 4 extremities well, SCD's on  Non tender calves, + pulses    Additional:    Current Facility-Administered Medications   Medication Dose Route Frequency    phenyleph-shark pacheco oil-mo-pet (PREPARATION H) ointment   PeriANAL QID PRN    witch hazel-glycerin (TUCKS) 50 % pads   PeriANAL PRN    hydrocortisone-pramoxine (PROCTOFOAM HC) 1-1 % rectal foam 1 Applicator  1 Applicator PeriANAL R56O    aspirin chewable tablet 81 mg  81 mg Oral DAILY    atenolol (TENORMIN) tablet 25 mg  25 mg Oral DAILY    atorvastatin (LIPITOR) tablet 40 mg  40 mg Oral QHS    gabapentin (NEURONTIN) capsule 300 mg  300 mg Oral TID    levothyroxine (SYNTHROID) tablet 125 mcg  125 mcg Oral 7am    famotidine (PEPCID) tablet 40 mg  40 mg Oral QHS    nicotine (NICODERM CQ) 14 mg/24 hr patch 1 Patch  1 Patch TransDERmal DAILY    LORazepam (ATIVAN) tablet 1 mg  1 mg Oral Q4H PRN    0.9% sodium chloride infusion  125 mL/hr IntraVENous CONTINUOUS    levoFLOXacin (LEVAQUIN) 500 mg in D5W IVPB  500 mg IntraVENous Q24H    metroNIDAZOLE (FLAGYL) IVPB premix 500 mg  500 mg IntraVENous Q8H    HYDROmorphone (DILAUDID) syringe 1 mg  1 mg IntraVENous Q4H PRN    budesonide-formoterol (SYMBICORT) 80-4.5 mcg inhaler  2 Puff Inhalation BID RT    nitroglycerin (NITROSTAT) tablet 0.4 mg  0.4 mg SubLINGual Q5MIN PRN    pantoprazole (PROTONIX) 40 mg in sodium chloride 0.9 % 10 mL injection  40 mg IntraVENous Q12H    albuterol (PROVENTIL VENTOLIN) nebulizer solution 2.5 mg  2.5 mg Nebulization Q4H PRN    sodium chloride (NS) flush 5-10 mL  5-10 mL IntraVENous Q8H    sodium chloride (NS) flush 5-10 mL  5-10 mL IntraVENous PRN    acetaminophen (TYLENOL) suppository 650 mg  650 mg Rectal Q4H PRN    naloxone (NARCAN) injection 0.4 mg  0.4 mg IntraVENous PRN    dicyclomine (BENTYL) capsule 10 mg  10 mg Oral Q6H PRN    diphenhydrAMINE (BENADRYL) injection 25 mg  25 mg IntraVENous Q4H PRN    ondansetron (ZOFRAN) injection 4 mg  4 mg IntraVENous Q4H PRN    bisacodyl (DULCOLAX) suppository 10 mg  10 mg Rectal DAILY PRN    oxyCODONE IR (ROXICODONE) tablet 5 mg  5 mg Oral Q4H PRN     Labs:    Recent Results (from the past 24 hour(s))   METABOLIC PANEL, COMPREHENSIVE    Collection Time: 09/13/17  3:33 AM   Result Value Ref Range    Sodium 139 136 - 145 mmol/L    Potassium 3.6 3.5 - 5.5 mmol/L    Chloride 104 100 - 108 mmol/L    CO2 27 21 - 32 mmol/L    Anion gap 8 3.0 - 18 mmol/L    Glucose 93 74 - 99 mg/dL    BUN 4 (L) 7.0 - 18 MG/DL    Creatinine 1.18 0.6 - 1.3 MG/DL    BUN/Creatinine ratio 3 (L) 12 - 20      GFR est AA >60 >60 ml/min/1.73m2    GFR est non-AA >60 >60 ml/min/1.73m2    Calcium 8.2 (L) 8.5 - 10.1 MG/DL    Bilirubin, total 0.3 0.2 - 1.0 MG/DL    ALT (SGPT) 9 (L) 16 - 61 U/L    AST (SGOT) 14 (L) 15 - 37 U/L    Alk. phosphatase 59 45 - 117 U/L    Protein, total 6.3 (L) 6.4 - 8.2 g/dL    Albumin 2.3 (L) 3.4 - 5.0 g/dL    Globulin 4.0 2.0 - 4.0 g/dL    A-G Ratio 0.6 (L) 0.8 - 1.7     MAGNESIUM    Collection Time: 09/13/17  3:33 AM   Result Value Ref Range    Magnesium 2.0 1.6 - 2.6 mg/dL   PHOSPHORUS    Collection Time: 09/13/17  3:33 AM   Result Value Ref Range    Phosphorus 2.8 2.5 - 4.9 MG/DL   PROTHROMBIN TIME + INR    Collection Time: 09/13/17  3:33 AM   Result Value Ref Range    Prothrombin time 13.9 11.5 - 15.2 sec    INR 1.1 0.8 - 1.2     PTT    Collection Time: 09/13/17  3:33 AM   Result Value Ref Range    aPTT 26.9 23.0 - 36.4 SEC   CBC WITH AUTOMATED DIFF    Collection Time: 09/13/17  3:33 AM   Result Value Ref Range    WBC 6.7 4.6 - 13.2 K/uL    RBC 4.25 (L) 4.70 - 5.50 M/uL    HGB 11.3 (L) 13.0 - 16.0 g/dL    HCT 35.7 (L) 36.0 - 48.0 %    MCV 84.0 74.0 - 97.0 FL    MCH 26.6 24.0 - 34.0 PG    MCHC 31.7 31.0 - 37.0 g/dL    RDW 15.4 (H) 11.6 - 14.5 %    PLATELET 033 710 - 445 K/uL    MPV 10.6 9.2 - 11.8 FL    NEUTROPHILS 62 40 - 73 %    LYMPHOCYTES 24 21 - 52 %    MONOCYTES 11 (H) 3 - 10 %    EOSINOPHILS 3 0 - 5 %    BASOPHILS 0 0 - 2 %    ABS. NEUTROPHILS 4.1 1.8 - 8.0 K/UL    ABS. LYMPHOCYTES 1.6 0.9 - 3.6 K/UL    ABS. MONOCYTES 0.7 0.05 - 1.2 K/UL    ABS. EOSINOPHILS 0.2 0.0 - 0.4 K/UL    ABS. BASOPHILS 0.0 0.0 - 0.1 K/UL    DF AUTOMATED     LACTIC ACID    Collection Time: 09/13/17  3:33 AM   Result Value Ref Range    Lactic acid 0.6 0.4 - 2.0 MMOL/L       Imaging:  No results found.     Assessment/Plan:     Hospital Problems  Date Reviewed: 9/13/2017          Codes Class Noted POA    Protein-calorie malnutrition, moderate ([facility] ICD-10-CM: E44.0  ICD-9-CM: 263.0  9/12/2017 Yes        History of AAA (abdominal aortic aneurysm) repair (Chronic) ICD-10-CM: S87.823  ICD-9-CM: V45.89  9/12/2017 Yes        * (Principal)Diverticulitis of large intestine without perforation or abscess with bleeding ICD-10-CM: K57.33  ICD-9-CM: 562.13  9/10/2017 Yes        Gastroesophageal reflux disease without esophagitis (Chronic) ICD-10-CM: K21.9  ICD-9-CM: 530.81  11/9/2015 Yes        HTN (hypertension) (Chronic) ICD-10-CM: I10  ICD-9-CM: 401.9  3/30/2013 Yes          continue IV antibiotics overnight and transition   to PO if tolerates diet  PCP can follow biopsies outpatient  Advance diet  Discuss any follow up with Dr Najma Mack  Encouraged good nutrition    Additional Notes:      Full Code    Disposition:   Home    Case discussed with:  [x]Patient  []Family  [x]Nursing  []Case Management  DVT Prophylaxis:  []Lovenox  []Hep SQ  [x]SCDs  []Coumadin   []On Heparin gtt    Signed By: Lashon Vidal DO     September 13, 2017 11:03 PM

## 2017-09-14 NOTE — PROGRESS NOTES
No further GI issues. Will sign off. Call for questions. Will f/u biopsy OP                  MOIRA Lafleur  [facility]. [email or web address]  Phone: [phone]  Fax: [phone]

## 2017-09-14 NOTE — PROGRESS NOTES
Care Management Interventions  PCP Verified by CM: Yes  Current Support Network: Lives Alone, Family Lives Nearby  Confirm Follow Up Transport: Family  Plan discussed with Pt/Family/Caregiver: Yes     Pt is a 78year old male admitted for diverticula of colon. Pt is alert and oriented in room. Pt states that he resides alone and his plan is to return home. Pt indicates being independent with ADLs and reports that he uses a walker or rollator to assist with ambulation. Pt mentions that his sister Mason Lindquist [phone]) will be assisting him with transportation home. Pt request for Financial Assist for hospital stay and CM contacted [facility] to inform.

## 2017-09-15 LAB
ATRIAL RATE: 67 BPM
CALCULATED P AXIS, ECG09: 67 DEGREES
CALCULATED R AXIS, ECG10: -85 DEGREES
CALCULATED T AXIS, ECG11: 92 DEGREES
DIAGNOSIS, 93000: NORMAL
P-R INTERVAL, ECG05: 340 MS
Q-T INTERVAL, ECG07: 512 MS
QRS DURATION, ECG06: 202 MS
QTC CALCULATION (BEZET), ECG08: 541 MS
VENTRICULAR RATE, ECG03: 67 BPM

## 2017-10-14 ENCOUNTER — HOSPITAL ENCOUNTER (EMERGENCY)
Age: 67
Discharge: HOME | End: 2017-10-15
Attending: EMERGENCY MEDICINE
Payer: MEDICARE

## 2017-10-14 ENCOUNTER — APPOINTMENT (OUTPATIENT)
Dept: CT IMAGING | Age: 67
End: 2017-10-14
Attending: EMERGENCY MEDICINE
Payer: MEDICARE

## 2017-10-14 VITALS
TEMPERATURE: 97.6 F | DIASTOLIC BLOOD PRESSURE: 89 MMHG | RESPIRATION RATE: 15 BRPM | BODY MASS INDEX: 32.63 KG/M2 | OXYGEN SATURATION: 100 % | WEIGHT: 247.31 LBS | SYSTOLIC BLOOD PRESSURE: 130 MMHG | HEART RATE: 99 BPM

## 2017-10-14 DIAGNOSIS — K52.9 NONINFECTIOUS GASTROENTERITIS, UNSPECIFIED TYPE: ICD-10-CM

## 2017-10-14 DIAGNOSIS — R10.13 ABDOMINAL PAIN, EPIGASTRIC: Primary | ICD-10-CM

## 2017-10-14 LAB
ALBUMIN SERPL-MCNC: 3.1 G/DL (ref 3.4–5)
ALBUMIN/GLOB SERPL: 0.6 {RATIO} (ref 0.8–1.7)
ALP SERPL-CCNC: 79 U/L (ref 45–117)
ALT SERPL-CCNC: 9 U/L (ref 16–61)
ANION GAP SERPL CALC-SCNC: 8 MMOL/L (ref 3–18)
AST SERPL-CCNC: 12 U/L (ref 15–37)
BASOPHILS # BLD: 0 K/UL (ref 0–0.06)
BASOPHILS NFR BLD: 0 % (ref 0–2)
BILIRUB DIRECT SERPL-MCNC: 0.1 MG/DL (ref 0–0.2)
BILIRUB SERPL-MCNC: 0.5 MG/DL (ref 0.2–1)
BUN SERPL-MCNC: 7 MG/DL (ref 7–18)
BUN/CREAT SERPL: 5 (ref 12–20)
CALCIUM SERPL-MCNC: 8.8 MG/DL (ref 8.5–10.1)
CHLORIDE SERPL-SCNC: 101 MMOL/L (ref 100–108)
CK MB CFR SERPL CALC: NORMAL % (ref 0–4)
CK MB SERPL-MCNC: <1 NG/ML (ref 5–25)
CK SERPL-CCNC: 73 U/L (ref 39–308)
CO2 SERPL-SCNC: 26 MMOL/L (ref 21–32)
CREAT SERPL-MCNC: 1.43 MG/DL (ref 0.6–1.3)
DIFFERENTIAL METHOD BLD: ABNORMAL
EOSINOPHIL # BLD: 0 K/UL (ref 0–0.4)
EOSINOPHIL NFR BLD: 0 % (ref 0–5)
ERYTHROCYTE [DISTWIDTH] IN BLOOD BY AUTOMATED COUNT: 16.2 % (ref 11.6–14.5)
GLOBULIN SER CALC-MCNC: 5.1 G/DL (ref 2–4)
GLUCOSE SERPL-MCNC: 111 MG/DL (ref 74–99)
HCT VFR BLD AUTO: 45.8 % (ref 36–48)
HGB BLD-MCNC: 15.6 G/DL (ref 13–16)
LACTATE BLD-SCNC: 1.9 MMOL/L (ref 0.4–2)
LIPASE SERPL-CCNC: 93 U/L (ref 73–393)
LYMPHOCYTES # BLD: 2.1 K/UL (ref 0.9–3.6)
LYMPHOCYTES NFR BLD: 21 % (ref 21–52)
MCH RBC QN AUTO: 27.4 PG (ref 24–34)
MCHC RBC AUTO-ENTMCNC: 34.1 G/DL (ref 31–37)
MCV RBC AUTO: 80.5 FL (ref 74–97)
MONOCYTES # BLD: 0.9 K/UL (ref 0.05–1.2)
MONOCYTES NFR BLD: 9 % (ref 3–10)
NEUTS SEG # BLD: 6.9 K/UL (ref 1.8–8)
NEUTS SEG NFR BLD: 70 % (ref 40–73)
PLATELET # BLD AUTO: 177 K/UL (ref 135–420)
POTASSIUM SERPL-SCNC: 3.5 MMOL/L (ref 3.5–5.5)
PROT SERPL-MCNC: 8.2 G/DL (ref 6.4–8.2)
RBC # BLD AUTO: 5.69 M/UL (ref 4.7–5.5)
SODIUM SERPL-SCNC: 135 MMOL/L (ref 136–145)
TROPONIN I SERPL-MCNC: <0.02 NG/ML (ref 0–0.04)
WBC # BLD AUTO: 9.9 K/UL (ref 4.6–13.2)

## 2017-10-14 PROCEDURE — 93005 ELECTROCARDIOGRAM TRACING: CPT

## 2017-10-14 PROCEDURE — 74011250636 HC RX REV CODE- 250/636: Performed by: EMERGENCY MEDICINE

## 2017-10-14 PROCEDURE — 74177 CT ABD & PELVIS W/CONTRAST: CPT

## 2017-10-14 PROCEDURE — 83605 ASSAY OF LACTIC ACID: CPT

## 2017-10-14 PROCEDURE — 99284 EMERGENCY DEPT VISIT MOD MDM: CPT

## 2017-10-14 PROCEDURE — 82550 ASSAY OF CK (CPK): CPT | Performed by: EMERGENCY MEDICINE

## 2017-10-14 PROCEDURE — 96374 THER/PROPH/DIAG INJ IV PUSH: CPT

## 2017-10-14 PROCEDURE — 80048 BASIC METABOLIC PNL TOTAL CA: CPT | Performed by: EMERGENCY MEDICINE

## 2017-10-14 PROCEDURE — 96375 TX/PRO/DX INJ NEW DRUG ADDON: CPT

## 2017-10-14 PROCEDURE — 83690 ASSAY OF LIPASE: CPT | Performed by: EMERGENCY MEDICINE

## 2017-10-14 PROCEDURE — 80076 HEPATIC FUNCTION PANEL: CPT | Performed by: EMERGENCY MEDICINE

## 2017-10-14 PROCEDURE — 96376 TX/PRO/DX INJ SAME DRUG ADON: CPT

## 2017-10-14 PROCEDURE — 74011636320 HC RX REV CODE- 636/320: Performed by: EMERGENCY MEDICINE

## 2017-10-14 PROCEDURE — 96361 HYDRATE IV INFUSION ADD-ON: CPT

## 2017-10-14 PROCEDURE — 85025 COMPLETE CBC W/AUTO DIFF WBC: CPT | Performed by: EMERGENCY MEDICINE

## 2017-10-14 PROCEDURE — 96360 HYDRATION IV INFUSION INIT: CPT

## 2017-10-14 RX ORDER — ONDANSETRON 2 MG/ML
4 INJECTION INTRAMUSCULAR; INTRAVENOUS
Status: COMPLETED | OUTPATIENT
Start: 2017-10-14 | End: 2017-10-14

## 2017-10-14 RX ORDER — HYDROMORPHONE HYDROCHLORIDE 2 MG/ML
0.5 INJECTION, SOLUTION INTRAMUSCULAR; INTRAVENOUS; SUBCUTANEOUS
Status: COMPLETED | OUTPATIENT
Start: 2017-10-14 | End: 2017-10-14

## 2017-10-14 RX ORDER — METRONIDAZOLE 500 MG/1
500 TABLET ORAL 2 TIMES DAILY
Qty: 14 TAB | Refills: 0 | Status: SHIPPED | OUTPATIENT
Start: 2017-10-14 | End: 2017-10-21

## 2017-10-14 RX ORDER — LEVOFLOXACIN 500 MG/1
500 TABLET, FILM COATED ORAL DAILY
Qty: 10 TAB | Refills: 0 | Status: SHIPPED | OUTPATIENT
Start: 2017-10-14

## 2017-10-14 RX ORDER — SODIUM CHLORIDE 9 MG/ML
150 INJECTION, SOLUTION INTRAVENOUS CONTINUOUS
Status: DISCONTINUED | OUTPATIENT
Start: 2017-10-14 | End: 2017-10-15 | Stop reason: HOSPADM

## 2017-10-14 RX ORDER — HYDROMORPHONE HCL IN 0.9% NACL 50 MG/50ML
1 PLASTIC BAG, INJECTION (ML) INJECTION ONCE
Status: COMPLETED | OUTPATIENT
Start: 2017-10-14 | End: 2017-10-14

## 2017-10-14 RX ORDER — HYDROCODONE BITARTRATE AND ACETAMINOPHEN 5; 325 MG/1; MG/1
1 TABLET ORAL
Qty: 12 TAB | Refills: 0 | Status: SHIPPED | OUTPATIENT
Start: 2017-10-14

## 2017-10-14 RX ADMIN — ONDANSETRON 4 MG: 2 INJECTION INTRAMUSCULAR; INTRAVENOUS at 21:25

## 2017-10-14 RX ADMIN — HYDROMORPHONE HYDROCHLORIDE 0.5 MG: 2 INJECTION, SOLUTION INTRAMUSCULAR; INTRAVENOUS; SUBCUTANEOUS at 21:25

## 2017-10-14 RX ADMIN — IOPAMIDOL 80 ML: 612 INJECTION, SOLUTION INTRAVENOUS at 22:14

## 2017-10-14 RX ADMIN — SODIUM CHLORIDE 150 ML/HR: 900 INJECTION, SOLUTION INTRAVENOUS at 21:25

## 2017-10-14 RX ADMIN — Medication 1 MG: at 23:36

## 2017-10-15 NOTE — ED TRIAGE NOTES
Patient is currently alert and oriented, patient denies any nausea, vomiting or diarrhea.  He reports a repaired aortic aneurysm in May

## 2017-10-15 NOTE — ED PROVIDER NOTES
HPI Comments: 8:38 PM Victorino Law is a 78 y.o. male with h/o HTN, pacemaker, CAD, COPD, arthritis, and AAA who presents to ED complaining of left lower quadrant and epigastric abdominal pain starting last night. Patient states the pain is worse when moves or takes a deep breath; there are no alleviating factors. Patient states the pain came on gradually last night; he had some nausea at the time but it has since resolved. He states he has not had a BM in 1 week and the last one was diarrhea. He was prescribed antibiotics by Dr. Jyotsna Keane earlier this week for diverticulitis. He has not seen a GI doctor. There are no other symptoms or concerns at this time. PCP: Gilson Harmon MD    The history is provided by the patient. No  was used. Past Medical History:   Diagnosis Date    Arthritis     CAD (coronary artery disease), native coronary artery     ALIDA X 2 to OM1    Carotid duplex 08/13/2013    Mild <50% bilateral ICA plaquing.  Complete heart block [facility]) June 2013    Medtronic dual chamber pacemaker    COPD (chronic obstructive pulmonary disease) ([address]     Diverticulitis of large intestine without perforation or abscess with bleeding 9/10/2017    Gastritis     GERD (gastroesophageal reflux disease)     High cholesterol     History of AAA (abdominal aortic aneurysm) repair 9/12/2017    History of echocardiogram 06/04/2013    EF 60-65%. No WMA. Gr 1 DDfx. No significant valvular heart disease.  History of myocardial perfusion scan 04/18/2011    No ischemia or prior infarction. No WMA. EF 52%. Neg EKG on pharm stress test.    Hypertension     Hypothyroidism     Lower extremity venous duplex 01/06/2015    No DVT. Superficial insufficiency of right GSV. Deep venous reflux in right CFV & fem vein. Deep venous reflux in left CFV.  Renal duplex 12/24/2014    Mild < 60% RRA stenosis. Low parenchymal & LRA flow. Renal asymmetry.   Intrinsic/med disease in right kidney. AAA (3.8 x 4.0 cm) infrarenal.    Right groin hernia     not resolved    S/P AAA repair 05/18/2017    S/P cardiac cath 06/02/2013    Diffuse atherosclerosis throughout. pRCA 50%. mLM 30%. mLAD 40%. oD2 (sm) 100%. pLCX 30%. p/mOM1 95/80% (o/lapping 2.25 x 23-mm & 2.25 x 12-mm Xience stents, resid 0%). Past Surgical History:   Procedure Laterality Date    FLEXIBLE SIGMOIDOSCOPY N/A 9/13/2017    SIGMOIDOSCOPY FLEXIBLE w/ biopsies performed by Francoise Conner MD at [address] HX AAA REPAIR N/A 05/18/2017    Dr Hemanth Solis  2008    [address]  6/2013    HX HERNIA REPAIR  1079    Umbilical    HX PACEMAKER  6/2013    Medtronic Pacemaker     ID SIGMOIDOSCOPY,BIOPSY  9/13/2017              Family History:   Problem Relation Age of Onset    Cancer Mother      pancreatic    Heart Attack Father     Heart Disease Father     Diabetes Sister     Heart Disease Sister     Diabetes Brother     Stroke Brother        Social History     Social History    Marital status:      Spouse name: N/A    Number of children: N/A    Years of education: N/A     Occupational History    Not on file. Social History Main Topics    Smoking status: Current Every Day Smoker     Packs/day: 1.00     Types: Cigarettes    Smokeless tobacco: Never Used    Alcohol use No    Drug use: No    Sexual activity: Not on file     Other Topics Concern     Service No    Blood Transfusions No    Caffeine Concern Yes    Occupational Exposure No    Hobby Hazards No    Sleep Concern No    Stress Concern No    Weight Concern No    Special Diet No    Back Care Yes    Exercise No    Bike Helmet No    Seat Belt Yes     Social History Narrative         ALLERGIES: Review of patient's allergies indicates no known allergies. Review of Systems   Constitutional: Negative for activity change, appetite change, diaphoresis and fever.    HENT: Negative for congestion, dental problem, ear pain, hearing loss, nosebleeds, postnasal drip, sinus pressure, sneezing and tinnitus. Eyes: Negative for photophobia, discharge, redness and visual disturbance. Respiratory: Negative for cough, choking, shortness of breath, wheezing and stridor. Cardiovascular: Negative for chest pain, palpitations and leg swelling. Gastrointestinal: Positive for abdominal pain (epigastric, left lower quadrant) and constipation. Negative for abdominal distention, anal bleeding and blood in stool. Genitourinary: Negative for decreased urine volume, difficulty urinating, discharge, dysuria, frequency, hematuria, penile swelling, scrotal swelling, testicular pain and urgency. Musculoskeletal: Negative for arthralgias, back pain, gait problem, joint swelling, myalgias and neck pain. Skin: Negative for color change and pallor. Neurological: Negative for dizziness, tremors, seizures, syncope and headaches. Hematological: Negative for adenopathy. Does not bruise/bleed easily. Psychiatric/Behavioral: Negative for agitation, behavioral problems, confusion and hallucinations. The patient is not nervous/anxious. Vitals:    10/14/17 2032 10/14/17 2041   Pulse: 81    Resp: 17    Temp: 97.6 °F (36.4 °C)    SpO2: 100% 100%   Weight: 112.2 kg (247 lb 5 oz)             Physical Exam   Constitutional: He is oriented to person, place, and time. He appears well-developed and well-nourished. HENT:   Head: Normocephalic and atraumatic. Right Ear: External ear normal.   Left Ear: External ear normal.   Nose: Nose normal.   Mouth/Throat: Oropharynx is clear and moist.   Eyes: Conjunctivae and EOM are normal. Pupils are equal, round, and reactive to light. Right eye exhibits no discharge. No scleral icterus. Neck: Normal range of motion. Neck supple. No JVD present. No thyromegaly present. Cardiovascular: Normal rate, regular rhythm and intact distal pulses.   Exam reveals no gallop and no friction rub. No murmur heard. Pulmonary/Chest: No respiratory distress. He has no wheezes. He has no rales. He exhibits no tenderness. Abdominal: He exhibits no distension and no mass. Bowel sounds are decreased. There is tenderness in the epigastric area and left lower quadrant. There is no rebound and no guarding. Musculoskeletal: Normal range of motion. He exhibits no edema. Lymphadenopathy:     He has no cervical adenopathy. Neurological: He is alert and oriented to person, place, and time. No cranial nerve deficit. Coordination normal.   Skin: Skin is warm and dry. No rash noted. No erythema. Psychiatric: He has a normal mood and affect. His behavior is normal. Judgment normal.   Nursing note and vitals reviewed. MDM  Number of Diagnoses or Management Options  Abdominal pain, epigastric:   Noninfectious gastroenteritis, unspecified type:   Diagnosis management comments: 78year old male presents with a 24 hour history of progressive abdominal pain, nausea, no BM. History of colitis and history of AAA with repair. Reviewed diagnostics and prior CT's , with risks/benefitis of radiation exposure. He states the pain is severe,agrees to CT       Amount and/or Complexity of Data Reviewed  Clinical lab tests: ordered and reviewed  Tests in the radiology section of CPT®: ordered and reviewed    Risk of Complications, Morbidity, and/or Mortality  Presenting problems: moderate      ED Course       Procedures    Vitals:  Patient Vitals for the past 12 hrs:   Temp Pulse Resp SpO2   10/14/17 2041 - - - 100 %   10/14/17 2032 97.6 °F (36.4 °C) 81 17 100 %       Medications ordered:   Medications   0.9% sodium chloride infusion (150 mL/hr IntraVENous New Bag 10/14/17 2125)   HYDROmorphone in NS (DILAUDID) injection 1 mg (not administered)   ondansetron (ZOFRAN) injection 4 mg (4 mg IntraVENous Given 10/14/17 2125)   HYDROmorphone (PF) (DILAUDID) injection 0.5 mg (0.5 mg IntraVENous Given 10/14/17 2125)   iopamidol (ISOVUE 300) 61 % contrast injection  mL (80 mL IntraVENous Given 10/14/17 2214)         Lab findings:  Recent Results (from the past 12 hour(s))   CBC WITH AUTOMATED DIFF    Collection Time: 10/14/17  8:31 PM   Result Value Ref Range    WBC 9.9 4.6 - 13.2 K/uL    RBC 5.69 (H) 4.70 - 5.50 M/uL    HGB 15.6 13.0 - 16.0 g/dL    HCT 45.8 36.0 - 48.0 %    MCV 80.5 74.0 - 97.0 FL    MCH 27.4 24.0 - 34.0 PG    MCHC 34.1 31.0 - 37.0 g/dL    RDW 16.2 (H) 11.6 - 14.5 %    PLATELET 159 768 - 649 K/uL    NEUTROPHILS 70 40 - 73 %    LYMPHOCYTES 21 21 - 52 %    MONOCYTES 9 3 - 10 %    EOSINOPHILS 0 0 - 5 %    BASOPHILS 0 0 - 2 %    ABS. NEUTROPHILS 6.9 1.8 - 8.0 K/UL    ABS. LYMPHOCYTES 2.1 0.9 - 3.6 K/UL    ABS. MONOCYTES 0.9 0.05 - 1.2 K/UL    ABS. EOSINOPHILS 0.0 0.0 - 0.4 K/UL    ABS.  BASOPHILS 0.0 0.0 - 0.06 K/UL    DF AUTOMATED     METABOLIC PANEL, BASIC    Collection Time: 10/14/17  8:31 PM   Result Value Ref Range    Sodium 135 (L) 136 - 145 mmol/L    Potassium 3.5 3.5 - 5.5 mmol/L    Chloride 101 100 - 108 mmol/L    CO2 26 21 - 32 mmol/L    Anion gap 8 3.0 - 18 mmol/L    Glucose 111 (H) 74 - 99 mg/dL    BUN 7 7.0 - 18 MG/DL    Creatinine 1.43 (H) 0.6 - 1.3 MG/DL    BUN/Creatinine ratio 5 (L) 12 - 20      GFR est AA 60 (L) >60 ml/min/1.73m2    GFR est non-AA 49 (L) >60 ml/min/1.73m2    Calcium 8.8 8.5 - 10.1 MG/DL   CARDIAC PANEL,(CK, CKMB & TROPONIN)    Collection Time: 10/14/17  8:31 PM   Result Value Ref Range    CK 73 39 - 308 U/L    CK - MB <1.0 <3.6 ng/ml    CK-MB Index  0.0 - 4.0 %     CALCULATION NOT PERFORMED WHEN RESULT IS BELOW LINEAR LIMIT    Troponin-I, Qt. <0.02 0.0 - 0.045 NG/ML   LIPASE    Collection Time: 10/14/17  8:31 PM   Result Value Ref Range    Lipase 93 73 - 393 U/L   HEPATIC FUNCTION PANEL    Collection Time: 10/14/17  8:31 PM   Result Value Ref Range    Protein, total 8.2 6.4 - 8.2 g/dL    Albumin 3.1 (L) 3.4 - 5.0 g/dL    Globulin 5.1 (H) 2.0 - 4.0 g/dL    A-G Ratio 0.6 (L) 0.8 - 1.7      Bilirubin, total 0.5 0.2 - 1.0 MG/DL    Bilirubin, direct 0.1 0.0 - 0.2 MG/DL    Alk. phosphatase 79 45 - 117 U/L    AST (SGOT) 12 (L) 15 - 37 U/L    ALT (SGPT) 9 (L) 16 - 61 U/L   POC LACTIC ACID    Collection Time: 10/14/17  9:31 PM   Result Value Ref Range    Lactic Acid (POC) 1.9 0.4 - 2.0 mmol/L       EKG interpretation by ED Physician:  20:28 Ventricular paced rhythm at a rate of 96 bpm, no stemi    X-Ray, CT or other radiology findings or impressions:  CT ABD PELV W CONT   Final Result   IMPRESSION:   No acute abnormalities. Resolving mucosal thickening of the rectosigmoid colon. No extraluminal inflammation. No obstruction. Stable stented aorta with no abnormal contrast extravasation or inflammation. Interpreted by radiologist      9:01 PM Explained the risks and benefits of repeated CT scans with the patient. Progress notes, Consult notes, and Reevaluation of patient:   10:42 PM CT was reassuring; resolving colitis and intact aortic repair site. Will have the patient follow up with GI. Reviewed the prior admission from September this year 2017  Will continue pain meds. No diarrhea here. Will resume Levaquin flagyl to treat possible subacute colitis/diverticulitis    Disposition:  Diagnosis:   1. Abdominal pain, epigastric    2.  Noninfectious gastroenteritis, unspecified type        Disposition: Discharged in stable condition      Follow-up Information     Follow up With Details Comments Contact Info    Francisco Taylor MD Schedule an appointment as soon as possible for a visit ED visit follow up [address] [facility] [address]  [facility] [address] [place] [id number]  [phone]      [facility] EMERGENCY DEPT Go to If symptoms worsen [address] [place] [id number]  [phone]           Patient's Medications   Start Taking    HYDROCODONE-ACETAMINOPHEN (NORCO) 5-325 MG PER TABLET    Take 1 Tab by mouth every four (4) hours as needed for Pain. Max Daily Amount: 6 Tabs. LEVOFLOXACIN (LEVAQUIN) 500 MG TABLET    Take 1 Tab by mouth daily. METRONIDAZOLE (FLAGYL) 500 MG TABLET    Take 1 Tab by mouth two (2) times a day for 7 days. Continue Taking    ALBUTEROL (PROVENTIL HFA, VENTOLIN HFA, PROAIR HFA) 90 MCG/ACTUATION INHALER    Take 2 Puffs by inhalation every six (6) hours as needed for Wheezing. ASPIRIN 81 MG CHEWABLE TABLET    Take 1 Tab by mouth daily. ATENOLOL (TENORMIN) 25 MG TABLET    Take 25 mg by mouth daily. ATORVASTATIN (LIPITOR) 40 MG TABLET    Take 40 mg by mouth nightly. FLUTICASONE-SALMETEROL (ADVAIR) 100-50 MCG/DOSE DISKUS INHALER    Take 1 Puff by inhalation every twelve (12) hours. Indications: BRONCHOSPASM PREVENTION WITH COPD    GABAPENTIN (NEURONTIN) 300 MG CAPSULE    Take 300 mg by mouth three (3) times daily. Indications: NEUROPATHIC PAIN    HYDROCORTISONE-PRAMOXINE (PROCTOFOAM HC) RECTAL FOAM    Insert 1 Applicator into rectum two (2) times daily as needed for Hemorrhoids. LEVOTHYROXINE (SYNTHROID) 125 MCG TABLET        LORAZEPAM (ATIVAN) 1 MG TABLET    Take 1 Tab by mouth every six (6) hours as needed. Max Daily Amount: 4 mg. Indications: anxiety    NICOTINE (NICODERM CQ) 14 MG/24 HR PATCH    1 Patch by TransDERmal route daily for 30 days. Indications: SMOKING CESSATION    NITROGLYCERIN (NITROSTAT) 0.4 MG SL TABLET    1 Tab by SubLINGual route every five (5) minutes as needed for Chest Pain (if chest pain not resolved after taking 3 call 911 ). PANTOPRAZOLE (PROTONIX) 40 MG TABLET    Take 40 mg by mouth daily. POLYETHYLENE GLYCOL (MIRALAX) 17 GRAM/DOSE POWDER    Take 17 g by mouth daily. 1 capful with 8 oz of water daily    RANITIDINE (ZANTAC) 300 MG TABLET    Take 1 Tab by mouth daily. SENNA (SENOKOT) 8.6 MG TABLET    Take 2 Tabs by mouth nightly. Indications: constipation    WITCH HAZEL-GLYCERIN (TUCKS) 50 % PADM    1 Container by PeriANAL route as needed.    These Medications have changed No medications on file   Stop Taking    HYDROCODONE-ACETAMINOPHEN (NORCO) 7.5-325 MG PER TABLET    Take 1 Tab by mouth every six (6) hours as needed for Pain. Max Daily Amount: 4 Tabs. METRONIDAZOLE (FLAGYL) 500 MG TABLET    You have this medication at home from Dr Kvng Everett 1 with food 3 times a day for 5 days  Indications: DIVERTICULITIS OF GASTROINTESTINAL TRACT     Scribe Attestation  Britt Bingham acting as a scribe for and in the presence of Mike Bocanegra MD  October 14, 2017 at 8:38 PM       Provider Attestation:  I personally performed the services described in the documentation, reviewed the documentation, as recorded by the scribe in my presence, and it accurately and completely records my words and actions.   Mike Bocanegra MD      Signed by: Britt Arcos, October 14, 2017 at 8:38 PM

## 2017-10-15 NOTE — ED NOTES
Dr. Anthony Lindsey ordered 1 mg Dilaudid IV. Patient's BP was low (91/69), rechecked and BP was 112/66. Dr. Anthony Lindsey made aware. Medication pushed slowly. IV Fluids continue at 150 ml/hr. Will continue to monitor.

## 2017-10-15 NOTE — ED NOTES
I have reviewed discharge instructions with the patient. The patient verbalized understanding. The patient was discharged with three prescription.

## 2017-10-16 ENCOUNTER — HOSPITAL ENCOUNTER (EMERGENCY)
Age: 67
Discharge: HOME | End: 2017-10-16
Attending: EMERGENCY MEDICINE
Payer: MEDICARE

## 2017-10-16 ENCOUNTER — APPOINTMENT (OUTPATIENT)
Dept: GENERAL RADIOLOGY | Age: 67
End: 2017-10-16
Attending: EMERGENCY MEDICINE
Payer: MEDICARE

## 2017-10-16 VITALS
DIASTOLIC BLOOD PRESSURE: 87 MMHG | OXYGEN SATURATION: 96 % | RESPIRATION RATE: 22 BRPM | WEIGHT: 230 LBS | HEART RATE: 82 BPM | SYSTOLIC BLOOD PRESSURE: 156 MMHG | BODY MASS INDEX: 31.15 KG/M2 | TEMPERATURE: 98.4 F | HEIGHT: 72 IN

## 2017-10-16 DIAGNOSIS — K59.00 CONSTIPATION, UNSPECIFIED CONSTIPATION TYPE: Primary | ICD-10-CM

## 2017-10-16 LAB
ALBUMIN SERPL-MCNC: 2.9 G/DL (ref 3.4–5)
ALBUMIN/GLOB SERPL: 0.6 {RATIO} (ref 0.8–1.7)
ALP SERPL-CCNC: 73 U/L (ref 45–117)
ALT SERPL-CCNC: 9 U/L (ref 16–61)
ANION GAP SERPL CALC-SCNC: 10 MMOL/L (ref 3–18)
AST SERPL-CCNC: 17 U/L (ref 15–37)
ATRIAL RATE: 63 BPM
BASOPHILS # BLD: 0 K/UL (ref 0–0.06)
BASOPHILS NFR BLD: 0 % (ref 0–2)
BILIRUB DIRECT SERPL-MCNC: <0.1 MG/DL (ref 0–0.2)
BILIRUB SERPL-MCNC: 0.6 MG/DL (ref 0.2–1)
BUN SERPL-MCNC: 10 MG/DL (ref 7–18)
BUN/CREAT SERPL: 8 (ref 12–20)
CALCIUM SERPL-MCNC: 8.7 MG/DL (ref 8.5–10.1)
CALCULATED R AXIS, ECG10: -87 DEGREES
CALCULATED T AXIS, ECG11: 81 DEGREES
CHLORIDE SERPL-SCNC: 101 MMOL/L (ref 100–108)
CO2 SERPL-SCNC: 23 MMOL/L (ref 21–32)
CREAT SERPL-MCNC: 1.25 MG/DL (ref 0.6–1.3)
DIAGNOSIS, 93000: NORMAL
DIFFERENTIAL METHOD BLD: ABNORMAL
EOSINOPHIL # BLD: 0 K/UL (ref 0–0.4)
EOSINOPHIL NFR BLD: 0 % (ref 0–5)
ERYTHROCYTE [DISTWIDTH] IN BLOOD BY AUTOMATED COUNT: 16.5 % (ref 11.6–14.5)
GLOBULIN SER CALC-MCNC: 5 G/DL (ref 2–4)
GLUCOSE SERPL-MCNC: 96 MG/DL (ref 74–99)
HCT VFR BLD AUTO: 42.2 % (ref 36–48)
HGB BLD-MCNC: 13.9 G/DL (ref 13–16)
LACTATE BLD-SCNC: 1.8 MMOL/L (ref 0.4–2)
LIPASE SERPL-CCNC: 75 U/L (ref 73–393)
LYMPHOCYTES # BLD: 1.8 K/UL (ref 0.9–3.6)
LYMPHOCYTES NFR BLD: 18 % (ref 21–52)
MCH RBC QN AUTO: 26.8 PG (ref 24–34)
MCHC RBC AUTO-ENTMCNC: 32.9 G/DL (ref 31–37)
MCV RBC AUTO: 81.3 FL (ref 74–97)
MONOCYTES # BLD: 0.9 K/UL (ref 0.05–1.2)
MONOCYTES NFR BLD: 9 % (ref 3–10)
NEUTS SEG # BLD: 7.3 K/UL (ref 1.8–8)
NEUTS SEG NFR BLD: 73 % (ref 40–73)
PLATELET # BLD AUTO: 162 K/UL (ref 135–420)
PMV BLD AUTO: 11.8 FL (ref 9.2–11.8)
POTASSIUM SERPL-SCNC: 3.9 MMOL/L (ref 3.5–5.5)
PROT SERPL-MCNC: 7.9 G/DL (ref 6.4–8.2)
Q-T INTERVAL, ECG07: 460 MS
QRS DURATION, ECG06: 192 MS
QTC CALCULATION (BEZET), ECG08: 581 MS
RBC # BLD AUTO: 5.19 M/UL (ref 4.7–5.5)
SODIUM SERPL-SCNC: 134 MMOL/L (ref 136–145)
VENTRICULAR RATE, ECG03: 96 BPM
WBC # BLD AUTO: 10.1 K/UL (ref 4.6–13.2)

## 2017-10-16 PROCEDURE — 85025 COMPLETE CBC W/AUTO DIFF WBC: CPT | Performed by: EMERGENCY MEDICINE

## 2017-10-16 PROCEDURE — 74011000272 HC RX REV CODE- 272: Performed by: EMERGENCY MEDICINE

## 2017-10-16 PROCEDURE — 80076 HEPATIC FUNCTION PANEL: CPT | Performed by: EMERGENCY MEDICINE

## 2017-10-16 PROCEDURE — 74022 RADEX COMPL AQT ABD SERIES: CPT

## 2017-10-16 PROCEDURE — 99284 EMERGENCY DEPT VISIT MOD MDM: CPT

## 2017-10-16 PROCEDURE — 83690 ASSAY OF LIPASE: CPT | Performed by: EMERGENCY MEDICINE

## 2017-10-16 PROCEDURE — 80048 BASIC METABOLIC PNL TOTAL CA: CPT | Performed by: EMERGENCY MEDICINE

## 2017-10-16 PROCEDURE — 83605 ASSAY OF LACTIC ACID: CPT

## 2017-10-16 PROCEDURE — 74011250637 HC RX REV CODE- 250/637: Performed by: EMERGENCY MEDICINE

## 2017-10-16 RX ORDER — HYDROCODONE BITARTRATE AND ACETAMINOPHEN 5; 325 MG/1; MG/1
1 TABLET ORAL ONCE
Status: COMPLETED | OUTPATIENT
Start: 2017-10-16 | End: 2017-10-16

## 2017-10-16 RX ADMIN — Medication 500 ML: at 21:18

## 2017-10-16 RX ADMIN — HYDROCODONE BITARTRATE AND ACETAMINOPHEN 1 TABLET: 5; 325 TABLET ORAL at 20:48

## 2017-10-16 NOTE — ED TRIAGE NOTES
Pt c/o rectal pain & colitis. Pt states Dr. Collazo Members sent him to the ED. Pt c/o constipation.

## 2017-10-16 NOTE — ED PROVIDER NOTES
HPI Comments: 6:20 PM      Jade Joseph is a 78 y.o. Male with PMHx of HTN, Hypercholesterolemia, Gastritis and large intestine Diverticulitis presents to the ED with a chief complaint of colitis for the past few days. Pt rates pain 10/10. Pt states he has LLQ tenderness. Pt also states he had no BM and constipation for the past 8 days. Pt reports he has been taking Norco for the last 2 months. Pt also states he goes to [facility] for his PCP (Dr. Ewa Lozoya) and Cardiology. Pt states he is dehydrated and had an episode of vomiting last night and earlier today as well, but no current vomiting. Pt reports he got a shot for nausea earlier today, but does not recall the name of the shot he was given. Pt reports that he also received to enema's at [facility] office today but no BM. Pt's sister present bedside. Sister states pt's son  2 years ago with Crohn's disease and pt's daughter currently has mild Crohn's. Sister also states pt's mother  of pancreatic cancer in her [de-identified]. Patient denies fever, chills, cough, SOB, chest pain, diarrhea, dysuria, diaphoresis, syncope or any other symptoms or complaints. The history is provided by the patient and a relative. No  was used. Past Medical History:   Diagnosis Date    Arthritis     CAD (coronary artery disease), native coronary artery     ALIDA X 2 to OM1    Carotid duplex 2013    Mild <50% bilateral ICA plaquing.  Complete heart block [facility]) 2013    Medtronic dual chamber pacemaker    COPD (chronic obstructive pulmonary disease) ([facility] [address]     Diverticulitis of large intestine without perforation or abscess with bleeding 9/10/2017    Gastritis     GERD (gastroesophageal reflux disease)     High cholesterol     History of AAA (abdominal aortic aneurysm) repair 2017    History of echocardiogram 2013    EF 60-65%. No WMA. Gr 1 DDfx. No significant valvular heart disease.     History of myocardial perfusion scan 04/18/2011    No ischemia or prior infarction. No WMA. EF 52%. Neg EKG on pharm stress test.    Hypertension     Hypothyroidism     Lower extremity venous duplex 01/06/2015    No DVT. Superficial insufficiency of right GSV. Deep venous reflux in right CFV & fem vein. Deep venous reflux in left CFV.  Renal duplex 12/24/2014    Mild < 60% RRA stenosis. Low parenchymal & LRA flow. Renal asymmetry. Intrinsic/med disease in right kidney. AAA (3.8 x 4.0 cm) infrarenal.    Right groin hernia     not resolved    S/P AAA repair 05/18/2017    S/P cardiac cath 06/02/2013    Diffuse atherosclerosis throughout. pRCA 50%. mLM 30%. mLAD 40%. oD2 (sm) 100%. pLCX 30%. p/mOM1 95/80% (o/lapping 2.25 x 23-mm & 2.25 x 12-mm Xience stents, resid 0%). Past Surgical History:   Procedure Laterality Date    FLEXIBLE SIGMOIDOSCOPY N/A 9/13/2017    SIGMOIDOSCOPY FLEXIBLE w/ biopsies performed by Billy Irby MD at [address] HX AAA REPAIR N/A 05/18/2017    Dr Kimberly Kohli  2008    [address]  6/2013    HX HERNIA REPAIR  [id number]    Umbilical    HX PACEMAKER  6/2013    Medtronic Pacemaker     VA SIGMOIDOSCOPY,BIOPSY  9/13/2017              Family History:   Problem Relation Age of Onset    Cancer Mother      pancreatic    Heart Attack Father     Heart Disease Father     Diabetes Sister     Heart Disease Sister     Diabetes Brother     Stroke Brother        Social History     Social History    Marital status:      Spouse name: N/A    Number of children: N/A    Years of education: N/A     Occupational History    Not on file.      Social History Main Topics    Smoking status: Current Every Day Smoker     Packs/day: 1.00     Types: Cigarettes    Smokeless tobacco: Never Used    Alcohol use No    Drug use: No    Sexual activity: Not on file     Other Topics Concern     Service No    Blood Transfusions No    Caffeine Concern Yes    Occupational Exposure No    Hobby Hazards No    Sleep Concern No    Stress Concern No    Weight Concern No    Special Diet No    Back Care Yes    Exercise No    Bike Helmet No    Seat Belt Yes     Social History Narrative         ALLERGIES: Review of patient's allergies indicates no known allergies. Review of Systems   Constitutional: Negative for fever. HENT: Negative for sore throat. Eyes: Negative for redness and visual disturbance. Respiratory: Negative for shortness of breath and wheezing. Cardiovascular: Negative for chest pain. Gastrointestinal: Positive for abdominal pain (LLQ), constipation, nausea and vomiting. Negative for diarrhea. Endocrine: Negative for polyuria. Genitourinary: Negative for dysuria. Musculoskeletal: Negative for arthralgias and neck stiffness. Skin: Negative for rash. Neurological: Negative for headaches. All other systems reviewed and are negative. Vitals:    10/16/17 1705 10/16/17 1909   BP: 156/87    Pulse: 82    Resp: 22    Temp: 98.4 °F (36.9 °C)    SpO2: 96% 96%   Weight: 104.3 kg (230 lb)    Height: 6' (1.829 m)             Physical Exam   Constitutional: He is oriented to person, place, and time. He appears well-developed and well-nourished. No distress. HENT:   Head: Normocephalic and atraumatic. Mouth/Throat: Oropharynx is clear and moist.   Eyes: Conjunctivae are normal. Pupils are equal, round, and reactive to light. No scleral icterus. Neck: Normal range of motion. Neck supple. Cardiovascular: Intact distal pulses. Capillary refill < 3 seconds   Pulmonary/Chest: Effort normal and breath sounds normal. No respiratory distress. He has no wheezes. Abdominal: Soft. Bowel sounds are normal. He exhibits no distension and no mass. There is tenderness in the left lower quadrant. There is no rebound and no guarding. Musculoskeletal: Normal range of motion. He exhibits no edema.    Lymphadenopathy: He has no cervical adenopathy. Neurological: He is alert and oriented to person, place, and time. No cranial nerve deficit. Skin: Skin is warm and dry. He is not diaphoretic. Psychiatric: His behavior is normal.   Nursing note and vitals reviewed. MDM  Number of Diagnoses or Management Options  Constipation, unspecified constipation type:   Diagnosis management comments: ddx constipation, dehydration, diverticular, infectious, metabolic; opiate-induced constipation    AFVSS    Pt had CT abd 2 days ago here in ED.     Mya Pedro who was expecting pt to the ED    Labs reassuring       Amount and/or Complexity of Data Reviewed  Clinical lab tests: ordered and reviewed  Tests in the radiology section of CPT®: ordered and reviewed  Tests in the medicine section of CPT®: ordered and reviewed  Review and summarize past medical records: yes  Independent visualization of images, tracings, or specimens: yes    Patient Progress  Patient progress: stable    ED Course       Procedures               Vitals:  Patient Vitals for the past 12 hrs:   Temp Pulse Resp BP SpO2   10/16/17 1909 - - - - 96 %   10/16/17 1705 98.4 °F (36.9 °C) 82 22 156/87 96 %       Medications Ordered:  Medications   rush enema (not administered)   HYDROcodone-acetaminophen (NORCO) 5-325 mg per tablet 1 Tab (not administered)       Lab Findings:  Recent Results (from the past 12 hour(s))   METABOLIC PANEL, BASIC    Collection Time: 10/16/17  5:46 PM   Result Value Ref Range    Sodium 134 (L) 136 - 145 mmol/L    Potassium 3.9 3.5 - 5.5 mmol/L    Chloride 101 100 - 108 mmol/L    CO2 23 21 - 32 mmol/L    Anion gap 10 3.0 - 18 mmol/L    Glucose 96 74 - 99 mg/dL    BUN 10 7.0 - 18 MG/DL    Creatinine 1.25 0.6 - 1.3 MG/DL    BUN/Creatinine ratio 8 (L) 12 - 20      GFR est AA >60 >60 ml/min/1.73m2    GFR est non-AA 58 (L) >60 ml/min/1.73m2    Calcium 8.7 8.5 - 10.1 MG/DL   LIPASE    Collection Time: 10/16/17  5:46 PM   Result Value Ref Range    Lipase 75 73 - 393 U/L   HEPATIC FUNCTION PANEL    Collection Time: 10/16/17  5:46 PM   Result Value Ref Range    Protein, total 7.9 6.4 - 8.2 g/dL    Albumin 2.9 (L) 3.4 - 5.0 g/dL    Globulin 5.0 (H) 2.0 - 4.0 g/dL    A-G Ratio 0.6 (L) 0.8 - 1.7      Bilirubin, total 0.6 0.2 - 1.0 MG/DL    Bilirubin, direct <0.1 0.0 - 0.2 MG/DL    Alk. phosphatase 73 45 - 117 U/L    AST (SGOT) 17 15 - 37 U/L    ALT (SGPT) 9 (L) 16 - 61 U/L   POC LACTIC ACID    Collection Time: 10/16/17  5:48 PM   Result Value Ref Range    Lactic Acid (POC) 1.8 0.4 - 2.0 mmol/L   CBC WITH AUTOMATED DIFF    Collection Time: 10/16/17  6:28 PM   Result Value Ref Range    WBC 10.1 4.6 - 13.2 K/uL    RBC 5.19 4.70 - 5.50 M/uL    HGB 13.9 13.0 - 16.0 g/dL    HCT 42.2 36.0 - 48.0 %    MCV 81.3 74.0 - 97.0 FL    MCH 26.8 24.0 - 34.0 PG    MCHC 32.9 31.0 - 37.0 g/dL    RDW 16.5 (H) 11.6 - 14.5 %    PLATELET 464 182 - 200 K/uL    MPV 11.8 9.2 - 11.8 FL    NEUTROPHILS 73 40 - 73 %    LYMPHOCYTES 18 (L) 21 - 52 %    MONOCYTES 9 3 - 10 %    EOSINOPHILS 0 0 - 5 %    BASOPHILS 0 0 - 2 %    ABS. NEUTROPHILS 7.3 1.8 - 8.0 K/UL    ABS. LYMPHOCYTES 1.8 0.9 - 3.6 K/UL    ABS. MONOCYTES 0.9 0.05 - 1.2 K/UL    ABS. EOSINOPHILS 0.0 0.0 - 0.4 K/UL    ABS. BASOPHILS 0.0 0.0 - 0.06 K/UL    DF AUTOMATED           X-ray, CT or radiology findings or impressions:  XR ABD ACUTE W 1 V CHEST    (Results Pending)   Per Dr. Simi Alvarez prelim read: lungs clear; no free air, no obstruction; has moderate stool      CT abd pelv 10/14/17: IMPRESSION  IMPRESSION: No acute abnormalities. Resolving mucosal thickening of the  rectosigmoid colon. No extraluminal inflammation. No obstruction. Stable stented  aorta with no abnormal contrast extravasation or inflammation. Progress notes, consult notes, or additional procedure notes:  7:12 PM Consult: I discussed care with Dr. Leni Mcneal (Hospitalist).  It was a standard discussion including patient history, chief complaint, available diagnostic results, and predicted treatment course. Dr. Celsa Le is bedside evaluating the pt for an appropriate disposition. Dr. Celsa Le states give ruhs enema for now; she will return to ED to reevaluate pt to determine dispo; if pt has BM plan is to dc home. 7:58 PM : Pt care transferred to Dr. Carlitos Masters  ,ED provider. History of patient complaint(s), available diagnostic reports and current treatment plan has been discussed thoroughly. Bedside rounding on patient occured : yes . Intended disposition of patient : TBD  Pending diagnostics reports and/or labs (please list): to get rush enema; Dr Celsa Le to reevaluate pt. Diagnosis:   1. Constipation, unspecified constipation type        Disposition: pending    Follow-up Information     None           Patient's Medications   Start Taking    No medications on file   Continue Taking    ALBUTEROL (PROVENTIL HFA, VENTOLIN HFA, PROAIR HFA) 90 MCG/ACTUATION INHALER    Take 2 Puffs by inhalation every six (6) hours as needed for Wheezing. ASPIRIN 81 MG CHEWABLE TABLET    Take 1 Tab by mouth daily. ATENOLOL (TENORMIN) 25 MG TABLET    Take 25 mg by mouth daily. ATORVASTATIN (LIPITOR) 40 MG TABLET    Take 40 mg by mouth nightly. FLUTICASONE-SALMETEROL (ADVAIR) 100-50 MCG/DOSE DISKUS INHALER    Take 1 Puff by inhalation every twelve (12) hours. Indications: BRONCHOSPASM PREVENTION WITH COPD    GABAPENTIN (NEURONTIN) 300 MG CAPSULE    Take 300 mg by mouth three (3) times daily. Indications: NEUROPATHIC PAIN    HYDROCODONE-ACETAMINOPHEN (NORCO) 5-325 MG PER TABLET    Take 1 Tab by mouth every four (4) hours as needed for Pain. Max Daily Amount: 6 Tabs. HYDROCORTISONE-PRAMOXINE (PROCTOFOAM HC) RECTAL FOAM    Insert 1 Applicator into rectum two (2) times daily as needed for Hemorrhoids. LEVOFLOXACIN (LEVAQUIN) 500 MG TABLET    Take 1 Tab by mouth daily.     LEVOTHYROXINE (SYNTHROID) 125 MCG TABLET        LORAZEPAM (ATIVAN) 1 MG TABLET    Take 1 Tab by mouth every six (6) hours as needed. Max Daily Amount: 4 mg. Indications: anxiety    METRONIDAZOLE (FLAGYL) 500 MG TABLET    Take 1 Tab by mouth two (2) times a day for 7 days. NICOTINE (NICODERM CQ) 14 MG/24 HR PATCH    1 Patch by TransDERmal route daily for 30 days. Indications: SMOKING CESSATION    NITROGLYCERIN (NITROSTAT) 0.4 MG SL TABLET    1 Tab by SubLINGual route every five (5) minutes as needed for Chest Pain (if chest pain not resolved after taking 3 call 911 ). PANTOPRAZOLE (PROTONIX) 40 MG TABLET    Take 40 mg by mouth daily. POLYETHYLENE GLYCOL (MIRALAX) 17 GRAM/DOSE POWDER    Take 17 g by mouth daily. 1 capful with 8 oz of water daily    RANITIDINE (ZANTAC) 300 MG TABLET    Take 1 Tab by mouth daily. SENNA (SENOKOT) 8.6 MG TABLET    Take 2 Tabs by mouth nightly. Indications: constipation    WITCH HAZEL-GLYCERIN (TUCKS) 50 % PADM    1 Container by PeriANAL route as needed. These Medications have changed    No medications on file   Stop Taking    No medications on file       Scribe Attestation      Bobby Gibson acting as a scribe for and in the presence of Tayla Diego DO      October 16, 2017 at 6:29 PM       Provider Attestation:      I personally performed the services described in the documentation, reviewed the documentation, as recorded by the scribe in my presence, and it accurately and completely records my words and actions.  October 16, 2017 at 6:29 PM - Tayla Diego DO

## 2017-10-16 NOTE — ED NOTES
[facility] EMERGENCY DEPT      78 y.o. male with noted past medical history who presents to the emergency department with abdominal pain and no BM for 8 days per patient. Pt has prior AAA repair and prior diverticulitis. I performed a brief evaluation, including history and physical, of the patient here in triage and I have determined that pt will need further treatment and evaluation from the main side ER physician. I have placed initial orders to help in expediting patients care. Valerie Randall M.D.

## 2017-10-17 NOTE — DISCHARGE INSTRUCTIONS
Constipation: Care Instructions  Your Care Instructions  Constipation means that you have a hard time passing stools (bowel movements). People pass stools from 3 times a day to once every 3 days. What is normal for you may be different. Constipation may occur with pain in the rectum and cramping. The pain may get worse when you try to pass stools. Sometimes there are small amounts of bright red blood on toilet paper or the surface of stools. This is because of enlarged veins near the rectum (hemorrhoids). A few changes in your diet and lifestyle may help you avoid ongoing constipation. Your doctor may also prescribe medicine to help loosen your stool. Some medicines can cause constipation. These include pain medicines and antidepressants. Tell your doctor about all the medicines you take. Your doctor may want to make a medicine change to ease your symptoms. Follow-up care is a key part of your treatment and safety. Be sure to make and go to all appointments, and call your doctor if you are having problems. It's also a good idea to know your test results and keep a list of the medicines you take. How can you care for yourself at home? · Drink plenty of fluids, enough so that your urine is light yellow or clear like water. If you have kidney, heart, or liver disease and have to limit fluids, talk with your doctor before you increase the amount of fluids you drink. · Include high-fiber foods in your diet each day. These include fruits, vegetables, beans, and whole grains. · Get at least 30 minutes of exercise on most days of the week. Walking is a good choice. You also may want to do other activities, such as running, swimming, cycling, or playing tennis or team sports. · Take a fiber supplement, such as Citrucel or Metamucil, every day. Read and follow all instructions on the label. · Schedule time each day for a bowel movement. A daily routine may help.  Take your time having your bowel movement. · Support your feet with a small step stool when you sit on the toilet. This helps flex your hips and places your pelvis in a squatting position. · Your doctor may recommend an over-the-counter laxative to relieve your constipation. Examples are Milk of Magnesia and MiraLax. Read and follow all instructions on the label. Do not use laxatives on a long-term basis. When should you call for help? Call your doctor now or seek immediate medical care if:  · You have new or worse belly pain. · You have new or worse nausea or vomiting. · You have blood in your stools. Watch closely for changes in your health, and be sure to contact your doctor if:  · Your constipation is getting worse. · You do not get better as expected. Where can you learn more? Go to http://daily-lance.info/. Enter 21 [phone] in the search box to learn more about \"Constipation: Care Instructions. \"  Current as of: March 20, 2017  Content Version: 11.3  © 8867-4612 [employer]. Care instructions adapted under license by [employer] (which disclaims liability or warranty for this information). If you have questions about a medical condition or this instruction, always ask your healthcare professional. [place] [address] any warranty or liability for your use of this information.

## 2017-10-17 NOTE — ED NOTES
10:30 PM: Dylon David is a 78 y.o. male presenting to the ED with an 8 day hx of constant constipation. Pt states over the last 8 days he has had constant constipation with associated lower abdominal cramping. Denies exacerbating or relieving factors of sx. Denies fever, chills, nausea, vomiting, diarrhea. Review of Systems - Gastrointestinal ROS: no abdominal pain, change in bowel habits, or black or bloody stools  positive for - abdominal pain and constipation. Negative for - nausea, vomiting, diarrhea  Review of Systems - General ROS: negative for - fever and chills  All other systems otherwise negative    General; AOX3. Pulmonary; CTA-B. Cardiac: RRR no MRG; Abd S/NT/ND. Plan:  D/c pt feeling better after large bm. Scribe Attestation      Molly Mckeon acting as a scribe for and in the presence of Berto Darnell MD      October 16, 2017 at 10:32 PM       Provider Attestation:      I personally performed the services described in the documentation, reviewed the documentation, as recorded by the scribe in my presence, and it accurately and completely records my words and actions.  October 16, 2017 at 10:32 PM - Berto Darnell MD

## 2017-10-30 ENCOUNTER — OFFICE VISIT (OUTPATIENT)
Dept: SURGERY | Age: 67
End: 2017-10-30

## 2017-10-30 VITALS
HEIGHT: 72 IN | DIASTOLIC BLOOD PRESSURE: 72 MMHG | RESPIRATION RATE: 20 BRPM | WEIGHT: 240 LBS | TEMPERATURE: 97.8 F | HEART RATE: 100 BPM | BODY MASS INDEX: 32.51 KG/M2 | SYSTOLIC BLOOD PRESSURE: 94 MMHG

## 2017-10-30 DIAGNOSIS — A04.72 COLITIS DUE TO CLOSTRIDIUM DIFFICILE: Primary | ICD-10-CM

## 2017-10-30 NOTE — PROGRESS NOTES
Subjective: He states he has abdominal discomfort. He points to the epigastric region. He has difficulty with bowel function. He has been taking laxatives. He has been taking Tylenol 3. He has had follow-up CT imaging which shows near resolution of the inflammation around the colon. He had a KUB which does not show obstruction. His last colonoscopy was in 2014 by Dr. Trey Gomez where one tubular adenoma was identified. Past medical history and ROS were reviewed and unchanged. Abdomen: Soft, tender in the left upper and lower quadrants as well as right upper quadrant, no guarding    Assessment / Plan    Resolving colitis, this was likely C. difficile  Alternatively it could have been ischemic colitis from stent insertion  He is to follow-up in 2 weeks  I have asked him to avoid all laxatives and take Colace 200 mg twice a day  I have asked him to  minimize narcotic intake  He eventually should have a repeat colonoscopy, he has [employer]    A total of 15 minutes was spent with the patient, with >50% of time spent on counseling and coordination of care. The diagnoses and plan were discussed with patient. All questions answered. Plan of care agreed to by all concerned.

## 2017-10-30 NOTE — MR AVS SNAPSHOT
Visit Information Date & Time Provider Department Dept. Phone Encounter #  
 10/30/2017  1:00 PM Celia Gonzalez  [address] [id number] Your Appointments 11/16/2017  3:30 PM  
Follow Up with Celia Gonzalez MD  
[facility] ([address]) Appt Note: 2 week follow up [address] [facility] Upcoming Health Maintenance Date Due DTaP/Tdap/Td series (1 - Tdap) 3/5/1972 FOBT Q 1 YEAR AGE 50-75 3/5/2001 ZOSTER VACCINE AGE 60> 1/5/2011 GLAUCOMA SCREENING Q2Y 3/5/2016 Pneumococcal 65+ Low/Medium Risk (1 of 2 - PCV13) 3/5/2016 MEDICARE YEARLY EXAM 3/5/2016 INFLUENZA AGE 9 TO ADULT 8/1/2017 Allergies as of 10/30/2017  Review Complete On: 10/30/2017 By: Celia Gonzalez MD  
 No Known Allergies Current Immunizations  Reviewed on 11/9/2015 Name Date Influenza Vaccine (Quad) PF 11/9/2015 Not reviewed this visit You Were Diagnosed With   
  
 Codes Comments Colitis due to Clostridium difficile    -  Primary ICD-10-CM: A04.72 
ICD-9-CM: 008.45 Vitals BP Pulse Temp Resp Height(growth percentile) Weight(growth percentile) 94/72 100 97.8 °F (36.6 °C) (Oral) 20 6' (1.829 m) 240 lb (108.9 kg) BMI Smoking Status 32.55 kg/m2 Current Every Day Smoker Vitals History BMI and BSA Data Body Mass Index Body Surface Area 32.55 kg/m 2 2.35 m 2 Preferred Pharmacy Pharmacy Name Phone [facility] [address] Your Updated Medication List  
  
   
This list is accurate as of: 10/30/17  1:55 PM.  Always use your most recent med list.  
  
  
  
  
 albuterol 90 mcg/actuation inhaler Commonly known as:  PROVENTIL HFA, VENTOLIN HFA, PROAIR HFA Take 2 Puffs by inhalation every six (6) hours as needed for Wheezing. aspirin 81 mg chewable tablet Take 1 Tab by mouth daily. atenolol 25 mg tablet Commonly known as:  TENORMIN Take 25 mg by mouth daily. atorvastatin 40 mg tablet Commonly known as:  LIPITOR Take 40 mg by mouth nightly. fluticasone-salmeterol 100-50 mcg/dose diskus inhaler Commonly known as:  ADVAIR Take 1 Puff by inhalation every twelve (12) hours. Indications: BRONCHOSPASM PREVENTION WITH COPD  
  
 gabapentin 300 mg capsule Commonly known as:  NEURONTIN Take 300 mg by mouth three (3) times daily. Indications: NEUROPATHIC PAIN  
  
 HYDROcodone-acetaminophen 5-325 mg per tablet Commonly known as:  Benetta Pock Take 1 Tab by mouth every four (4) hours as needed for Pain. Max Daily Amount: 6 Tabs. hydrocortisone-pramoxine rectal foam  
Commonly known as:  PROCTOFOAM HC Insert 1 Applicator into rectum two (2) times daily as needed for Hemorrhoids. levoFLOXacin 500 mg tablet Commonly known as:  Thersia Bring Take 1 Tab by mouth daily. levothyroxine 125 mcg tablet Commonly known as:  SYNTHROID  
  
 LORazepam 1 mg tablet Commonly known as:  ATIVAN Take 1 Tab by mouth every six (6) hours as needed. Max Daily Amount: 4 mg. Indications: anxiety  
  
 nicotine 14 mg/24 hr patch Commonly known as:  NICODERM CQ  
1 Patch by TransDERmal route daily for 30 days. Indications: SMOKING CESSATION  
  
 nitroglycerin 0.4 mg SL tablet Commonly known as:  NITROSTAT  
1 Tab by SubLINGual route every five (5) minutes as needed for Chest Pain (if chest pain not resolved after taking 3 call 911 ). polyethylene glycol 17 gram/dose powder Commonly known as:  Ginger Clause Take 17 g by mouth daily. 1 capful with 8 oz of water daily PROTONIX 40 mg tablet Generic drug:  pantoprazole Take 40 mg by mouth daily. raNITIdine 300 mg tablet Commonly known as:  ZANTAC Take 1 Tab by mouth daily. senna 8.6 mg tablet Commonly known as:  Peter Belkismarnie Bao Take 2 Tabs by mouth nightly. Indications: constipation  
  
 witch hazel-glycerin 50 % Padm Commonly known as:  TUCKS  
1 Container by PeriANAL route as needed. Please provide this summary of care documentation to your next provider. Your primary care clinician is listed as Samuel Garcia. If you have any questions after today's visit, please call [phone].

## 2017-11-16 ENCOUNTER — OFFICE VISIT (OUTPATIENT)
Dept: SURGERY | Age: 67
End: 2017-11-16

## 2017-11-16 VITALS
SYSTOLIC BLOOD PRESSURE: 94 MMHG | RESPIRATION RATE: 20 BRPM | BODY MASS INDEX: 32.37 KG/M2 | HEART RATE: 104 BPM | DIASTOLIC BLOOD PRESSURE: 60 MMHG | HEIGHT: 72 IN | WEIGHT: 239 LBS | TEMPERATURE: 98.1 F

## 2017-11-16 DIAGNOSIS — R10.13 EPIGASTRIC PAIN: ICD-10-CM

## 2017-11-16 DIAGNOSIS — K52.9 COLITIS: Primary | ICD-10-CM

## 2017-11-16 NOTE — MR AVS SNAPSHOT
Visit Information Date & Time Provider Department Dept. Phone Encounter #  
 11/16/2017  3:30 PM Laura Bauman MD [place] [address] [phone] [id number] Upcoming Health Maintenance Date Due DTaP/Tdap/Td series (1 - Tdap) 3/5/1972 FOBT Q 1 YEAR AGE 50-75 3/5/2001 ZOSTER VACCINE AGE 60> 1/5/2011 GLAUCOMA SCREENING Q2Y 3/5/2016 Pneumococcal 65+ Low/Medium Risk (1 of 2 - PCV13) 3/5/2016 MEDICARE YEARLY EXAM 3/5/2016 Influenza Age 5 to Adult 8/1/2017 Allergies as of 11/16/2017  Review Complete On: 11/16/2017 By: Christin Fuller RN No Known Allergies Current Immunizations  Reviewed on 11/9/2015 Name Date Influenza Vaccine (Quad) PF 11/9/2015 Not reviewed this visit You Were Diagnosed With   
  
 Codes Comments Colitis    -  Primary ICD-10-CM: K52.9 ICD-9-CM: 558. 9 Vitals BP Pulse Temp Resp Height(growth percentile) Weight(growth percentile) 94/60 (!) 104 98.1 °F (36.7 °C) 20 6' (1.829 m) 239 lb (108.4 kg) BMI Smoking Status 32.41 kg/m2 Current Every Day Smoker BMI and BSA Data Body Mass Index Body Surface Area  
 32.41 kg/m 2 2.35 m 2 Preferred Pharmacy Pharmacy Name Phone [facility] [address] [place], [address] Your Updated Medication List  
  
   
This list is accurate as of: 11/16/17  4:06 PM.  Always use your most recent med list.  
  
  
  
  
 albuterol 90 mcg/actuation inhaler Commonly known as:  PROVENTIL HFA, VENTOLIN HFA, PROAIR HFA Take 2 Puffs by inhalation every six (6) hours as needed for Wheezing. aspirin 81 mg chewable tablet Take 1 Tab by mouth daily. atenolol 25 mg tablet Commonly known as:  TENORMIN Take 25 mg by mouth daily. atorvastatin 40 mg tablet Commonly known as:  LIPITOR Take 40 mg by mouth nightly. fluticasone-salmeterol 100-50 mcg/dose diskus inhaler Commonly known as:  ADVAIR Take 1 Puff by inhalation every twelve (12) hours. Indications: BRONCHOSPASM PREVENTION WITH COPD  
  
 gabapentin 300 mg capsule Commonly known as:  NEURONTIN Take 300 mg by mouth three (3) times daily. Indications: NEUROPATHIC PAIN  
  
 HYDROcodone-acetaminophen 5-325 mg per tablet Commonly known as:  Walker John Take 1 Tab by mouth every four (4) hours as needed for Pain. Max Daily Amount: 6 Tabs. hydrocortisone-pramoxine rectal foam  
Commonly known as:  PROCTOFOAM HC Insert 1 Applicator into rectum two (2) times daily as needed for Hemorrhoids. levoFLOXacin 500 mg tablet Commonly known as:  Charlcie Soledad Take 1 Tab by mouth daily. levothyroxine 125 mcg tablet Commonly known as:  SYNTHROID  
  
 LORazepam 1 mg tablet Commonly known as:  ATIVAN Take 1 Tab by mouth every six (6) hours as needed. Max Daily Amount: 4 mg. Indications: anxiety  
  
 nicotine 14 mg/24 hr patch Commonly known as:  NICODERM CQ  
1 Patch by TransDERmal route daily for 30 days. Indications: SMOKING CESSATION  
  
 nitroglycerin 0.4 mg SL tablet Commonly known as:  NITROSTAT  
1 Tab by SubLINGual route every five (5) minutes as needed for Chest Pain (if chest pain not resolved after taking 3 call 911 ). polyethylene glycol 17 gram/dose powder Commonly known as:  Kiet Isaac Take 17 g by mouth daily. 1 capful with 8 oz of water daily PROTONIX 40 mg tablet Generic drug:  pantoprazole Take 40 mg by mouth daily. raNITIdine 300 mg tablet Commonly known as:  ZANTAC Take 1 Tab by mouth daily. senna 8.6 mg tablet Commonly known as:  [employer] Take 2 Tabs by mouth nightly. Indications: constipation  
  
 witch hazel-glycerin 50 % Padm Commonly known as:  TUCKS  
1 Container by PeriANAL route as needed. Please provide this summary of care documentation to your next provider. Your primary care clinician is listed as Theodor So. If you have any questions after today's visit, please call [phone].

## 2017-11-16 NOTE — PROGRESS NOTES
Subjective: He complains of abdominal pain. This appears to have gotten worse. He points to the epigastric region. It is relatively constant but is positional.  He does not have any nausea or vomiting. He is moving his bowels. He is not on chronic narcotics. Past medical history and ROS were reviewed and unchanged. Abdomen: Soft, tender in the epigastric region and right upper quadrant, less so on the left lower quadrant      Assessment / Plan    Status post recent admission for colitis, worsening abdominal pain  Schedule CT abdomen and pelvis to evaluate the colon. Refer to GI for possible upper endoscopy  Follow-up in 1 month    A total of 15 minutes was spent with the patient, with >50% of time spent on counseling and coordination of care. The diagnoses and plan were discussed with patient. All questions answered. Plan of care agreed to by all concerned.

## 2017-11-28 ENCOUNTER — HOSPITAL ENCOUNTER (OUTPATIENT)
Dept: CT IMAGING | Age: 67
Discharge: HOME | End: 2017-11-28
Attending: COLON & RECTAL SURGERY
Payer: MEDICARE

## 2017-11-28 DIAGNOSIS — K52.9 COLITIS: ICD-10-CM

## 2017-11-28 DIAGNOSIS — R10.13 EPIGASTRIC PAIN: ICD-10-CM

## 2017-11-28 LAB — CREAT UR-MCNC: 0.9 MG/DL (ref 0.6–1.3)

## 2017-11-28 PROCEDURE — 74011636320 HC RX REV CODE- 636/320: Performed by: COLON & RECTAL SURGERY

## 2017-11-28 PROCEDURE — 74177 CT ABD & PELVIS W/CONTRAST: CPT

## 2017-11-28 PROCEDURE — 82565 ASSAY OF CREATININE: CPT

## 2017-11-28 RX ADMIN — IOPAMIDOL 100 ML: 612 INJECTION, SOLUTION INTRAVENOUS at 10:21

## 2017-11-30 NOTE — PROGRESS NOTES
CT reviewed no obvious abnormalities. Pt contacted, he has appt with GI to potentially perform EGD. Pain is epigastric. F/U here after GI appt.

## 2018-09-09 NOTE — PROGRESS NOTES
Bedside and Verbal shift change report given to Baldo Campos (oncoming nurse) by Eliane Calles (offgoing nurse). Report included the following information SBAR, Kardex, MAR and Recent Results. SITUATION:  Code Status: Full Code  Reason for Admission: Diverticula of colon  Diverticulitis large intestine w/o perforation or abscess w/bleeding  Hospital day: 0  Problem List:       Hospital Problems  Date Reviewed: 9/10/2017          Codes Class Noted POA    * (Principal)Diverticulitis of large intestine without perforation or abscess with bleeding ICD-10-CM: K57.33  ICD-9-CM: 562.13  9/10/2017 Yes        Gastroesophageal reflux disease without esophagitis ICD-10-CM: K21.9  ICD-9-CM: 530.81  11/9/2015 Yes        HTN (hypertension) ICD-10-CM: I10  ICD-9-CM: 401.9  3/30/2013 Yes              BACKGROUND:   Past Medical History:   Past Medical History:   Diagnosis Date    Arthritis     CAD (coronary artery disease), native coronary artery     ALIDA X 2 to OM1    Carotid duplex 08/13/2013    Mild <50% bilateral ICA plaquing.  [facility] heart block [facility]) June 2013    Medtronic dual chamber pacemaker    COPD (ch[facility] obstructive pulmonary disease) ([facility] [address]     Diverticulitis of large intestine without perforation or abscess with bleeding 9/10/2017    Gastritis     GERD (gastroesophageal reflux disease)     High cholesterol     History of echocardiogram 06/04/2013    EF 60-65%. No WMA. Gr 1 DDfx. No significant valvular heart disease.  History of myocardial perfusion scan 04/18/2011    No ischemia or prior infarction. No WMA. EF 52%. Neg EKG on pharm stress test.    Hypertension     Hypothyroidism     Lower extremity venous duplex 01/06/2015    No DVT. Superficial insufficiency of right GSV. Deep venous reflux in right CFV & fem vein. Deep venous reflux in left CFV.  Renal duplex 12/24/2014    Mild < 60% RRA stenosis. Low parenchymal & LRA flow. Renal asymmetry.   Intrinsic/med disease in right kidney. AAA (3.8 x 4.0 cm) infrarenal.    Right groin hernia     not resolved    S/P AAA repair 05/18/2017    S/P cardiac cath 06/02/2013    Diffuse atherosclerosis throughout. pRCA 50%. mLM 30%. mLAD 40%. oD2 (sm) 100%. pLCX 30%. p/mOM1 95/80% (o/lapping 2.25 x 23-mm & 2.25 x 12-mm Xience stents, resid 0%). Patient taking anticoagulants yes    Patient has a defibrillator: no     ASSESSMENT:  Changes in Assessment Throughout Shift: Patient alert and oriented x's 4. Vitals stable, afebrile, in NSR, on RA. Denying pain. 1 BM today, nurse did not see. Diet now ordered, GI lite. Voiding adequately. Will continue to monitor. Significant Changes in 24 hours (for example, RR/code, fall)  Patient has Central Line: no  Patient has Romero Cath: no   PT  IV Patency  OR Checklist  Pending Tests    Last Vitals:  Vitals w/ MEWS Score (last day)     Date/Time MEWS Score Pulse Resp Temp BP Level of Consciousness SpO2    09/10/17 1553 1 61 18 98 °F (36.7 °C) 124/73 Alert 100 %    09/10/17 1224 1 90 18 97 °F (36.1 °C) 116/70 Alert 100 %    09/10/17 0909 1 60 18 97.4 °F (36.3 °C) 103/59 Alert 97 %    09/10/17 0505 1 60 16 97.1 °F (36.2 °C) 121/69 Alert 96 %    09/10/17 0045 -- 64 11 -- 114/64 -- 98 %    09/10/17 0030 -- 63 12 -- 103/57 -- 98 %    09/10/17 0015 -- 63 11 -- 117/60 -- 99 %    09/09/17 2345 -- 66 11 -- 112/66 -- 98 %    09/09/17 2230 -- 70 12 -- -- -- 91 %    09/09/17 2215 -- 70 15 -- -- -- 100 %    09/09/17 2200 -- 70 14 -- -- -- 99 %    09/09/17 2115 -- 69 10 -- 114/68 -- 98 %    09/09/17 2100 -- 68 13 -- 117/68 -- 99 %    09/09/17 2045 -- 70 14 -- 122/71 -- 97 %    09/09/17 2030 -- 70 14 -- 93/52 -- 98 %    09/09/17 2015 -- 67 20 -- 132/72 -- 99 %    09/09/17 2000 1 73 16 97.7 °F (36.5 °C) 115/69 Alert 100 %            PAIN    Pain Assessment    Pain Intensity 1: 0 (09/10/17 0516)              Patient Stated Pain Goal: 0  Intervention effective: yes    Last 3 Weights:   There were no vitals filed for this visit. Weight change:     INTAKE/OUPUT    Current Shift: 09/10 0701 - 09/10 1900  In: 102.1 [I.V.:102.1]  Out: 300 [Urine:300]    Last three shifts:      RECOMMENDATIONS AND DISCHARGE PLANNING  Patient needs and requests: NA    Pending tests/procedures: NA      Discharge plan for patient: NA    Discharge planning Needs or Barriers: to be determined    Estimated Discharge Date: to be determined Posted on Whiteboard in Patients Room: no       \"HEALS\" SAFETY CHECK  A safety check occurred in the patient's room between off going nurse and oncoming nurse listed above. The safety check included the below items:    H  High Alert Medications Verify all high alert medication drips (heparin, PCA, etc.)  E  Equipment Suction is set up for ALL patients (with jose)  Red plugs utilized for all equipment (IV pumps, etc.)  WOWs wiped down at end of shift. Room stocked with oxygen, suction, and other unit-specific supplies  A  Alarms Bed alarm is set for fall risk patients  Ensure chair alarm is in place and activated if patient is up in a chair  L  Lines Check IV for any infiltration  Romero bag is empty if patient has a Romero   Tubing and IV bags are labeled  S  Safety  Room is clean, patient is clean, and equipment is clean. Hallways are clear from equipment besides carts. Fall bracelet on for fall risk patients  Ensure room is clear and free of clutter  Suction is set up for ALL patients (with jose)  Hallways are clear from equipment besides carts.    Isolation precautions followed, supplies available outside room, sign posted    Lizeth Michaels Breath sounds clear and equal bilaterally.

## 2020-01-14 ENCOUNTER — HOSPITAL ENCOUNTER (OUTPATIENT)
Dept: ULTRASOUND IMAGING | Age: 70
Discharge: HOME | End: 2020-01-14
Attending: INTERNAL MEDICINE
Payer: MEDICARE

## 2020-01-14 DIAGNOSIS — Z86.79 STATUS POST AAA (ABDOMINAL AORTIC ANEURYSM) REPAIR: ICD-10-CM

## 2020-01-14 DIAGNOSIS — Z98.890 STATUS POST AAA (ABDOMINAL AORTIC ANEURYSM) REPAIR: ICD-10-CM

## 2020-01-14 PROCEDURE — 76775 US EXAM ABDO BACK WALL LIM: CPT

## 2020-01-24 ENCOUNTER — HOSPITAL ENCOUNTER (OUTPATIENT)
Dept: CT IMAGING | Age: 70
Discharge: HOME | End: 2020-01-24
Attending: INTERNAL MEDICINE
Payer: MEDICARE

## 2020-01-24 DIAGNOSIS — I71.20 ANEURYSM, AORTA, THORACIC: ICD-10-CM

## 2020-01-24 DIAGNOSIS — Z98.890 STATUS POST AAA (ABDOMINAL AORTIC ANEURYSM) REPAIR: ICD-10-CM

## 2020-01-24 DIAGNOSIS — Z86.79 STATUS POST AAA (ABDOMINAL AORTIC ANEURYSM) REPAIR: ICD-10-CM

## 2020-01-24 LAB — CREAT UR-MCNC: 1.2 MG/DL (ref 0.6–1.3)

## 2020-01-24 PROCEDURE — 74011636320 HC RX REV CODE- 636/320

## 2020-01-24 PROCEDURE — 82565 ASSAY OF CREATININE: CPT

## 2020-01-24 PROCEDURE — 74174 CTA ABD&PLVS W/CONTRAST: CPT

## 2020-01-24 RX ADMIN — IOPAMIDOL 100 ML: 755 INJECTION, SOLUTION INTRAVENOUS at 11:00

## 2020-01-31 ENCOUNTER — OFFICE VISIT (OUTPATIENT)
Dept: VASCULAR SURGERY | Age: 70
End: 2020-01-31

## 2020-01-31 VITALS
SYSTOLIC BLOOD PRESSURE: 120 MMHG | RESPIRATION RATE: 16 BRPM | DIASTOLIC BLOOD PRESSURE: 70 MMHG | BODY MASS INDEX: 32.37 KG/M2 | HEIGHT: 72 IN | OXYGEN SATURATION: 97 % | WEIGHT: 239 LBS | HEART RATE: 64 BPM

## 2020-01-31 DIAGNOSIS — K55.1 MESENTERIC ARTERY STENOSIS (HCC): ICD-10-CM

## 2020-01-31 DIAGNOSIS — Z98.890 HX OF REPAIR OF DISSECTING THORACIC ANEURYSM: Primary | ICD-10-CM

## 2020-01-31 DIAGNOSIS — Z86.79 HX OF REPAIR OF DISSECTING THORACIC ANEURYSM: Primary | ICD-10-CM

## 2020-01-31 DIAGNOSIS — Z95.828 HISTORY OF ENDOVASCULAR STENT GRAFT FOR ABDOMINAL AORTIC ANEURYSM: ICD-10-CM

## 2020-01-31 DIAGNOSIS — F17.200 TOBACCO DEPENDENCE: ICD-10-CM

## 2020-01-31 NOTE — PROGRESS NOTES
1. Have you been to an emergency room or urgent care clinic since your last visit? NO    Hospitalized since your last visit? If yes, where, when, and reason for visit? NO  2. Have you seen or consulted any other health care providers outside of the [facility] since your last visit including any procedures, health maintenance items. If yes, where, when and reason for visit?  NO

## 2020-01-31 NOTE — PROGRESS NOTES
[address] [facility]    Chief Complaint   Patient presents with    Abdominal Aortic Aneurysm       History and Physical    20-year-old male following up today regarding a CAT scan he had done. I have not seen him since 2017. He receives his care at the [facility]. 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 Has a history of infrarenal aneurysm repair with an endovascular stent. He does have complex aorta. He has had no complications regarding the stent. Is had a couple diverticulitis admissions back in 2017 but nothing since then. I interviewed him today he does not have pain with eating. He has not had weight loss. In fact he has had some weight gain he tells me. He continues to smoke as usual for him. Past Medical History:   Diagnosis Date    Arthritis     CAD (coronary artery disease), native coronary artery     ALIDA X 2 to OM1    Carotid duplex 08/13/2013    Mild <50% bilateral ICA plaquing.  Complete heart block [facility]) June 2013    Medtronic dual chamber pacemaker    COPD (chronic obstructive pulmonary disease) ([facility] [address]     Diverticulitis of large intestine without perforation or abscess with bleeding 9/10/2017    Gastritis     GERD (gastroesophageal reflux disease)     High cholesterol     History of AAA (abdominal aortic aneurysm) repair 9/12/2017    History of echocardiogram 06/04/2013    EF 60-65%. No WMA. Gr 1 DDfx. No significant valvular heart disease.  History of myocardial perfusion scan 04/18/2011    No ischemia or prior infarction. No WMA. EF 52%. Neg EKG on pharm stress test.    Hypertension     Hypothyroidism     Lower extremity venous duplex 01/06/2015    No DVT. Superficial insufficiency of right GSV. Deep venous reflux in right CFV & fem vein. Deep venous reflux in left CFV.  Renal duplex 12/24/2014    Mild < 60% RRA stenosis. Low parenchymal & LRA flow. Renal asymmetry.   Intrinsic/med disease in right kidney. AAA (3.8 x 4.0 cm) infrarenal.    Right groin hernia     not resolved    S/P AAA repair 05/18/2017    S/P cardiac cath 06/02/2013    Diffuse atherosclerosis throughout. pRCA 50%. mLM 30%. mLAD 40%. oD2 (sm) 100%. pLCX 30%. p/mOM1 95/80% (o/lapping 2.25 x 23-mm & 2.25 x 12-mm Xience stents, resid 0%).                  Patient Active Problem List   Diagnosis Code    HTN (hypertension) I10    Dyslipidemia E78.5    Aortic dissection, abdominal ([facility]) I71.02    Coronary atherosclerosis of native coronary artery I25.10    Complete heart block (HCC) I44.2    Dyspnea R06.00    Lightheadedness R42    Vasovagal response R55    Tobacco abuse Z72.0    AAA (abdominal aortic aneurysm) ([facility]) I71.4    Renal artery atherosclerosis, unilateral ([facility]) I70.1    Varicose vein of leg I83.90    Thyroid activity decreased E03.9    Right groin hernia K40.90    CAD (coronary artery disease), native coronary artery I25.10    Gastroesophageal reflux disease without esophagitis K21.9    Hypertriglyceridemia E78.1    Vitamin D deficiency E55.9    Elevated serum creatinine R79.89    HNP (herniated nucleus pulposus), lumbar M51.26    Facet arthritis of lumbar region M47.816    Chronic pain G89.29    Muscle spasm of back M62.830    Tobacco dependency F17.200    Spondylosis of lumbar region without myelopathy or radiculopathy M47.816    Lumbar facet arthropathy M47.816    Arthritis of lumbar spine M47.816    AAA (abdominal aortic aneurysm) without rupture ([facility]) I71.4    SOB (shortness of breath) R06.02    Constipation due to pain medication therapy K59.03    Diverticulitis of large intestine without perforation or abscess with bleeding K57.33    Protein-calorie malnutrition, moderate (HCC) E44.0    History of AAA (abdominal aortic aneurysm) repair T32.236     Past Surgical History:   Procedure Laterality Date    FLEXIBLE SIGMOIDOSCOPY N/A 9/13/2017    SIGMOIDOSCOPY FLEXIBLE w/ biopsies performed by Seth Younger MD at [address] HX AAA REPAIR N/A 05/18/2017    Dr Fadi Jackson [facility] HX CHOLECYSTECTOMY  2008  [address]  6/2013    HX HERNIA REPAIR  5730    Umbilical    HX OTHER SURGICAL      renal stents placed    HX PACEMAKER  6/2013    Medtronic Pacemaker     HX PACEMAKER PLACEMENT      MO SIGMOIDOSCOPY,BIOPSY  9/13/2017          Current Outpatient Medications   Medication Sig Dispense Refill    HYDROcodone-acetaminophen (NORCO) 5-325 mg per tablet Take 1 Tab by mouth every four (4) hours as needed for Pain. Max Daily Amount: 6 Tabs. 12 Tab 0    levoFLOXacin (LEVAQUIN) 500 mg tablet Take 1 Tab by mouth daily. 10 Tab 0    polyethylene glycol (MIRALAX) 17 gram/dose powder Take 17 g by mouth daily. 1 capful with 8 oz of water daily 119 g 0    senna (SENOKOT) 8.6 mg tablet Take 2 Tabs by mouth nightly. Indications: constipation 100 Tab 0    hydrocortisone-pramoxine (PROCTOFOAM HC) rectal foam Insert 1 Applicator into rectum two (2) times daily as needed for Hemorrhoids. 1 Can 0    witch hazel-glycerin (TUCKS) 50 % padm 1 Container by PeriANAL route as needed. 1 Each 0    raNITIdine (ZANTAC) 300 mg tablet Take 1 Tab by mouth daily.  gabapentin (NEURONTIN) 300 mg capsule Take 300 mg by mouth three (3) times daily. Indications: NEUROPATHIC PAIN      fluticasone-salmeterol (ADVAIR) 100-50 mcg/dose diskus inhaler Take 1 Puff by inhalation every twelve (12) hours. Indications: BRONCHOSPASM PREVENTION WITH COPD 1 Inhaler 0    LORazepam (ATIVAN) 1 mg tablet Take 1 Tab by mouth every six (6) hours as needed. Max Daily Amount: 4 mg. Indications: anxiety 15 Tab 0    pantoprazole (PROTONIX) 40 mg tablet Take 40 mg by mouth daily.  atenolol (TENORMIN) 25 mg tablet Take 25 mg by mouth daily.  levothyroxine (SYNTHROID) 125 mcg tablet       atorvastatin (LIPITOR) 40 mg tablet Take 40 mg by mouth nightly.  aspirin 81 mg chewable tablet Take 1 Tab by mouth daily.  30 Tab 11  albuterol (PROVENTIL HFA, VENTOLIN HFA, PROAIR HFA) 90 mcg/actuation inhaler Take 2 Puffs by inhalation every six (6) hours as needed for Wheezing. 1 Inhaler 0    nitroglycerin (NITROSTAT) 0.4 mg SL tablet 1 Tab by SubLINGual route every five (5) minutes as needed for Chest Pain (if chest pain not resolved after taking 3 call 911 ). 30 Tab 3    nicotine (NICODERM CQ) 14 mg/24 hr patch 1 Patch by TransDERmal route daily for 30 days. Indications: SMOKING CESSATION 30 Patch 0     No Known Allergies    Review of Systems    A full review of systems was completed times ten organ systems and was deemed negative unless otherwise mentioned in the HPI. Physical   Visit Vitals  /70 (BP 1 Location: Left arm, BP Patient Position: Sitting)   Pulse 64   Resp 16   Ht 6' (1.829 m)   Wt 239 lb (108.4 kg)   SpO2 97%   BMI 32.41 kg/m²       He appears well today is in good spirits  Head is normocephalic  Neck no JVD strong smell of tobacco  Chest is coarse throughout  Cardiac regular  Abdomen soft nontender  Upper extremities radial pulses are palpable on both sides  Lower extremity no signs of arterial insufficiency  I reviewed this CAT scan with the report and with the images with him. His thoracic Endo stent is patent without leak. There is been reduction in size of the sac wrapped around it. The infrarenal stent is also patent intact without leak. He has a stable penetrating ulcer in the area of the mesenteric. His celiac and superior mesenteric of atherosclerosis at its origin also with some stenosis. Impression/Plan:     ICD-10-CM ICD-9-CM    1. Hx of repair of dissecting thoracic aneurysm Z98.890 V15.1     Z86.79     2. History of endovascular stent graft for abdominal aortic aneurysm Z95.828 V43.4    3. Mesenteric artery stenosis (HCC) K55.1 557.1    4.  Tobacco dependence F17.200 305.1      Stable endovascular stents  Mesenteric stenosis but is asymptomatic  Stable penetrating ulcer of aorta    We talked at length today just not can be okay if he keeps smoking, tobacco will cause his aorta to continue to degenerate or have stenosis or both. He understands this  Fortunately his mesenteric stenosis is asymptomatic he can continue with antiplatelet therapy and statin therapy which are very important. I like to see him back at least in a year with a Similar CAT scan. We will send this recommendation to his primary care doctor   No orders of the defined types were placed in this encounter. Follow-up and Dispositions    · Return in about 1 year (around 1/31/2021). Jose Roberto Martin MD    PLEASE NOTE:  This document has been produced using voice recognition software. Unrecognized errors in transcription may be present.

## 2021-02-23 ENCOUNTER — TRANSCRIBE ORDER (OUTPATIENT)
Dept: SCHEDULING | Age: 71
End: 2021-02-23

## 2021-02-23 DIAGNOSIS — I71.20 ANEURYSM, THORACIC AORTIC: Primary | ICD-10-CM

## 2021-03-02 ENCOUNTER — HOSPITAL ENCOUNTER (OUTPATIENT)
Dept: CT IMAGING | Age: 71
Discharge: HOME | End: 2021-03-02
Attending: INTERNAL MEDICINE
Payer: MEDICARE

## 2021-03-02 DIAGNOSIS — I71.20 ANEURYSM, THORACIC AORTIC: ICD-10-CM

## 2021-03-02 LAB — CREAT UR-MCNC: 1.3 MG/DL (ref 0.6–1.3)

## 2021-03-02 PROCEDURE — 82565 ASSAY OF CREATININE: CPT

## 2021-03-09 ENCOUNTER — HOSPITAL ENCOUNTER (OUTPATIENT)
Dept: CT IMAGING | Age: 71
Discharge: HOME | End: 2021-03-09
Attending: INTERNAL MEDICINE
Payer: MEDICARE

## 2021-03-09 LAB — CREAT UR-MCNC: 1.6 MG/DL (ref 0.6–1.3)

## 2021-03-09 PROCEDURE — 74011000636 HC RX REV CODE- 636

## 2021-03-09 PROCEDURE — 82565 ASSAY OF CREATININE: CPT

## 2021-03-09 PROCEDURE — 74174 CTA ABD&PLVS W/CONTRAST: CPT

## 2021-03-09 RX ADMIN — IOPAMIDOL 80 ML: 755 INJECTION, SOLUTION INTRAVENOUS at 10:23

## 2021-03-22 ENCOUNTER — OFFICE VISIT (OUTPATIENT)
Dept: VASCULAR SURGERY | Age: 71
End: 2021-03-22
Payer: MEDICARE

## 2021-03-22 VITALS
SYSTOLIC BLOOD PRESSURE: 112 MMHG | DIASTOLIC BLOOD PRESSURE: 72 MMHG | HEART RATE: 65 BPM | OXYGEN SATURATION: 97 % | RESPIRATION RATE: 20 BRPM

## 2021-03-22 DIAGNOSIS — I71.40 ABDOMINAL AORTIC ANEURYSM (AAA) WITHOUT RUPTURE: Primary | Chronic | ICD-10-CM

## 2021-03-22 DIAGNOSIS — Z98.890 HX OF REPAIR OF DISSECTING THORACIC ANEURYSM: ICD-10-CM

## 2021-03-22 DIAGNOSIS — Z86.79 HX OF REPAIR OF DISSECTING THORACIC ANEURYSM: ICD-10-CM

## 2021-03-22 PROCEDURE — 99214 OFFICE O/P EST MOD 30 MIN: CPT | Performed by: SURGERY

## 2021-03-22 NOTE — PROGRESS NOTES
Order for referral back to [facility] vascular for surgery done 2012 placed by verbal order from Dr. Dot Langley.

## 2021-03-22 NOTE — PROGRESS NOTES
Karensengwilder Lagunas Bridgers    3/22/2021     Chief Complaint   Patient presents with    Abdominal Aortic Aneurysm       History and Physical      Mr. Lowell Bermudez returns for annual follow-up for his aortic aneurysm disease. He last saw Dr. Román Chaudhary 1 year ago. At that point he was noted to have a stable abdominal aorta status post EVAR in 2017 performed by Dr. Román Chaudhary. He had bilateral renal artery stents previous to that. The patient also had a TEVAR performed previously. Talking to the patient today, this appears to have been done around 2012 at [facility].  He has not had follow-up there in some time per his report. On the scan today, there is a new type I a endoleak at his site of TEVAR. I note that there is a stented fenestration of the graft at the left subclavian artery. By my measurements, the greatest diameter of the aorta has grown to about 5.9 cm, previously about 5.1. This is not by centerline measurements however. He also has aneurysmal dilatation of the descending thoracic aorta the distal landing zone of the stent graft to about 5.1 cm, slightly larger by my measurements from previous. The infrarenal endograft is in place without evidence of endoleak. There is only a very small residual aneurysm sac. Patient denies any abdominal or back pain. He states that overall he has been feeling well. He does continue to smoke. Past Medical History:   Diagnosis Date    Arthritis     CAD (coronary artery disease), native coronary artery     ALIDA X 2 to OM1    Carotid duplex 08/13/2013    Mild <50% bilateral ICA plaquing.       Complete heart block [facility]) June 2013    Medtronic dual chamber pacemaker    COPD (chronic obstructive pulmonary disease) ([facility] [address]     Diverticulitis of large intestine without perforation or abscess with bleeding 9/10/2017    Gastritis     GERD (gastroesophageal reflux disease)     High cholesterol     History of AAA (abdominal aortic aneurysm) repair 9/12/2017  History of echocardiogram 06/04/2013    EF 60-65%. No WMA. Gr 1 DDfx. No significant valvular heart disease.  History of myocardial perfusion scan 04/18/2011    No ischemia or prior infarction. No WMA. EF 52%. Neg EKG on pharm stress test.    Hypertension     Hypothyroidism     Lower extremity venous duplex 01/06/2015    No DVT. Superficial insufficiency of right GSV. Deep venous reflux in right CFV & fem vein. Deep venous reflux in left CFV.  Renal duplex 12/24/2014    Mild < 60% RRA stenosis. Low parenchymal & LRA flow. Renal asymmetry. Intrinsic/med disease in right kidney. AAA (3.8 x 4.0 cm) infrarenal.    Right groin hernia     not resolved    S/P AAA repair 05/18/2017    S/P cardiac cath 06/02/2013    Diffuse atherosclerosis throughout. pRCA 50%. mLM 30%. mLAD 40%. oD2 (sm) 100%. pLCX 30%. p/mOM1 95/80% (o/lapping 2.25 x 23-mm & 2.25 x 12-mm Xience stents, resid 0%).                  Patient Active Problem List   Diagnosis Code    HTN (hypertension) I10    Dyslipidemia E78.5    Aortic dissection, abdominal ([facility]) I71.02    Coronary atherosclerosis of native coronary artery I25.10    Complete heart block (HCC) I44.2    Dyspnea R06.00    Lightheadedness R42    Vasovagal response R55    Tobacco abuse Z72.0    AAA (abdominal aortic aneurysm) ([facility]) I71.4    Renal artery atherosclerosis, unilateral ([facility]) I70.1    Varicose vein of leg I83.90    Thyroid activity decreased E03.9    Right groin hernia K40.90    CAD (coronary artery disease), native coronary artery I25.10    Gastroesophageal reflux disease without esophagitis K21.9    Hypertriglyceridemia E78.1    Vitamin D deficiency E55.9    Elevated serum creatinine R79.89    HNP (herniated nucleus pulposus), lumbar M51.26    Facet arthritis of lumbar region M47.816    Chronic pain G89.29    Muscle spasm of back M62.830    Tobacco dependency F17.200    Spondylosis of lumbar region without myelopathy or radiculopathy M47.816    Lumbar facet arthropathy M47.816    Arthritis of lumbar spine M47.816    AAA (abdominal aortic aneurysm) without rupture (HCC) I71.4    SOB (shortness of breath) R06.02    Constipation due to pain medication therapy K59.03    Diverticulitis of large intestine without perforation or abscess with bleeding K57.33    Protein-calorie malnutrition, moderate (HCC) E44.0    History of AAA (abdominal aortic aneurysm) repair F10.530    Hx of repair of dissecting thoracic aneurysm Z98.890, Z86.79     Past Surgical History:   Procedure Laterality Date    FLEXIBLE SIGMOIDOSCOPY N/A 9/13/2017    SIGMOIDOSCOPY FLEXIBLE w/ biopsies performed by Danny Pena MD at [facility]    HX AAA REPAIR N/A 05/18/2017    Dr Miriam Dunne  6/2013    HX HERNIA REPAIR  [id number]    Umbilical    HX OTHER SURGICAL      renal stents placed    HX PACEMAKER  6/2013    Medtronic Pacemaker     HX PACEMAKER PLACEMENT      CO SIGMOIDOSCOPY,BIOPSY  9/13/2017          Current Outpatient Medications   Medication Sig Dispense Refill    polyethylene glycol (MIRALAX) 17 gram/dose powder Take 17 g by mouth daily. 1 capful with 8 oz of water daily 119 g 0    senna (SENOKOT) 8.6 mg tablet Take 2 Tabs by mouth nightly. Indications: constipation 100 Tab 0    hydrocortisone-pramoxine (PROCTOFOAM HC) rectal foam Insert 1 Applicator into rectum two (2) times daily as needed for Hemorrhoids. 1 Can 0    witch hazel-glycerin (TUCKS) 50 % padm 1 Container by PeriANAL route as needed. 1 Each 0    gabapentin (NEURONTIN) 300 mg capsule Take 300 mg by mouth three (3) times daily. Indications: NEUROPATHIC PAIN      fluticasone-salmeterol (ADVAIR) 100-50 mcg/dose diskus inhaler Take 1 Puff by inhalation every twelve (12) hours. Indications: BRONCHOSPASM PREVENTION WITH COPD 1 Inhaler 0    pantoprazole (PROTONIX) 40 mg tablet Take 40 mg by mouth daily.       atenolol (TENORMIN) 25 mg tablet Take 25 mg by mouth daily.  levothyroxine (SYNTHROID) 125 mcg tablet 137 mcg.  atorvastatin (LIPITOR) 40 mg tablet Take 40 mg by mouth nightly.  aspirin 81 mg chewable tablet Take 1 Tab by mouth daily. 30 Tab 11    albuterol (PROVENTIL HFA, VENTOLIN HFA, PROAIR HFA) 90 mcg/actuation inhaler Take 2 Puffs by inhalation every six (6) hours as needed for Wheezing. 1 Inhaler 0    nitroglycerin (NITROSTAT) 0.4 mg SL tablet 1 Tab by SubLINGual route every five (5) minutes as needed for Chest Pain (if chest pain not resolved after taking 3 call 911 ). 30 Tab 3    HYDROcodone-acetaminophen (NORCO) 5-325 mg per tablet Take 1 Tab by mouth every four (4) hours as needed for Pain. Max Daily Amount: 6 Tabs. 12 Tab 0    levoFLOXacin (LEVAQUIN) 500 mg tablet Take 1 Tab by mouth daily. 10 Tab 0    nicotine (NICODERM CQ) 14 mg/24 hr patch 1 Patch by TransDERmal route daily for 30 days. Indications: SMOKING CESSATION 30 Patch 0    raNITIdine (ZANTAC) 300 mg tablet Take 1 Tab by mouth daily.  LORazepam (ATIVAN) 1 mg tablet Take 1 Tab by mouth every six (6) hours as needed. Max Daily Amount: 4 mg. Indications: anxiety 15 Tab 0     No Known Allergies    ROS     Physical   Visit Vitals  /72 (BP 1 Location: Left upper arm, BP Patient Position: Sitting, BP Cuff Size: Adult)   Pulse 65   Resp 20   SpO2 97%       Physical Exam     Abdomen is obese soft nontender. No palpable abdominal mass. Small bit of tenderness at what it feels like a periumbilical hernia. He has 2+ femoral pulses bilaterally which are somewhat difficult to palpate due to his habitus. Impression/Plan:     ICD-10-CM ICD-9-CM    1. Abdominal aortic aneurysm (AAA) without rupture (HCC)  I71.4 441.4 REFERRAL TO VASCULAR SURGERY   2.  Hx of repair of dissecting thoracic aneurysm  Z98.890 V15.1     Z86.79       Patient has type I a endoleak at site of previous TEVAR with stented fenestration of the left subclavian artery and graft to the origin of the left carotid. I do see evidence of some aneurysmal growth over the last year. He is stable from an abdominal aortic aneurysm this post EVAR without endoleak. Patent bilateral renal arteries with mild restenosis. Follow-up and Dispositions    · Return if symptoms worsen or fail to improve. Given that his previous TEVAR was performed at [facility], I am going to refer him there. We have looked at the records and identified that he was treated by Dr. Minnie Stephens to whoml I will refer for further expeditious evaluation. I recommended evaluation  at a tertiary center given the potential complexity of repair. I also explained to him that should he have any acute changes and chest or back pain that he should seek medical care. He understands. I would recommend that his EVAR surveillance be assumed by by Dr. Minnie Stephens as well as this can be done in conjunction with his ongoing treatment/surveillance of his thoracic aorta. I explained that should Dr. Minnie Stephens want him to return to this clinic for surveillance of the abdominal aorta, that would be just fine as well. He continues to smoke and I advised him as to the importance of smoking cessation given his degenerative aorta.       Claudette Alcon, MD

## 2021-03-22 NOTE — PROGRESS NOTES
1. Have you been to an emergency room or urgent care clinic since your last visit? No     Hospitalized since your last visit? If yes, where, when, and reason for visit? No     2. Have you seen or consulted any other health care providers outside of the [facility] since your last visit including any procedures, health maintenance items. If yes, where, when and reason for visit?  Yes ; pcp , cardiology , podiatry ,               3 most recent [address] [facility] 3/22/2021   Little interest or pleasure in doing things Not at all   Feeling down, depressed, irritable, or hopeless Not at all   Total Score PHQ 2 0

## 2021-04-28 ENCOUNTER — TRANSCRIBE ORDER (OUTPATIENT)
Dept: SCHEDULING | Age: 71
End: 2021-04-28

## 2021-04-28 DIAGNOSIS — I71.60 THORACOABDOMINAL AORTIC ANEURYSM: Primary | ICD-10-CM

## 2021-04-28 DIAGNOSIS — J44.9 COPD (CHRONIC OBSTRUCTIVE PULMONARY DISEASE) (HCC): ICD-10-CM

## 2021-04-28 DIAGNOSIS — I25.10 CORONARY ATHEROSCLEROSIS OF NATIVE CORONARY ARTERY: ICD-10-CM

## 2021-05-05 ENCOUNTER — HOSPITAL ENCOUNTER (OUTPATIENT)
Dept: VASCULAR SURGERY | Age: 71
Discharge: HOME | End: 2021-05-05
Attending: SURGERY
Payer: MEDICARE

## 2021-05-05 ENCOUNTER — HOSPITAL ENCOUNTER (OUTPATIENT)
Dept: RESPIRATORY THERAPY | Age: 71
Discharge: HOME | End: 2021-05-05
Attending: SURGERY
Payer: MEDICARE

## 2021-05-05 DIAGNOSIS — I25.10 CORONARY ATHEROSCLEROSIS OF NATIVE CORONARY ARTERY: ICD-10-CM

## 2021-05-05 DIAGNOSIS — J44.9 COPD (CHRONIC OBSTRUCTIVE PULMONARY DISEASE) (HCC): ICD-10-CM

## 2021-05-05 DIAGNOSIS — J44.9 CHRONIC OBSTRUCTIVE PULMONARY DISEASE, UNSPECIFIED COPD TYPE (HCC): ICD-10-CM

## 2021-05-05 PROCEDURE — 94729 DIFFUSING CAPACITY: CPT

## 2021-05-05 PROCEDURE — 94727 GAS DIL/WSHOT DETER LNG VOL: CPT | Performed by: INTERNAL MEDICINE

## 2021-05-05 PROCEDURE — 93880 EXTRACRANIAL BILAT STUDY: CPT

## 2021-05-05 PROCEDURE — 94729 DIFFUSING CAPACITY: CPT | Performed by: INTERNAL MEDICINE

## 2021-05-05 PROCEDURE — 94060 EVALUATION OF WHEEZING: CPT | Performed by: INTERNAL MEDICINE

## 2021-05-05 PROCEDURE — 94060 EVALUATION OF WHEEZING: CPT

## 2021-05-05 PROCEDURE — 94726 PLETHYSMOGRAPHY LUNG VOLUMES: CPT

## 2021-05-06 LAB
LEFT BULB EDV: 31.3 CM/S
LEFT BULB PSV: 110.2 CM/S
LEFT CCA DIST DIAS: 12.8 CM/S
LEFT CCA DIST SYS: 72.5 CM/S
LEFT CCA MID DIAS: 20.83 CM/S
LEFT CCA MID SYS: 91.9 CM/S
LEFT CCA PROX DIAS: 17.6 CM/S
LEFT CCA PROX SYS: 85.5 CM/S
LEFT ECA DIAS: 7.69 CM/S
LEFT ECA SYS: 108.2 CM/S
LEFT ICA DIST DIAS: 17.5 CM/S
LEFT ICA DIST SYS: 76.7 CM/S
LEFT ICA MID DIAS: 17.5 CM/S
LEFT ICA MID SYS: 84.6 CM/S
LEFT ICA PROX DIAS: 19.5 CM/S
LEFT ICA PROX SYS: 94.4 CM/S
LEFT ICA/CCA SYS: 1.52
LEFT SUBCLAVIAN DIAS: 4.88 CM/S
LEFT SUBCLAVIAN SYS: 189.8 CM/S
LEFT VERTEBRAL DIAS: 13.08 CM/S
LEFT VERTEBRAL SYS: 49.3 CM/S
RIGHT CCA DIST DIAS: 13.1 CM/S
RIGHT CCA DIST SYS: 83.4 CM/S
RIGHT CCA MID DIAS: 10.88 CM/S
RIGHT CCA MID SYS: 65.84 CM/S
RIGHT CCA PROX DIAS: 6.5 CM/S
RIGHT CCA PROX SYS: 65.8 CM/S
RIGHT ECA DIAS: 9.38 CM/S
RIGHT ECA SYS: 232.5 CM/S
RIGHT ICA DIST DIAS: 20.5 CM/S
RIGHT ICA DIST SYS: 78.9 CM/S
RIGHT ICA MID DIAS: 29 CM/S
RIGHT ICA MID SYS: 120.1 CM/S
RIGHT ICA PROX DIAS: 18.7 CM/S
RIGHT ICA PROX SYS: 73.7 CM/S
RIGHT ICA/CCA SYS: 1.4
RIGHT SUBCLAVIAN DIAS: 4.3 CM/S
RIGHT SUBCLAVIAN SYS: 167.2 CM/S
RIGHT VERTEBRAL DIAS: 9.81 CM/S
RIGHT VERTEBRAL SYS: 56.2 CM/S

## 2021-05-07 ENCOUNTER — HOSPITAL ENCOUNTER (OUTPATIENT)
Dept: NUCLEAR MEDICINE | Age: 71
Discharge: HOME | End: 2021-05-07
Attending: SURGERY
Payer: MEDICARE

## 2021-05-07 ENCOUNTER — HOSPITAL ENCOUNTER (OUTPATIENT)
Dept: NON INVASIVE DIAGNOSTICS | Age: 71
Discharge: HOME | End: 2021-05-07
Attending: SURGERY
Payer: MEDICARE

## 2021-05-07 VITALS
DIASTOLIC BLOOD PRESSURE: 78 MMHG | HEIGHT: 72 IN | WEIGHT: 239 LBS | SYSTOLIC BLOOD PRESSURE: 139 MMHG | BODY MASS INDEX: 32.37 KG/M2

## 2021-05-07 VITALS
SYSTOLIC BLOOD PRESSURE: 139 MMHG | WEIGHT: 239 LBS | BODY MASS INDEX: 32.37 KG/M2 | DIASTOLIC BLOOD PRESSURE: 78 MMHG | HEIGHT: 72 IN

## 2021-05-07 DIAGNOSIS — J44.9 COPD (CHRONIC OBSTRUCTIVE PULMONARY DISEASE) (HCC): ICD-10-CM

## 2021-05-07 DIAGNOSIS — I71.60 THORACOABDOMINAL AORTIC ANEURYSM: ICD-10-CM

## 2021-05-07 DIAGNOSIS — R06.09 DYSPNEA ON EXERTION: ICD-10-CM

## 2021-05-07 LAB
STRESS BASELINE DIAS BP: 78 MMHG
STRESS BASELINE HR: 62 BPM
STRESS BASELINE SYS BP: 139 MMHG
STRESS ESTIMATED WORKLOAD: 1 METS
STRESS EXERCISE DUR MIN: NORMAL
STRESS PEAK DIAS BP: 84 MMHG
STRESS PEAK SYS BP: 147 MMHG
STRESS PERCENT HR ACHIEVED: 57 %
STRESS POST PEAK HR: 86 BPM
STRESS RATE PRESSURE PRODUCT: NORMAL BPM*MMHG
STRESS ST DEPRESSION: 0 MM
STRESS ST ELEVATION: 0 MM
STRESS TARGET HR: 150 BPM

## 2021-05-07 PROCEDURE — A9500 TC99M SESTAMIBI: HCPCS

## 2021-05-07 PROCEDURE — 78452 HT MUSCLE IMAGE SPECT MULT: CPT | Performed by: INTERNAL MEDICINE

## 2021-05-07 PROCEDURE — 93016 CV STRESS TEST SUPVJ ONLY: CPT | Performed by: INTERNAL MEDICINE

## 2021-05-07 PROCEDURE — 93018 CV STRESS TEST I&R ONLY: CPT | Performed by: INTERNAL MEDICINE

## 2021-05-07 PROCEDURE — 93017 CV STRESS TEST TRACING ONLY: CPT

## 2021-05-07 PROCEDURE — 74011250636 HC RX REV CODE- 250/636: Performed by: SURGERY

## 2021-05-07 RX ORDER — SODIUM CHLORIDE 9 MG/ML
250 INJECTION, SOLUTION INTRAVENOUS ONCE
Status: COMPLETED | OUTPATIENT
Start: 2021-05-07 | End: 2021-05-07

## 2021-05-07 RX ADMIN — REGADENOSON 0.4 MG: 0.08 INJECTION, SOLUTION INTRAVENOUS at 09:40

## 2021-05-07 RX ADMIN — SODIUM CHLORIDE 250 ML: 900 INJECTION, SOLUTION INTRAVENOUS at 09:40

## 2021-05-07 NOTE — PROGRESS NOTES
Patient was given 10 mCi of Sestamibi for the Resting pictures. Patient received 0.4 mg of Lexiscan for the exercise portion of the Stress test. Patient was then given 33 mCi of Sestamibi for the Stress pictures. Armband was removed and disposed of before the patient left.  Unable to acquire gated imaging during stress pictures

## 2021-05-30 ENCOUNTER — HOSPITAL ENCOUNTER (EMERGENCY)
Age: 71
Discharge: ANOTHER HOSPITAL | End: 2021-05-30
Attending: EMERGENCY MEDICINE
Payer: MEDICARE

## 2021-05-30 ENCOUNTER — APPOINTMENT (OUTPATIENT)
Dept: CT IMAGING | Age: 71
End: 2021-05-30
Attending: EMERGENCY MEDICINE
Payer: MEDICARE

## 2021-05-30 ENCOUNTER — APPOINTMENT (OUTPATIENT)
Dept: GENERAL RADIOLOGY | Age: 71
End: 2021-05-30
Attending: EMERGENCY MEDICINE
Payer: MEDICARE

## 2021-05-30 VITALS
HEART RATE: 62 BPM | TEMPERATURE: 98.1 F | RESPIRATION RATE: 18 BRPM | OXYGEN SATURATION: 97 % | SYSTOLIC BLOOD PRESSURE: 149 MMHG | DIASTOLIC BLOOD PRESSURE: 84 MMHG

## 2021-05-30 DIAGNOSIS — I71.40 ABDOMINAL AORTIC ANEURYSM (AAA) WITHOUT RUPTURE: ICD-10-CM

## 2021-05-30 DIAGNOSIS — R07.9 CHEST PAIN, UNSPECIFIED TYPE: ICD-10-CM

## 2021-05-30 DIAGNOSIS — I71.10 LEAKING THORACIC AORTIC ANEURYSM (TAA): Primary | ICD-10-CM

## 2021-05-30 LAB
ABO + RH BLD: NORMAL
ALBUMIN SERPL-MCNC: 3.4 G/DL (ref 3.4–5)
ALBUMIN/GLOB SERPL: 0.7 {RATIO} (ref 0.8–1.7)
ALP SERPL-CCNC: 114 U/L (ref 45–117)
ALT SERPL-CCNC: 27 U/L (ref 16–61)
ANION GAP SERPL CALC-SCNC: 3 MMOL/L (ref 3–18)
AST SERPL-CCNC: 46 U/L (ref 10–38)
ATRIAL RATE: 64 BPM
ATRIAL RATE: 68 BPM
BASOPHILS # BLD: 0.1 K/UL (ref 0–0.1)
BASOPHILS NFR BLD: 1 % (ref 0–2)
BILIRUB SERPL-MCNC: 0.4 MG/DL (ref 0.2–1)
BLOOD GROUP ANTIBODIES SERPL: NORMAL
BNP SERPL-MCNC: 245 PG/ML (ref 0–900)
BUN SERPL-MCNC: 15 MG/DL (ref 7–18)
BUN/CREAT SERPL: 10 (ref 12–20)
CALCIUM SERPL-MCNC: 9 MG/DL (ref 8.5–10.1)
CALCULATED P AXIS, ECG09: 13 DEGREES
CALCULATED P AXIS, ECG09: 69 DEGREES
CALCULATED R AXIS, ECG10: -80 DEGREES
CALCULATED R AXIS, ECG10: -83 DEGREES
CALCULATED T AXIS, ECG11: 89 DEGREES
CALCULATED T AXIS, ECG11: 91 DEGREES
CHLORIDE SERPL-SCNC: 105 MMOL/L (ref 100–111)
CO2 SERPL-SCNC: 28 MMOL/L (ref 21–32)
CREAT SERPL-MCNC: 1.45 MG/DL (ref 0.6–1.3)
DIAGNOSIS, 93000: NORMAL
DIAGNOSIS, 93000: NORMAL
DIFFERENTIAL METHOD BLD: ABNORMAL
EOSINOPHIL # BLD: 0.2 K/UL (ref 0–0.4)
EOSINOPHIL NFR BLD: 2 % (ref 0–5)
ERYTHROCYTE [DISTWIDTH] IN BLOOD BY AUTOMATED COUNT: 14.9 % (ref 11.6–14.5)
GLOBULIN SER CALC-MCNC: 5.1 G/DL (ref 2–4)
GLUCOSE SERPL-MCNC: 98 MG/DL (ref 74–99)
HCT VFR BLD AUTO: 41.5 % (ref 36–48)
HGB BLD-MCNC: 13.3 G/DL (ref 13–16)
LIPASE SERPL-CCNC: 107 U/L (ref 73–393)
LYMPHOCYTES # BLD: 2.2 K/UL (ref 0.9–3.6)
LYMPHOCYTES NFR BLD: 28 % (ref 21–52)
MCH RBC QN AUTO: 27 PG (ref 24–34)
MCHC RBC AUTO-ENTMCNC: 32 G/DL (ref 31–37)
MCV RBC AUTO: 84.3 FL (ref 74–97)
MONOCYTES # BLD: 0.8 K/UL (ref 0.05–1.2)
MONOCYTES NFR BLD: 10 % (ref 3–10)
NEUTS SEG # BLD: 4.7 K/UL (ref 1.8–8)
NEUTS SEG NFR BLD: 60 % (ref 40–73)
P-R INTERVAL, ECG05: 166 MS
P-R INTERVAL, ECG05: 166 MS
PLATELET # BLD AUTO: 156 K/UL (ref 135–420)
PMV BLD AUTO: 10.4 FL (ref 9.2–11.8)
POTASSIUM SERPL-SCNC: 5.1 MMOL/L (ref 3.5–5.5)
PROT SERPL-MCNC: 8.5 G/DL (ref 6.4–8.2)
Q-T INTERVAL, ECG07: 480 MS
Q-T INTERVAL, ECG07: 496 MS
QRS DURATION, ECG06: 204 MS
QRS DURATION, ECG06: 210 MS
QTC CALCULATION (BEZET), ECG08: 495 MS
QTC CALCULATION (BEZET), ECG08: 527 MS
RBC # BLD AUTO: 4.92 M/UL (ref 4.35–5.65)
SODIUM SERPL-SCNC: 136 MMOL/L (ref 136–145)
SPECIMEN EXP DATE BLD: NORMAL
TROPONIN I SERPL-MCNC: <0.02 NG/ML (ref 0–0.04)
VENTRICULAR RATE, ECG03: 64 BPM
VENTRICULAR RATE, ECG03: 68 BPM
WBC # BLD AUTO: 7.9 K/UL (ref 4.6–13.2)

## 2021-05-30 PROCEDURE — 74011000636 HC RX REV CODE- 636: Performed by: EMERGENCY MEDICINE

## 2021-05-30 PROCEDURE — 99285 EMERGENCY DEPT VISIT HI MDM: CPT

## 2021-05-30 PROCEDURE — 96375 TX/PRO/DX INJ NEW DRUG ADDON: CPT

## 2021-05-30 PROCEDURE — 83690 ASSAY OF LIPASE: CPT

## 2021-05-30 PROCEDURE — 80053 COMPREHEN METABOLIC PANEL: CPT

## 2021-05-30 PROCEDURE — 71045 X-RAY EXAM CHEST 1 VIEW: CPT

## 2021-05-30 PROCEDURE — 83880 ASSAY OF NATRIURETIC PEPTIDE: CPT

## 2021-05-30 PROCEDURE — 99291 CRITICAL CARE FIRST HOUR: CPT

## 2021-05-30 PROCEDURE — 86901 BLOOD TYPING SEROLOGIC RH(D): CPT

## 2021-05-30 PROCEDURE — 74011250636 HC RX REV CODE- 250/636: Performed by: EMERGENCY MEDICINE

## 2021-05-30 PROCEDURE — 74011000250 HC RX REV CODE- 250: Performed by: EMERGENCY MEDICINE

## 2021-05-30 PROCEDURE — 85025 COMPLETE CBC W/AUTO DIFF WBC: CPT

## 2021-05-30 PROCEDURE — 93005 ELECTROCARDIOGRAM TRACING: CPT

## 2021-05-30 PROCEDURE — 96365 THER/PROPH/DIAG IV INF INIT: CPT

## 2021-05-30 PROCEDURE — 84484 ASSAY OF TROPONIN QUANT: CPT

## 2021-05-30 PROCEDURE — 71275 CT ANGIOGRAPHY CHEST: CPT

## 2021-05-30 RX ORDER — MORPHINE SULFATE 4 MG/ML
4 INJECTION INTRAVENOUS
Status: COMPLETED | OUTPATIENT
Start: 2021-05-30 | End: 2021-05-30

## 2021-05-30 RX ORDER — NITROGLYCERIN 40 MG/100ML
0-20 INJECTION INTRAVENOUS
Status: DISCONTINUED | OUTPATIENT
Start: 2021-05-30 | End: 2021-05-30 | Stop reason: HOSPADM

## 2021-05-30 RX ADMIN — MORPHINE SULFATE 4 MG: 4 INJECTION, SOLUTION INTRAMUSCULAR; INTRAVENOUS at 06:51

## 2021-05-30 RX ADMIN — IOPAMIDOL 80 ML: 755 INJECTION, SOLUTION INTRAVENOUS at 06:36

## 2021-05-30 RX ADMIN — NITROGLYCERIN 10 MCG/MIN: 40 INJECTION INTRAVENOUS at 07:01

## 2021-05-30 NOTE — ED PROVIDER NOTES
Patient is a 25-year-old male with a history of COPD, coronary artery disease status post MI, and a known thoracic aortic aneurysm status post endograft and with a known leak, and a thoracic aortic aneurysm status post endograft who presents to the ED today with chest pain. He states that it began between 1230 and 1:00 AM.  He states it is on the left side of his chest and radiates through to his back. Also hurts in the back of his upper arm. He states that he did have an MI in 2012 and this pain is much worse than his prior MI. He states that he has a thoracic aortic aneurysm that has been repaired but it has been leaking. He is scheduled for surgery in June and sees Dr. Og Albert. Past Medical History:   Diagnosis Date    Arthritis     CAD (coronary artery disease), native coronary artery     ALIDA X 2 to OM1    Carotid duplex 08/13/2013    Mild <50% bilateral ICA plaquing.  Complete heart block [facility]) June 2013    Medtronic dual chamber pacemaker    COPD (chronic obstructive pulmonary disease) ([address]     Diverticulitis of large intestine without perforation or abscess with bleeding 9/10/2017    Gastritis     GERD (gastroesophageal reflux disease)     High cholesterol     History of AAA (abdominal aortic aneurysm) repair 9/12/2017    History of echocardiogram 06/04/2013    EF 60-65%. No WMA. Gr 1 DDfx. No significant valvular heart disease.  History of myocardial perfusion scan 04/18/2011    No ischemia or prior infarction. No WMA. EF 52%. Neg EKG on pharm stress test.    Hypertension     Hypothyroidism     Lower extremity venous duplex 01/06/2015    No DVT. Superficial insufficiency of right GSV. Deep venous reflux in right CFV & fem vein. Deep venous reflux in left CFV.  Renal duplex 12/24/2014    Mild < 60% RRA stenosis. Low parenchymal & LRA flow. Renal asymmetry. Intrinsic/med disease in right kidney.   AAA (3.8 x 4.0 cm) infrarenal.    Right groin hernia not resolved    S/P AAA repair 05/18/2017    S/P cardiac cath 06/02/2013    Diffuse atherosclerosis throughout. pRCA 50%. mLM 30%. mLAD 40%. oD2 (sm) 100%. pLCX 30%. p/mOM1 95/80% (o/lapping 2.25 x 23-mm & 2.25 x 12-mm Xience stents, resid 0%).                    Past Surgical History:   Procedure Laterality Date    FLEXIBLE SIGMOIDOSCOPY N/A 9/13/2017    SIGMOIDOSCOPY FLEXIBLE w/ biopsies performed by Corina Schmidt MD at [address] HX AAA REPAIR N/A 05/18/2017    Dr Ethan Schmidt [facility] HX CHOLECYSTECTOMY  2008  [address]  6/2013    HX HERNIA REPAIR  7572    Umbilical    HX OTHER SURGICAL      renal stents placed    HX PACEMAKER  6/2013    Medtronic Pacemaker     HX PACEMAKER PLACEMENT      SC SIGMOIDOSCOPY,BIOPSY  9/13/2017              Family History:   Problem Relation Age of Onset    Cancer Mother         pancreatic    Heart Attack Father     Heart Disease Father     Diabetes Sister     Heart Disease Sister     Diabetes Brother     Stroke Brother     Crohn's Disease Son        Social History     Socioeconomic History    Marital status:      Spouse name: Not on file    Number of children: Not on file    Years of education: Not on file    Highest education level: Not on file   Occupational History    Not on file   Tobacco Use    Smoking status: Current Every Day Smoker     Packs/day: 1.00     Types: Cigarettes    Smokeless tobacco: Never Used   Substance and Sexual Activity    Alcohol use: No     Alcohol/week: 0.0 standard drinks    Drug use: No    Sexual activity: Not on file   Other Topics Concern     Service No    Blood Transfusions No    Caffeine Concern Yes    Occupational Exposure No    Hobby Hazards No    Sleep Concern No    Stress Concern No    Weight Concern No    Special Diet No    Back Care Yes    Exercise No    Bike Helmet No    Seat Belt Yes    Self-Exams Not Asked   Social History Narrative    Not on file     Social Determinants of Health     Financial Resource Strain:     Difficulty of Paying Living Expenses:    Food Insecurity:     Worried About Running Out of Food in the Last Year:     [address] in the Last Year:    Transportation Needs:     Lack of Transportation (Medical):  Lack of Transportation (Non-Medical):    Physical Activity:     Days of Exercise per Week:     Minutes of Exercise per Session:    Stress:     Feeling of Stress :    Social Connections:     Frequency of Communication with Friends and Family:     Frequency of Social Gatherings with Friends and Family:     Attends Baptism Services:     Active Member of Clubs or Organizations:     Attends Club or Organization Meetings:     Marital Status:    Intimate Partner Violence:     Fear of Current or Ex-Partner:     Emotionally Abused:     Physically Abused:     Sexually Abused: ALLERGIES: Patient has no known allergies. Review of Systems    Vitals:    05/30/21 0524   BP: (!) 149/84   Pulse: 62   Resp: 18   Temp: 98.1 °F (36.7 °C)   SpO2: 97%            Physical Exam       Recent Results (from the past 12 hour(s))   CBC WITH AUTOMATED DIFF    Collection Time: 05/30/21  5:42 AM   Result Value Ref Range    WBC 7.9 4.6 - 13.2 K/uL    RBC 4.92 4.35 - 5.65 M/uL    HGB 13.3 13.0 - 16.0 g/dL    HCT 41.5 36.0 - 48.0 %    MCV 84.3 74.0 - 97.0 FL    MCH 27.0 24.0 - 34.0 PG    MCHC 32.0 31.0 - 37.0 g/dL    RDW 14.9 (H) 11.6 - 14.5 %    PLATELET 789 042 - 794 K/uL    MPV 10.4 9.2 - 11.8 FL    NEUTROPHILS 60 40 - 73 %    LYMPHOCYTES 28 21 - 52 %    MONOCYTES 10 3 - 10 %    EOSINOPHILS 2 0 - 5 %    BASOPHILS 1 0 - 2 %    ABS. NEUTROPHILS 4.7 1.8 - 8.0 K/UL    ABS. LYMPHOCYTES 2.2 0.9 - 3.6 K/UL    ABS. MONOCYTES 0.8 0.05 - 1.2 K/UL    ABS. EOSINOPHILS 0.2 0.0 - 0.4 K/UL    ABS.  BASOPHILS 0.1 0.0 - 0.1 K/UL    DF AUTOMATED     METABOLIC PANEL, COMPREHENSIVE    Collection Time: 05/30/21  5:42 AM   Result Value Ref Range    Sodium 136 136 - 145 mmol/L    Potassium 5.1 3.5 - 5.5 mmol/L    Chloride 105 100 - 111 mmol/L    CO2 28 21 - 32 mmol/L    Anion gap 3 3.0 - 18 mmol/L    Glucose 98 74 - 99 mg/dL    BUN 15 7.0 - 18 MG/DL    Creatinine 1.45 (H) 0.6 - 1.3 MG/DL    BUN/Creatinine ratio 10 (L) 12 - 20      GFR est AA 58 (L) >60 ml/min/1.73m2    GFR est non-AA 48 (L) >60 ml/min/1.73m2    Calcium 9.0 8.5 - 10.1 MG/DL    Bilirubin, total 0.4 0.2 - 1.0 MG/DL    ALT (SGPT) 27 16 - 61 U/L    AST (SGOT) 46 (H) 10 - 38 U/L    Alk. phosphatase 114 45 - 117 U/L    Protein, total 8.5 (H) 6.4 - 8.2 g/dL    Albumin 3.4 3.4 - 5.0 g/dL    Globulin 5.1 (H) 2.0 - 4.0 g/dL    A-G Ratio 0.7 (L) 0.8 - 1.7     NT-PRO BNP    Collection Time: 05/30/21  5:42 AM   Result Value Ref Range    NT pro- 0 - 900 PG/ML   TROPONIN I    Collection Time: 05/30/21  5:42 AM   Result Value Ref Range    Troponin-I, QT <0.02 0.0 - 0.045 NG/ML   LIPASE    Collection Time: 05/30/21  5:42 AM   Result Value Ref Range    Lipase 107 73 - 393 U/L   EKG, 12 LEAD, INITIAL    Collection Time: 05/30/21  6:45 AM   Result Value Ref Range    Ventricular Rate 68 BPM    Atrial Rate 68 BPM    P-R Interval 166 ms    QRS Duration 210 ms    Q-T Interval 496 ms    QTC Calculation (Bezet) 527 ms    Calculated P Axis 69 degrees    Calculated R Axis -80 degrees    Calculated T Axis 89 degrees    Diagnosis       Atrial-sensed ventricular-paced rhythm  Abnormal ECG  When compared with ECG of 14-OCT-2017 20:28,  Vent. rate has decreased BY  28 BPM       CTA CHEST ABD PELV W WO CONT   Final Result      1. Stable type Ia endoleak at the thoracic aortic stent graft. The remainder of   the stent graft is stable. Stable size of residual aneurysm sac. 2. Stable abdominal aortic stent graft without endoleak or change. 3. Stable small mural ulcer in the upper abdominal aorta. 4. Stable ectasia of the upper ascending aorta and aneurysmal enlargement of the   distal descending thoracic aorta.    5. Stable high-grade right subclavian artery origin stenosis. Stable   mild-moderate SMA stenosis and mild celiac artery stenosis. 6. Patent renal artery stents. 7. Stable nonvascular findings.   -Fatty inguinal hernias.   -Fatty umbilical hernia. -Diverticulosis. -Renal cysts and mild nonobstructing nephrolithiasis.   -Small lung nodules are stable since at least 2013, not requiring further   follow-up per current Fleischner guidelines. Preliminary reading provided by Dr. Juliet Kahn 5/30/2020 9021. XR CHEST PORT   Final Result      No evidence of acute pulmonary disease. MDM  Number of Diagnoses or Management Options  Abdominal aortic aneurysm (AAA) without rupture (HCC)  Chest pain, unspecified type  Leaking thoracic aortic aneurysm (TAA) ([facility] [address]  Diagnosis management comments: The patient is a 81-year-old male with multiple medical problems including coronary artery disease and a known leak of his endograft in his thoracic aorta status post aneurysm repair. He has left-sided chest pain that radiates through to his back and is worse than his prior MIs. I ordered a CT a of his chest abdomen pelvis to further evaluate his aorta. He has surgery scheduled with Dr. Josefa Cook for June at [facility].      I spoke with Dr. Subhash Horne of vascular surgery who stated that they are unable to compare our images to their images to evaluate whether or not his leak/TAA is enlarging/bleeding and recommended that we transfer the patient to [facility] for further evaluation by vascular surgery. I then spoke with Dr. Radha Parra from the ED who has accepted the patient in transfer. The patient has remained hemodynamically stable in our ED. ED Course as of May 30 2113   Lysbeth List May 30, 2021   5377 Received turnover from Dr. Elida Crump at [phone] patient with a leaking AAA. I did sign the EMTALA paperwork he was imminently stable and was transported out of the building at this time.     [CB]   2112 CTA CHEST ABD PELV W WO CONT [EB]      ED Course User Index  [CB] Glenny Howard MD  [EB] Shahrzad Franco MD       Critical Care  Performed by: Shahrzad Franco MD  Authorized by: Shahrzad Franco MD     Critical care provider statement:     Critical care time (minutes):  40    Critical care time was exclusive of:  Separately billable procedures and treating other patients    Critical care was necessary to treat or prevent imminent or life-threatening deterioration of the following conditions:  Cardiac failure, circulatory failure, CNS failure or compromise, respiratory failure and shock    Critical care was time spent personally by me on the following activities:  Development of treatment plan with patient or surrogate, discussions with consultants, evaluation of patient's response to treatment, examination of patient, obtaining history from patient or surrogate, ordering and performing treatments and interventions, ordering and review of laboratory studies, ordering and review of radiographic studies, pulse oximetry, re-evaluation of patient's condition and review of old charts    I assumed direction of critical care for this patient from another provider in my specialty: no

## 2021-05-30 NOTE — ED NOTES
ED Course as of May 30 0733   Sp Natasha May 30, 2021   9372 Received turnover from Dr. Ashlyn Rutledge at [phone] patient with a leaking AAA. I did sign the EMTALA paperwork he was imminently stable and was transported out of the building at this time.     [CB]      ED Course User Index  [CB] Fabi Bagley MD

## 2021-05-30 NOTE — ED TRIAGE NOTES
Pt arrived with c/o of chest pain that radiates from upper left chest and radiates to the back of left arm and into his back that started appx 12 today.

## 2021-07-10 ENCOUNTER — APPOINTMENT (OUTPATIENT)
Dept: GENERAL RADIOLOGY | Age: 71
End: 2021-07-10
Attending: EMERGENCY MEDICINE
Payer: MEDICARE

## 2021-07-10 ENCOUNTER — HOSPITAL ENCOUNTER (EMERGENCY)
Age: 71
Discharge: HOME | End: 2021-07-10
Attending: EMERGENCY MEDICINE
Payer: MEDICARE

## 2021-07-10 VITALS
HEIGHT: 71 IN | RESPIRATION RATE: 16 BRPM | WEIGHT: 247 LBS | BODY MASS INDEX: 34.58 KG/M2 | DIASTOLIC BLOOD PRESSURE: 66 MMHG | OXYGEN SATURATION: 99 % | TEMPERATURE: 98.1 F | HEART RATE: 63 BPM | SYSTOLIC BLOOD PRESSURE: 103 MMHG

## 2021-07-10 DIAGNOSIS — R07.89 MUSCULOSKELETAL CHEST PAIN: Primary | ICD-10-CM

## 2021-07-10 LAB
ALBUMIN SERPL-MCNC: 3 G/DL (ref 3.4–5)
ALBUMIN/GLOB SERPL: 0.7 {RATIO} (ref 0.8–1.7)
ALP SERPL-CCNC: 122 U/L (ref 45–117)
ALT SERPL-CCNC: 21 U/L (ref 16–61)
ANION GAP SERPL CALC-SCNC: 7 MMOL/L (ref 3–18)
AST SERPL-CCNC: 27 U/L (ref 10–38)
ATRIAL RATE: 64 BPM
BASOPHILS # BLD: 0 K/UL (ref 0–0.1)
BASOPHILS NFR BLD: 0 % (ref 0–2)
BILIRUB SERPL-MCNC: 0.5 MG/DL (ref 0.2–1)
BUN SERPL-MCNC: 11 MG/DL (ref 7–18)
BUN/CREAT SERPL: 10 (ref 12–20)
CALCIUM SERPL-MCNC: 8.6 MG/DL (ref 8.5–10.1)
CALCULATED P AXIS, ECG09: 57 DEGREES
CALCULATED R AXIS, ECG10: -83 DEGREES
CALCULATED T AXIS, ECG11: 95 DEGREES
CHLORIDE SERPL-SCNC: 108 MMOL/L (ref 100–111)
CO2 SERPL-SCNC: 26 MMOL/L (ref 21–32)
CREAT SERPL-MCNC: 1.13 MG/DL (ref 0.6–1.3)
DIAGNOSIS, 93000: NORMAL
DIFFERENTIAL METHOD BLD: ABNORMAL
EOSINOPHIL # BLD: 0.2 K/UL (ref 0–0.4)
EOSINOPHIL NFR BLD: 2 % (ref 0–5)
ERYTHROCYTE [DISTWIDTH] IN BLOOD BY AUTOMATED COUNT: 15.9 % (ref 11.6–14.5)
GLOBULIN SER CALC-MCNC: 4.6 G/DL (ref 2–4)
GLUCOSE SERPL-MCNC: 116 MG/DL (ref 74–99)
HCT VFR BLD AUTO: 36 % (ref 36–48)
HGB BLD-MCNC: 11.8 G/DL (ref 13–16)
LYMPHOCYTES # BLD: 2 K/UL (ref 0.9–3.6)
LYMPHOCYTES NFR BLD: 25 % (ref 21–52)
MCH RBC QN AUTO: 27.4 PG (ref 24–34)
MCHC RBC AUTO-ENTMCNC: 32.8 G/DL (ref 31–37)
MCV RBC AUTO: 83.5 FL (ref 74–97)
MONOCYTES # BLD: 0.7 K/UL (ref 0.05–1.2)
MONOCYTES NFR BLD: 10 % (ref 3–10)
NEUTS SEG # BLD: 4.9 K/UL (ref 1.8–8)
NEUTS SEG NFR BLD: 63 % (ref 40–73)
P-R INTERVAL, ECG05: 170 MS
PLATELET # BLD AUTO: 142 K/UL (ref 135–420)
PMV BLD AUTO: 10.4 FL (ref 9.2–11.8)
POTASSIUM SERPL-SCNC: 3.6 MMOL/L (ref 3.5–5.5)
PROT SERPL-MCNC: 7.6 G/DL (ref 6.4–8.2)
Q-T INTERVAL, ECG07: 476 MS
QRS DURATION, ECG06: 212 MS
QTC CALCULATION (BEZET), ECG08: 491 MS
RBC # BLD AUTO: 4.31 M/UL (ref 4.35–5.65)
SODIUM SERPL-SCNC: 141 MMOL/L (ref 136–145)
TROPONIN I SERPL-MCNC: <0.02 NG/ML (ref 0–0.04)
VENTRICULAR RATE, ECG03: 64 BPM
WBC # BLD AUTO: 7.7 K/UL (ref 4.6–13.2)

## 2021-07-10 PROCEDURE — 99284 EMERGENCY DEPT VISIT MOD MDM: CPT

## 2021-07-10 PROCEDURE — 93005 ELECTROCARDIOGRAM TRACING: CPT

## 2021-07-10 PROCEDURE — 85025 COMPLETE CBC W/AUTO DIFF WBC: CPT

## 2021-07-10 PROCEDURE — 80053 COMPREHEN METABOLIC PANEL: CPT

## 2021-07-10 PROCEDURE — 84484 ASSAY OF TROPONIN QUANT: CPT

## 2021-07-10 PROCEDURE — 71045 X-RAY EXAM CHEST 1 VIEW: CPT

## 2021-07-10 PROCEDURE — 74011250637 HC RX REV CODE- 250/637: Performed by: EMERGENCY MEDICINE

## 2021-07-10 RX ORDER — ACETAMINOPHEN 500 MG
1000 TABLET ORAL ONCE
Status: COMPLETED | OUTPATIENT
Start: 2021-07-10 | End: 2021-07-10

## 2021-07-10 RX ADMIN — ACETAMINOPHEN 1000 MG: 500 TABLET ORAL at 09:50

## 2021-07-10 NOTE — ED TRIAGE NOTES
Alessandro with C/O chest pain and left upper arm pain that started this am, patient had recent surgery at [facility] , patient not sure what surgery he had, patient alert and oriented x 4, skin warm and dry, patient with healing scars to bilateral sides of neck, patient placed on monitor, paced rhythm noted.   Purposeful rounding completed:    Side rails up x 2:  YES  Bed in low position and wheels locked: YES  Call bell within reach: YES  Comfort addressed: YES    Toileting needs addressed: YES  Plan of care reviewed/updated with patient and or family members: YES  IV site assessed: YES  Pain assessed and addressed: YES, 0

## 2021-07-10 NOTE — ED NOTES
Patient discharged to home with instructions,  all questions answered, patient voices understanding, patient instructed on follow up, VSS, patient in no distress at this time, patient ambulated home with out difficulty

## 2022-03-16 NOTE — ED PROVIDER NOTES
EMERGENCY DEPARTMENT HISTORY AND PHYSICAL EXAM    8:24 AM seen at this time on EMS stretcher at home to expedite care      Date: 7/10/2021  Patient Name: Beto Carey    History of Presenting Illness     Chief Complaint   Patient presents with    Chest Pain         History Provided By: patient    Additional History (Context): Beto Carey is a 80 y.o. male presents with cardiac history as well as AAA, had chest pain left arm pain, relieved by nitro by EMS also given aspirin. When he arrives chest pain is not present unless he moves the left arm. He notes the left arm started hurting 12 to 24 hours ago. Distress at the time of my examination is 0 he supposed to have additional surgery at [facility] on Wednesday. PCP: Shilpa Corado MD    Chief Complaint:   Duration:    Timing:    Location:   Quality:   Severity:   Modifying Factors:   Associated Symptoms:       Current Facility-Administered Medications   Medication Dose Route Frequency Provider Last Rate Last Admin    acetaminophen (TYLENOL) tablet 1,000 mg  1,000 mg Oral ONCE Alaina Webb MD         Current Outpatient Medications   Medication Sig Dispense Refill    HYDROcodone-acetaminophen (NORCO) 5-325 mg per tablet Take 1 Tab by mouth every four (4) hours as needed for Pain. Max Daily Amount: 6 Tabs. 12 Tab 0    levoFLOXacin (LEVAQUIN) 500 mg tablet Take 1 Tab by mouth daily. 10 Tab 0    polyethylene glycol (MIRALAX) 17 gram/dose powder Take 17 g by mouth daily. 1 capful with 8 oz of water daily 119 g 0    senna (SENOKOT) 8.6 mg tablet Take 2 Tabs by mouth nightly. Indications: constipation 100 Tab 0    hydrocortisone-pramoxine (PROCTOFOAM HC) rectal foam Insert 1 Applicator into rectum two (2) times daily as needed for Hemorrhoids. 1 Can 0    nicotine (NICODERM CQ) 14 mg/24 hr patch 1 Patch by TransDERmal route daily for 30 days.  Indications: SMOKING CESSATION 30 Patch 0    witch hazel-glycerin (TUCKS) 50 % padm 1 Container by PeriANAL route as needed. 1 Each 0    raNITIdine (ZANTAC) 300 mg tablet Take 1 Tab by mouth daily.  gabapentin (NEURONTIN) 300 mg capsule Take 300 mg by mouth three (3) times daily. Indications: NEUROPATHIC PAIN      fluticasone-salmeterol (ADVAIR) 100-50 mcg/dose diskus inhaler Take 1 Puff by inhalation every twelve (12) hours. Indications: BRONCHOSPASM PREVENTION WITH COPD 1 Inhaler 0    LORazepam (ATIVAN) 1 mg tablet Take 1 Tab by mouth every six (6) hours as needed. Max Daily Amount: 4 mg. Indications: anxiety 15 Tab 0    pantoprazole (PROTONIX) 40 mg tablet Take 40 mg by mouth daily.  atenolol (TENORMIN) 25 mg tablet Take 25 mg by mouth daily.  levothyroxine (SYNTHROID) 125 mcg tablet 137 mcg.  atorvastatin (LIPITOR) 40 mg tablet Take 40 mg by mouth nightly.  aspirin 81 mg chewable tablet Take 1 Tab by mouth daily. 30 Tab 11    albuterol (PROVENTIL HFA, VENTOLIN HFA, PROAIR HFA) 90 mcg/actuation inhaler Take 2 Puffs by inhalation every six (6) hours as needed for Wheezing. 1 Inhaler 0    nitroglycerin (NITROSTAT) 0.4 mg SL tablet 1 Tab by SubLINGual route every five (5) minutes as needed for Chest Pain (if chest pain not resolved after taking 3 call 911 ). 30 Tab 3       Past History     Past Medical History:  Past Medical History:   Diagnosis Date    Arthritis     CAD (coronary artery disease), native coronary artery     ALIDA X 2 to OM1    Carotid duplex 08/13/2013    Mild <50% bilateral ICA plaquing.  Complete heart block [facility]) June 2013    Medtronic dual chamber pacemaker    COPD (chronic obstructive pulmonary disease) ([facility] [address]     Diverticulitis of large intestine without perforation or abscess with bleeding 9/10/2017    Gastritis     GERD (gastroesophageal reflux disease)     High cholesterol     History of AAA (abdominal aortic aneurysm) repair 9/12/2017    History of echocardiogram 06/04/2013    EF 60-65%. No WMA. Gr 1 DDfx.   No significant valvular heart disease.  History of myocardial perfusion scan 04/18/2011    No ischemia or prior infarction. No WMA. EF 52%. Neg EKG on pharm stress test.    Hypertension     Hypothyroidism     Lower extremity venous duplex 01/06/2015    No DVT. Superficial insufficiency of right GSV. Deep venous reflux in right CFV & fem vein. Deep venous reflux in left CFV.  Renal duplex 12/24/2014    Mild < 60% RRA stenosis. Low parenchymal & LRA flow. Renal asymmetry. Intrinsic/med disease in right kidney. AAA (3.8 x 4.0 cm) infrarenal.    Right groin hernia     not resolved    S/P AAA repair 05/18/2017    S/P cardiac cath 06/02/2013    Diffuse atherosclerosis throughout. pRCA 50%. mLM 30%. mLAD 40%. oD2 (sm) 100%. pLCX 30%. p/mOM1 95/80% (o/lapping 2.25 x 23-mm & 2.25 x 12-mm Xience stents, resid 0%).                    Past Surgical History:  Past Surgical History:   Procedure Laterality Date    FLEXIBLE SIGMOIDOSCOPY N/A 9/13/2017    SIGMOIDOSCOPY FLEXIBLE w/ biopsies performed by Nadeen Vega MD at [address] HX AAA REPAIR N/A 05/18/2017    Dr Yohana Hicks [facility] HX CHOLECYSTECTOMY  2008  [address]  6/2013    HX HERNIA REPAIR  [id number]    Umbilical    HX OTHER SURGICAL      renal stents placed    HX PACEMAKER  6/2013    Medtronic Pacemaker     HX PACEMAKER PLACEMENT      TX SIGMOIDOSCOPY,BIOPSY  9/13/2017            Family History:  Family History   Problem Relation Age of Onset    Cancer Mother         pancreatic    Heart Attack Father     Heart Disease Father     Diabetes Sister     Heart Disease Sister     Diabetes Brother     Stroke Brother     Crohn's Disease Son        Social History:  Social History     Tobacco Use    Smoking status: Current Every Day Smoker     Packs/day: 1.00     Types: Cigarettes    Smokeless tobacco: Never Used   Substance Use Topics    Alcohol use: No     Alcohol/week: 0.0 standard drinks    Drug use: No       Allergies:  No Known Allergies      Review of Systems     Review of Systems   Constitutional: Negative for diaphoresis and fever. HENT: Negative for congestion and sore throat. Eyes: Negative for pain and itching. Respiratory: Negative for cough and shortness of breath. Cardiovascular: Positive for chest pain. Negative for palpitations. Gastrointestinal: Negative for abdominal pain and diarrhea. Endocrine: Negative for polydipsia and polyuria. Genitourinary: Negative for dysuria and hematuria. Musculoskeletal: Positive for arthralgias. Negative for myalgias. Skin: Negative for rash and wound. Neurological: Negative for seizures and syncope. Hematological: Does not bruise/bleed easily. Psychiatric/Behavioral: Negative for agitation and hallucinations. Physical Exam       Patient Vitals for the past 12 hrs:   Temp Pulse Resp BP SpO2   07/10/21 0830 98.2 °F (36.8 °C) 68 14 98/74 98 %       Physical Exam  Vitals and nursing note reviewed. Constitutional:       Appearance: He is well-developed. HENT:      Head: Normocephalic and atraumatic. Eyes:      General: No scleral icterus. Conjunctiva/sclera: Conjunctivae normal.   Neck:      Vascular: No JVD. Cardiovascular:      Rate and Rhythm: Normal rate and regular rhythm. Heart sounds: Normal heart sounds. Comments: 4 intact extremity pulses. Fresh scars near the clavicles  Pulmonary:      Effort: Pulmonary effort is normal.      Breath sounds: Normal breath sounds. Abdominal:      Palpations: Abdomen is soft. There is no mass. Tenderness: There is no abdominal tenderness. Musculoskeletal:         General: Normal range of motion. Cervical back: Normal range of motion and neck supple. Lymphadenopathy:      Cervical: No cervical adenopathy. Skin:     General: Skin is warm and dry. Neurological:      Mental Status: He is alert.            Diagnostic Study Results   Labs -  Recent Results (from the past 12 hour(s))   CBC WITH AUTOMATED DIFF    Collection Time: 07/10/21  8:45 AM   Result Value Ref Range    WBC 7.7 4.6 - 13.2 K/uL    RBC 4.31 (L) 4.35 - 5.65 M/uL    HGB 11.8 (L) 13.0 - 16.0 g/dL    HCT 36.0 36.0 - 48.0 %    MCV 83.5 74.0 - 97.0 FL    MCH 27.4 24.0 - 34.0 PG    MCHC 32.8 31.0 - 37.0 g/dL    RDW 15.9 (H) 11.6 - 14.5 %    PLATELET 730 765 - 487 K/uL    MPV 10.4 9.2 - 11.8 FL    NEUTROPHILS 63 40 - 73 %    LYMPHOCYTES 25 21 - 52 %    MONOCYTES 10 3 - 10 %    EOSINOPHILS 2 0 - 5 %    BASOPHILS 0 0 - 2 %    ABS. NEUTROPHILS 4.9 1.8 - 8.0 K/UL    ABS. LYMPHOCYTES 2.0 0.9 - 3.6 K/UL    ABS. MONOCYTES 0.7 0.05 - 1.2 K/UL    ABS. EOSINOPHILS 0.2 0.0 - 0.4 K/UL    ABS. BASOPHILS 0.0 0.0 - 0.1 K/UL    DF AUTOMATED     METABOLIC PANEL, COMPREHENSIVE    Collection Time: 07/10/21  8:45 AM   Result Value Ref Range    Sodium 141 136 - 145 mmol/L    Potassium 3.6 3.5 - 5.5 mmol/L    Chloride 108 100 - 111 mmol/L    CO2 26 21 - 32 mmol/L    Anion gap 7 3.0 - 18 mmol/L    Glucose 116 (H) 74 - 99 mg/dL    BUN 11 7.0 - 18 MG/DL    Creatinine 1.13 0.6 - 1.3 MG/DL    BUN/Creatinine ratio 10 (L) 12 - 20      GFR est AA >60 >60 ml/min/1.73m2    GFR est non-AA >60 >60 ml/min/1.73m2    Calcium 8.6 8.5 - 10.1 MG/DL    Bilirubin, total 0.5 0.2 - 1.0 MG/DL    ALT (SGPT) 21 16 - 61 U/L    AST (SGOT) 27 10 - 38 U/L    Alk. phosphatase 122 (H) 45 - 117 U/L    Protein, total 7.6 6.4 - 8.2 g/dL    Albumin 3.0 (L) 3.4 - 5.0 g/dL    Globulin 4.6 (H) 2.0 - 4.0 g/dL    A-G Ratio 0.7 (L) 0.8 - 1.7     TROPONIN I    Collection Time: 07/10/21  8:45 AM   Result Value Ref Range    Troponin-I, QT <0.02 0.0 - 0.045 NG/ML       Radiologic Studies -   XR CHEST PORT   Final Result   1. Trace left pleural effusion. 2. Stable appearance of thoracic aortic stent graft and residual descending   thoracic aortic aneurysm. Thank you for enabling us to participate in the care of this patient.         XR CHEST PORT    Result Date: 7/10/2021  EXAM: CHEST PORTABLE CLINICAL HISTORY/INDICATION: Chest pain COMPARISON: 5/30/2021. TECHNIQUE: Portable AP view was obtained. FINDINGS: LINES/DEVICES: Stable position of dual-lead left subclavian approach pacemaker. Surgical clips project at the left supraclavicular region. Delmon Green HEART/MEDIASTINUM: Redemonstration of thoracic aortic stent graft and residual aneurysmal dilatation of the descending thoracic aorta, unchanged. Cardiac silhouette is within normal limits in size. LUNGS: No focal airspace opacity suggestive of pneumonia, pulmonary edema, or pneumothorax. Minimal blunting of the left costophrenic angle. SOFT TISSUES: Unremarkable. BONES: Unremarkable for age. 1. Trace left pleural effusion. 2. Stable appearance of thoracic aortic stent graft and residual descending thoracic aortic aneurysm. Thank you for enabling us to participate in the care of this patient. Medications ordered:   Medications   acetaminophen (TYLENOL) tablet 1,000 mg (has no administration in time range)         Medical Decision Making   Initial Medical Decision Making and DDx:  Consider aortic disease ACS musculoskeletal pain doubt pneumonia pneumothorax PE    ED Course: Progress Notes, Reevaluation, and Consults:     9:45 AM Pt reevaluated at this time. Discussed results and findings, as well as, diagnosis and plan for discharge. Follow up with doctors/services listed. Return to the emergency department for alarming symptoms. Pt verbalizes understanding and agreement with plan. All questions addressed. In summary negative nuclear stress in May, he is having sequential operations for his aorta. He is optimized from a cardiac perspective. The character of his pain is much more suggestive of left arm musculoskeletal pain, its worse with movement, he has had it for 12 to 24 hours without changes on EKG or troponin. Next aortic operation is on Wednesday. He will follow up with cardiology.   I do not think this represents ACS    I am the first provider for this patient. I reviewed the vital signs, available nursing notes, past medical history, past surgical history, family history and social history. Patient Vitals for the past 12 hrs:   Temp Pulse Resp BP SpO2   07/10/21 0830 98.2 °F (36.8 °C) 68 14 98/74 98 %       Vital Signs-Reviewed the patient's vital signs. Pulse Oximetry Analysis, Cardiac Monitor, 12 lead ekg:       Interpreted by the EP. Records Reviewed: Nursing notes reviewed (Time of Review: 8:24 AM)    Procedures:   Critical Care Time:   Aspirin: (was aspirin given for stroke?)    Diagnosis     Clinical Impression:   1. Musculoskeletal chest pain        Disposition: Discharged      Follow-up Information     Follow up With Specialties Details Why Contact Info    Flory Palomares MD Cardiology, Internal Medicine, Nuclear Medicine Call in 2 days  [facility]  [phone]             Patient's Medications   Start Taking    No medications on file   Continue Taking    ALBUTEROL (PROVENTIL HFA, VENTOLIN HFA, PROAIR HFA) 90 MCG/ACTUATION INHALER    Take 2 Puffs by inhalation every six (6) hours as needed for Wheezing. ASPIRIN 81 MG CHEWABLE TABLET    Take 1 Tab by mouth daily. ATENOLOL (TENORMIN) 25 MG TABLET    Take 25 mg by mouth daily. ATORVASTATIN (LIPITOR) 40 MG TABLET    Take 40 mg by mouth nightly. FLUTICASONE-SALMETEROL (ADVAIR) 100-50 MCG/DOSE DISKUS INHALER    Take 1 Puff by inhalation every twelve (12) hours. Indications: BRONCHOSPASM PREVENTION WITH COPD    GABAPENTIN (NEURONTIN) 300 MG CAPSULE    Take 300 mg by mouth three (3) times daily. Indications: NEUROPATHIC PAIN    HYDROCODONE-ACETAMINOPHEN (NORCO) 5-325 MG PER TABLET    Take 1 Tab by mouth every four (4) hours as needed for Pain. Max Daily Amount: 6 Tabs. HYDROCORTISONE-PRAMOXINE (PROCTOFOAM HC) RECTAL FOAM    Insert 1 Applicator into rectum two (2) times daily as needed for Hemorrhoids.     LEVOFLOXACIN (LEVAQUIN) 500 MG TABLET    Take 1 Tab by mouth daily. LEVOTHYROXINE (SYNTHROID) 125 MCG TABLET    137 mcg. LORAZEPAM (ATIVAN) 1 MG TABLET    Take 1 Tab by mouth every six (6) hours as needed. Max Daily Amount: 4 mg. Indications: anxiety    NICOTINE (NICODERM CQ) 14 MG/24 HR PATCH    1 Patch by TransDERmal route daily for 30 days. Indications: SMOKING CESSATION    NITROGLYCERIN (NITROSTAT) 0.4 MG SL TABLET    1 Tab by SubLINGual route every five (5) minutes as needed for Chest Pain (if chest pain not resolved after taking 3 call 911 ). PANTOPRAZOLE (PROTONIX) 40 MG TABLET    Take 40 mg by mouth daily. POLYETHYLENE GLYCOL (MIRALAX) 17 GRAM/DOSE POWDER    Take 17 g by mouth daily. 1 capful with 8 oz of water daily    RANITIDINE (ZANTAC) 300 MG TABLET    Take 1 Tab by mouth daily. SENNA (SENOKOT) 8.6 MG TABLET    Take 2 Tabs by mouth nightly. Indications: constipation    WITCH HAZEL-GLYCERIN (TUCKS) 50 % PADM    1 Container by PeriANAL route as needed.    These Medications have changed    No medications on file   Stop Taking    No medications on file     _______________________________    Notes:    Roosevelt Elizabeth MD using Dragon dictation      _______________________________ 4

## 2022-03-18 PROBLEM — K59.03 CONSTIPATION DUE TO PAIN MEDICATION THERAPY: Status: ACTIVE | Noted: 2017-06-07

## 2022-03-19 PROBLEM — E44.0 PROTEIN-CALORIE MALNUTRITION, MODERATE (HCC): Status: ACTIVE | Noted: 2017-09-12

## 2022-03-19 PROBLEM — K57.33 DIVERTICULITIS OF LARGE INTESTINE WITHOUT PERFORATION OR ABSCESS WITH BLEEDING: Status: ACTIVE | Noted: 2017-09-10

## 2022-03-19 PROBLEM — R06.02 SOB (SHORTNESS OF BREATH): Status: ACTIVE | Noted: 2017-06-02

## 2022-03-19 PROBLEM — I71.40 AAA (ABDOMINAL AORTIC ANEURYSM) WITHOUT RUPTURE (HCC): Status: ACTIVE | Noted: 2017-05-18

## 2022-03-20 PROBLEM — Z98.890: Status: ACTIVE | Noted: 2021-03-22

## 2022-03-20 PROBLEM — Z98.890 HISTORY OF AAA (ABDOMINAL AORTIC ANEURYSM) REPAIR: Status: ACTIVE | Noted: 2017-09-12

## 2022-03-20 PROBLEM — Z86.79: Status: ACTIVE | Noted: 2021-03-22

## 2023-03-30 ENCOUNTER — HOSPITAL ENCOUNTER (EMERGENCY)
Facility: HOSPITAL | Age: 73
Discharge: HOME | End: 2023-03-30
Attending: EMERGENCY MEDICINE
Payer: MEDICARE

## 2023-03-30 ENCOUNTER — APPOINTMENT (OUTPATIENT)
Facility: HOSPITAL | Age: 73
End: 2023-03-30
Payer: MEDICARE

## 2023-03-30 VITALS
DIASTOLIC BLOOD PRESSURE: 67 MMHG | WEIGHT: 235 LBS | TEMPERATURE: 98.7 F | BODY MASS INDEX: 31.83 KG/M2 | HEART RATE: 60 BPM | SYSTOLIC BLOOD PRESSURE: 139 MMHG | HEIGHT: 72 IN | RESPIRATION RATE: 11 BRPM

## 2023-03-30 DIAGNOSIS — N20.1 URETEROLITHIASIS: Primary | ICD-10-CM

## 2023-03-30 LAB
ALBUMIN SERPL-MCNC: 2.7 G/DL (ref 3.4–5)
ALBUMIN/GLOB SERPL: 0.6 (ref 0.8–1.7)
ALP SERPL-CCNC: 99 U/L (ref 45–117)
ALT SERPL-CCNC: 13 U/L (ref 16–61)
ANION GAP SERPL CALC-SCNC: 3 MMOL/L (ref 3–18)
APPEARANCE UR: CLEAR
AST SERPL-CCNC: 14 U/L (ref 10–38)
BACTERIA URNS QL MICRO: ABNORMAL /HPF
BASOPHILS # BLD: 0 K/UL (ref 0–0.1)
BASOPHILS NFR BLD: 0 % (ref 0–2)
BILIRUB SERPL-MCNC: 0.7 MG/DL (ref 0.2–1)
BILIRUB UR QL: NEGATIVE
BUN SERPL-MCNC: 13 MG/DL (ref 7–18)
BUN/CREAT SERPL: 10 (ref 12–20)
CALCIUM SERPL-MCNC: 8.9 MG/DL (ref 8.5–10.1)
CHLORIDE SERPL-SCNC: 104 MMOL/L (ref 100–111)
CO2 SERPL-SCNC: 29 MMOL/L (ref 21–32)
COLOR UR: YELLOW
CREAT SERPL-MCNC: 1.33 MG/DL (ref 0.6–1.3)
DIFFERENTIAL METHOD BLD: ABNORMAL
EKG ATRIAL RATE: 39 BPM
EKG DIAGNOSIS: NORMAL
EKG P-R INTERVAL: 166 MS
EKG Q-T INTERVAL: 496 MS
EKG QRS DURATION: 206 MS
EKG QTC CALCULATION (BAZETT): 496 MS
EKG R AXIS: -78 DEGREES
EKG T AXIS: 95 DEGREES
EKG VENTRICULAR RATE: 60 BPM
EOSINOPHIL # BLD: 0.1 K/UL (ref 0–0.4)
EOSINOPHIL NFR BLD: 1 % (ref 0–5)
EPITH CASTS URNS QL MICRO: ABNORMAL /LPF (ref 0–5)
ERYTHROCYTE [DISTWIDTH] IN BLOOD BY AUTOMATED COUNT: 15.8 % (ref 11.6–14.5)
GLOBULIN SER CALC-MCNC: 4.3 G/DL (ref 2–4)
GLUCOSE SERPL-MCNC: 99 MG/DL (ref 74–99)
GLUCOSE UR STRIP.AUTO-MCNC: NEGATIVE MG/DL
HCT VFR BLD AUTO: 36.7 % (ref 36–48)
HGB BLD-MCNC: 11.6 G/DL (ref 13–16)
HGB UR QL STRIP: ABNORMAL
IMM GRANULOCYTES # BLD AUTO: 0 K/UL (ref 0–0.04)
IMM GRANULOCYTES NFR BLD AUTO: 0 % (ref 0–0.5)
KETONES UR QL STRIP.AUTO: NEGATIVE MG/DL
LEUKOCYTE ESTERASE UR QL STRIP.AUTO: NEGATIVE
LYMPHOCYTES # BLD: 1.1 K/UL (ref 0.9–3.6)
LYMPHOCYTES NFR BLD: 14 % (ref 21–52)
MCH RBC QN AUTO: 25.9 PG (ref 24–34)
MCHC RBC AUTO-ENTMCNC: 31.6 G/DL (ref 31–37)
MCV RBC AUTO: 81.9 FL (ref 78–100)
MONOCYTES # BLD: 0.6 K/UL (ref 0.05–1.2)
MONOCYTES NFR BLD: 8 % (ref 3–10)
NEUTS SEG # BLD: 6 K/UL (ref 1.8–8)
NEUTS SEG NFR BLD: 77 % (ref 40–73)
NITRITE UR QL STRIP.AUTO: NEGATIVE
NRBC # BLD: 0 K/UL (ref 0–0.01)
NRBC BLD-RTO: 0 PER 100 WBC
PH UR STRIP: 6 (ref 5–8)
PLATELET # BLD AUTO: 164 K/UL (ref 135–420)
PMV BLD AUTO: 9.9 FL (ref 9.2–11.8)
POTASSIUM SERPL-SCNC: 4.1 MMOL/L (ref 3.5–5.5)
PROT SERPL-MCNC: 7 G/DL (ref 6.4–8.2)
PROT UR STRIP-MCNC: 30 MG/DL
RBC # BLD AUTO: 4.48 M/UL (ref 4.35–5.65)
RBC #/AREA URNS HPF: ABNORMAL /HPF (ref 0–5)
SODIUM SERPL-SCNC: 136 MMOL/L (ref 136–145)
SP GR UR REFRACTOMETRY: 1.02 (ref 1–1.03)
UROBILINOGEN UR QL STRIP.AUTO: 1 EU/DL (ref 0.2–1)
WBC # BLD AUTO: 7.8 K/UL (ref 4.6–13.2)
WBC URNS QL MICRO: ABNORMAL /HPF (ref 0–4)

## 2023-03-30 PROCEDURE — 85025 COMPLETE CBC W/AUTO DIFF WBC: CPT

## 2023-03-30 PROCEDURE — 99284 EMERGENCY DEPT VISIT MOD MDM: CPT

## 2023-03-30 PROCEDURE — 93005 ELECTROCARDIOGRAM TRACING: CPT | Performed by: EMERGENCY MEDICINE

## 2023-03-30 PROCEDURE — 80053 COMPREHEN METABOLIC PANEL: CPT

## 2023-03-30 PROCEDURE — 93010 ELECTROCARDIOGRAM REPORT: CPT | Performed by: INTERNAL MEDICINE

## 2023-03-30 PROCEDURE — 2580000003 HC RX 258: Performed by: EMERGENCY MEDICINE

## 2023-03-30 PROCEDURE — 6360000002 HC RX W HCPCS: Performed by: EMERGENCY MEDICINE

## 2023-03-30 PROCEDURE — 81001 URINALYSIS AUTO W/SCOPE: CPT

## 2023-03-30 PROCEDURE — 87086 URINE CULTURE/COLONY COUNT: CPT

## 2023-03-30 PROCEDURE — 96374 THER/PROPH/DIAG INJ IV PUSH: CPT

## 2023-03-30 PROCEDURE — 96375 TX/PRO/DX INJ NEW DRUG ADDON: CPT

## 2023-03-30 PROCEDURE — 74176 CT ABD & PELVIS W/O CONTRAST: CPT

## 2023-03-30 RX ORDER — ONDANSETRON 4 MG/1
4 TABLET, FILM COATED ORAL 3 TIMES DAILY PRN
Qty: 15 TABLET | Refills: 0 | Status: SHIPPED | OUTPATIENT
Start: 2023-03-30

## 2023-03-30 RX ORDER — SODIUM CHLORIDE, SODIUM LACTATE, POTASSIUM CHLORIDE, AND CALCIUM CHLORIDE .6; .31; .03; .02 G/100ML; G/100ML; G/100ML; G/100ML
1000 INJECTION, SOLUTION INTRAVENOUS ONCE
Status: COMPLETED | OUTPATIENT
Start: 2023-03-30 | End: 2023-03-30

## 2023-03-30 RX ORDER — HYDROMORPHONE HYDROCHLORIDE 1 MG/ML
1 INJECTION, SOLUTION INTRAMUSCULAR; INTRAVENOUS; SUBCUTANEOUS
Status: COMPLETED | OUTPATIENT
Start: 2023-03-30 | End: 2023-03-30

## 2023-03-30 RX ORDER — TAMSULOSIN HYDROCHLORIDE 0.4 MG/1
0.4 CAPSULE ORAL DAILY
Qty: 5 CAPSULE | Refills: 0 | Status: SHIPPED | OUTPATIENT
Start: 2023-03-30

## 2023-03-30 RX ORDER — ONDANSETRON 2 MG/ML
4 INJECTION INTRAMUSCULAR; INTRAVENOUS ONCE
Status: COMPLETED | OUTPATIENT
Start: 2023-03-30 | End: 2023-03-30

## 2023-03-30 RX ORDER — KETOROLAC TROMETHAMINE 15 MG/ML
15 INJECTION, SOLUTION INTRAMUSCULAR; INTRAVENOUS ONCE
Status: COMPLETED | OUTPATIENT
Start: 2023-03-30 | End: 2023-03-30

## 2023-03-30 RX ORDER — HYDROCODONE BITARTRATE AND ACETAMINOPHEN 5; 325 MG/1; MG/1
1 TABLET ORAL EVERY 6 HOURS PRN
Qty: 20 TABLET | Refills: 0 | Status: SHIPPED | OUTPATIENT
Start: 2023-03-30 | End: 2023-04-02

## 2023-03-30 RX ADMIN — ONDANSETRON 4 MG: 2 INJECTION INTRAMUSCULAR; INTRAVENOUS at 07:04

## 2023-03-30 RX ADMIN — KETOROLAC TROMETHAMINE 15 MG: 15 INJECTION, SOLUTION INTRAMUSCULAR; INTRAVENOUS at 07:03

## 2023-03-30 RX ADMIN — HYDROMORPHONE HYDROCHLORIDE 1 MG: 1 INJECTION, SOLUTION INTRAMUSCULAR; INTRAVENOUS; SUBCUTANEOUS at 07:04

## 2023-03-30 RX ADMIN — SODIUM CHLORIDE, POTASSIUM CHLORIDE, SODIUM LACTATE AND CALCIUM CHLORIDE 1000 ML: 600; 310; 30; 20 INJECTION, SOLUTION INTRAVENOUS at 07:03

## 2023-03-30 ASSESSMENT — ENCOUNTER SYMPTOMS
CHEST TIGHTNESS: 0
BACK PAIN: 1
COUGH: 0
NAUSEA: 0
VOMITING: 0
SHORTNESS OF BREATH: 0
ABDOMINAL PAIN: 1

## 2023-03-30 ASSESSMENT — LIFESTYLE VARIABLES
HOW OFTEN DO YOU HAVE A DRINK CONTAINING ALCOHOL: NEVER
HOW MANY STANDARD DRINKS CONTAINING ALCOHOL DO YOU HAVE ON A TYPICAL DAY: PATIENT DOES NOT DRINK

## 2023-03-30 NOTE — ACP (ADVANCE CARE PLANNING)
Advance Care Planning     Advance Care Planning Inpatient Note  Spiritual Care Department    Today's Date: 3/30/2023  Unit: [facility] EMERGENCY DEPT    Received request from . Upon review of chart and communication with care team, patient's decision making abilities are not in question. . Patient and Friends was/were present in the room during visit. Goals of ACP Conversation:  Discuss advance care planning documents    Health Care Decision Makers:     No healthcare decision makers have been documented. Click here to complete [address] including selection of the Healthcare Decision Maker Relationship (ie \"Primary\")  Summary:  No Decision Maker named by patient at this time    Advance Care Planning Documents (Patient Wishes):  None     Assessment:  Patient seen in bed 9 of the emergency room where he will probably be admitted to the hospital from. Patient is complaining of Pain in his head.   Asked him about his having an advance directive on file here and he said no but that he does have one completed with a copy at the house or at [facility].    Interventions:  Patient DECLINED ACP conversation    Care Preferences Communicated:   No    Outcomes/Plan:      Electronically signed by Deana Lozano [facility] on 3/30/2023 at 10:32 AM

## 2023-03-30 NOTE — PROGRESS NOTES
completed the initial Spiritual Assessment of the patient, and offered Pastoral Care, support to the patient and family member present. Patient is waiting to see if he will be admitted to the hospital after test are run. Advance directive is at home and not here on file. Patient does not have any Cheondoism/cultural needs that will affect patients preferences in health care. Chaplains will continue to follow and will provide pastoral care on an as needed/requested basis.     Montana Barboza  [facility] Care Department  [phone]

## 2023-03-30 NOTE — ED PROVIDER NOTES
EMERGENCY DEPARTMENT HISTORY AND PHYSICAL EXAM      Date: 3/30/2023  Patient Name: Lynda Sapp    History of Presenting Illness     Chief Complaint   Patient presents with    Abdominal Pain       This is a 71-year-old man who presents to the emerged from complaints of right flank pain with some associated nausea since yesterday evening. He states that the pain came on relatively abruptly, states it has been essentially constant but has periods of worsening. He states it does not seem to be affected by position or movement, he has had some feeling that he needs to go to the bathroom but that does not seem to help with the pain. He has not noticed any blood in the urine, has not had any dysuria or urinary frequency. He denies any fevers or chills. He does state he has history of some lumbar spine issues though that pain typically does not feel like this. He denies any prior history of kidney stones, states he does have a stents in his renal arteries as well as his aorta. PCP: Melchor Goodpasture, MD    No current facility-administered medications for this encounter. Current Outpatient Medications   Medication Sig Dispense Refill    HYDROcodone-acetaminophen (NORCO) 5-325 MG per tablet Take 1 tablet by mouth every 6 hours as needed for Pain for up to 3 days. Intended supply: 3 days.  Take lowest dose possible to manage pain Max Daily Amount: 4 tablets 20 tablet 0    ondansetron (ZOFRAN) 4 MG tablet Take 1 tablet by mouth 3 times daily as needed for Nausea or Vomiting 15 tablet 0    tamsulosin (FLOMAX) 0.4 MG capsule Take 1 capsule by mouth daily 5 capsule 0    albuterol sulfate HFA (PROVENTIL;VENTOLIN;PROAIR) 108 (90 Base) MCG/ACT inhaler Inhale 2 puffs into the lungs every 6 hours as needed      aspirin 81 MG chewable tablet Take 81 mg by mouth daily      atenolol (TENORMIN) 25 MG tablet Take 25 mg by mouth daily      atorvastatin (LIPITOR) 40 MG tablet Take 40 mg by mouth      fluticasone-salmeterol

## 2023-03-30 NOTE — DISCHARGE INSTRUCTIONS
The urology office will call you to set up a follow-up appointment. If you start running fevers or you have pain that is not controlled with pain medication, return here.

## 2023-03-31 LAB
BACTERIA SPEC CULT: NORMAL
SERVICE CMNT-IMP: NORMAL

## 2023-04-05 NOTE — ED NOTES
Dr. Katharine aHddad at bedside. Problem: Chronic Conditions and Co-morbidities  Goal: Patient's chronic conditions and co-morbidity symptoms are monitored and maintained or improved  Outcome: Adequate for Discharge     Problem: Safety - Adult  Goal: Free from fall injury  Outcome: Adequate for Discharge

## 2023-08-25 ENCOUNTER — PREP FOR PROCEDURE (OUTPATIENT)
Age: 73
End: 2023-08-25

## 2023-08-25 ENCOUNTER — OFFICE VISIT (OUTPATIENT)
Age: 73
End: 2023-08-25

## 2023-08-25 ENCOUNTER — TELEPHONE (OUTPATIENT)
Age: 73
End: 2023-08-25

## 2023-08-25 VITALS
DIASTOLIC BLOOD PRESSURE: 78 MMHG | HEART RATE: 64 BPM | OXYGEN SATURATION: 97 % | HEIGHT: 72 IN | WEIGHT: 224 LBS | SYSTOLIC BLOOD PRESSURE: 124 MMHG | BODY MASS INDEX: 30.34 KG/M2

## 2023-08-25 DIAGNOSIS — E78.5 DYSLIPIDEMIA: ICD-10-CM

## 2023-08-25 DIAGNOSIS — I44.2 COMPLETE HEART BLOCK (HCC): ICD-10-CM

## 2023-08-25 DIAGNOSIS — I71.60 THORACOABDOMINAL AORTIC ANEURYSM (TAAA) WITHOUT RUPTURE, UNSPECIFIED PART (HCC): ICD-10-CM

## 2023-08-25 DIAGNOSIS — F17.200 TOBACCO DEPENDENCY: ICD-10-CM

## 2023-08-25 DIAGNOSIS — I71.40 ABDOMINAL AORTIC ANEURYSM (AAA) WITHOUT RUPTURE, UNSPECIFIED PART (HCC): ICD-10-CM

## 2023-08-25 DIAGNOSIS — Z95.0 PACEMAKER: ICD-10-CM

## 2023-08-25 DIAGNOSIS — Z01.818 PRE-OP TESTING: ICD-10-CM

## 2023-08-25 DIAGNOSIS — I10 PRIMARY HYPERTENSION: ICD-10-CM

## 2023-08-25 DIAGNOSIS — Z45.010 PACEMAKER GENERATOR END OF LIFE: Primary | ICD-10-CM

## 2023-08-25 DIAGNOSIS — Z45.010 ENCOUNTER FOR PACEMAKER AT END OF BATTERY LIFE: Primary | ICD-10-CM

## 2023-08-25 DIAGNOSIS — I25.10 ATHEROSCLEROSIS OF NATIVE CORONARY ARTERY OF NATIVE HEART WITHOUT ANGINA PECTORIS: ICD-10-CM

## 2023-08-25 PROBLEM — I71.00 AORTIC DISSECTION (HCC): Status: ACTIVE | Noted: 2021-05-30

## 2023-08-25 RX ORDER — SODIUM CHLORIDE 0.9 % (FLUSH) 0.9 %
5-40 SYRINGE (ML) INJECTION PRN
Status: CANCELLED | OUTPATIENT
Start: 2023-08-25

## 2023-08-25 RX ORDER — GABAPENTIN 600 MG/1
600 TABLET ORAL 3 TIMES DAILY
COMMUNITY

## 2023-08-25 RX ORDER — CARVEDILOL 6.25 MG/1
6.25 TABLET ORAL 2 TIMES DAILY
COMMUNITY
Start: 2023-06-12

## 2023-08-25 RX ORDER — SODIUM CHLORIDE 0.9 % (FLUSH) 0.9 %
5-40 SYRINGE (ML) INJECTION EVERY 12 HOURS SCHEDULED
Status: CANCELLED | OUTPATIENT
Start: 2023-08-25

## 2023-08-25 RX ORDER — SODIUM CHLORIDE 9 MG/ML
INJECTION, SOLUTION INTRAVENOUS PRN
Status: CANCELLED | OUTPATIENT
Start: 2023-08-25

## 2023-08-25 ASSESSMENT — PATIENT HEALTH QUESTIONNAIRE - PHQ9
2. FEELING DOWN, DEPRESSED OR HOPELESS: 0
SUM OF ALL RESPONSES TO PHQ QUESTIONS 1-9: 0
1. LITTLE INTEREST OR PLEASURE IN DOING THINGS: 0
SUM OF ALL RESPONSES TO PHQ QUESTIONS 1-9: 0
SUM OF ALL RESPONSES TO PHQ9 QUESTIONS 1 & 2: 0
SUM OF ALL RESPONSES TO PHQ QUESTIONS 1-9: 0
SUM OF ALL RESPONSES TO PHQ QUESTIONS 1-9: 0

## 2023-08-25 ASSESSMENT — ENCOUNTER SYMPTOMS
ABDOMINAL PAIN: 0
VOMITING: 0
ABDOMINAL DISTENTION: 0
SHORTNESS OF BREATH: 1
COUGH: 1
NAUSEA: 0
SORE THROAT: 0

## 2023-08-25 NOTE — PROGRESS NOTES
Danny Solano presents today for   Chief Complaint   Patient presents with    New Patient     Re-establishing care, Last seen in the office on 12-    Device Check     Medtronic, Vpaced; 100%, Dual Chamber Pacer       Gne Gallegos preferred language for health care discussion is english/other. Is someone accompanying this pt? yes    Is the patient using any DME equipment during OV? walker    Depression Screening:  Depression: Not at risk    PHQ-2 Score: 0        Learning Assessment:  Who is the primary learner? Patient    What is the preferred language for health care of the primary learner? ENGLISH    How does the primary learner prefer to learn new concepts? DEMONSTRATION    Answered By patient    Relationship to Learner SELF           Pt currently taking Anticoagulant therapy? no    Pt currently taking Antiplatelet therapy ? ASA 81 mg once a day and Plavix 75 mg once a day      Coordination of Care:  1. Have you been to the ER, urgent care clinic since your last visit? Hospitalized since your last visit? no    2. Have you seen or consulted any other health care providers outside of the [address] [place] [address] since your last visit? Include any pap smears or colon screening.  no
block.  Initial Medtronic dual-chamber pacemaker placed in 2013. His battery has reached the elective replacement time as described above. His new device will be MRI compatible. Coronary artery disease: History of PCI to his left circumflex in 2013. His most recent nuclear stress test in May 2021 demonstrated a fixed apical and inferoapical defect consistent with infarct. There is no ischemia. He denies any new anginal type symptoms. He has been maintained on dual antiplatelet therapy and a potent statin and a beta-blocker. I would continue his current regimen. Ischemic cardiomyopathy. EF 50 to 55% by echocardiogram in 2021. No evidence of decompensation on exam today. He has never required diuretic therapy. Hypertension. Patient's blood pressure appears to be reasonably well controlled on carvedilol as monotherapy. Dyslipidemia. Patient has been taking atorvastatin 80 mg daily. His LDL should be less than 70 as close to 50 as possible. Peripheral arterial disease. Patient with known TAA and AAA both underwent endovascular repair in the past.  He is also had to have revisions for Endo leaks. His most recent surgery was in July 2021. He is now followed closely by vascular [facility]. He underwent a CTA in March 2023 which was stable compared to previous studies. Tobacco use disorder. Patient continues to smoke a pack of cigarettes a day. He has over a 50-pack-year smoking history. He was strongly encouraged to consider complete smoking cessation. Follow-up in 1 month following his pacemaker generator exchange, sooner if needed.         Hi Blevins MD

## 2023-08-25 NOTE — PATIENT INSTRUCTIONS
[facility]   Patient  EP Instructions  [address] [facility], [address]                You are scheduled to have a Gen change  on  September 6, 2023 , at 0800. Please check in at 0645. Please go to [facility] and park in the outpatient parking lot that is located around to the back of the hospital and enter through the [facility]. Once you enter through the [facility] check in with the  there. The  will either give you directions or assist you in getting to the cath holding area. 3.  You are not to eat or drink anything after midnight the night before your procedure. Please continue to take your medications with a small sip of water on the morning of the procedure with the following exceptions:Plavix-hold for 5 days prior to procedure. If you are diabetic, do not take your insulin/sugar pill the morning of the procedure. We encourage families to wait in the waiting room on the first floor while the procedure is being done. The Doctor will come out and talk with you as soon as the procedure is over. You will not be able to drive yourself home after your procedure is done. There is the possibility that you may spend the night in the hospital, depending on the results of the procedure. This will be determined after the procedure is done. 8.   If you or your family have any questions, please call our office Monday-Friday 8:30 am - 4:30 pm , at [phone], and ask to speak to one of the nurses. Please have labs completed 1 week prior to the procedure. Pt verbalized full understanding    Post Implant Instructions for Pacemakers,AICD, and BIV devices. You may remove the big bulky bandage from the incision site after 24-48 hours. Keep the incision clean and dry for another 2 days. After that, it is ok to get the incision wet but do not soak or scrub the incision.   Pat the area dry with a clean

## 2023-09-05 ENCOUNTER — HOSPITAL ENCOUNTER (OUTPATIENT)
Facility: HOSPITAL | Age: 73
Discharge: HOME | End: 2023-09-08
Payer: MEDICARE

## 2023-09-05 DIAGNOSIS — Z01.818 PRE-OP TESTING: ICD-10-CM

## 2023-09-05 DIAGNOSIS — Z45.010 PACEMAKER GENERATOR END OF LIFE: ICD-10-CM

## 2023-09-05 LAB
ALBUMIN SERPL-MCNC: 3.3 G/DL (ref 3.4–5)
ALBUMIN/GLOB SERPL: 0.7 (ref 0.8–1.7)
ALP SERPL-CCNC: 128 U/L (ref 45–117)
ALT SERPL-CCNC: 16 U/L (ref 16–61)
ANION GAP SERPL CALC-SCNC: 5 MMOL/L (ref 3–18)
AST SERPL-CCNC: 17 U/L (ref 10–38)
BILIRUB SERPL-MCNC: 0.6 MG/DL (ref 0.2–1)
BUN SERPL-MCNC: 13 MG/DL (ref 7–18)
BUN/CREAT SERPL: 8 (ref 12–20)
CALCIUM SERPL-MCNC: 9.5 MG/DL (ref 8.5–10.1)
CHLORIDE SERPL-SCNC: 102 MMOL/L (ref 100–111)
CO2 SERPL-SCNC: 30 MMOL/L (ref 21–32)
CREAT SERPL-MCNC: 1.54 MG/DL (ref 0.6–1.3)
ERYTHROCYTE [DISTWIDTH] IN BLOOD BY AUTOMATED COUNT: 16.1 % (ref 11.6–14.5)
GLOBULIN SER CALC-MCNC: 4.9 G/DL (ref 2–4)
GLUCOSE SERPL-MCNC: 104 MG/DL (ref 74–99)
HCT VFR BLD AUTO: 43.7 % (ref 36–48)
HGB BLD-MCNC: 13.5 G/DL (ref 13–16)
INR PPP: 1 (ref 0.9–1.1)
MCH RBC QN AUTO: 25.5 PG (ref 24–34)
MCHC RBC AUTO-ENTMCNC: 30.9 G/DL (ref 31–37)
MCV RBC AUTO: 82.5 FL (ref 78–100)
NRBC # BLD: 0 K/UL (ref 0–0.01)
NRBC BLD-RTO: 0 PER 100 WBC
PLATELET # BLD AUTO: 224 K/UL (ref 135–420)
PMV BLD AUTO: 10.3 FL (ref 9.2–11.8)
POTASSIUM SERPL-SCNC: 5.2 MMOL/L (ref 3.5–5.5)
PROT SERPL-MCNC: 8.2 G/DL (ref 6.4–8.2)
PROTHROMBIN TIME: 13 SEC (ref 11.9–14.7)
RBC # BLD AUTO: 5.3 M/UL (ref 4.35–5.65)
SODIUM SERPL-SCNC: 137 MMOL/L (ref 136–145)
WBC # BLD AUTO: 8.4 K/UL (ref 4.6–13.2)

## 2023-09-05 PROCEDURE — 71046 X-RAY EXAM CHEST 2 VIEWS: CPT

## 2023-09-05 PROCEDURE — 85027 COMPLETE CBC AUTOMATED: CPT

## 2023-09-05 PROCEDURE — 36415 COLL VENOUS BLD VENIPUNCTURE: CPT

## 2023-09-05 PROCEDURE — 85610 PROTHROMBIN TIME: CPT

## 2023-09-05 PROCEDURE — 80053 COMPREHEN METABOLIC PANEL: CPT

## 2023-09-06 ENCOUNTER — HOSPITAL ENCOUNTER (OUTPATIENT)
Facility: HOSPITAL | Age: 73
Setting detail: OUTPATIENT SURGERY
Discharge: HOME | End: 2023-09-06
Attending: INTERNAL MEDICINE | Admitting: INTERNAL MEDICINE
Payer: MEDICARE

## 2023-09-06 VITALS
RESPIRATION RATE: 13 BRPM | HEART RATE: 60 BPM | OXYGEN SATURATION: 99 % | SYSTOLIC BLOOD PRESSURE: 102 MMHG | DIASTOLIC BLOOD PRESSURE: 71 MMHG

## 2023-09-06 DIAGNOSIS — Z45.010 PACEMAKER GENERATOR END OF LIFE: Primary | ICD-10-CM

## 2023-09-06 DIAGNOSIS — Z45.010 ENCOUNTER FOR PACEMAKER AT END OF BATTERY LIFE: ICD-10-CM

## 2023-09-06 PROCEDURE — 2500000003 HC RX 250 WO HCPCS: Performed by: INTERNAL MEDICINE

## 2023-09-06 PROCEDURE — C1785 PMKR, DUAL, RATE-RESP: HCPCS | Performed by: INTERNAL MEDICINE

## 2023-09-06 PROCEDURE — 2709999900 HC NON-CHARGEABLE SUPPLY: Performed by: INTERNAL MEDICINE

## 2023-09-06 PROCEDURE — 99153 MOD SED SAME PHYS/QHP EA: CPT | Performed by: INTERNAL MEDICINE

## 2023-09-06 PROCEDURE — 99152 MOD SED SAME PHYS/QHP 5/>YRS: CPT | Performed by: INTERNAL MEDICINE

## 2023-09-06 PROCEDURE — 33228 REMV&REPLC PM GEN DUAL LEAD: CPT | Performed by: INTERNAL MEDICINE

## 2023-09-06 PROCEDURE — 2720000010 HC SURG SUPPLY STERILE: Performed by: INTERNAL MEDICINE

## 2023-09-06 PROCEDURE — 6360000002 HC RX W HCPCS: Performed by: INTERNAL MEDICINE

## 2023-09-06 DEVICE — IPG W1DR01 AZURE XT DR MRI USA
Type: IMPLANTABLE DEVICE | Status: FUNCTIONAL
Brand: AZURE™ XT DR MRI SURESCAN™

## 2023-09-06 RX ORDER — SODIUM CHLORIDE 0.9 % (FLUSH) 0.9 %
5-40 SYRINGE (ML) INJECTION PRN
Status: DISCONTINUED | OUTPATIENT
Start: 2023-09-06 | End: 2023-09-06 | Stop reason: HOSPADM

## 2023-09-06 RX ORDER — SODIUM CHLORIDE 9 MG/ML
INJECTION, SOLUTION INTRAVENOUS PRN
Status: DISCONTINUED | OUTPATIENT
Start: 2023-09-06 | End: 2023-09-06 | Stop reason: HOSPADM

## 2023-09-06 RX ORDER — SODIUM CHLORIDE 0.9 % (FLUSH) 0.9 %
5-40 SYRINGE (ML) INJECTION EVERY 12 HOURS SCHEDULED
Status: DISCONTINUED | OUTPATIENT
Start: 2023-09-06 | End: 2023-09-06 | Stop reason: HOSPADM

## 2023-09-06 RX ORDER — FENTANYL CITRATE 50 UG/ML
INJECTION, SOLUTION INTRAMUSCULAR; INTRAVENOUS PRN
Status: DISCONTINUED | OUTPATIENT
Start: 2023-09-06 | End: 2023-09-06 | Stop reason: HOSPADM

## 2023-09-06 RX ORDER — MIDAZOLAM HYDROCHLORIDE 1 MG/ML
INJECTION INTRAMUSCULAR; INTRAVENOUS PRN
Status: DISCONTINUED | OUTPATIENT
Start: 2023-09-06 | End: 2023-09-06 | Stop reason: HOSPADM

## 2023-09-06 RX ORDER — CEFAZOLIN SODIUM 1 G/3ML
INJECTION, POWDER, FOR SOLUTION INTRAMUSCULAR; INTRAVENOUS PRN
Status: DISCONTINUED | OUTPATIENT
Start: 2023-09-06 | End: 2023-09-06 | Stop reason: HOSPADM

## 2023-09-06 RX ORDER — ACETAMINOPHEN 325 MG/1
650 TABLET ORAL EVERY 4 HOURS PRN
Status: DISCONTINUED | OUTPATIENT
Start: 2023-09-06 | End: 2023-09-06 | Stop reason: HOSPADM

## 2023-09-06 ASSESSMENT — PAIN DESCRIPTION - LOCATION
LOCATION: BACK
LOCATION: BACK

## 2023-09-06 ASSESSMENT — PAIN DESCRIPTION - FREQUENCY: FREQUENCY: CONTINUOUS

## 2023-09-06 NOTE — DISCHARGE INSTRUCTIONS
DISCHARGE SUMMARY from Nurse    PATIENT INSTRUCTIONS:    After general anesthesia or intravenous sedation, for 24 hours or while taking prescription Narcotics:  Limit your activities  Do not drive and operate hazardous machinery  Do not make important personal or business decisions  Do  not drink alcoholic beverages  If you have not urinated within 8 hours after discharge, please contact your surgeon on call. Report the following to your surgeon:  Excessive pain, swelling, redness or odor of or around the surgical area  Temperature over 100.5  Nausea and vomiting lasting longer than 4 hours or if unable to take medications  Any signs of decreased circulation or nerve impairment to extremity: change in color, persistent  numbness, tingling, coldness or increase pain  Any questions    What to do at Home:  Recommended activity: no lifting, Driving, or Strenuous exercise for 24 hours. If you experience any of the following symptoms bleeding,swelling, acute pain or numbness, fever, please follow up with Dr. Asha Hurst. Roque Littlejohn. *  Please give a list of your current medications to your Primary Care Provider. *  Please update this list whenever your medications are discontinued, doses are      changed, or new medications (including over-the-counter products) are added. *  Please carry medication information at all times in case of emergency situations. These are general instructions for a healthy lifestyle:    No smoking/ No tobacco products/ Avoid exposure to second hand smoke  Surgeon General's Warning:  Quitting smoking now greatly reduces serious risk to your health.     Obesity, smoking, and sedentary lifestyle greatly increases your risk for illness    A healthy diet, regular physical exercise & weight monitoring are important for maintaining a healthy lifestyle    You may be retaining fluid if you have a history of heart failure or if you experience any of the following symptoms:  Weight gain of 3 pounds

## 2023-09-06 NOTE — H&P
HPI     Patient presents for a new office visit. He was previously seen in our office almost 8 years ago. He has been receiving most of his care through the [facility] cardiologist.  He has a past medical history significant for complete heart block, status post dual-chamber permanent pacemaker placed in 2013. He also has a history of coronary disease, status post remote stenting to his left circumflex greater than 10 years ago. He also has a history of extensive vascular disease with a AAA and a TAA both requiring endovascular surgery repair multiple times. He was referred back here after his pacemaker was found to have reached the elective replacement time last month. He last underwent an echocardiogram through [facility] in April 2021 which showed a very mildly depressed LV function, EF 50 to 55% with a dyskinetic distal anterior wall and apex. He also underwent a pharmacologic nuclear stress test in May 2021 prior to undergoing vascular surgery. This was felt to be an intermediate risk study with a fixed apical and an apical defect consistent with prior infarct, there were no reversible defects concerning for ischemia. He last underwent revision of his TAA endograft in July 2021. Patient admits to increased fatigue over the past few months. He denies any chest pain or claudication. No increased shortness of breath from his baseline. He is still smoking about a pack of cigarettes a day. He denies any heart palpitations, dizzy spells, nor syncope. Past Medical History        Past Medical History:   Diagnosis Date    Arthritis      CAD (coronary artery disease), native coronary artery       MARIELY X 2 to OM1    Carotid bruit 08/13/2013     Mild <50% bilateral ICA plaquing.       Complete heart block [facility]) June 2013     [employer] dual chamber pacemaker    COPD (chronic obstructive pulmonary disease) (HCC)      Diverticulitis of large intestine without perforation or abscess with bleeding 9/10/2017

## 2023-09-13 ENCOUNTER — NURSE ONLY (OUTPATIENT)
Age: 73
End: 2023-09-13

## 2023-09-13 DIAGNOSIS — Z45.010 PACEMAKER GENERATOR END OF LIFE: Primary | ICD-10-CM

## 2023-09-13 DIAGNOSIS — Z95.0 PACEMAKER: ICD-10-CM

## 2023-09-13 DIAGNOSIS — I44.2 COMPLETE HEART BLOCK (HCC): ICD-10-CM

## 2023-11-27 ENCOUNTER — OFFICE VISIT (OUTPATIENT)
Age: 73
End: 2023-11-27
Payer: MEDICARE

## 2023-11-27 VITALS
BODY MASS INDEX: 30.48 KG/M2 | HEIGHT: 72 IN | SYSTOLIC BLOOD PRESSURE: 101 MMHG | WEIGHT: 225 LBS | OXYGEN SATURATION: 98 % | DIASTOLIC BLOOD PRESSURE: 68 MMHG | HEART RATE: 97 BPM

## 2023-11-27 DIAGNOSIS — Z95.0 PACEMAKER: ICD-10-CM

## 2023-11-27 DIAGNOSIS — I44.2 COMPLETE HEART BLOCK (HCC): Primary | ICD-10-CM

## 2023-11-27 DIAGNOSIS — E78.1 HYPERTRIGLYCERIDEMIA: ICD-10-CM

## 2023-11-27 DIAGNOSIS — I71.40 ABDOMINAL AORTIC ANEURYSM (AAA) WITHOUT RUPTURE, UNSPECIFIED PART (HCC): ICD-10-CM

## 2023-11-27 DIAGNOSIS — F17.200 TOBACCO DEPENDENCY: ICD-10-CM

## 2023-11-27 DIAGNOSIS — I25.10 CORONARY ARTERY DISEASE INVOLVING NATIVE CORONARY ARTERY OF NATIVE HEART WITHOUT ANGINA PECTORIS: ICD-10-CM

## 2023-11-27 PROCEDURE — 99214 OFFICE O/P EST MOD 30 MIN: CPT | Performed by: INTERNAL MEDICINE

## 2023-11-27 PROCEDURE — G8484 FLU IMMUNIZE NO ADMIN: HCPCS | Performed by: INTERNAL MEDICINE

## 2023-11-27 PROCEDURE — 3017F COLORECTAL CA SCREEN DOC REV: CPT | Performed by: INTERNAL MEDICINE

## 2023-11-27 PROCEDURE — 4004F PT TOBACCO SCREEN RCVD TLK: CPT | Performed by: INTERNAL MEDICINE

## 2023-11-27 PROCEDURE — 3078F DIAST BP <80 MM HG: CPT | Performed by: INTERNAL MEDICINE

## 2023-11-27 PROCEDURE — 3074F SYST BP LT 130 MM HG: CPT | Performed by: INTERNAL MEDICINE

## 2023-11-27 PROCEDURE — 1123F ACP DISCUSS/DSCN MKR DOCD: CPT | Performed by: INTERNAL MEDICINE

## 2023-11-27 PROCEDURE — G8427 DOCREV CUR MEDS BY ELIG CLIN: HCPCS | Performed by: INTERNAL MEDICINE

## 2023-11-27 PROCEDURE — G8417 CALC BMI ABV UP PARAM F/U: HCPCS | Performed by: INTERNAL MEDICINE

## 2023-11-27 ASSESSMENT — ENCOUNTER SYMPTOMS
VOMITING: 0
ABDOMINAL PAIN: 0
COUGH: 1
NAUSEA: 0
SHORTNESS OF BREATH: 1
ABDOMINAL DISTENTION: 0
SORE THROAT: 0

## 2023-11-27 ASSESSMENT — PATIENT HEALTH QUESTIONNAIRE - PHQ9
SUM OF ALL RESPONSES TO PHQ QUESTIONS 1-9: 0
SUM OF ALL RESPONSES TO PHQ QUESTIONS 1-9: 0
SUM OF ALL RESPONSES TO PHQ9 QUESTIONS 1 & 2: 0
2. FEELING DOWN, DEPRESSED OR HOPELESS: 0
SUM OF ALL RESPONSES TO PHQ QUESTIONS 1-9: 0
SUM OF ALL RESPONSES TO PHQ QUESTIONS 1-9: 0
1. LITTLE INTEREST OR PLEASURE IN DOING THINGS: 0

## 2023-11-27 NOTE — PROGRESS NOTES
Danny Solano presents today for   Chief Complaint   Patient presents with    Follow-up     3 month       Gen Gallegos preferred language for health care discussion is english/other. Is someone accompanying this pt? no    Is the patient using any DME equipment during OV? no    Depression Screening:  Depression: Not at risk (11/27/2023)    PHQ-2     PHQ-2 Score: 0        Learning Assessment:  Who is the primary learner? Patient    What is the preferred language for health care of the primary learner? ENGLISH    How does the primary learner prefer to learn new concepts? DEMONSTRATION    Answered By patient    Relationship to Learner SELF           Pt currently taking Anticoagulant therapy? no    Pt currently taking Antiplatelet therapy ? Plavix  Aspirin         Coordination of Care:  1. Have you been to the ER, urgent care clinic since your last visit? Hospitalized since your last visit? no    2. Have you seen or consulted any other health care providers outside of the [address] [place] [address] since your last visit? Include any pap smears or colon screening.  no
sensing pacing and impedance values since implant. Coronary artery disease: History of PCI to his left circumflex in 2013. His most recent nuclear stress test in May 2021 demonstrated a fixed apical and inferoapical defect consistent with infarct. There is no ischemia. He denies any new anginal type symptoms. He has been maintained on dual antiplatelet therapy and a potent statin. His beta-blocker was stopped due to low blood pressure. I would continue his current regimen. Ischemic cardiomyopathy. EF 50 to 55% by echocardiogram in 2021. No evidence of decompensation on exam today. He has never required diuretic therapy. Dyslipidemia. Patient has been taking atorvastatin 80 mg daily. His LDL should be less than 70 as close to 50 as possible. Peripheral arterial disease. Patient with known TAA and AAA both underwent endovascular repair in the past.  He is also had to have revisions for Endo leaks. His most recent surgery was in July 2021. He is now followed closely by vascular surgery at [facility]. He underwent a CTA in March 2023 which was stable compared to previous studies. Tobacco use disorder. Patient continues to smoke a pack of cigarettes a day. He has over a 50-pack-year smoking history. He was strongly encouraged to consider complete smoking cessation. Patient can follow-up in our office annually for device checks.       Ludin Beckford MD

## (undated) DEVICE — GLOVE SURG SZ 7.5 L11.73IN FNGR THK9.8MIL STRW LTX POLYMER

## (undated) DEVICE — SHEATH INTRO 11FR L11CM GWIRE 0.035IN POLYUR RADPQ SIDE PRT

## (undated) DEVICE — SUTURE PERMA-HAND SZ 2-0 L24IN NONABSORBABLE BLK W/O NDL SA75H

## (undated) DEVICE — 3M™ MEDIPORE™ H SOFT CLOTH SURGICAL TAPE 2864, 4 INCH X 10 YARD (10CM X 9,14M), 12 ROLLS/CASE: Brand: 3M™ MEDIPORE™

## (undated) DEVICE — GUIDEWIRE VASC L180CM DIA0035IN L15CM STR TIP PTFE HEP S STL

## (undated) DEVICE — ENDOSCOPY PUMP TUBING/ CAP SET: Brand: ERBE

## (undated) DEVICE — Device

## (undated) DEVICE — NEEDLE ANGIO 1 WALL ULT SMOOTH 19GAX7CM MERIT AVANCE

## (undated) DEVICE — SUTURE MCRYL SZ 3-0 L27IN ABSRB UD L26MM SH 1/2 CIR Y416H

## (undated) DEVICE — MEDI-TRACE CADENCE ADULT, DEFIBRILLATION ELECTRODE -RTS  (10 PR/PK) - PHYSIO-CONTROL: Brand: MEDI-TRACE CADENCE

## (undated) DEVICE — SUTURE PROL SZ 5-0 L36IN NONABSORBABLE BLU L13MM C-1 3/8 8720H

## (undated) DEVICE — INFUSOR PRSS BG CUF 500ML -- MEDICHOICE

## (undated) DEVICE — 3M™ DURAPORE™ SURGICAL TAPE 2 INCHES X 10YARDS (5.0CM X 9.1M) 6ROLLS/CARTON 10CARTONS/CASE 1538-2: Brand: 3M™ DURAPORE™

## (undated) DEVICE — ELECTRODE PT RET AD L9FT HI MOIST COND ADH HYDRGEL CORDED

## (undated) DEVICE — DRAPE,TOP,102X53,STERILE: Brand: MEDLINE

## (undated) DEVICE — SUTURE PERMA HND SZ 0 L18IN NONABSORBABLE BLK L30MM PSL REV 580H

## (undated) DEVICE — MAYO STAND COVER: Brand: CONVERTORS

## (undated) DEVICE — DRAPE XR C ARM 41X74IN LF --

## (undated) DEVICE — MEDI-VAC NON-CONDUCTIVE SUCTION TUBING: Brand: CARDINAL HEALTH

## (undated) DEVICE — 3M™ IOBAN™ 2 ANTIMICROBIAL INCISE DRAPE 6640EZ: Brand: IOBAN™ 2

## (undated) DEVICE — LIMB HOLDER, WRIST/ANKLE: Brand: DEROYAL

## (undated) DEVICE — SYR LR LCK 1ML GRAD NSAF 30ML --

## (undated) DEVICE — TRAY PREP DRY W/ PREM GLV 2 APPL 6 SPNG 2 UNDPD 1 OVERWRAP

## (undated) DEVICE — PRESSURE MONITORING SET: Brand: TRUWAVE

## (undated) DEVICE — SYR 50ML LR LCK 1ML GRAD NSAF --

## (undated) DEVICE — FORCEPS BX L240CM JAW DIA2.8MM L CAP W/ NDL MIC MESH TOOTH

## (undated) DEVICE — DRAPE TWL SURG 16X26IN BLU ORB04] ALLCARE INC]

## (undated) DEVICE — SUTURE ABSORBABLE BRAIDED 2-0 CT-1 27 IN UD VICRYL J259H

## (undated) DEVICE — TABLE COVER: Brand: CONVERTORS

## (undated) DEVICE — KENDALL RADIOLUCENT FOAM MONITORING ELECTRODE RECTANGULAR SHAPE: Brand: KENDALL

## (undated) DEVICE — NEEDLE HYPO 18GA L1.5IN PNK S STL HUB POLYPR SHLD REG BVL

## (undated) DEVICE — APPLIER CLP AUTO MED 9.75 IN TI SURGCLP SUPER INTLOK 20 DISP

## (undated) DEVICE — GAUZE SPONGES,16 PLY: Brand: CURITY

## (undated) DEVICE — KIT CATH 7FR L16CM CTRL VEN POLYUR 3 LUMN PRSS INJ BLU

## (undated) DEVICE — AIRLIFE™ NASAL OXYGEN CANNULA CURVED, NONFLARED TIP WITH 14 FOOT (4.3 M) CRUSH-RESISTANT TUBING, OVER-THE-EAR STYLE: Brand: AIRLIFE™

## (undated) DEVICE — CATH DIAG 4/ BERN/65/035 -- IMAGER II 31-609

## (undated) DEVICE — CATH BLLN STENT GRAF 12FRX46MM -- RELIANT

## (undated) DEVICE — INFLATION DEVICE: Brand: ENCORE™ 26

## (undated) DEVICE — SYRINGE MED 25GA 3ML L5/8IN SUBQ PLAS W/ DETACH NDL SFTY

## (undated) DEVICE — 3M™ BAIR PAWS FLEX™ WARMING GOWN, STANDARD, 20 PER CASE 81003: Brand: BAIR PAWS™

## (undated) DEVICE — (D)GLOVE EXAM LG NITRL NS -- DISC BY MFR NO SUB

## (undated) DEVICE — GOWN ISOL IMPERV UNIV, DISP, OPEN BACK, BLUE --

## (undated) DEVICE — Z DISCONTINUED PER MEDLINE SYRINGE ANGIO 150ML PWR INJ FAST LOAD J STRW TBNG SPIK

## (undated) DEVICE — SYR POWER 150ML 8IN FILL TUBE --

## (undated) DEVICE — GLOVE SURG SZ 7 L11.33IN FNGR THK9.8MIL STRW LTX POLYMER

## (undated) DEVICE — INTRODUCER SHTH 11FR CANN L23CM DIL TIP 45MM YEL W/O MINI

## (undated) DEVICE — FLUFF AND POLYMER UNDERPAD,EXTRA HEAVY: Brand: WINGS

## (undated) DEVICE — 3M(TM) MEDIPORE(TM) +PAD SOFT CLOTH ADHESIVE WOUND DRESSING 3569: Brand: 3M™ MEDIPORE™

## (undated) DEVICE — SUTURE MCRYL SZ 4 0 L18IN ABSRB VLT PS 1 L24MM 3 8 CIR REV Y682H

## (undated) DEVICE — SUT PROL 6-0 30IN C1 DA BLU --

## (undated) DEVICE — DRESSING HEMSTAT W4XL4IN 4 PLY WHT IMPREG KAOLIN HYDRPHLC

## (undated) DEVICE — DRAPE STRL ANGIO W/ 2 FLD COLLCTN PCH 86X135 218X343 CM 2

## (undated) DEVICE — FLEX ADVANTAGE 3000CC: Brand: FLEX ADVANTAGE

## (undated) DEVICE — CANNULA ORIG TL CLR W FOAM CUSHIONS AND 14FT SUPL TB 3 CHN

## (undated) DEVICE — MEDI-VAC SUCTION HIGH CAPACITY: Brand: CARDINAL HEALTH

## (undated) DEVICE — COVADERM: Brand: DEROYAL

## (undated) DEVICE — INTRODUCER SHTH 11FR CANN L11CM DIL TIP 45MM YEL TUNGSTEN

## (undated) DEVICE — INTENDED FOR TISSUE SEPARATION, AND OTHER PROCEDURES THAT REQUIRE A SHARP SURGICAL BLADE TO PUNCTURE OR CUT.: Brand: BARD-PARKER SAFETY BLADES SIZE 10, STERILE

## (undated) DEVICE — 3M™ STERI-STRIP™ COMPOUND BENZOIN TINCTURE 40 BAGS/CARTON 4 CARTONS/CASE C1544: Brand: 3M™ STERI-STRIP™

## (undated) DEVICE — KIT CLN UP BON SECOURS MARYV

## (undated) DEVICE — TUBING PRSS 48IN 1200PSI CLR HI PRSS INJ EXCITE FLX

## (undated) DEVICE — BEACON TIP CENTIMETER SIZING CATHETER: Brand: BEACON

## (undated) DEVICE — (D)STRIP SKN CLSR 0.5X4IN WHT --

## (undated) DEVICE — PLASMABLADE PS200-040 4.0: Brand: PLASMABLADE™

## (undated) DEVICE — CATHETER ART 20 GAX4 CM 22 GA RADIAL FEP

## (undated) DEVICE — SUTURE PERMA-HAND SZ 3-0 L24IN NONABSORBABLE BLK W/O NDL SA74H

## (undated) DEVICE — SUTURE MCRYL SZ 4-0 L18IN ABSRB UD L19MM PS-2 3/8 CIR PRIM Y496G

## (undated) DEVICE — DRAPE SURG W25XL50IN E OPN CIR BND BG

## (undated) DEVICE — (D)SYR 10ML 1/5ML GRAD NSAF -- PKGING CHANGE USE ITEM 338027

## (undated) DEVICE — GUIDEWIRE VASC L180CM DIA0.035IN TIP L7CM PTFE S STL STR

## (undated) DEVICE — RADIFOCUS GLIDEWIRE: Brand: GLIDEWIRE

## (undated) DEVICE — REM POLYHESIVE ADULT PATIENT RETURN ELECTRODE: Brand: VALLEYLAB

## (undated) DEVICE — TRAY CATH OD16FR SIL URIN M STATLOK STBL DEV SURSTP

## (undated) DEVICE — INTENDED FOR TISSUE SEPARATION, AND OTHER PROCEDURES THAT REQUIRE A SHARP SURGICAL BLADE TO PUNCTURE OR CUT.: Brand: BARD-PARKER SAFETY BLADES SIZE 11, STERILE

## (undated) DEVICE — SUTURE MCRYL SZ 2-0 L36IN ABSRB UD L36MM CT-1 1/2 CIR Y945H

## (undated) DEVICE — SOLUTION IRRIG 1000ML H2O STRL BLT

## (undated) DEVICE — SYR 50ML SLIP TIP NSAF LF STRL --

## (undated) DEVICE — (D)CATH DIAG HI FLO PIG ROY FL -- HNR5.0-35-70-P-10S-PIG RECALL

## (undated) DEVICE — CATHETER SUCT TR FL TIP 14FR W/ O CTRL

## (undated) DEVICE — 3M™ IOBAN™ 2 ANTIMICROBIAL INCISE DRAPE 6650EZ: Brand: IOBAN™ 2

## (undated) DEVICE — SYRINGE MED 20ML STD CLR PLAS LUERLOCK TIP N CTRL DISP

## (undated) DEVICE — Device: Brand: VISIONS PV .035 DIGITAL IVUS CATHETER

## (undated) DEVICE — (D)PREP SKN CHLRAPRP APPL 26ML -- CONVERT TO ITEM 371833